# Patient Record
Sex: MALE | Race: WHITE | NOT HISPANIC OR LATINO | Employment: OTHER | URBAN - NONMETROPOLITAN AREA
[De-identification: names, ages, dates, MRNs, and addresses within clinical notes are randomized per-mention and may not be internally consistent; named-entity substitution may affect disease eponyms.]

---

## 2020-03-23 ENCOUNTER — HOSPITAL ENCOUNTER (EMERGENCY)
Facility: HOSPITAL | Age: 75
End: 2020-03-23
Attending: EMERGENCY MEDICINE | Admitting: EMERGENCY MEDICINE
Payer: COMMERCIAL

## 2020-03-23 ENCOUNTER — APPOINTMENT (EMERGENCY)
Dept: RADIOLOGY | Facility: HOSPITAL | Age: 75
End: 2020-03-23
Payer: COMMERCIAL

## 2020-03-23 ENCOUNTER — HOSPITAL ENCOUNTER (INPATIENT)
Facility: HOSPITAL | Age: 75
LOS: 7 days | Discharge: NURSING, REHAB OR LONG-TERM CARE | DRG: 813 | End: 2020-03-30
Attending: EMERGENCY MEDICINE | Admitting: ANESTHESIOLOGY
Payer: COMMERCIAL

## 2020-03-23 ENCOUNTER — APPOINTMENT (EMERGENCY)
Dept: CT IMAGING | Facility: HOSPITAL | Age: 75
End: 2020-03-23
Payer: COMMERCIAL

## 2020-03-23 VITALS
TEMPERATURE: 97.8 F | SYSTOLIC BLOOD PRESSURE: 94 MMHG | OXYGEN SATURATION: 98 % | DIASTOLIC BLOOD PRESSURE: 52 MMHG | WEIGHT: 134.48 LBS | HEART RATE: 71 BPM | RESPIRATION RATE: 17 BRPM

## 2020-03-23 DIAGNOSIS — R77.8 ELEVATED TROPONIN I LEVEL: ICD-10-CM

## 2020-03-23 DIAGNOSIS — N18.9 ACUTE KIDNEY INJURY SUPERIMPOSED ON CHRONIC KIDNEY DISEASE (HCC): ICD-10-CM

## 2020-03-23 DIAGNOSIS — D69.6 THROMBOCYTOPENIA (HCC): ICD-10-CM

## 2020-03-23 DIAGNOSIS — R19.5 OCCULT GI BLEEDING: ICD-10-CM

## 2020-03-23 DIAGNOSIS — N19 UREMIA: ICD-10-CM

## 2020-03-23 DIAGNOSIS — E87.2 HIGH ANION GAP METABOLIC ACIDOSIS: ICD-10-CM

## 2020-03-23 DIAGNOSIS — D64.9 SYMPTOMATIC ANEMIA: ICD-10-CM

## 2020-03-23 DIAGNOSIS — R77.8 ELEVATED TROPONIN: ICD-10-CM

## 2020-03-23 DIAGNOSIS — N17.9 ACUTE KIDNEY INJURY (HCC): ICD-10-CM

## 2020-03-23 DIAGNOSIS — E23.6 PITUITARY MASS (HCC): ICD-10-CM

## 2020-03-23 DIAGNOSIS — N17.9 ACUTE KIDNEY INJURY SUPERIMPOSED ON CHRONIC KIDNEY DISEASE (HCC): ICD-10-CM

## 2020-03-23 DIAGNOSIS — R57.9 SHOCK (HCC): Primary | ICD-10-CM

## 2020-03-23 DIAGNOSIS — E11.9 TYPE 2 DIABETES MELLITUS (HCC): ICD-10-CM

## 2020-03-23 DIAGNOSIS — K74.60 CIRRHOSIS (HCC): ICD-10-CM

## 2020-03-23 DIAGNOSIS — D64.9 ANEMIA: ICD-10-CM

## 2020-03-23 DIAGNOSIS — R79.89 ELEVATED LACTIC ACID LEVEL: ICD-10-CM

## 2020-03-23 DIAGNOSIS — G93.89 BRAIN MASS: ICD-10-CM

## 2020-03-23 PROBLEM — J44.9 COPD (CHRONIC OBSTRUCTIVE PULMONARY DISEASE) (HCC): Status: ACTIVE | Noted: 2020-03-23

## 2020-03-23 PROBLEM — I25.10 CAD (CORONARY ARTERY DISEASE): Status: ACTIVE | Noted: 2020-03-23

## 2020-03-23 LAB
ABO GROUP BLD: NORMAL
ABO GROUP BLD: NORMAL
ALBUMIN SERPL BCP-MCNC: 2.9 G/DL (ref 3.5–5)
ALBUMIN SERPL BCP-MCNC: 3.2 G/DL (ref 3.5–5)
ALP SERPL-CCNC: 36 U/L (ref 46–116)
ALP SERPL-CCNC: 40 U/L (ref 46–116)
ALT SERPL W P-5'-P-CCNC: 23 U/L (ref 12–78)
ALT SERPL W P-5'-P-CCNC: 23 U/L (ref 12–78)
AMMONIA PLAS-SCNC: 56 UMOL/L (ref 11–35)
ANION GAP SERPL CALCULATED.3IONS-SCNC: 10 MMOL/L (ref 4–13)
ANION GAP SERPL CALCULATED.3IONS-SCNC: 18 MMOL/L (ref 4–13)
ANION GAP SERPL CALCULATED.3IONS-SCNC: 20 MMOL/L (ref 4–13)
AST SERPL W P-5'-P-CCNC: 167 U/L (ref 5–45)
AST SERPL W P-5'-P-CCNC: 80 U/L (ref 5–45)
ATRIAL RATE: 79 BPM
BASE EX.OXY STD BLDV CALC-SCNC: 55.2 % (ref 60–80)
BASE EXCESS BLDV CALC-SCNC: -14.4 MMOL/L
BASOPHILS # BLD AUTO: 0.03 THOUSANDS/ΜL (ref 0–0.1)
BASOPHILS NFR BLD AUTO: 0 % (ref 0–1)
BILIRUB SERPL-MCNC: 0.4 MG/DL (ref 0.2–1)
BILIRUB SERPL-MCNC: 0.51 MG/DL (ref 0.2–1)
BILIRUB UR QL STRIP: NEGATIVE
BLD GP AB SCN SERPL QL: NEGATIVE
BLD GP AB SCN SERPL QL: NEGATIVE
BLD SMEAR INTERP: NORMAL
BUN SERPL-MCNC: 118 MG/DL (ref 5–25)
BUN SERPL-MCNC: 127 MG/DL (ref 5–25)
BUN SERPL-MCNC: 138 MG/DL (ref 5–25)
CALCIUM SERPL-MCNC: 7.4 MG/DL (ref 8.3–10.1)
CALCIUM SERPL-MCNC: 7.5 MG/DL (ref 8.3–10.1)
CALCIUM SERPL-MCNC: 8.4 MG/DL (ref 8.3–10.1)
CHLORIDE SERPL-SCNC: 109 MMOL/L (ref 100–108)
CHLORIDE SERPL-SCNC: 112 MMOL/L (ref 100–108)
CHLORIDE SERPL-SCNC: 123 MMOL/L (ref 100–108)
CLARITY UR: CLEAR
CO2 SERPL-SCNC: 13 MMOL/L (ref 21–32)
CO2 SERPL-SCNC: 13 MMOL/L (ref 21–32)
CO2 SERPL-SCNC: 14 MMOL/L (ref 21–32)
COLOR UR: YELLOW
CREAT SERPL-MCNC: 2.83 MG/DL (ref 0.6–1.3)
CREAT SERPL-MCNC: 3.17 MG/DL (ref 0.6–1.3)
CREAT SERPL-MCNC: 3.52 MG/DL (ref 0.6–1.3)
CRP SERPL QL: 8.6 MG/L
EOSINOPHIL # BLD AUTO: 0.14 THOUSAND/ΜL (ref 0–0.61)
EOSINOPHIL NFR BLD AUTO: 1 % (ref 0–6)
ERYTHROCYTE [DISTWIDTH] IN BLOOD BY AUTOMATED COUNT: 17.9 % (ref 11.6–15.1)
ERYTHROCYTE [DISTWIDTH] IN BLOOD BY AUTOMATED COUNT: 18.4 % (ref 11.6–15.1)
ERYTHROCYTE [SEDIMENTATION RATE] IN BLOOD: 19 MM/HOUR (ref 0–10)
FERRITIN SERPL-MCNC: 172 NG/ML (ref 8–388)
FIBRINOGEN PPP-MCNC: 208 MG/DL (ref 227–495)
FOLATE SERPL-MCNC: 13.7 NG/ML (ref 3.1–17.5)
GFR SERPL CREATININE-BSD FRML MDRD: 16 ML/MIN/1.73SQ M
GFR SERPL CREATININE-BSD FRML MDRD: 18 ML/MIN/1.73SQ M
GFR SERPL CREATININE-BSD FRML MDRD: 21 ML/MIN/1.73SQ M
GLUCOSE SERPL-MCNC: 126 MG/DL (ref 65–140)
GLUCOSE SERPL-MCNC: 133 MG/DL (ref 65–140)
GLUCOSE SERPL-MCNC: 136 MG/DL (ref 65–140)
GLUCOSE SERPL-MCNC: 146 MG/DL (ref 65–140)
GLUCOSE UR STRIP-MCNC: NEGATIVE MG/DL
HAV IGM SER QL: NORMAL
HBV CORE IGM SER QL: NORMAL
HBV SURFACE AG SER QL: NORMAL
HCO3 BLDV-SCNC: 11.5 MMOL/L (ref 24–30)
HCT VFR BLD AUTO: 18.8 % (ref 36.5–49.3)
HCT VFR BLD AUTO: 20.1 % (ref 36.5–49.3)
HCT VFR BLD AUTO: 20.7 % (ref 36.5–49.3)
HCV AB SER QL: NORMAL
HGB BLD-MCNC: 5.9 G/DL (ref 12–17)
HGB BLD-MCNC: 6.4 G/DL (ref 12–17)
HGB BLD-MCNC: 6.6 G/DL (ref 12–17)
HGB RETIC QN AUTO: 37.9 PG (ref 30–38.3)
HGB UR QL STRIP.AUTO: NEGATIVE
HOLD SPECIMEN: NORMAL
HOLD SPECIMEN: NORMAL
IMM GRANULOCYTES # BLD AUTO: 0.18 THOUSAND/UL (ref 0–0.2)
IMM GRANULOCYTES NFR BLD AUTO: 1 % (ref 0–2)
IMM RETICS NFR: 52.2 % (ref 0–14)
INR PPP: 1.36 (ref 0.84–1.19)
IRON SATN MFR SERPL: 83 %
IRON SERPL-MCNC: 280 UG/DL (ref 65–175)
KETONES UR STRIP-MCNC: NEGATIVE MG/DL
LACTATE SERPL-SCNC: 0.8 MMOL/L (ref 0.5–2)
LACTATE SERPL-SCNC: 2.4 MMOL/L (ref 0.5–2)
LACTATE SERPL-SCNC: 2.7 MMOL/L (ref 0.5–2)
LDH SERPL-CCNC: 370 U/L (ref 81–234)
LEUKOCYTE ESTERASE UR QL STRIP: NEGATIVE
LIPASE SERPL-CCNC: 52 U/L (ref 73–393)
LYMPHOCYTES # BLD AUTO: 5.95 THOUSANDS/ΜL (ref 0.6–4.47)
LYMPHOCYTES NFR BLD AUTO: 40 % (ref 14–44)
MAGNESIUM SERPL-MCNC: 1.6 MG/DL (ref 1.6–2.6)
MAGNESIUM SERPL-MCNC: 1.6 MG/DL (ref 1.6–2.6)
MCH RBC QN AUTO: 31.7 PG (ref 26.8–34.3)
MCH RBC QN AUTO: 33.1 PG (ref 26.8–34.3)
MCHC RBC AUTO-ENTMCNC: 31.4 G/DL (ref 31.4–37.4)
MCHC RBC AUTO-ENTMCNC: 31.8 G/DL (ref 31.4–37.4)
MCV RBC AUTO: 100 FL (ref 82–98)
MCV RBC AUTO: 106 FL (ref 82–98)
MONOCYTES # BLD AUTO: 1.51 THOUSAND/ΜL (ref 0.17–1.22)
MONOCYTES NFR BLD AUTO: 10 % (ref 4–12)
NEUTROPHILS # BLD AUTO: 7.16 THOUSANDS/ΜL (ref 1.85–7.62)
NEUTS SEG NFR BLD AUTO: 48 % (ref 43–75)
NITRITE UR QL STRIP: NEGATIVE
NRBC BLD AUTO-RTO: 1 /100 WBCS
O2 CT BLDV-SCNC: 5.6 ML/DL
P AXIS: 69 DEGREES
PCO2 BLDV: 27 MM HG (ref 42–50)
PH BLDV: 7.25 [PH] (ref 7.3–7.4)
PH UR STRIP.AUTO: 5 [PH]
PHOSPHATE SERPL-MCNC: 3.2 MG/DL (ref 2.3–4.1)
PLATELET # BLD AUTO: 13 THOUSANDS/UL (ref 149–390)
PLATELET # BLD AUTO: 2 THOUSANDS/UL (ref 149–390)
PMV BLD AUTO: 9.4 FL (ref 8.9–12.7)
PO2 BLDV: 33.3 MM HG (ref 35–45)
POTASSIUM SERPL-SCNC: 3.9 MMOL/L (ref 3.5–5.3)
POTASSIUM SERPL-SCNC: 4.4 MMOL/L (ref 3.5–5.3)
POTASSIUM SERPL-SCNC: 4.5 MMOL/L (ref 3.5–5.3)
PR INTERVAL: 158 MS
PROCALCITONIN SERPL-MCNC: 0.68 NG/ML
PROT SERPL-MCNC: 6 G/DL (ref 6.4–8.2)
PROT SERPL-MCNC: 6.6 G/DL (ref 6.4–8.2)
PROT UR STRIP-MCNC: NEGATIVE MG/DL
PROTHROMBIN TIME: 16.8 SECONDS (ref 11.6–14.5)
QRS AXIS: 62 DEGREES
QRSD INTERVAL: 88 MS
QT INTERVAL: 450 MS
QTC INTERVAL: 516 MS
RBC # BLD AUTO: 1.78 MILLION/UL (ref 3.88–5.62)
RBC # BLD AUTO: 2.02 MILLION/UL (ref 3.88–5.62)
RETICS # AUTO: ABNORMAL 10*3/UL (ref 14356–105094)
RETICS # CALC: 6.66 % (ref 0.37–1.87)
RH BLD: POSITIVE
RH BLD: POSITIVE
SODIUM SERPL-SCNC: 143 MMOL/L (ref 136–145)
SODIUM SERPL-SCNC: 143 MMOL/L (ref 136–145)
SODIUM SERPL-SCNC: 146 MMOL/L (ref 136–145)
SP GR UR STRIP.AUTO: 1.02 (ref 1–1.03)
SPECIMEN EXPIRATION DATE: NORMAL
SPECIMEN EXPIRATION DATE: NORMAL
T WAVE AXIS: 125 DEGREES
T4 FREE SERPL-MCNC: 0.79 NG/DL (ref 0.76–1.46)
TIBC SERPL-MCNC: 337 UG/DL (ref 250–450)
TROPONIN I SERPL-MCNC: 23.18 NG/ML
TROPONIN I SERPL-MCNC: 37.2 NG/ML
TROPONIN I SERPL-MCNC: 37.7 NG/ML
TROPONIN I SERPL-MCNC: 8.52 NG/ML
TSH SERPL DL<=0.05 MIU/L-ACNC: 1.45 UIU/ML (ref 0.36–3.74)
UROBILINOGEN UR QL STRIP.AUTO: 0.2 E.U./DL
VENTRICULAR RATE: 79 BPM
VIT B12 SERPL-MCNC: 437 PG/ML (ref 100–900)
WBC # BLD AUTO: 14.89 THOUSAND/UL (ref 4.31–10.16)
WBC # BLD AUTO: 15.21 THOUSAND/UL (ref 4.31–10.16)

## 2020-03-23 PROCEDURE — 85025 COMPLETE CBC W/AUTO DIFF WBC: CPT | Performed by: STUDENT IN AN ORGANIZED HEALTH CARE EDUCATION/TRAINING PROGRAM

## 2020-03-23 PROCEDURE — 83550 IRON BINDING TEST: CPT | Performed by: STUDENT IN AN ORGANIZED HEALTH CARE EDUCATION/TRAINING PROGRAM

## 2020-03-23 PROCEDURE — 96366 THER/PROPH/DIAG IV INF ADDON: CPT

## 2020-03-23 PROCEDURE — 86140 C-REACTIVE PROTEIN: CPT | Performed by: STUDENT IN AN ORGANIZED HEALTH CARE EDUCATION/TRAINING PROGRAM

## 2020-03-23 PROCEDURE — 84439 ASSAY OF FREE THYROXINE: CPT | Performed by: EMERGENCY MEDICINE

## 2020-03-23 PROCEDURE — 96361 HYDRATE IV INFUSION ADD-ON: CPT

## 2020-03-23 PROCEDURE — 85025 COMPLETE CBC W/AUTO DIFF WBC: CPT | Performed by: EMERGENCY MEDICINE

## 2020-03-23 PROCEDURE — 83605 ASSAY OF LACTIC ACID: CPT | Performed by: STUDENT IN AN ORGANIZED HEALTH CARE EDUCATION/TRAINING PROGRAM

## 2020-03-23 PROCEDURE — 86901 BLOOD TYPING SEROLOGIC RH(D): CPT | Performed by: EMERGENCY MEDICINE

## 2020-03-23 PROCEDURE — 85007 BL SMEAR W/DIFF WBC COUNT: CPT | Performed by: STUDENT IN AN ORGANIZED HEALTH CARE EDUCATION/TRAINING PROGRAM

## 2020-03-23 PROCEDURE — 83735 ASSAY OF MAGNESIUM: CPT | Performed by: EMERGENCY MEDICINE

## 2020-03-23 PROCEDURE — 80048 BASIC METABOLIC PNL TOTAL CA: CPT | Performed by: EMERGENCY MEDICINE

## 2020-03-23 PROCEDURE — 83540 ASSAY OF IRON: CPT | Performed by: STUDENT IN AN ORGANIZED HEALTH CARE EDUCATION/TRAINING PROGRAM

## 2020-03-23 PROCEDURE — 99291 CRITICAL CARE FIRST HOUR: CPT | Performed by: ANESTHESIOLOGY

## 2020-03-23 PROCEDURE — 36430 TRANSFUSION BLD/BLD COMPNT: CPT

## 2020-03-23 PROCEDURE — 80053 COMPREHEN METABOLIC PANEL: CPT | Performed by: EMERGENCY MEDICINE

## 2020-03-23 PROCEDURE — 85652 RBC SED RATE AUTOMATED: CPT | Performed by: STUDENT IN AN ORGANIZED HEALTH CARE EDUCATION/TRAINING PROGRAM

## 2020-03-23 PROCEDURE — 85384 FIBRINOGEN ACTIVITY: CPT | Performed by: STUDENT IN AN ORGANIZED HEALTH CARE EDUCATION/TRAINING PROGRAM

## 2020-03-23 PROCEDURE — 74176 CT ABD & PELVIS W/O CONTRAST: CPT

## 2020-03-23 PROCEDURE — 99284 EMERGENCY DEPT VISIT MOD MDM: CPT | Performed by: EMERGENCY MEDICINE

## 2020-03-23 PROCEDURE — 71045 X-RAY EXAM CHEST 1 VIEW: CPT

## 2020-03-23 PROCEDURE — 86920 COMPATIBILITY TEST SPIN: CPT

## 2020-03-23 PROCEDURE — 82948 REAGENT STRIP/BLOOD GLUCOSE: CPT

## 2020-03-23 PROCEDURE — 81003 URINALYSIS AUTO W/O SCOPE: CPT | Performed by: EMERGENCY MEDICINE

## 2020-03-23 PROCEDURE — 84484 ASSAY OF TROPONIN QUANT: CPT | Performed by: STUDENT IN AN ORGANIZED HEALTH CARE EDUCATION/TRAINING PROGRAM

## 2020-03-23 PROCEDURE — 85610 PROTHROMBIN TIME: CPT | Performed by: EMERGENCY MEDICINE

## 2020-03-23 PROCEDURE — 36415 COLL VENOUS BLD VENIPUNCTURE: CPT

## 2020-03-23 PROCEDURE — 86850 RBC ANTIBODY SCREEN: CPT | Performed by: STUDENT IN AN ORGANIZED HEALTH CARE EDUCATION/TRAINING PROGRAM

## 2020-03-23 PROCEDURE — P9016 RBC LEUKOCYTES REDUCED: HCPCS

## 2020-03-23 PROCEDURE — 83690 ASSAY OF LIPASE: CPT | Performed by: EMERGENCY MEDICINE

## 2020-03-23 PROCEDURE — 99285 EMERGENCY DEPT VISIT HI MDM: CPT

## 2020-03-23 PROCEDURE — 86901 BLOOD TYPING SEROLOGIC RH(D): CPT | Performed by: STUDENT IN AN ORGANIZED HEALTH CARE EDUCATION/TRAINING PROGRAM

## 2020-03-23 PROCEDURE — 84443 ASSAY THYROID STIM HORMONE: CPT | Performed by: EMERGENCY MEDICINE

## 2020-03-23 PROCEDURE — P9100 PATHOGEN TEST FOR PLATELETS: HCPCS

## 2020-03-23 PROCEDURE — 86900 BLOOD TYPING SEROLOGIC ABO: CPT | Performed by: EMERGENCY MEDICINE

## 2020-03-23 PROCEDURE — 96375 TX/PRO/DX INJ NEW DRUG ADDON: CPT

## 2020-03-23 PROCEDURE — 85014 HEMATOCRIT: CPT | Performed by: EMERGENCY MEDICINE

## 2020-03-23 PROCEDURE — 30233R1 TRANSFUSION OF NONAUTOLOGOUS PLATELETS INTO PERIPHERAL VEIN, PERCUTANEOUS APPROACH: ICD-10-PCS | Performed by: INTERNAL MEDICINE

## 2020-03-23 PROCEDURE — 84100 ASSAY OF PHOSPHORUS: CPT | Performed by: STUDENT IN AN ORGANIZED HEALTH CARE EDUCATION/TRAINING PROGRAM

## 2020-03-23 PROCEDURE — 85027 COMPLETE CBC AUTOMATED: CPT | Performed by: STUDENT IN AN ORGANIZED HEALTH CARE EDUCATION/TRAINING PROGRAM

## 2020-03-23 PROCEDURE — 84145 PROCALCITONIN (PCT): CPT | Performed by: EMERGENCY MEDICINE

## 2020-03-23 PROCEDURE — 87040 BLOOD CULTURE FOR BACTERIA: CPT | Performed by: EMERGENCY MEDICINE

## 2020-03-23 PROCEDURE — 96368 THER/DIAG CONCURRENT INF: CPT

## 2020-03-23 PROCEDURE — 96365 THER/PROPH/DIAG IV INF INIT: CPT

## 2020-03-23 PROCEDURE — 82140 ASSAY OF AMMONIA: CPT | Performed by: STUDENT IN AN ORGANIZED HEALTH CARE EDUCATION/TRAINING PROGRAM

## 2020-03-23 PROCEDURE — 84484 ASSAY OF TROPONIN QUANT: CPT | Performed by: EMERGENCY MEDICINE

## 2020-03-23 PROCEDURE — C9113 INJ PANTOPRAZOLE SODIUM, VIA: HCPCS | Performed by: EMERGENCY MEDICINE

## 2020-03-23 PROCEDURE — 93005 ELECTROCARDIOGRAM TRACING: CPT

## 2020-03-23 PROCEDURE — 82607 VITAMIN B-12: CPT | Performed by: STUDENT IN AN ORGANIZED HEALTH CARE EDUCATION/TRAINING PROGRAM

## 2020-03-23 PROCEDURE — 83010 ASSAY OF HAPTOGLOBIN QUANT: CPT | Performed by: STUDENT IN AN ORGANIZED HEALTH CARE EDUCATION/TRAINING PROGRAM

## 2020-03-23 PROCEDURE — 83615 LACTATE (LD) (LDH) ENZYME: CPT | Performed by: STUDENT IN AN ORGANIZED HEALTH CARE EDUCATION/TRAINING PROGRAM

## 2020-03-23 PROCEDURE — 82746 ASSAY OF FOLIC ACID SERUM: CPT | Performed by: STUDENT IN AN ORGANIZED HEALTH CARE EDUCATION/TRAINING PROGRAM

## 2020-03-23 PROCEDURE — 83735 ASSAY OF MAGNESIUM: CPT | Performed by: STUDENT IN AN ORGANIZED HEALTH CARE EDUCATION/TRAINING PROGRAM

## 2020-03-23 PROCEDURE — 85362 FIBRIN DEGRADATION PRODUCTS: CPT | Performed by: STUDENT IN AN ORGANIZED HEALTH CARE EDUCATION/TRAINING PROGRAM

## 2020-03-23 PROCEDURE — 82728 ASSAY OF FERRITIN: CPT | Performed by: STUDENT IN AN ORGANIZED HEALTH CARE EDUCATION/TRAINING PROGRAM

## 2020-03-23 PROCEDURE — 83605 ASSAY OF LACTIC ACID: CPT | Performed by: EMERGENCY MEDICINE

## 2020-03-23 PROCEDURE — C9113 INJ PANTOPRAZOLE SODIUM, VIA: HCPCS | Performed by: STUDENT IN AN ORGANIZED HEALTH CARE EDUCATION/TRAINING PROGRAM

## 2020-03-23 PROCEDURE — 85018 HEMOGLOBIN: CPT | Performed by: EMERGENCY MEDICINE

## 2020-03-23 PROCEDURE — P9037 PLATE PHERES LEUKOREDU IRRAD: HCPCS

## 2020-03-23 PROCEDURE — 93010 ELECTROCARDIOGRAM REPORT: CPT | Performed by: INTERNAL MEDICINE

## 2020-03-23 PROCEDURE — P9035 PLATELET PHERES LEUKOREDUCED: HCPCS

## 2020-03-23 PROCEDURE — 85046 RETICYTE/HGB CONCENTRATE: CPT | Performed by: STUDENT IN AN ORGANIZED HEALTH CARE EDUCATION/TRAINING PROGRAM

## 2020-03-23 PROCEDURE — 80074 ACUTE HEPATITIS PANEL: CPT | Performed by: EMERGENCY MEDICINE

## 2020-03-23 PROCEDURE — 80053 COMPREHEN METABOLIC PANEL: CPT | Performed by: STUDENT IN AN ORGANIZED HEALTH CARE EDUCATION/TRAINING PROGRAM

## 2020-03-23 PROCEDURE — 82805 BLOOD GASES W/O2 SATURATION: CPT | Performed by: EMERGENCY MEDICINE

## 2020-03-23 PROCEDURE — 70450 CT HEAD/BRAIN W/O DYE: CPT

## 2020-03-23 PROCEDURE — 86900 BLOOD TYPING SEROLOGIC ABO: CPT | Performed by: STUDENT IN AN ORGANIZED HEALTH CARE EDUCATION/TRAINING PROGRAM

## 2020-03-23 PROCEDURE — 96367 TX/PROPH/DG ADDL SEQ IV INF: CPT

## 2020-03-23 PROCEDURE — 30233N1 TRANSFUSION OF NONAUTOLOGOUS RED BLOOD CELLS INTO PERIPHERAL VEIN, PERCUTANEOUS APPROACH: ICD-10-PCS | Performed by: INTERNAL MEDICINE

## 2020-03-23 PROCEDURE — 86850 RBC ANTIBODY SCREEN: CPT | Performed by: EMERGENCY MEDICINE

## 2020-03-23 RX ORDER — CHLORHEXIDINE GLUCONATE 0.12 MG/ML
15 RINSE ORAL EVERY 12 HOURS SCHEDULED
Status: DISCONTINUED | OUTPATIENT
Start: 2020-03-23 | End: 2020-03-30 | Stop reason: HOSPADM

## 2020-03-23 RX ORDER — DOCUSATE SODIUM 100 MG/1
100 CAPSULE, LIQUID FILLED ORAL 2 TIMES DAILY
Status: DISCONTINUED | OUTPATIENT
Start: 2020-03-24 | End: 2020-03-26

## 2020-03-23 RX ORDER — FERROUS SULFATE 325(65) MG
325 TABLET ORAL 2 TIMES DAILY WITH MEALS
COMMUNITY
End: 2020-04-21 | Stop reason: ALTCHOICE

## 2020-03-23 RX ORDER — CEFEPIME HYDROCHLORIDE 2 G/50ML
2000 INJECTION, SOLUTION INTRAVENOUS ONCE
Status: COMPLETED | OUTPATIENT
Start: 2020-03-23 | End: 2020-03-23

## 2020-03-23 RX ORDER — ONDANSETRON 2 MG/ML
4 INJECTION INTRAMUSCULAR; INTRAVENOUS ONCE
Status: COMPLETED | OUTPATIENT
Start: 2020-03-23 | End: 2020-03-23

## 2020-03-23 RX ORDER — OMEPRAZOLE 20 MG/1
20 CAPSULE, DELAYED RELEASE ORAL DAILY
COMMUNITY
End: 2020-03-30 | Stop reason: HOSPADM

## 2020-03-23 RX ORDER — ACETAMINOPHEN 325 MG/1
650 TABLET ORAL EVERY 6 HOURS PRN
Status: DISCONTINUED | OUTPATIENT
Start: 2020-03-23 | End: 2020-03-30 | Stop reason: HOSPADM

## 2020-03-23 RX ORDER — ROSUVASTATIN CALCIUM 20 MG/1
20 TABLET, COATED ORAL DAILY
COMMUNITY

## 2020-03-23 RX ORDER — FERROUS SULFATE 325(65) MG
325 TABLET ORAL 2 TIMES DAILY WITH MEALS
Status: DISCONTINUED | OUTPATIENT
Start: 2020-03-24 | End: 2020-03-30 | Stop reason: HOSPADM

## 2020-03-23 RX ORDER — POLYVINYL ALCOHOL 14 MG/ML
2 SOLUTION/ DROPS OPHTHALMIC 2 TIMES DAILY
COMMUNITY
End: 2020-11-03 | Stop reason: ALTCHOICE

## 2020-03-23 RX ORDER — BISACODYL 10 MG
10 SUPPOSITORY, RECTAL RECTAL DAILY PRN
COMMUNITY

## 2020-03-23 RX ORDER — PANTOPRAZOLE SODIUM 40 MG/1
40 INJECTION, POWDER, FOR SOLUTION INTRAVENOUS ONCE
Status: COMPLETED | OUTPATIENT
Start: 2020-03-23 | End: 2020-03-23

## 2020-03-23 RX ORDER — ACETAMINOPHEN 325 MG/1
650 TABLET ORAL EVERY 6 HOURS PRN
COMMUNITY

## 2020-03-23 RX ORDER — ASPIRIN 81 MG/1
81 TABLET, CHEWABLE ORAL DAILY
COMMUNITY
End: 2020-03-30 | Stop reason: HOSPADM

## 2020-03-23 RX ORDER — PANTOPRAZOLE SODIUM 40 MG/1
40 INJECTION, POWDER, FOR SOLUTION INTRAVENOUS EVERY 12 HOURS SCHEDULED
Status: DISCONTINUED | OUTPATIENT
Start: 2020-03-23 | End: 2020-03-24

## 2020-03-23 RX ORDER — DOCUSATE SODIUM 100 MG/1
100 CAPSULE, LIQUID FILLED ORAL 2 TIMES DAILY
COMMUNITY
End: 2021-03-07

## 2020-03-23 RX ORDER — BISACODYL 10 MG
10 SUPPOSITORY, RECTAL RECTAL DAILY PRN
Status: DISCONTINUED | OUTPATIENT
Start: 2020-03-23 | End: 2020-03-30 | Stop reason: HOSPADM

## 2020-03-23 RX ORDER — ATORVASTATIN CALCIUM 40 MG/1
40 TABLET, FILM COATED ORAL
Status: DISCONTINUED | OUTPATIENT
Start: 2020-03-24 | End: 2020-03-30 | Stop reason: HOSPADM

## 2020-03-23 RX ORDER — FENOFIBRATE 145 MG/1
145 TABLET, COATED ORAL DAILY
COMMUNITY
End: 2021-04-07 | Stop reason: SINTOL

## 2020-03-23 RX ADMIN — CEFEPIME HYDROCHLORIDE 2000 MG: 2 INJECTION, SOLUTION INTRAVENOUS at 15:18

## 2020-03-23 RX ADMIN — NOREPINEPHRINE BITARTRATE 5 MCG/MIN: 1 INJECTION, SOLUTION, CONCENTRATE INTRAVENOUS at 16:41

## 2020-03-23 RX ADMIN — CHLORHEXIDINE GLUCONATE 0.12% ORAL RINSE 15 ML: 1.2 LIQUID ORAL at 21:46

## 2020-03-23 RX ADMIN — PANTOPRAZOLE SODIUM 40 MG: 40 INJECTION, POWDER, FOR SOLUTION INTRAVENOUS at 13:51

## 2020-03-23 RX ADMIN — SODIUM CHLORIDE 1000 ML: 0.9 INJECTION, SOLUTION INTRAVENOUS at 13:44

## 2020-03-23 RX ADMIN — SODIUM CHLORIDE, SODIUM LACTATE, POTASSIUM CHLORIDE, AND CALCIUM CHLORIDE 500 ML: .6; .31; .03; .02 INJECTION, SOLUTION INTRAVENOUS at 15:18

## 2020-03-23 RX ADMIN — DESMOPRESSIN ACETATE 18.4 MCG: 4 SOLUTION INTRAVENOUS at 14:32

## 2020-03-23 RX ADMIN — PANTOPRAZOLE SODIUM 40 MG: 40 INJECTION, POWDER, FOR SOLUTION INTRAVENOUS at 21:46

## 2020-03-23 RX ADMIN — ONDANSETRON 4 MG: 2 INJECTION INTRAMUSCULAR; INTRAVENOUS at 13:50

## 2020-03-23 RX ADMIN — SODIUM CHLORIDE 10 MCG/MIN: 0.9 INJECTION, SOLUTION INTRAVENOUS at 20:45

## 2020-03-23 NOTE — ED NOTES
Unit of blood completed at this time   VS: 96 3; HR: 76 BP: 82/47: resp: 17 Oxygen sat: Tamir Loyola RN  03/23/20 7208

## 2020-03-23 NOTE — H&P
History and Physical - Critical Care  Yas Banks 69 y o  male MRN: [id number]  Unit/Bed#: ICU 02 Encounter: [id number]     Reason for Admission / Chief Complaint: Brain mass, shock, anemia       History of Present Illness:  Yas Banks is a 69 y o  male who presents as a transfer from [address]  Patient has medical history significant for COPD, hyperlipidemia, coronary artery disease diabetes mellitus type 2, thrombocytopenia, CKD stage 3  Patient is a resident [facility]  Patient initially presented to the emergency department for abnormal lab results in [facility] home  Outpatient lab studies showed a hemoglobin of 7 with platelet count of less than 10 and elevated /creatinine 3  On questioning, patient referred small amount of blood in his stool as well as mild abdominal pain and nausea, exertional dyspnea does also present at rest, worse with standing  Patient appeared confused on questioning and was unable to provide full details  Despite lab derangements, patient hemodynamically stable with a heart rate of 79  Patient was mildly hypothermic at 35 8° C  Lab significant for hemoglobin 5 9-6 6, WBC 15, platelets 2, lactic acid 2 7-2 4, creatinine 3 52-3 17, -127, bicarb 13 with anion gap of 18 VBG 7 247/27 0/33 3/11 5/-14 4, troponin 8 52, INR 1 36, magnesium 1 6, AST 80, albumin 3 2  Chest x-ray without acute cardiopulmonary disease  CT abdomen pelvis with nodular liver concerning for underlying cirrhosis, multiple bilateral renal nodules some of which meet the criteria for simple cyst and others which do not, diverticulosis without diverticulitis  CT head with 2 5 x 2 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 with locally invasive features of the adjacent skull and extension into the sphenoid sinus on the left 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 versus pituitary carcinoma versus craniopharyngioma versus meningioma versus giant saccular aneurysm    Prominence of the subdural CSF spaces bilaterally which 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  EKG with normal sinus rhythm with heart rate 79, prolonged QTC of 516 milliseconds, T-wave inversion in lateral leads and ST depression in V3, isolated Q-wave in III  Patient received 1 5 L IVF, 1U pRBC, 1 U plt, DDAVP, protonix, cefepime and was started on levo for hypotension  Discussed case with nursing home  Stated patient felt dizzy a few days ago which prompted lab draws as an outpatient  They stated he had loose bowel movements however no blood or melena as bowel movements  FOBT was positive therapy  Per [facility] home, labs from 11/22/2019 had creatinine 2 2, BUN 35 and from 07/22/2019 hemoglobin 10 0  History obtained from chart review  Past Medical History:  No past medical history on file  Past Surgical History:  No past surgical history on file  Past Family History:  No family history on file       Social History:  E-Cigarette/Vaping     E-Cigarette/Vaping Substances     Social History     Tobacco Use   Smoking Status Current Every Day Smoker    Packs/day: 0 25   Smokeless Tobacco Never Used     Social History     Substance and Sexual Activity   Alcohol Use Never    Frequency: Never     Social History     Substance and Sexual Activity   Drug Use Never     Marital Status: /Civil Union     Medications:  Current Facility-Administered Medications   Medication Dose Route Frequency    [START ON 3/24/2020] cefepime (MAXIPIME) 1,000 mg in dextrose 5 % 50 mL IVPB  1,000 mg Intravenous Q12H    chlorhexidine (PERIDEX) 0 12 % oral rinse 15 mL  15 mL Swish & Spit Q12H [facility]    [START ON 3/24/2020] insulin lispro (HumaLOG) 100 units/mL subcutaneous injection 1-5 Units  1-5 Units Subcutaneous Q6H [facility]    norepinephrine (LEVOPHED) 4 mg (STANDARD CONCENTRATION) IV in sodium chloride 0 9% 250 mL  1-30 mcg/min Intravenous Titrated    pantoprazole (PROTONIX) injection 40 mg  40 mg Intravenous Q12H [facility] Home medications:  Prior to Admission medications    Not on File     Allergies: Allergies   Allergen Reactions    Erythromycin     Penicillins     Shellfish-Derived Products         ROS:   Review of Systems   Constitutional: Negative for appetite change, chills, diaphoresis, fatigue, fever and unexpected weight change  HENT: Negative for sore throat  Eyes: Negative for visual disturbance  Respiratory: Negative for cough, chest tightness, shortness of breath and wheezing  Cardiovascular: Negative for chest pain, palpitations and leg swelling  Gastrointestinal: Negative for abdominal distention, abdominal pain, blood in stool, constipation, diarrhea, nausea and vomiting  Genitourinary: Negative for difficulty urinating, flank pain and urgency  Musculoskeletal: Negative for arthralgias and myalgias  Skin: Negative for pallor and rash  Neurological: Negative for dizziness, weakness, light-headedness and headaches  Vitals:  Vitals:    20   BP: (!) 95/39   BP Location: Left arm   Pulse: 72   Resp: 18   Temp: (!) 97 4 °F (36 3 °C)   TempSrc: Oral   SpO2: 99%   Weight: 59 8 kg (131 lb 13 4 oz)   Height: 5' 6" (1 676 m)     Temperature:   Temp (24hrs), Av 7 °F (35 9 °C), Min:96 1 °F (35 6 °C), Max:97 8 °F (36 6 °C)    Current: Temperature: (!) 97 4 °F (36 3 °C)     Weights:   IBW: 63 8 kg  Body mass index is 21 28 kg/m²  Hemodynamic Monitoring:  N/A     Non-Invasive/Invasive Ventilation Settings:  Respiratory    Lab Data (Last 4 hours)    None         O2/Vent Data (Last 4 hours)    None              No results found for: PHART, OEK3SCN, PO2ART, GAH2NLF, F0ZUTOVQ, BEART, SOURCE  SpO2: SpO2: 99 %     Physical Exam:  Physical Exam   Constitutional: He is oriented to person, place, and time  He appears well-developed and well-nourished  No distress  HENT:   Head: Normocephalic and atraumatic  Mouth/Throat: Oropharynx is clear and moist  No oropharyngeal exudate     Eyes: Pupils are equal, round, and reactive to light  Conjunctivae and EOM are normal  No scleral icterus  Neck: Neck supple  JVD (No JVD however there is JVD on hepatic jugular reflex) present  No thyromegaly present  Cardiovascular: Normal rate and regular rhythm  No murmur heard  Pulmonary/Chest: Effort normal and breath sounds normal  No respiratory distress  He has no wheezes  He exhibits no tenderness  Ambient air   Abdominal: Soft  Bowel sounds are normal  He exhibits no distension  There is no tenderness  There is no guarding  Musculoskeletal: Normal range of motion  He exhibits no edema or deformity  Lymphadenopathy:     He has no cervical adenopathy  Neurological: He is alert and oriented to person, place, and time  He exhibits normal muscle tone  Awake, alert and oriented to person place and time however he is unaware of the month and does answer questions slowly at times, able to move all extremities and follows commands, EOMI, PERRLA   Skin: Skin is warm and dry  Capillary refill takes less than 2 seconds  No rash noted  He is not diaphoretic  No erythema  There is pallor  Psychiatric: He has a normal mood and affect  His behavior is normal    Nursing note and vitals reviewed         Labs:  Results from last 7 days   Lab Units 03/23/20  1754 03/23/20  1322   WBC Thousand/uL  --  14 89*   HEMOGLOBIN g/dL 6 6* 5 9*   HEMATOCRIT % 20 7* 18 8*   PLATELETS Thousands/uL  --  2*   NEUTROS PCT %  --  48   MONOS PCT %  --  10      Results from last 7 days   Lab Units 03/23/20  1610 03/23/20  1322   SODIUM mmol/L 143 143   POTASSIUM mmol/L 4 5 4 4   CHLORIDE mmol/L 112* 109*   CO2 mmol/L 13* 14*   BUN mg/dL 127* 138*   CREATININE mg/dL 3 17* 3 52*   CALCIUM mg/dL 7 5* 8 4   ALK PHOS U/L  --  40*   ALT U/L  --  23   AST U/L  --  80*     Results from last 7 days   Lab Units 03/23/20  1322   MAGNESIUM mg/dL 1 6          Results from last 7 days   Lab Units 03/23/20  1322   INR  1 36*     Results from last 7 days   Lab Units 03/23/20  1610   LACTIC ACID mmol/L 2 4*     0   Lab Value Date/Time    TROPONINI 23 18 () 03/23/2020 1754    TROPONINI 8 52 () 03/23/2020 1340        Imaging:  I have personally reviewed pertinent reports  EKG: This was personally reviewed by myself  Micro:  No results found for: [place]ice [place], SPUTUMCULTUR    Assessment:  78-year-old male with past medical history significant for CAD status post CABG, COPD, hyperlipidemia transferred to [facility] for brain mass, symptomatic anemia and shock currently requiring vasopressor support       Plan:                  Neuro:  Brain mass - 2 5 x 2 0 hyperdense mass in the pituitary fossa with locally invasive features of adjacent skull base extension into the sphenoid sinus on the left  -appreciate neurosurgery recommendations  -neuro checks q 4 hours  -[facility] ICU                 CV:   Shock - consider septic versus hypovolemic in the setting of anemia, bedside ultrasound without effusion or RWMA, extremities warm  Coronary artery disease status post CABG  Elevation in troponin - likely demand ischemia in the setting of non MI elevation in troponin versus NSTEMI type 1 - will avoid heparin drip at this time given thrombocytopenia and anemia  -goal MAP >65 mmHg  -trend troponin  -serial EKG  -check echocardiogram  -appreciate cardiology recommendations  -monitor clinically for chest pain or signs of heart failure                 Lung:   Charted history COPD - not currently in exacerbation, saturating well on room air  -goal SpO2 greater than 90% given ischemia  -incentive spirometry                 GI:   Cirrhosis  -Protonix IV b i d   -check ammonia level                 FEN:   -NPO  -goal potassium greater than 4, magnesium greater than 2  -hold off on IV fluids given volume resuscitation with blood products                 :   Multiple bilateral renal cysts  Acute kidney injury on chronic kidney disease - most recent creatinine 2 2/BUN 35 in November per nursing home - consider in the setting of symptomatic anemia  Uremia - likely in the setting of acute kidney injury however consider GI bleed as possibility  -I/O  -daily weights  -urinary retention protocol  -nephrology recommendations appreciated  -consider renal US                 ID:   Hypothermia  Lactic acidosis  -continue cefepime  -trend WBC, fever curve  -trend lactic acid                 Heme:   Symptomatic anemia - rectal exam negative, FOBT positive (per nursing home)  Thrombocytopenia  -trend hemoglobin  -anemia workup with hemolysis smear, LDH, haptoglobin, retic with the tip hemoglobin, B12, folate, iron panel  -DIC workup  -platelet clumping study  -appreciate gastroenterology recommendations                 Endo:   Type 2 diabetes mellitus  -blood glucose checks Q 6  -correctional scale insulin q 6 while NPO                 Msk/Skin:   Petechia  -PT/OT  -check ESR/CRP                 Disposition:  Critical care, await fax from facility for medication reconciliation  VTE Pharmacologic Prophylaxis:  Contraindicated in the setting of symptomatic anemia, thrombocytopenia  VTE Mechanical Prophylaxis: sequential compression device     Invasive lines and devices: Invasive Devices     Central Venous Catheter Line            CVC Central Lines 03/23/20 Triple Left Femoral less than 1 day          Peripheral Intravenous Line            Peripheral IV 03/23/20 Left Antecubital less than 1 day    Peripheral IV 03/23/20 Right Antecubital less than 1 day                 Code Status: Level 3 - DNAR and DNI  POA:    POLST:       Given critical illness, patient length of stay will require greater than two midnights  Portions of the record may have been created with voice recognition software  Occasional wrong word or "sound a like" substitutions may have occurred due to the inherent limitations of voice recognition software    Read the chart carefully and recognize, using context, where substitutions have occurred          Cristo Merchant MD

## 2020-03-23 NOTE — ED NOTES
Blood at hub at 1411  Temp:96 1; HR 72; BP: 75/52, RESP: 18 OXYGEN SAT: 96% ON ROOM AIR     Lesley Loyola RN  03/23/20 [address] Loyola RN  03/23/20

## 2020-03-23 NOTE — EMTALA/ACUTE CARE TRANSFER
[facility] EMERGENCY DEPARTMENT  [address] [facility] [address]  Dept: [phone]      EMTALA TRANSFER CONSENT    NAME Geri Hartmann 1945                              MRN [id number]    I have been informed of my rights regarding examination, treatment, and transfer   by Dr Jose Schmidt, DO    Benefits: Specialized equipment and/or services available at the receiving facility (Include comment)________________________(critical care)    Risks: Potential for delay in receiving treatment, Possible worsening of condition or death during transfer, Potential deterioration of medical condition, Loss of IV, Increased discomfort during transfer(motor vehicle crash)      Consent for Transfer:  I acknowledge that my medical condition has been evaluated and explained to me by the emergency department physician or other qualified medical person and/or my attending physician, who has recommended that I be transferred to the service of  Accepting Physician: Dr Sarahi Jiang at [address] Name, [address] : [facility]  The above potential benefits of such transfer, the potential risks associated with such transfer, and the probable risks of not being transferred have been explained to me, and I fully understand them  The doctor has explained that, in my case, the benefits of transfer outweigh the risks  I agree to be transferred  I authorize the performance of emergency medical procedures and treatments upon me in both transit and upon arrival at the receiving facility  Additionally, I authorize the release of any and all medical records to the receiving facility and request they be transported with me, if possible  I understand that the safest mode of transportation during a medical emergency is an ambulance and that the Hospital advocates the use of this mode of transport   Risks of traveling to the receiving facility by car, including absence of medical control, life sustaining equipment, such as oxygen, and medical personnel has been explained to me and I fully understand them  (ZOILA CORRECT BOX BELOW)  [  ]  I consent to the stated transfer and to be transported by ambulance/helicopter  [  ]  I consent to the stated transfer, but refuse transportation by ambulance and accept full responsibility for my transportation by car  I understand the risks of non-ambulance transfers and I exonerate the Hospital and its staff from any deterioration in my condition that results from this refusal     X___________________________________________    DATE  20  TIME________  Signature of patient or legally responsible individual signing on patient behalf           RELATIONSHIP TO PATIENT_________________________          Provider Certification    NAME Lamine Godoy                                        [facility] 1945                              MRN [id number]    A medical screening exam was performed on the above named patient  Based on the examination:    Condition Necessitating Transfer The primary encounter diagnosis was Shock ([address] )  Diagnoses of Acute kidney injury superimposed on chronic kidney disease ([address] ), Elevated troponin I level, Elevated lactic acid level, High anion gap metabolic acidosis, Occult GI bleeding, Anemia, and Thrombocytopenia ([address] ) were also pertinent to this visit      Patient Condition: The patient has been stabilized such that within reasonable medical probability, no material deterioration of the patient condition or the condition of the unborn child(minnie) is likely to result from the transfer    Reason for Transfer: Level of Care needed not available at this facility(critical care)    Transfer Requirements: [place] [address]   · Space available and qualified personnel available for treatment as acknowledged by    · Agreed to accept transfer and to provide appropriate medical treatment as acknowledged by       Dr Gaby Gay  · Appropriate medical records of the examination and treatment of the patient are provided at the time of transfer   [address] [facility], [address] _______  · Transfer will be performed by qualified personnel from    and appropriate transfer equipment as required, including the use of necessary and appropriate life support measures  Provider Certification: I have examined the patient and explained the following risks and benefits of being transferred/refusing transfer to the patient/family:  General risk, such as traffic hazards, adverse weather conditions, rough terrain or turbulence, possible failure of equipment (including vehicle or aircraft), or consequences of actions of persons outside the control of the transport personnel      Based on these reasonable risks and benefits to the patient and/or the unborn child(minnie), and based upon the information available at the time of the patients examination, I certify that the medical benefits reasonably to be expected from the provision of appropriate medical treatments at another medical facility outweigh the increasing risks, if any, to the individuals medical condition, and in the case of labor to the unborn child, from effecting the transfer      X____________________________________________ DATE 03/23/20        TIME_______      ORIGINAL - SEND TO MEDICAL RECORDS   COPY - SEND WITH PATIENT DURING TRANSFER

## 2020-03-23 NOTE — ED NOTES
[id number]; O POSITIVE;  Emergent transfusion of uncrossed blood per Dr Bruno Simple infusing over one hour, witnessed by KELECHI Loyola, KELECHI  03/23/20 6232

## 2020-03-23 NOTE — ED PROVIDER NOTES
History  Chief Complaint   Patient presents with    Abnormal Lab     patient present with low hemoglobin according to EMS  he denies any sob at this time  he states that he was sob and had blood in stool at [facility]  patient has a hx of a blood transfusion  56-year-old male with history emphysema, hyperlipidemia, coronary artery disease, type 2 diabetes, chronic thrombocytopenia, stage III chronic kidney disease presents from [place] [address] with report of abnormal hemoglobin found on lab testing ordered 22 March 2020  Review of lab testing performed from 22 March shows hemoglobin of 7 g/dl with marked thrombocytopenia of less than 10 x 10^3; there was also elevated blood urea nitrogen of 110 with creatinine of 3  Patient himself reported a small amount of blood in stool this morning although he was somewhat confused and unable to provide full details  He did report some degree of abdominal pain as well as nausea; again full details in this respect were not available from the patient  As well he reports some degree of dyspnea present at rest worse with exertion and upon standing at his nursing home this morning  He does not use any anticoagulant medications  He did not report any history of GI bleeding and review of medical records does not appear to demonstrate any prior GI bleeding: he has however received blood transfusions previously  Information taken from nursing home, EMR, and patient  No hx of hematologic malignancy  A/p:  Paperwork from nursing home reviewed demonstrating anemia/profound thrombocytopenia  With reported GI bleeding, concern for other hematologic derangements/metabolic derangements related to hypoperfusion  Broad workup for infectious/metabolic derangements  High likelihood of transfusion being required: consent obtained from patient after initial evaluation  Hospital admission        History provided by:  Patient, EMS personnel, medical records and nursing home (Nursing home transfer paperwork)      None       History reviewed  No pertinent past medical history  History reviewed  No pertinent surgical history  History reviewed  No pertinent family history  I have reviewed and agree with the history as documented  E-Cigarette/Vaping     E-Cigarette/Vaping Substances     Social History     Tobacco Use    Smoking status: Current Every Day Smoker     Packs/day: 0 25    Smokeless tobacco: Never Used   Substance Use Topics    Alcohol use: Never     Frequency: Never    Drug use: Never       Review of Systems   Constitutional: Negative for chills, fatigue and fever  Respiratory: Positive for shortness of breath  Negative for cough  Cardiovascular: Negative for chest pain and palpitations  Gastrointestinal: Positive for abdominal pain and nausea  Negative for diarrhea and vomiting  Skin: Negative for color change, pallor, rash and wound  Neurological: Positive for light-headedness  Negative for dizziness, weakness, numbness and headaches  Hematological: Negative for adenopathy  Does not bruise/bleed easily  All other systems reviewed and are negative  Physical Exam  Physical Exam   Constitutional: He appears well-developed and well-nourished  He is active and cooperative  No distress  HENT:   Head: Normocephalic and atraumatic  Right Ear: Hearing and external ear normal    Left Ear: Hearing and external ear normal    Nose: Nose normal    Neck: Trachea normal and phonation normal  No tracheal tenderness present  No tracheal deviation present  Cardiovascular: Normal rate, regular rhythm, S1 normal, S2 normal, normal heart sounds and intact distal pulses  Exam reveals no gallop and no friction rub  No murmur heard  Pulses:       Radial pulses are 1+ on the right side, and 1+ on the left side  Dorsalis pedis pulses are 1+ on the right side, and 1+ on the left side          Posterior tibial pulses are 1+ on the right side, and 1+ on the left side  Pulmonary/Chest: Effort normal and breath sounds normal  No stridor  No respiratory distress  He has no decreased breath sounds  He has no wheezes  He has no rhonchi  He has no rales  He exhibits no tenderness  Abdominal: Soft  He exhibits no distension and no mass  There is no tenderness  There is no rigidity, no rebound, no guarding and no CVA tenderness  Rectal exam:  Normal rectal tone  No palpable internal/external masses  Scant brown stool; guaiac negative  Exam assisted by [facility] RN   Musculoskeletal: He exhibits no edema, tenderness or deformity  Neurological: He is alert  He has normal strength  He is disoriented (oriented to person/place; not oriented to time: confused with respect to when he was residing at nursing home)  No cranial nerve deficit or sensory deficit  He exhibits normal muscle tone  GCS eye subscore is 4  GCS verbal subscore is 4  GCS motor subscore is 6  PERRLA; EOMI  Sensation intact to light touch over face in V1-V3 distribution bilaterally  Facial expressions symmetric  Tongue/uvula midline  Shoulder shrug equal bilaterally  Strength 5/5 in UE/LE bilaterally  Sensation intact to light touch in UE/LE bilaterally  Skin: Skin is warm, dry and intact  He is not diaphoretic  There is pallor  Psychiatric: He has a normal mood and affect  His speech is normal and behavior is normal    Confused as to recent past events: cannot recall when he was admitted to nursing facility and seems to think that he is no longer residing there   Nursing note and vitals reviewed        Vital Signs  ED Triage Vitals [03/23/20 1309]   Temperature Pulse Respirations Blood Pressure SpO2   (!) 96 4 °F (35 8 °C) 79 21 118/53 97 %      Temp Source Heart Rate Source Patient Position - Orthostatic VS BP Location FiO2 (%)   Temporal Monitor Lying Right arm --      Pain Score       --           Vitals:    03/23/20 1743 03/23/20 1745 03/23/20 1800 03/23/20 1815   BP: (!) 97/49 (!) 97/49 111/53 94/52   Pulse: 73 73 73 71   Patient Position - Orthostatic VS:  Lying Lying Lying       Visual Acuity    ED Medications  Medications   norepinephrine (LEVOPHED) 4 mg (STANDARD CONCENTRATION) IV in sodium chloride 0 9% 250 mL (7 mcg/min Intravenous Rate/Dose Change 3/23/20 1716)   sodium chloride 0 9 % bolus 1,000 mL (0 mL Intravenous Stopped 3/23/20 1443)   ondansetron (ZOFRAN) injection 4 mg (4 mg Intravenous Given 3/23/20 1350)   pantoprazole (PROTONIX) injection 40 mg (40 mg Intravenous Given 3/23/20 1351)   desmopressin (DDAVP) 18 4 mcg in sodium chloride 0 9 % 50 mL IVPB (0 mcg/kg × 61 kg Intravenous Stopped 3/23/20 1502)   cefepime (MAXIPIME) IVPB (premix) 2,000 mg (0 mg Intravenous Stopped 3/23/20 1550)   lactated ringers bolus 500 mL (0 mL Intravenous Stopped 3/23/20 1550)       Diagnostic Studies  Results Reviewed     Procedure Component Value Units Date/Time    Hemoglobin and hematocrit, blood [[id number]]  (Abnormal) Collected:  03/23/20 1754    Lab Status:  Final result Specimen:  Blood from Arm, Left Updated:  03/23/20 1805     Hemoglobin 6 6 g/dL      Hematocrit 20 7 %     Troponin I [[id number]] Collected:  03/23/20 1754    Lab Status: In process Specimen:  Blood from Arm, Left Updated:  03/23/20 1757    Blood culture #1 [[id number]] Collected:  03/23/20 1651    Lab Status: In process Specimen:  Blood from Central Venous Line Updated:  03/23/20 1720    Blood culture #2 [[id number]] Collected:  03/23/20 1658    Lab Status: In process Specimen:  Blood from Arm, Right Updated:  03/23/20 1720    Lactic acid, plasma [[id number]]  (Abnormal) Collected:  03/23/20 1610    Lab Status:  Final result Specimen:  Blood from Arm, Left Updated:  03/23/20 1641     LACTIC ACID 2 4 mmol/L     Narrative:       Result may be elevated if tourniquet was used during collection      Basic metabolic panel [[id number]]  (Abnormal) Collected:  03/23/20 1610    Lab Status:  Final result Specimen:  Blood from Arm, Left Updated: 03/23/20 1626     Sodium 143 mmol/L      Potassium 4 5 mmol/L      Chloride 112 mmol/L      CO2 13 mmol/L      ANION GAP 18 mmol/L       mg/dL      Creatinine 3 17 mg/dL      Glucose 133 mg/dL      Calcium 7 5 mg/dL      eGFR 18 ml/min/1 73sq m     Narrative:       [place] guidelines for Chronic Kidney Disease (CKD):     Stage 1 with normal or high GFR (GFR > 90 mL/min/1 73 square meters)    Stage 2 Mild CKD (GFR = 60-89 mL/min/1 73 square meters)    Stage 3A Moderate CKD (GFR = 45-59 mL/min/1 73 square meters)    Stage 3B Moderate CKD (GFR = 30-44 mL/min/1 73 square meters)    Stage 4 Severe CKD (GFR = 15-29 mL/min/1 73 square meters)    Stage 5 End Stage CKD (GFR <15 mL/min/1 73 square meters)  Note: GFR calculation is accurate only with a steady state creatinine    Hepatitis panel, acute [[id number]] Collected:  03/23/20 1610    Lab Status: In process Specimen:  Blood from Arm, Left Updated:  03/23/20 1614    TSH [[id number]]  (Normal) Collected:  03/23/20 1527    Lab Status:  Final result Specimen:  Blood Updated:  03/23/20 1557     TSH 3RD GENERATON 1 452 uIU/mL     Narrative:       Patients undergoing fluorescein dye angiography may retain small amounts of fluorescein in the body for 48-72 hours post procedure  Samples containing fluorescein can produce falsely depressed TSH values  If the patient had this procedure,a specimen should be resubmitted post fluorescein clearance        Blood gas, venous [[id number]]  (Abnormal) Collected:  03/23/20 1530    Lab Status:  Final result Specimen:  Blood from Arm, Left Updated:  03/23/20 1546     pH, Emigdio 7 247     pCO2, Emigdio 27 0 mm Hg      pO2, Emigdio 33 3 mm Hg      HCO3, Emigdio 11 5 mmol/L      Base Excess, Emigdio -14 4 mmol/L      O2 Content, Emigdio 5 6 ml/dL      O2 HGB, VENOUS 55 2 %     UA w Reflex to Microscopic w Reflex to Culture [[id number]] Collected:  03/23/20 1521    Lab Status:  Final result Specimen:  Urine, Clean Catch Updated: 03/23/20 1531     Color, UA Yellow     Clarity, UA Clear     Specific [place], UA 1 020     pH, UA 5 0     Leukocytes, UA Negative     Nitrite, UA Negative     Protein, UA Negative mg/dl      Glucose, UA Negative mg/dl      Ketones, UA Negative mg/dl      Urobilinogen, UA 0 2 E U /dl      Bilirubin, UA Negative     Blood, UA Negative    T4, free [[id number]] Collected:  03/23/20 1527    Lab Status: In process Specimen:  Blood Updated:  03/23/20 1527    Procalcitonin [[id number]] Collected:  03/23/20 1521    Lab Status: In process Specimen:  Blood Updated:  03/23/20 1522    Troponin I [[id number]]  (Abnormal) Collected:  03/23/20 1340    Lab Status:  Final result Specimen:  Blood Updated:  03/23/20 1414     Troponin I 8 52 ng/mL     Lactic acid, plasma [[id number]]  (Abnormal) Collected:  03/23/20 1335    Lab Status:  Final result Specimen:  Blood from Arm, Right Updated:  03/23/20 1408     LACTIC ACID 2 7 mmol/L     Narrative:       Result may be elevated if tourniquet was used during collection      Protime-INR [[id number]]  (Abnormal) Collected:  03/23/20 1322    Lab Status:  Final result Specimen:  Blood from Arm, Right Updated:  03/23/20 1401     Protime 16 8 seconds      INR 1 36    Magnesium [[id number]]  (Normal) Collected:  03/23/20 1322    Lab Status:  Final result Specimen:  Blood from Arm, Right Updated:  03/23/20 1353     Magnesium 1 6 mg/dL     Lipase [[id number]]  (Abnormal) Collected:  03/23/20 1322    Lab Status:  Final result Specimen:  Blood from Arm, Right Updated:  03/23/20 1353     Lipase 52 u/L     CBC and differential [[id number]]  (Abnormal) Collected:  03/23/20 1322    Lab Status:  Final result Specimen:  Blood from Arm, Right Updated:  03/23/20 1351     WBC 14 89 Thousand/uL      RBC 1 78 Million/uL      Hemoglobin 5 9 g/dL      Hematocrit 18 8 %       fL      MCH 33 1 pg      MCHC 31 4 g/dL      RDW 18 4 %      Platelets 2 Thousands/uL      nRBC 1 /100 WBCs      Neutrophils Relative 48 % Immat GRANS % 1 %      Lymphocytes Relative 40 %      Monocytes Relative 10 %      Eosinophils Relative 1 %      Basophils Relative 0 %      Neutrophils Absolute 7 16 Thousands/µL      Immature Grans Absolute 0 18 Thousand/uL      Lymphocytes Absolute 5 95 Thousands/µL      Monocytes Absolute 1 51 Thousand/µL      Eosinophils Absolute 0 14 Thousand/µL      Basophils Absolute 0 03 Thousands/µL     Comprehensive metabolic panel [[id number]]  (Abnormal) Collected:  03/23/20 1322    Lab Status:  Final result Specimen:  Blood from Arm, Right Updated:  03/23/20 1347     Sodium 143 mmol/L      Potassium 4 4 mmol/L      Chloride 109 mmol/L      CO2 14 mmol/L      ANION GAP 20 mmol/L       mg/dL      Creatinine 3 52 mg/dL      Glucose 136 mg/dL      Calcium 8 4 mg/dL      AST 80 U/L      ALT 23 U/L      Alkaline Phosphatase 40 U/L      Total Protein 6 6 g/dL      Albumin 3 2 g/dL      Total Bilirubin 0 40 mg/dL      eGFR 16 ml/min/1 73sq m     Narrative:       [employer] guidelines for Chronic Kidney Disease (CKD):     Stage 1 with normal or high GFR (GFR > 90 mL/min/1 73 square meters)    Stage 2 Mild CKD (GFR = 60-89 mL/min/1 73 square meters)    Stage 3A Moderate CKD (GFR = 45-59 mL/min/1 73 square meters)    Stage 3B Moderate CKD (GFR = 30-44 mL/min/1 73 square meters)    Stage 4 Severe CKD (GFR = 15-29 mL/min/1 73 square meters)    Stage 5 End Stage CKD (GFR <15 mL/min/1 73 square meters)  Note: GFR calculation is accurate only with a steady state creatinine                 CT abdomen pelvis wo contrast   Final Result by Austin Olmedo MD (03/23 1542)      Nodular liver concerning for underlying cirrhosis  Multiple bilateral renal nodules, some of which meet the criteria for a simple cyst and others which do not  Recommend an ultrasound for further evaluation with attention to the exophytic left upper pole nodule        Diverticulosis without evidence for acute diverticulitis  The study was marked in EPIC for significant notification  Workstation performed: [id number]         CT head without contrast   Final Result by Arie Hairston DO (03/23 1531)      1  No acute intracranial abnormality  2  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 and extension into the sphenoid sinus on the left  Differential considerations include pituitary macroadenoma, pituitary carcinoma,    craniopharyngioma, meningioma as well as less likely considerations such as giant saccular aneurysm  Nonemergent MRI brain with IV contrast recommended for further characterization  3  Prominence of the subdural CSF spaces bilaterally 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  The study was marked in Epic for follow-up  Workstation performed: [id number]         XR chest 1 view portable   Final Result by Milana Reeves MD (03/23 1342)      No acute cardiopulmonary disease              Workstation performed: [id number]                    Procedures  ECG 12 Lead Documentation Only  Date/Time: 3/23/2020 1:41 PM  Performed by: Esther Gay DO  Authorized by: Esther Gay DO     Indications / Diagnosis:  Gi bleeding  ECG reviewed by me, the ED Provider: yes    Patient location:  ED  Previous ECG:     Previous ECG:  Unavailable  Interpretation:     Interpretation: abnormal    Rate:     ECG rate:  79    ECG rate assessment: normal    Rhythm:     Rhythm: sinus rhythm    Ectopy:     Ectopy: none    QRS:     QRS axis:  Normal    QRS intervals:  Normal  Conduction:     Conduction: normal    ST segments:     ST segments:  Abnormal    Depression:  V3, V4 and V2  T waves:     T waves: inverted      Inverted:  V2, V3, V4, V5, V6, aVL, II and I  Other findings:     Other findings: prolonged qTc interval    Comments:      Pr 158 qrs 88 qtc 516  Central Line  Date/Time: 3/23/2020 4:20 PM  Performed by: Consuelo Land Lizeth Waite DO  Authorized by: Hermelindo García DO     Patient location:  ED  Other Assisting Provider: Yes (comment) Lauri Loyola RN)    Consent:     Consent obtained:  Emergent situation (Patient confused and unable to provide appropriate consent for procedure; no other family members or decision makers available to provide alternative consent)  Universal protocol:     Patient identity confirmed:  Verbally with patient and arm band  Pre-procedure details:     Hand hygiene: Hand hygiene performed prior to insertion      Sterile barrier technique: All elements of maximal sterile technique followed      Skin preparation:  2% chlorhexidine    Skin preparation agent: Skin preparation agent completely dried prior to procedure    Indications:     Central line indications: medications requiring central line      Central line indications comment:  Vasopressor infusion    Site selection rationale:  Patient unable to tolerate lying flat; marked thrombocytopenia  Anesthesia (see MAR for exact dosages): Anesthesia method:  Local infiltration    Local anesthetic:  Lidocaine 1% w/o epi (5 ml)  Procedure details:     Location:  Left femoral    Vessel type: vein      Laterality:  Left    Patient position:  Flat    Catheter type:  Triple lumen 20cm    Catheter size:  7 Fr    Landmarks identified: yes      Ultrasound guidance: yes      Sterile ultrasound techniques: Sterile gel and sterile probe covers were used      Manometry confirmation: yes (consistent with venous placement)      Number of attempts:  1    Successful placement: yes      Vessel of catheter tip end:  Flush to skin  Post-procedure details:     Post-procedure:  Dressing applied and line sutured    Assessment:  Blood return through all ports and free fluid flow    Patient tolerance of procedure:   Tolerated well, no immediate complications  CriticalCare Time  Performed by: Hermelindo García DO  Authorized by: Hermelindo García DO     Critical care provider statement: Critical care time (minutes):  120    Critical care was necessary to treat or prevent imminent or life-threatening deterioration of the following conditions:  Metabolic crisis, shock, renal failure and sepsis    Critical care was time spent personally by me on the following activities:  Ordering and review of laboratory studies, ordering and review of radiographic studies, re-evaluation of patient's condition, review of old charts, evaluation of patient's response to treatment, examination of patient, discussions with consultants, development of treatment plan with patient or surrogate and obtaining history from patient or surrogate        ED Course  ED Course as of Mar 23 1829   Mon Mar 23, 2020   1348 Since arrival, patient's blood pressure has decreased consistently from 118/53 to 90s/50s with increasing confusion/disorientation  Concern for CNS hypoperfusion plus/minus intracranial hemorrhage  He has both anemic and markedly thrombocytopenic  Will give emergent platelet/PRBC transfusion initially and additional product cross-matched as indicated  Check CT head given the marked thrombocytopenia with confusion and CT a/p       1409 WBC elevated  Hemoglobin/hematocrit anemic  Marked thrombocytopenia  Elevated lactic acid  Elevated anion gap with elevated BUN/creatinine  There is some elevation of AST as well  Prior values not available for comparison although patient is known to have stage 3 chronic kidney disease  INR elevated  There is no reported history of chronic liver disease  Lipase below reference range  1410 Upon further review of medical records, patient does take aspirin 81 mg daily as part of secondary prevention for coronary artery disease  As he is markedly thrombocytopenic and actively bleeding, I will administer DDAVP for antiplatelet reversal       1429 D/w Dr Hans Jacobs of cardiology: suspect type II MI from acidosis/severe anemia   Recommends stopping aspirin from cardiology standpoint  [address] D/w Dr Sanjay Koo of GI  Patient case reviewed: likely to need intervention later in hospital course but not immediately  Priority for resuscitation and H/O consultation regarding new hematologic derangements particularly as no over GIB is present  GI team happy to see patient in consultation  1508 CTs completed and awaiting interpretation      1537 UA resulted and wnl      1548 Multiple attempts were made to contact other relatives or individuals known to the patient who could provide additional information regarding patient's condition beyond that provided by NH staff, transfer paperwork, and EMS  Patient's wife resides in NH and has dementia; she was (per NH staff) unable to be of any assistance in this case  Patient has brother/sister listed on NH paperwork but no contact information available for them: NH was contacted and did not have information available regarding them  Limited recent information from [address] (last data consistently from 2017)      1551 VBG: acute metabolic acidosis with appropriate respiratory compensation  Hypoxemia    Tiger Text to Dr Jose Schmitt of [facility]      1600 TSH wnl; T4 pending        7574 Discussed with Dr Jose Schmitt of [facility]  Patient case was reviewed  He recommends transfer given CT head findings  This plan was discussed with the patient who was agreeable; he does appear to be somewhat more confused than at the time of his arrival  This coincided with a recurrent decrement of his blood pressure to the 70s/50s  He has clearly not responded consistently to IV fluid administration or blood product administration and hypotension appears to be affecting his mental status  Given the concern for hemorrhagic shock is now much lower, I will start norepinephrine infusion to improve patient's BP in place of additional blood product administration  Central venous access will be established  Patient awake/alert  Oriented to person/place    Not oriented consistently to time  Intact airway  Normal unlabored respirations  Hypotensive but with peripheral 1+ pulses throughout  GCS eyes four verbal four motor six  Moves all extremities x4 with equal tone  Intact sensation in all extremities  1635 Lactic acid decreased after resuscitation  Repeat BMP demonstrates improvement in AG/CO2 and creatinine      1655 Central venous catheter placed as per procedure note  Patient tolerated with no immediate complications      0520 BP improved with norepinephrine 7 mcg/min to 90s/50s MAP>65  HR 74 RR 18 O2 sat 96-98% RA  [facility] ETA 1815      1754 Repeat hemoglobin/hematocrit 6 6 g/dl  This is improved from prior  There has undoubtedly been a decrement due to additional blood draws after transfusion    Will hold additional transfusion as patient is now having improved hemodynamic instability with norepinephrine infusion and improved markers of perfusion after resuscitation  afterafter resuscitation      35 20 43 EMS in ED to transfer patient          MDM  Number of Diagnoses or Management Options     Amount and/or Complexity of Data Reviewed  Clinical lab tests: ordered  Tests in the radiology section of CPT®: ordered and reviewed  Decide to obtain previous medical records or to obtain history from someone other than the patient: yes  Obtain history from someone other than the patient: yes [phone]: D/w Dr Kali Conde of ICU [facility]   Patient case discussed  Kole Sánchez in transfer to [facility] to arrange transfer)  Review and summarize past medical records: yes  Discuss the patient with other providers: yes  Independent visualization of images, tracings, or specimens: yes    Risk of Complications, Morbidity, and/or Mortality  Presenting problems: high  Diagnostic procedures: high  Management options: high    Patient Progress  Patient progress: stable        Disposition  Final diagnoses:   Shock ([facility] [address] )   Acute kidney injury superimposed on chronic kidney disease ([facility] [address] )   Elevated troponin I level Elevated lactic acid level   High anion gap metabolic acidosis   Occult GI bleeding   Anemia   Thrombocytopenia (HCC)   Cirrhosis (HCC)   Pituitary mass ([facility] [address] )     Time reflects when diagnosis was documented in both MDM as applicable and the Disposition within this note     Time User Action Codes Description Comment    3/23/2020  5:07 PM Jacquelene Harden Add [R57 9] Shock ([facility] [address] )     3/23/2020  5:07 PM Jacquelene Harden Add [N17 0] Acute kidney injury ([facility]) with acute tubular necrosis (ATN) ([facility] [address] )     3/23/2020  5:07 PM Jacquelene Harden Remove [N17 0] Acute kidney injury (BEN) with acute tubular necrosis (ATN) ([facility] [address] )     3/23/2020  5:07 PM Jacquelene Harden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

## 2020-03-23 NOTE — ED NOTES
Patient being transported to [address] with nurse        Saige Loyola RN  03/23/20 4321

## 2020-03-23 NOTE — ED NOTES
Rectal exam completed by Dr Rowdy Middleton with myself at bedside  Patient tolerated exam well        Genetta Hashimoto Page, RN  03/23/20 4937

## 2020-03-24 ENCOUNTER — APPOINTMENT (INPATIENT)
Dept: NON INVASIVE DIAGNOSTICS | Facility: HOSPITAL | Age: 75
DRG: 813 | End: 2020-03-24
Payer: COMMERCIAL

## 2020-03-24 ENCOUNTER — TELEPHONE (OUTPATIENT)
Dept: NEUROSURGERY | Facility: CLINIC | Age: 75
End: 2020-03-24

## 2020-03-24 ENCOUNTER — APPOINTMENT (INPATIENT)
Dept: RADIOLOGY | Facility: HOSPITAL | Age: 75
DRG: 813 | End: 2020-03-24
Payer: COMMERCIAL

## 2020-03-24 PROBLEM — E87.2 ACIDOSIS: Status: ACTIVE | Noted: 2020-03-24

## 2020-03-24 PROBLEM — E87.0 HYPERNATREMIA: Status: ACTIVE | Noted: 2020-03-24

## 2020-03-24 PROBLEM — N17.9 AKI (ACUTE KIDNEY INJURY) (HCC): Status: ACTIVE | Noted: 2020-03-24

## 2020-03-24 PROBLEM — N18.30 CKD (CHRONIC KIDNEY DISEASE) STAGE 3, GFR 30-59 ML/MIN (HCC): Status: ACTIVE | Noted: 2020-03-24

## 2020-03-24 LAB
ABO GROUP BLD BPU: NORMAL
ALBUMIN SERPL BCP-MCNC: 3.1 G/DL (ref 3.5–5)
ALP SERPL-CCNC: 40 U/L (ref 46–116)
ALT SERPL W P-5'-P-CCNC: 30 U/L (ref 12–78)
ANION GAP SERPL CALCULATED.3IONS-SCNC: 6 MMOL/L (ref 4–13)
ANION GAP SERPL CALCULATED.3IONS-SCNC: 9 MMOL/L (ref 4–13)
ANISOCYTOSIS BLD QL SMEAR: PRESENT
AST SERPL W P-5'-P-CCNC: 215 U/L (ref 5–45)
ATRIAL RATE: 72 BPM
BASOPHILS # BLD AUTO: 0.09 THOUSANDS/ΜL (ref 0–0.1)
BASOPHILS # BLD MANUAL: 0.15 THOUSAND/UL (ref 0–0.1)
BASOPHILS NFR BLD AUTO: 1 % (ref 0–1)
BASOPHILS NFR MAR MANUAL: 1 % (ref 0–1)
BILIRUB SERPL-MCNC: 0.52 MG/DL (ref 0.2–1)
BPU ID: NORMAL
BUN SERPL-MCNC: 108 MG/DL (ref 5–25)
BUN SERPL-MCNC: 88 MG/DL (ref 5–25)
BURR CELLS BLD QL SMEAR: PRESENT
CALCIUM SERPL-MCNC: 7.5 MG/DL (ref 8.3–10.1)
CALCIUM SERPL-MCNC: 7.8 MG/DL (ref 8.3–10.1)
CHLORIDE SERPL-SCNC: 121 MMOL/L (ref 100–108)
CHLORIDE SERPL-SCNC: 123 MMOL/L (ref 100–108)
CO2 SERPL-SCNC: 14 MMOL/L (ref 21–32)
CO2 SERPL-SCNC: 17 MMOL/L (ref 21–32)
CREAT SERPL-MCNC: 2.54 MG/DL (ref 0.6–1.3)
CREAT SERPL-MCNC: 2.84 MG/DL (ref 0.6–1.3)
CROSSMATCH: NORMAL
EOSINOPHIL # BLD AUTO: 0.26 THOUSAND/ΜL (ref 0–0.61)
EOSINOPHIL # BLD MANUAL: 0.15 THOUSAND/UL (ref 0–0.4)
EOSINOPHIL NFR BLD AUTO: 2 % (ref 0–6)
EOSINOPHIL NFR BLD MANUAL: 1 % (ref 0–6)
ERYTHROCYTE [DISTWIDTH] IN BLOOD BY AUTOMATED COUNT: 17 % (ref 11.6–15.1)
ERYTHROCYTE [DISTWIDTH] IN BLOOD BY AUTOMATED COUNT: 17.2 % (ref 11.6–15.1)
ERYTHROCYTE [DISTWIDTH] IN BLOOD BY AUTOMATED COUNT: 17.6 % (ref 11.6–15.1)
ERYTHROCYTE [DISTWIDTH] IN BLOOD BY AUTOMATED COUNT: 18.2 % (ref 11.6–15.1)
FDP BLD QL AGGL: <10
GFR SERPL CREATININE-BSD FRML MDRD: 21 ML/MIN/1.73SQ M
GFR SERPL CREATININE-BSD FRML MDRD: 24 ML/MIN/1.73SQ M
GLUCOSE SERPL-MCNC: 109 MG/DL (ref 65–140)
GLUCOSE SERPL-MCNC: 124 MG/DL (ref 65–140)
GLUCOSE SERPL-MCNC: 131 MG/DL (ref 65–140)
GLUCOSE SERPL-MCNC: 136 MG/DL (ref 65–140)
GLUCOSE SERPL-MCNC: 179 MG/DL (ref 65–140)
GLUCOSE SERPL-MCNC: 96 MG/DL (ref 65–140)
GLUCOSE SERPL-MCNC: 97 MG/DL (ref 65–140)
HCT VFR BLD AUTO: 19.2 % (ref 36.5–49.3)
HCT VFR BLD AUTO: 23.1 % (ref 36.5–49.3)
HCT VFR BLD AUTO: 23.2 % (ref 36.5–49.3)
HCT VFR BLD AUTO: 24.1 % (ref 36.5–49.3)
HCT VFR BLD AUTO: 24.5 % (ref 36.5–49.3)
HEMOCCULT STL QL: POSITIVE
HGB BLD-MCNC: 6.2 G/DL (ref 12–17)
HGB BLD-MCNC: 7.5 G/DL (ref 12–17)
HGB BLD-MCNC: 7.6 G/DL (ref 12–17)
HGB BLD-MCNC: 8 G/DL (ref 12–17)
HGB BLD-MCNC: 8.2 G/DL (ref 12–17)
IMM GRANULOCYTES # BLD AUTO: 0.31 THOUSAND/UL (ref 0–0.2)
IMM GRANULOCYTES NFR BLD AUTO: 2 % (ref 0–2)
LACTATE SERPL-SCNC: 0.8 MMOL/L (ref 0.5–2)
LYMPHOCYTES # BLD AUTO: 35 % (ref 14–44)
LYMPHOCYTES # BLD AUTO: 5.13 THOUSANDS/ΜL (ref 0.6–4.47)
LYMPHOCYTES # BLD AUTO: 5.32 THOUSAND/UL (ref 0.6–4.47)
LYMPHOCYTES NFR BLD AUTO: 36 % (ref 14–44)
MACROCYTES BLD QL AUTO: PRESENT
MCH RBC QN AUTO: 31.3 PG (ref 26.8–34.3)
MCH RBC QN AUTO: 31.5 PG (ref 26.8–34.3)
MCHC RBC AUTO-ENTMCNC: 32.3 G/DL (ref 31.4–37.4)
MCHC RBC AUTO-ENTMCNC: 32.5 G/DL (ref 31.4–37.4)
MCHC RBC AUTO-ENTMCNC: 33.2 G/DL (ref 31.4–37.4)
MCHC RBC AUTO-ENTMCNC: 33.5 G/DL (ref 31.4–37.4)
MCV RBC AUTO: 94 FL (ref 82–98)
MCV RBC AUTO: 94 FL (ref 82–98)
MCV RBC AUTO: 96 FL (ref 82–98)
MCV RBC AUTO: 97 FL (ref 82–98)
MONOCYTES # BLD AUTO: 1.4 THOUSAND/ΜL (ref 0.17–1.22)
MONOCYTES # BLD AUTO: 1.52 THOUSAND/UL (ref 0–1.22)
MONOCYTES NFR BLD AUTO: 10 % (ref 4–12)
MONOCYTES NFR BLD: 10 % (ref 4–12)
NEUTROPHILS # BLD AUTO: 7.18 THOUSANDS/ΜL (ref 1.85–7.62)
NEUTROPHILS # BLD MANUAL: 8.06 THOUSAND/UL (ref 1.85–7.62)
NEUTS BAND NFR BLD MANUAL: 2 % (ref 0–8)
NEUTS SEG NFR BLD AUTO: 49 % (ref 43–75)
NEUTS SEG NFR BLD AUTO: 51 % (ref 43–75)
NRBC BLD AUTO-RTO: 2 /100 WBCS
P AXIS: 78 DEGREES
PATHOLOGIST INTERPRETATION: NORMAL
PATHOLOGY REVIEW: YES
PLATELET # BLD AUTO: 1 THOUSANDS/UL (ref 149–390)
PLATELET # BLD AUTO: 2 THOUSANDS/UL (ref 149–390)
PLATELET # BLD AUTO: 5 THOUSANDS/UL (ref 149–390)
PLATELET # BLD AUTO: 7 THOUSANDS/UL (ref 149–390)
PLATELET BLD QL SMEAR: ABNORMAL
PMV BLD AUTO: 12.8 FL (ref 8.9–12.7)
PMV BLD AUTO: 13.5 FL (ref 8.9–12.7)
POLYCHROMASIA BLD QL SMEAR: PRESENT
POTASSIUM SERPL-SCNC: 3.6 MMOL/L (ref 3.5–5.3)
POTASSIUM SERPL-SCNC: 3.8 MMOL/L (ref 3.5–5.3)
PR INTERVAL: 175 MS
PROCALCITONIN SERPL-MCNC: 0.51 NG/ML
PROT SERPL-MCNC: 6 G/DL (ref 6.4–8.2)
QRS AXIS: 61 DEGREES
QRSD INTERVAL: 92 MS
QT INTERVAL: 375 MS
QTC INTERVAL: 411 MS
RBC # BLD AUTO: 1.98 MILLION/UL (ref 3.88–5.62)
RBC # BLD AUTO: 2.4 MILLION/UL (ref 3.88–5.62)
RBC # BLD AUTO: 2.56 MILLION/UL (ref 3.88–5.62)
RBC # BLD AUTO: 2.6 MILLION/UL (ref 3.88–5.62)
RBC MORPH BLD: PRESENT
SODIUM SERPL-SCNC: 144 MMOL/L (ref 136–145)
SODIUM SERPL-SCNC: 146 MMOL/L (ref 136–145)
T WAVE AXIS: 184 DEGREES
TOTAL CELLS COUNTED SPEC: 100
TROPONIN I SERPL-MCNC: >40 NG/ML
UNIT DISPENSE STATUS: NORMAL
UNIT PRODUCT CODE: NORMAL
UNIT RH: NORMAL
VENTRICULAR RATE: 72 BPM
WBC # BLD AUTO: 14.37 THOUSAND/UL (ref 4.31–10.16)
WBC # BLD AUTO: 5.16 THOUSAND/UL (ref 4.31–10.16)
WBC # BLD AUTO: 5.22 THOUSAND/UL (ref 4.31–10.16)
WBC # BLD AUTO: 5.86 THOUSAND/UL (ref 4.31–10.16)

## 2020-03-24 PROCEDURE — 80053 COMPREHEN METABOLIC PANEL: CPT | Performed by: STUDENT IN AN ORGANIZED HEALTH CARE EDUCATION/TRAINING PROGRAM

## 2020-03-24 PROCEDURE — 99223 1ST HOSP IP/OBS HIGH 75: CPT | Performed by: INTERNAL MEDICINE

## 2020-03-24 PROCEDURE — 76700 US EXAM ABDOM COMPLETE: CPT

## 2020-03-24 PROCEDURE — 80048 BASIC METABOLIC PNL TOTAL CA: CPT | Performed by: PHYSICIAN ASSISTANT

## 2020-03-24 PROCEDURE — 85014 HEMATOCRIT: CPT | Performed by: STUDENT IN AN ORGANIZED HEALTH CARE EDUCATION/TRAINING PROGRAM

## 2020-03-24 PROCEDURE — 99222 1ST HOSP IP/OBS MODERATE 55: CPT | Performed by: INTERNAL MEDICINE

## 2020-03-24 PROCEDURE — P9037 PLATE PHERES LEUKOREDU IRRAD: HCPCS

## 2020-03-24 PROCEDURE — 99291 CRITICAL CARE FIRST HOUR: CPT | Performed by: EMERGENCY MEDICINE

## 2020-03-24 PROCEDURE — 99221 1ST HOSP IP/OBS SF/LOW 40: CPT | Performed by: NEUROLOGICAL SURGERY

## 2020-03-24 PROCEDURE — 82948 REAGENT STRIP/BLOOD GLUCOSE: CPT

## 2020-03-24 PROCEDURE — 97163 PT EVAL HIGH COMPLEX 45 MIN: CPT

## 2020-03-24 PROCEDURE — 85018 HEMOGLOBIN: CPT | Performed by: STUDENT IN AN ORGANIZED HEALTH CARE EDUCATION/TRAINING PROGRAM

## 2020-03-24 PROCEDURE — 97167 OT EVAL HIGH COMPLEX 60 MIN: CPT

## 2020-03-24 PROCEDURE — 93010 ELECTROCARDIOGRAM REPORT: CPT | Performed by: INTERNAL MEDICINE

## 2020-03-24 PROCEDURE — 85025 COMPLETE CBC W/AUTO DIFF WBC: CPT | Performed by: STUDENT IN AN ORGANIZED HEALTH CARE EDUCATION/TRAINING PROGRAM

## 2020-03-24 PROCEDURE — 93306 TTE W/DOPPLER COMPLETE: CPT

## 2020-03-24 PROCEDURE — 83605 ASSAY OF LACTIC ACID: CPT | Performed by: STUDENT IN AN ORGANIZED HEALTH CARE EDUCATION/TRAINING PROGRAM

## 2020-03-24 PROCEDURE — 93005 ELECTROCARDIOGRAM TRACING: CPT

## 2020-03-24 PROCEDURE — 82272 OCCULT BLD FECES 1-3 TESTS: CPT | Performed by: STUDENT IN AN ORGANIZED HEALTH CARE EDUCATION/TRAINING PROGRAM

## 2020-03-24 PROCEDURE — P9016 RBC LEUKOCYTES REDUCED: HCPCS

## 2020-03-24 PROCEDURE — C9113 INJ PANTOPRAZOLE SODIUM, VIA: HCPCS | Performed by: STUDENT IN AN ORGANIZED HEALTH CARE EDUCATION/TRAINING PROGRAM

## 2020-03-24 PROCEDURE — 85027 COMPLETE CBC AUTOMATED: CPT | Performed by: PHYSICIAN ASSISTANT

## 2020-03-24 PROCEDURE — 84484 ASSAY OF TROPONIN QUANT: CPT | Performed by: STUDENT IN AN ORGANIZED HEALTH CARE EDUCATION/TRAINING PROGRAM

## 2020-03-24 PROCEDURE — 84145 PROCALCITONIN (PCT): CPT | Performed by: STUDENT IN AN ORGANIZED HEALTH CARE EDUCATION/TRAINING PROGRAM

## 2020-03-24 PROCEDURE — 93306 TTE W/DOPPLER COMPLETE: CPT | Performed by: INTERNAL MEDICINE

## 2020-03-24 PROCEDURE — P9100 PATHOGEN TEST FOR PLATELETS: HCPCS

## 2020-03-24 RX ORDER — SODIUM CHLORIDE, SODIUM GLUCONATE, SODIUM ACETATE, POTASSIUM CHLORIDE, MAGNESIUM CHLORIDE, SODIUM PHOSPHATE, DIBASIC, AND POTASSIUM PHOSPHATE .53; .5; .37; .037; .03; .012; .00082 G/100ML; G/100ML; G/100ML; G/100ML; G/100ML; G/100ML; G/100ML
500 INJECTION, SOLUTION INTRAVENOUS ONCE
Status: COMPLETED | OUTPATIENT
Start: 2020-03-24 | End: 2020-03-24

## 2020-03-24 RX ORDER — LACTULOSE 20 G/30ML
20 SOLUTION ORAL 3 TIMES DAILY
Status: DISCONTINUED | OUTPATIENT
Start: 2020-03-24 | End: 2020-03-24

## 2020-03-24 RX ORDER — PANTOPRAZOLE SODIUM 40 MG/1
40 TABLET, DELAYED RELEASE ORAL
Status: DISCONTINUED | OUTPATIENT
Start: 2020-03-25 | End: 2020-03-24

## 2020-03-24 RX ORDER — PANTOPRAZOLE SODIUM 40 MG/1
40 TABLET, DELAYED RELEASE ORAL
Status: DISCONTINUED | OUTPATIENT
Start: 2020-03-24 | End: 2020-03-30 | Stop reason: HOSPADM

## 2020-03-24 RX ORDER — METHYLPREDNISOLONE SODIUM SUCCINATE 40 MG/ML
40 INJECTION, POWDER, LYOPHILIZED, FOR SOLUTION INTRAMUSCULAR; INTRAVENOUS 2 TIMES DAILY
Status: DISCONTINUED | OUTPATIENT
Start: 2020-03-24 | End: 2020-03-30 | Stop reason: HOSPADM

## 2020-03-24 RX ADMIN — SODIUM CHLORIDE 2.9 MCG/MIN: 0.9 INJECTION, SOLUTION INTRAVENOUS at 12:20

## 2020-03-24 RX ADMIN — METHYLPREDNISOLONE SODIUM SUCCINATE 40 MG: 40 INJECTION, POWDER, FOR SOLUTION INTRAMUSCULAR; INTRAVENOUS at 17:45

## 2020-03-24 RX ADMIN — PANTOPRAZOLE SODIUM 40 MG: 40 TABLET, DELAYED RELEASE ORAL at 16:21

## 2020-03-24 RX ADMIN — FERROUS SULFATE TAB 325 MG (65 MG ELEMENTAL FE) 325 MG: 325 (65 FE) TAB at 16:21

## 2020-03-24 RX ADMIN — CEFEPIME HYDROCHLORIDE 1000 MG: 1 INJECTION, POWDER, FOR SOLUTION INTRAMUSCULAR; INTRAVENOUS at 02:26

## 2020-03-24 RX ADMIN — DOCUSATE SODIUM 100 MG: 100 CAPSULE, LIQUID FILLED ORAL at 17:42

## 2020-03-24 RX ADMIN — SODIUM CHLORIDE, SODIUM GLUCONATE, SODIUM ACETATE, POTASSIUM CHLORIDE, MAGNESIUM CHLORIDE, SODIUM PHOSPHATE, DIBASIC, AND POTASSIUM PHOSPHATE 500 ML: .53; .5; .37; .037; .03; .012; .00082 INJECTION, SOLUTION INTRAVENOUS at 10:39

## 2020-03-24 RX ADMIN — LACTULOSE 20 G: 10 SOLUTION ORAL at 08:38

## 2020-03-24 RX ADMIN — PANTOPRAZOLE SODIUM 40 MG: 40 INJECTION, POWDER, FOR SOLUTION INTRAVENOUS at 08:38

## 2020-03-24 RX ADMIN — ATORVASTATIN CALCIUM 40 MG: 40 TABLET, FILM COATED ORAL at 16:22

## 2020-03-24 RX ADMIN — DEXTRAN 70 AND HYPROMELLOSE 2910 2 DROP: 1; 3 SOLUTION/ DROPS OPHTHALMIC at 17:42

## 2020-03-24 RX ADMIN — SODIUM CHLORIDE 10 MCG/MIN: 0.9 INJECTION, SOLUTION INTRAVENOUS at 04:02

## 2020-03-24 RX ADMIN — DEXTRAN 70 AND HYPROMELLOSE 2910 2 DROP: 1; 3 SOLUTION/ DROPS OPHTHALMIC at 08:42

## 2020-03-24 RX ADMIN — DOCUSATE SODIUM 100 MG: 100 CAPSULE, LIQUID FILLED ORAL at 09:40

## 2020-03-24 RX ADMIN — CHLORHEXIDINE GLUCONATE 0.12% ORAL RINSE 15 ML: 1.2 LIQUID ORAL at 08:38

## 2020-03-24 RX ADMIN — SODIUM BICARBONATE 100 ML/HR: 84 INJECTION, SOLUTION INTRAVENOUS at 14:25

## 2020-03-24 RX ADMIN — FERROUS SULFATE TAB 325 MG (65 MG ELEMENTAL FE) 325 MG: 325 (65 FE) TAB at 08:38

## 2020-03-24 NOTE — ASSESSMENT & PLAN NOTE
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  · Patient is a poor historian, states he has known about the mass for "years"    Imaging reviewed personally and with attending, results are as follows:  · CT head wo contrast 3/23/2020:  2 5 x 2 0cm hyperdensemass in the pituitary fossa with locally invasive features of the adjacent skull base and extension into the sphenoid sinus on the left  Differential considerations include pituitary macroadenoma, pituitary carcinoma, craniopharyngioma, meningioma as well as less likely considerations such as giant saccular aneurysm  Nonemergent MRI brain with IV contrast recommended for further characterization  Prominence of the subdural CSF spaces bilaterally 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  Plan:   Repeat CT head stat if GCS declines more than 2 points in 1 hour   Medical management and pain control per primary team  o Platelets 7  o Hemoglobin 7 5  o Elevated WBC on abx treatment   DVT ppx:  SCDs   Mobilize as tolerated with assistance, PT / OT evaluation    Neurosurgery will follow as needed during hospitalization  Patient is not a surgical candidate at this time and therefore no surgical intervention warranted on this admission  Continue medical management  Can follow up in the outpatient setting in about 2-4 weeks with MRI brain  Please call with questions or concerns, signed off

## 2020-03-24 NOTE — CONSULTS
Oncology Consult Note  Temo Solano 69 y o  male MRN: [id number]  Unit/Bed#: ICU 02 Encounter: [id number]          Assessment and Plan:   1  Anemia  Hemoglobin 5 9 upon presentation  7 6 post transfusion 2 Uprbc  Declined to 6 2  He also has received DDAVP  INR 1 36 3/23/20  Slightly elevated LDH at 370, FDP less than 10, fibrinogen decreased to 108  Reticulocyte count 6 6%, normal bilirubin of 0 4  No schistocytes or helmet cells seen on hemolysis smear, haptoglobin pending  Ferritin 172, 3/23 @1739, iron saturation 83% ( post transfusion packed red blood cells)  Mild elevation of CRP 8 6, sed rate 19     2  Thrombocytopenia,  platelet count  2  at time of presentation, platelet BVXVB<99 despite transfusion 2 bags platelets     3  Elevated Cr 3 52 on presentation; improved to 2 84 3/24/20    4   2 5 x 2 centimeter pituitary fossa mass identified on noncontrast CT of his head  MRI of the brain has been ordered  5   Multiple bilateral renal nodules, some of which meet the criteria for a simple cyst and others which do not  Recommend an ultrasound for further evaluation with attention to the exophytic left upper pole nodule  6   Nodular  liver concerning for underlying cirrhosis    7  Shock-  hypovolemic verses basal dilatory, NSTEMI MI on vasopressors, anticoagulation is on hold at this time, troponin greater than 40      Anemia and thrombocytopenia may be separate issues  Fact that his platelets are still decreased post transfusion may be related to immune phenomenon such as ITP  Iron studies not entirely accurate as they were drawn post transfusion  He may have DIC  Fibrinogen 108(for expected to be elevated more as an acute phase reactant )    Will administer Solu-Medrol 40mg bid for 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  Will recheck DIC panel to include aPTTT and [place] 13  Do Not have high suspicion for TTP  LFT elevation may be secondary to pressor effect               Reason for Consultation:  Anemia, thrombocytopenia      History of Presenting Illness:  Mague Bear was transferred from  [address] [place] [address] where he has been a resident since May 2018 to [address] due to abnormal outpatient blood work  3/22/2020 hemoglobin was found to be 7, platelet count 10, , creatinine of 3  Per H&P note,  Per nursing home, labs from 11/22/2019 had creatinine 2 2, BUN 35 and from 07/22/2019 hemoglobin 10  0   11/20/2016 hemoglobin 12 1, white blood cell count 5 5, platelet count [id number] creatinine 1 36 at [facility]     On records from the nursing home he has history of major depressive disorder, emphysema, atherosclerotic heart disease, coronary disease, diabetes mellitus, GERD, chronic kidney disease stage 3 and thrombocytopenia  Upon presentation March 23rd 1322 his hemoglobin was 5 9, MCV of 106, white blood cell count 14 89 with 48% neutrophils, 1% immature granulocytes, 40% lymphocytes, 10% monocytes, 1% eosinophils, platelet count of 2    3/23/2020 2138:  hemoglobin 6 4, white blood cell count 15 2, platelet count 13  0/25/76 505:  hemoglobin 7 5, white blood cell count 14 37, platelet count 7  3/98/26 1153:  hemoglobin 6 2, white blood cell count 5 16, platelet count 1    , fibrinogen slightly decreased to 108, FDP less than 10, ferritin 172, reticulocyte percentage 6 6, haptoglobin pending, no schistocytes or helmet is cells seen on peripheral smear, bilirubin normal at 0 4  AST has been elevating 3/20/1980 3rd to 15 March 24th  ALT is normal   INR 1 36, B12 437, sed rate 19, CRP 0 6, creatinine improving peers 3 52 on admission March 23rd  2 84 3/24/2020  Patient was noted to be somewhat confused  Nursing home staff reported the patient had been experiencing dizziness  Is also reported that he had a positive fecal occult blood test     CT scan of his head identified a 2 5 x 2 centimeter pituitary fossa mass        CT abdomen and pelvis 3/23/2020 identified cirrhotic changes as well as renal nodules which cannot be completely characterized as cysts  Patient is a poor story in  States he has diarrhea today  Denies any fevers, chills, sweats  States he has had itchy skin for the past 2 years  Denies any melena or hematochezia  States he was taking oral iron  Denies any chest pain or shortness of breath  Review of Systems - As stated in the HPI otherwise the fourteen point review of systems was negative        Past Medical History:   Diagnosis Date    Cirrhosis ([facility] )     COPD (chronic obstructive pulmonary disease) (HCC)     Coronary artery disease     Dementia (HCC)     Diabetes mellitus ([facility] )     Diverticulosis     Gastric reflux     Hyperlipemia     Panlobular emphysema (HCC)     Pituitary mass (HCC)     Renal disorder     CKD Stage 3 & Bilat multi renal nodules    Thrombocyte disorder (HCC)     thrombocytopenia    Unstable angina (HCC)        Past Surgical History:   Procedure Laterality Date    CARDIAC SURGERY      unspecified       Social History     Socioeconomic History    Marital status: /Civil Union     Spouse name: Not on file    Number of children: Not on file    Years of education: Not on file    Highest education level: Not on file   Occupational History    Not on file   Social Needs    Financial resource strain: Not on file    Food insecurity:     Worry: Not on file     Inability: Not on file    Transportation needs:     Medical: Not on file     Non-medical: Not on file   Tobacco Use    Smoking status: Current Every Day Smoker     Packs/day: 0 25    Smokeless tobacco: Never Used   Substance and Sexual Activity    Alcohol use: Never     Frequency: Never     Binge frequency: Never    Drug use: Never    Sexual activity: Not Currently     Partners: Female   Lifestyle    Physical activity:     Days per week: Not on file     Minutes per session: Not on file    Stress: Not on file   Relationships    Social connections:     Talks on phone: Not on file     Gets together: Not on file     Attends Christianity service: Not on file     Active member of club or organization: Not on file     Attends meetings of clubs or organizations: Not on file     Relationship status: Not on file    Intimate partner violence:     Fear of current or ex partner: Not on file     Emotionally abused: Not on file     Physically abused: Not on file     Forced sexual activity: Not on file   Other Topics Concern    Not on file   Social History Narrative    Not on file       No family history on file      Allergies   Allergen Reactions    Erythromycin     Penicillins     Shellfish-Derived Products          Current Facility-Administered Medications:     acetaminophen (TYLENOL) tablet 650 mg, 650 mg, Oral, Q6H PRN, Yris Michaels MD    atorvastatin (LIPITOR) tablet 40 mg, 40 mg, Oral, Daily With Diomedes Bernabe MD    bisacodyl (DULCOLAX) rectal suppository 10 mg, 10 mg, Rectal, Daily PRN, Yris Michaels MD    chlorhexidine (PERIDEX) 0 12 % oral rinse 15 mL, 15 mL, Swish & Spit, Q12H [facility], Yris Michaels MD, 15 mL at 03/24/20 0838    dextran 70-hypromellose (GENTEAL TEARS) 0 1-0 3 % ophthalmic solution 2 drop, 2 drop, Both Eyes, BID, Yris Michaels MD, 2 drop at 03/24/20 3901    docusate sodium (COLACE) capsule 100 mg, 100 mg, Oral, BID, Yris Michaels MD, 100 mg at 03/24/20 0940    ferrous sulfate tablet 325 mg, 325 mg, Oral, BID With Meals, Yris Michaels MD, 325 mg at 03/24/20 0838    insulin lispro (HumaLOG) 100 units/mL subcutaneous injection 1-5 Units, 1-5 Units, Subcutaneous, Q6H [facility] **AND** Fingerstick Glucose (POCT), , , Q6H, Yris Michaels MD    methylPREDNISolone sodium succinate (Solu-MEDROL) injection 40 mg, 40 mg, Intravenous, BID, Olivier Stark PA-C    norepinephrine (LEVOPHED) 4 mg (STANDARD CONCENTRATION) IV in sodium chloride 0 9% 250 mL, 1-30 mcg/min, Intravenous, Titrated, Yris Michaels MD, Last Rate: 10 9 mL/hr at 03/24/20 1220, 2 9 mcg/min at 03/24/20 1220    pantoprazole (PROTONIX) EC tablet 40 mg, 40 mg, Oral, BID AC, Tabitha Snow PA-C    sodium bicarbonate 75 mEq in sterile water 1,000 mL infusion, 100 mL/hr, Intravenous, Continuous, Tabitha Snow PA-C, Last Rate: 100 mL/hr at 03/24/20 1425, 100 mL/hr at 03/24/20 1425    Medications Prior to Admission   Medication    acetaminophen (TYLENOL) 325 mg tablet    aspirin 81 mg chewable tablet    docusate sodium (COLACE) 100 mg capsule    fenofibrate (TRICOR) 145 mg tablet    ferrous sulfate 325 (65 Fe) mg tablet    omeprazole (PriLOSEC) 20 mg delayed release capsule    polyvinyl alcohol (LIQUIFILM TEARS) 1 4 % ophthalmic solution    rosuvastatin (CRESTOR) 20 MG tablet    bisacodyl (DULCOLAX) 10 mg suppository         PHYSICAL EXAMINATION     /61   Pulse 68   Temp 97 9 °F (36 6 °C) (Oral)   Resp (!) 25   Ht 5' 6" (1 676 m)   Wt 59 8 kg (131 lb 13 4 oz)   SpO2 97%   BMI 21 28 kg/m²     Ht Readings from Last 3 Encounters:   03/23/20 5' 6" (1 676 m)        Wt Readings from Last 3 Encounters:   03/23/20 59 8 kg (131 lb 13 4 oz)   03/23/20 61 kg (134 lb 7 7 oz)       1 68 meters squared    Physical Exam:      Constitutional: Appears well-developed and well-nourished  NAD  Non-ill appearring  HEENT: Normocephalic and atraumatic  Conjunctivae, EOM and lids are normal  Pupils are equal, round  Oral mucosa moist   Hearing intact  Cardiovascular: Regular rate and rhythm without rubs, murmurs or gallops  Extremities:  No LE edema  2+ dorsalis pedis pulses  Pulmonary/Chest: CTA without wheezing, rales or rhonchi  Abdomen:  NABS, Soft, non-tender without rebound or guarding  Liver and spleen non-palpable  Musculoskeletal: Normal range of motion  + arthritic changes  Neurological: Grossly normal strength without sensory deficit  Skin: Skin is warm, dry and intact  Petechiae arms, legs, trunk        Psychiatric: Poor historian    Lymphatics:  No palpable peripheral adenopathy  RESULTS    Recent Results (from the past 48 hour(s))   CBC and differential    Collection Time: 03/23/20  1:22 PM   Result Value Ref Range    WBC 14 89 (H) 4 31 - 10 16 Thousand/uL    RBC 1 78 (L) 3 88 - 5 62 Million/uL    Hemoglobin 5 9 (LL) 12 0 - 17 0 g/dL    Hematocrit 18 8 (L) 36 5 - 49 3 %     (H) 82 - 98 fL    MCH 33 1 26 8 - 34 3 pg    MCHC 31 4 31 4 - 37 4 g/dL    RDW 18 4 (H) 11 6 - 15 1 %    Platelets 2 (LL) 702 - 390 Thousands/uL    nRBC 1 /100 WBCs    Neutrophils Relative 48 43 - 75 %    Immat GRANS % 1 0 - 2 %    Lymphocytes Relative 40 14 - 44 %    Monocytes Relative 10 4 - 12 %    Eosinophils Relative 1 0 - 6 %    Basophils Relative 0 0 - 1 %    Neutrophils Absolute 7 16 1 85 - 7 62 Thousands/µL    Immature Grans Absolute 0 18 0 00 - 0 20 Thousand/uL    Lymphocytes Absolute 5 95 (H) 0 60 - 4 47 Thousands/µL    Monocytes Absolute 1 51 (H) 0 17 - 1 22 Thousand/µL    Eosinophils Absolute 0 14 0 00 - 0 61 Thousand/µL    Basophils Absolute 0 03 0 00 - 0 10 Thousands/µL   Comprehensive metabolic panel    Collection Time: 03/23/20  1:22 PM   Result Value Ref Range    Sodium 143 136 - 145 mmol/L    Potassium 4 4 3 5 - 5 3 mmol/L    Chloride 109 (H) 100 - 108 mmol/L    CO2 14 (L) 21 - 32 mmol/L    ANION GAP 20 (H) 4 - 13 mmol/L     (H) 5 - 25 mg/dL    Creatinine 3 52 (H) 0 60 - 1 30 mg/dL    Glucose 136 65 - 140 mg/dL    Calcium 8 4 8 3 - 10 1 mg/dL    AST 80 (H) 5 - 45 U/L    ALT 23 12 - 78 U/L    Alkaline Phosphatase 40 (L) 46 - 116 U/L    Total Protein 6 6 6 4 - 8 2 g/dL    Albumin 3 2 (L) 3 5 - 5 0 g/dL    Total Bilirubin 0 40 0 20 - 1 00 mg/dL    eGFR 16 ml/min/1 73sq m   Green / Black tube on hold    Collection Time: 03/23/20  1:22 PM   Result Value Ref Range    Extra Tube Hold for add-ons  Green / Yellow tube on hold    Collection Time: 03/23/20  1:22 PM   Result Value Ref Range    Extra Tube Hold for add-ons  Magnesium    Collection Time: 03/23/20  1:22 PM   Result Value Ref Range    Magnesium 1 6 1 6 - 2 6 mg/dL   Protime-INR    Collection Time: 03/23/20  1:22 PM   Result Value Ref Range    Protime 16 8 (H) 11 6 - 14 5 seconds    INR 1 36 (H) 0 84 - 1 19   Lipase    Collection Time: 03/23/20  1:22 PM   Result Value Ref Range    Lipase 52 (L) 73 - 393 u/L   Type and screen    Collection Time: 03/23/20  1:23 PM   Result Value Ref Range    ABO Grouping O     Rh Factor Positive     Antibody Screen Negative     Specimen Expiration Date [id number]    Lactic acid, plasma    Collection Time: 03/23/20  1:35 PM   Result Value Ref Range    LACTIC ACID 2 7 (HH) 0 5 - 2 0 mmol/L   Troponin I    Collection Time: 03/23/20  1:40 PM   Result Value Ref Range    Troponin I 8 52 (HH) <=0 04 ng/mL   ECG 12 lead    Collection Time: 03/23/20  1:40 PM   Result Value Ref Range    Ventricular Rate 79 BPM    Atrial Rate 79 BPM    AK Interval 158 ms    QRSD Interval 88 ms    QT Interval 450 ms    QTC Interval 516 ms    P [place] 69 degrees    QRS Axis 62 degrees    T Wave Axis 125 degrees   UA w Reflex to Microscopic w Reflex to Culture    Collection Time: 03/23/20  3:21 PM   Result Value Ref Range    Color, UA Yellow     Clarity, UA Clear     Specific Gravity, UA 1 020 1 003 - 1 030    pH, UA 5 0 4 5, 5 0, 5 5, 6 0, 6 5, 7 0, 7 5, 8 0    Leukocytes, UA Negative Negative    Nitrite, UA Negative Negative    Protein, UA Negative Negative mg/dl    Glucose, UA Negative Negative mg/dl    Ketones, UA Negative Negative mg/dl    Urobilinogen, UA 0 2 0 2, 1 0 E U /dl E U /dl    Bilirubin, UA Negative Negative    Blood, UA Negative Negative   Procalcitonin    Collection Time: 03/23/20  3:21 PM   Result Value Ref Range    Procalcitonin 0 68 (H) <=0 25 ng/ml   TSH    Collection Time: 03/23/20  3:27 PM   Result Value Ref Range    TSH 3RD GENERATON 1 452 0 358 - 3 740 uIU/mL   T4, free    Collection Time: 03/23/20  3:27 PM   Result Value Ref Range    Free T4 0 79 0 76 - 1 46 ng/dL   Blood gas, venous    Collection Time: 03/23/20  3:30 PM   Result Value Ref Range    pH, Emigdio 7 247 (L) 7 300 - 7 400    pCO2, Emigdio 27 0 (L) 42 0 - 50 0 mm Hg    pO2, Emigdio 33 3 (L) 35 0 - 45 0 mm Hg    HCO3, Emigdio 11 5 (L) 24 - 30 mmol/L    Base Excess, Emigdio -14 4 mmol/L    O2 Content, Emigdio 5 6 ml/dL    O2 HGB, VENOUS 55 2 (L) 60 0 - 80 0 %   Hepatitis panel, acute    Collection Time: 03/23/20  4:10 PM   Result Value Ref Range    Hepatitis B Surface Ag Non-reactive Non-reactive, NonReactive - Confirmed    Hep A IgM Non-reactive Non-reactive, Equivocal-Suggest Recollect    Hepatitis C Ab Non-reactive Non-reactive    Hep B C IgM Non-reactive Non-reactive   Lactic acid, plasma    Collection Time: 03/23/20  4:10 PM   Result Value Ref Range    LACTIC ACID 2 4 (HH) 0 5 - 2 0 mmol/L   Basic metabolic panel    Collection Time: 03/23/20  4:10 PM   Result Value Ref Range    Sodium 143 136 - 145 mmol/L    Potassium 4 5 3 5 - 5 3 mmol/L    Chloride 112 (H) 100 - 108 mmol/L    CO2 13 (L) 21 - 32 mmol/L    ANION GAP 18 (H) 4 - 13 mmol/L     (H) 5 - 25 mg/dL    Creatinine 3 17 (H) 0 60 - 1 30 mg/dL    Glucose 133 65 - 140 mg/dL    Calcium 7 5 (L) 8 3 - 10 1 mg/dL    eGFR 18 ml/min/1 73sq m   Blood culture #1    Collection Time: 03/23/20  4:51 PM   Result Value Ref Range    Blood Culture Received in Microbiology Lab  Culture in Progress  Blood culture #2    Collection Time: 03/23/20  4:58 PM   Result Value Ref Range    Blood Culture Received in Microbiology Lab  Culture in Progress      Hemoglobin and hematocrit, blood    Collection Time: 03/23/20  5:54 PM   Result Value Ref Range    Hemoglobin 6 6 (LL) 12 0 - 17 0 g/dL    Hematocrit 20 7 (L) 36 5 - 49 3 %   Troponin I    Collection Time: 03/23/20  5:54 PM   Result Value Ref Range    Troponin I 23 18 (HH) <=0 04 ng/mL   CBC and differential    Collection Time: 03/23/20  9:38 PM   Result Value Ref Range    WBC 15 21 (H) 4 31 - 10 16 Thousand/uL    RBC 2 02 (L) 3 88 - 5 62 Million/uL    Hemoglobin 6 4 (LL) 12 0 - 17 0 g/dL    Hematocrit 20 1 (L) 36 5 - 49 3 %     (H) 82 - 98 fL    MCH 31 7 26 8 - 34 3 pg    MCHC 31 8 31 4 - 37 4 g/dL    RDW 17 9 (H) 11 6 - 15 1 %    MPV 9 4 8 9 - 12 7 fL    Platelets 13 (LL) 246 - 390 Thousands/uL   Lactic acid    Collection Time: 03/23/20  9:38 PM   Result Value Ref Range    LACTIC ACID 0 8 0 5 - 2 0 mmol/L   Troponin I    Collection Time: 03/23/20  9:38 PM   Result Value Ref Range    Troponin I 37 20 (H) <=0 04 ng/mL   Troponin I    Collection Time: 03/23/20  9:38 PM   Result Value Ref Range    Troponin I 37 70 (H) <=0 04 ng/mL   Ammonia    Collection Time: 03/23/20  9:38 PM   Result Value Ref Range    Ammonia 56 (H) 11 - 35 umol/L   Hemolysis Smear    Collection Time: 03/23/20  9:38 PM   Result Value Ref Range    Hemolysis Smear No Schistocytes or Helmet Cells noted    Retic Count with Reticulocyte HGB    Collection Time: 03/23/20  9:38 PM   Result Value Ref Range    Immature Retic Fract 52 2 (H) 0 0 - 14 0 %    Retic Ct Pct 6 66 (H) 0 37 - 1 87 %    Retic Ct Abs 132,500 (H) 14,356-105,094    RETIC HGB 37 9 30 0 - 38 3 pg   Manual Differential(PHLEBS Do Not Order)    Collection Time: 03/23/20  9:38 PM   Result Value Ref Range    Segmented % 51 43 - 75 %    Bands % 2 0 - 8 %    Lymphocytes % 35 14 - 44 %    Monocytes % 10 4 - 12 %    Eosinophils, % 1 0 - 6 %    Basophils % 1 0 - 1 %    Absolute Neutrophils 8 06 (H) 1 85 - 7 62 Thousand/uL    Lymphocytes Absolute 5 32 (H) 0 60 - 4 47 Thousand/uL    Monocytes Absolute 1 52 (H) 0 00 - 1 22 Thousand/uL    Eosinophils Absolute 0 15 0 00 - 0 40 Thousand/uL    Basophils Absolute 0 15 (H) 0 00 - 0 10 Thousand/uL    Total Counted 100     RBC Morphology Present     Anisocytosis Present     Cristo Cells Present     Macrocytes Present     Polychromasia Present     Platelet Estimate Decreased (A) Adequate    Pathology Review Yes (A) No   Path Slide Review    Collection Time: 03/23/20  9:38 PM Result Value Ref Range    Path Review       Normocytic anemia with polychromasia and several nucleated red blood cells  Numerous warren cells present  Suggest clinical correlation and follow-up as indicated      Comprehensive metabolic panel    Collection Time: 03/23/20  9:39 PM   Result Value Ref Range    Sodium 146 (H) 136 - 145 mmol/L    Potassium 3 9 3 5 - 5 3 mmol/L    Chloride 123 (H) 100 - 108 mmol/L    CO2 13 (L) 21 - 32 mmol/L    ANION GAP 10 4 - 13 mmol/L     (H) 5 - 25 mg/dL    Creatinine 2 83 (H) 0 60 - 1 30 mg/dL    Glucose 126 65 - 140 mg/dL    Calcium 7 4 (L) 8 3 - 10 1 mg/dL     (H) 5 - 45 U/L    ALT 23 12 - 78 U/L    Alkaline Phosphatase 36 (L) 46 - 116 U/L    Total Protein 6 0 (L) 6 4 - 8 2 g/dL    Albumin 2 9 (L) 3 5 - 5 0 g/dL    Total Bilirubin 0 51 0 20 - 1 00 mg/dL    eGFR 21 ml/min/1 73sq m   Magnesium    Collection Time: 03/23/20  9:39 PM   Result Value Ref Range    Magnesium 1 6 1 6 - 2 6 mg/dL   Phosphorus    Collection Time: 03/23/20  9:39 PM   Result Value Ref Range    Phosphorus 3 2 2 3 - 4 1 mg/dL   Fibrin split products    Collection Time: 03/23/20  9:39 PM   Result Value Ref Range    FDP <10 <10   Fibrinogen    Collection Time: 03/23/20  9:39 PM   Result Value Ref Range    Fibrinogen 208 (L) 227 - 495 mg/dL   Lactate dehydrogenase    Collection Time: 03/23/20  9:39 PM   Result Value Ref Range     (H) 81 - 234 U/L   Vitamin B12    Collection Time: 03/23/20  9:39 PM   Result Value Ref Range    Vitamin B-12 437 100 - 900 pg/mL   Folate    Collection Time: 03/23/20  9:39 PM   Result Value Ref Range    Folate 13 7 3 1 - 17 5 ng/mL   C-reactive protein    Collection Time: 03/23/20  9:39 PM   Result Value Ref Range    CRP 8 6 (H) <3 0 mg/L   Iron Saturation %    Collection Time: 03/23/20  9:39 PM   Result Value Ref Range    Iron Saturation 83 %    TIBC 337 250 - 450 ug/dL    Iron 280 (H) 65 - 175 ug/dL   Ferritin    Collection Time: 03/23/20  9:39 PM   Result Value Ref Range    Ferritin 172 8 - 388 ng/mL   Sedimentation rate, automated    Collection Time: 03/23/20  9:41 PM   Result Value Ref Range    Sed Rate 19 (H) 0 - 10 mm/hour   Type and screen    Collection Time: 03/23/20 10:38 PM   Result Value Ref Range    ABO Grouping O     Rh Factor Positive     Antibody Screen Negative     Specimen Expiration Date [id number]    Fingerstick Glucose (POCT)    Collection Time: 03/23/20 10:38 PM   Result Value Ref Range    POC Glucose 146 (H) 65 - 140 mg/dl   Hemoglobin and hematocrit, blood    Collection Time: 03/24/20  2:30 AM   Result Value Ref Range    Hemoglobin 7 6 (L) 12 0 - 17 0 g/dL    Hematocrit 23 2 (L) 36 5 - 49 3 %   Troponin I    Collection Time: 03/24/20  2:30 AM   Result Value Ref Range    Troponin I >40 00 (H) <=0 04 ng/mL   Lactic acid, plasma    Collection Time: 03/24/20  2:30 AM   Result Value Ref Range    LACTIC ACID 0 8 0 5 - 2 0 mmol/L   ECG 12 lead    Collection Time: 03/24/20  5:00 AM   Result Value Ref Range    Ventricular Rate 72 BPM    Atrial Rate 72 BPM    AK Interval 175 ms    QRSD Interval 92 ms    QT Interval 375 ms    QTC Interval 411 ms    P [place] 78 degrees    QRS Axis 61 degrees    T Wave Axis 184 degrees   Comprehensive metabolic panel    Collection Time: 03/24/20  5:05 AM   Result Value Ref Range    Sodium 146 (H) 136 - 145 mmol/L    Potassium 3 8 3 5 - 5 3 mmol/L    Chloride 123 (H) 100 - 108 mmol/L    CO2 14 (L) 21 - 32 mmol/L    ANION GAP 9 4 - 13 mmol/L     (H) 5 - 25 mg/dL    Creatinine 2 84 (H) 0 60 - 1 30 mg/dL    Glucose 124 65 - 140 mg/dL    Calcium 7 8 (L) 8 3 - 10 1 mg/dL     (H) 5 - 45 U/L    ALT 30 12 - 78 U/L    Alkaline Phosphatase 40 (L) 46 - 116 U/L    Total Protein 6 0 (L) 6 4 - 8 2 g/dL    Albumin 3 1 (L) 3 5 - 5 0 g/dL    Total Bilirubin 0 52 0 20 - 1 00 mg/dL    eGFR 21 ml/min/1 73sq m   CBC and differential    Collection Time: 03/24/20  5:05 AM   Result Value Ref Range    WBC 14 37 (H) 4 31 - 10 16 Thousand/uL    RBC 2 40 (L) 3 88 - 5 62 Million/uL    Hemoglobin 7 5 (L) 12 0 - 17 0 g/dL    Hematocrit 23 1 (L) 36 5 - 49 3 %    MCV 96 82 - 98 fL    MCH 31 3 26 8 - 34 3 pg    MCHC 32 5 31 4 - 37 4 g/dL    RDW 17 6 (H) 11 6 - 15 1 %    Platelets 7 (LL) 547 - 390 Thousands/uL    nRBC 2 /100 WBCs    Neutrophils Relative 49 43 - 75 %    Immat GRANS % 2 0 - 2 %    Lymphocytes Relative 36 14 - 44 %    Monocytes Relative 10 4 - 12 %    Eosinophils Relative 2 0 - 6 %    Basophils Relative 1 0 - 1 %    Neutrophils Absolute 7 18 1 85 - 7 62 Thousands/µL    Immature Grans Absolute 0 31 (H) 0 00 - 0 20 Thousand/uL    Lymphocytes Absolute 5 13 (H) 0 60 - 4 47 Thousands/µL    Monocytes Absolute 1 40 (H) 0 17 - 1 22 Thousand/µL    Eosinophils Absolute 0 26 0 00 - 0 61 Thousand/µL    Basophils Absolute 0 09 0 00 - 0 10 Thousands/µL   Procalcitonin    Collection Time: 03/24/20  5:05 AM   Result Value Ref Range    Procalcitonin 0 51 (H) <=0 25 ng/ml   Fingerstick Glucose (POCT)    Collection Time: 03/24/20  5:34 AM   Result Value Ref Range    POC Glucose 131 65 - 140 mg/dl   Fingerstick Glucose (POCT)    Collection Time: 03/24/20  5:46 AM   Result Value Ref Range    POC Glucose 136 65 - 140 mg/dl   Prepare Leukoreduced Platelet Pheresis (1 pheresis product = 6-8 pooled units): 1 Product    Collection Time: 03/24/20  5:55 AM   Result Value Ref Range    Unit Product Code P9203E48     Unit Number [id number]     Unit ABO O     Unit [facility] POS     Unit Dispense Status Presumed Trans    Prepare Leukoreduced RBC: 1 Units    Collection Time: 03/24/20  6:33 AM   Result Value Ref Range    Unit Product Code J2376D66     Unit Number [id number]     Unit ABO O     Unit RH POS     Crossmatch Compatible     Unit Dispense Status Presumed Trans    Prepare Leukoreduced Platelet Pheresis (1 pheresis product = 6-8 pooled units): 1 Product    Collection Time: 03/24/20  6:33 AM   Result Value Ref Range    Unit Product Code C4977P81     Unit Number [id number] Unit ABO O     Unit RH POS     Unit Dispense Status Presumed Trans    Fingerstick Glucose (POCT)    Collection Time: 03/24/20 11:52 AM   Result Value Ref Range    POC Glucose 109 65 - 140 mg/dl   CBC    Collection Time: 03/24/20 11:53 AM   Result Value Ref Range    WBC 5 16 4 31 - 10 16 Thousand/uL    RBC 1 98 (L) 3 88 - 5 62 Million/uL    Hemoglobin 6 2 (LL) 12 0 - 17 0 g/dL    Hematocrit 19 2 (L) 36 5 - 49 3 %    MCV 97 82 - 98 fL    MCH 31 3 26 8 - 34 3 pg    MCHC 32 3 31 4 - 37 4 g/dL    RDW 18 2 (H) 11 6 - 15 1 %    Platelets 1 (LL) 788 - 390 Thousands/uL   Prepare Leukoreduced RBC: 2 Units    Collection Time: 03/24/20 12:46 PM   Result Value Ref Range    Unit Product Code Y2959X78     Unit Number [id number]     Unit ABO O     Unit [facility] POS     Crossmatch Compatible     Unit Dispense Status Issued     Unit Product Code Q5940K70     Unit Number [id number]     Unit ABO O     Unit [facility] POS     Crossmatch Compatible     Unit Dispense Status Presumed Trans    Prepare Leukoreduced RBC: 1 Units    Collection Time: 03/24/20 12:46 PM   Result Value Ref Range    Unit Product Code I0204O98     Unit Number [id number]     Unit ABO O     Unit [facility] POS     Crossmatch Compatible     Unit Dispense Status Crossmatched    Prepare Leukoreduced Platelet Pheresis (1 pheresis product = 6-8 pooled units): 1 Product    Collection Time: 03/24/20  2:07 PM   Result Value Ref Range    Unit Product Code P6117Y91     Unit Number [id number]     Unit ABO O     Unit [facility] POS     Unit Dispense Status Issued        Procedure: Ct Abdomen Pelvis Wo Contrast    Result Date: 3/23/2020  Narrative: CT ABDOMEN AND PELVIS WITHOUT IV CONTRAST INDICATION:   Melena  COMPARISON:  May 14, 2013 TECHNIQUE:  CT examination of the abdomen and pelvis was performed without intravenous contrast   Axial, sagittal, and coronal 2D reformatted images were created from the source data and submitted for interpretation   Radiation dose length product (DLP) for this visit:  504 97 mGy-cm   This examination, like all CT scans performed in the [facility], was performed utilizing techniques to minimize radiation dose exposure, including the use of iterative  reconstruction and automated exposure control  Enteric contrast was not administered  FINDINGS: ABDOMEN LOWER CHEST:  No clinically significant abnormality identified in the visualized lower chest  LIVER/BILIARY TREE:  The liver surface is nodular which was not present on the previous study and can be seen with cirrhosis  GALLBLADDER:  Gallbladder is surgically absent  SPLEEN:  Unremarkable  PANCREAS:  Unremarkable  ADRENAL GLANDS:  Unremarkable  KIDNEYS/URETERS:  Multiple bilateral renal nodules  Some of the nodules represent cysts  A hyperdense 6 mm right lower pole cyst is noted  Additional nodules do not meet the criteria for a benign lesion and additional imaging is required  14 x 12 mm left upper pole exophytic nodule  STOMACH AND BOWEL:  There is colonic diverticulosis without evidence of acute diverticulitis  APPENDIX:  A normal appendix was visualized  ABDOMINOPELVIC CAVITY:  No ascites or free intraperitoneal air  No lymphadenopathy  VESSELS:  Atherosclerotic changes are present  No evidence of aneurysm  PELVIS REPRODUCTIVE ORGANS:  Unremarkable for patient's age  URINARY BLADDER:  Unremarkable  ABDOMINAL WALL/INGUINAL REGIONS:  Unremarkable  OSSEOUS STRUCTURES:  No acute fracture or destructive osseous lesion  Impression: Nodular liver concerning for underlying cirrhosis  Multiple bilateral renal nodules, some of which meet the criteria for a simple cyst and others which do not  Recommend an ultrasound for further evaluation with attention to the exophytic left upper pole nodule  Diverticulosis without evidence for acute diverticulitis  The study was marked in EPIC for significant notification   Workstation performed: [id number]     Procedure: Xr Chest 1 View Portable    Result Date: 3/23/2020  Narrative: CHEST INDICATION:   dyspnea; possible gi bleeding  COMPARISON:  None EXAM PERFORMED/VIEWS:  XR CHEST PORTABLE  AP semierect FINDINGS:  There are median sternotomy wires indicating prior cardiac surgery  Cardiomediastinal silhouette appears unremarkable  The lungs are clear  No pneumothorax or pleural effusion  Osseous structures appear within normal limits for patient age  Impression: No acute cardiopulmonary disease  Workstation performed: [id number]     Procedure: Ct Head Without Contrast    Result Date: 3/23/2020  Narrative: CT BRAIN - WITHOUT CONTRAST INDICATION:  Altered mental status increasing confusion/disorientation; marked thrombocytopenia  COMPARISON:  None TECHNIQUE:  CT examination of the brain was performed  In addition to axial images, coronal 2D reformatted images were created and submitted for interpretation  Radiation dose length product (DLP) for this visit:  826 52 mGy-cm   This examination, like all CT scans performed in the [facility], was performed utilizing techniques to minimize radiation dose exposure, including the use of iterative  reconstruction and automated exposure control  IMAGE QUALITY:  Diagnostic  FINDINGS: PARENCHYMA:  No intra-axial mass, mass effect or midline shift  No CT signs of acute infarction  No acute parenchymal hemorrhage  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  There appears to be disruption of the roof of the sphenoid sinus with soft tissue tissue extension into the sinus  Further bony destruction of the lateral wall and base of the sphenoid bone on the left  and left dorsum sellae  Subtle soft tissue involvement of the vidian canal on the left may be present as well as soft tissue extension into the medial aspect of the left middle cranial fossa  Age-related cortical atrophy   Periventricular white matter hypodensity which is nonspecific although most compatible with chronic small vessel ischemic disease  VENTRICLES AND EXTRA-AXIAL SPACES:  The ventricles are concordant with degree of atrophy, midline position  There is mild prominence of the subdural CSF spaces bilaterally, 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  VISUALIZED ORBITS AND PARANASAL SINUSES:  See comments above  Sinuses are otherwise unremarkable  No acute orbital pathology  CALVARIUM AND EXTRACRANIAL SOFT TISSUES:  Normal      Impression: 1  No acute intracranial abnormality  2  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 and extension into the sphenoid sinus on the left  Differential considerations include pituitary macroadenoma, pituitary carcinoma, craniopharyngioma, meningioma as well as less likely considerations such as giant saccular aneurysm  Nonemergent MRI brain with IV contrast recommended for further characterization  3  Prominence of the subdural CSF spaces bilaterally 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  The study was marked in Epic for follow-up   Workstation performed: [id number]

## 2020-03-24 NOTE — CONSULTS
Consultation - Nephrology   Micky Juana 69 y o  male MRN: [id number]  Unit/Bed#: ICU 02 Encounter: [id number]    ASSESSMENT and PLAN:  1  BEN on probable CKD, b/l sCr 1 36 as of Nov 2016   -BEN likely d/t prerenal cause from decreased oral intake with possible hypovolemic shock  -UA performed yesterday bland, supportive of likely hemodynamic cause  -f/u abdominal u/s to evaluate kidney sizes/echogenicity  -CT abdomen shows multiple bilateral renal nodules, with some cysts which do not appear benign   -was on PPI at home  -f/u serial BMP, renal indices improving s/p IV resuscitation  -continue with hypotonic bicarb gtt in setting of BEN/hypovolemia and acidosis  -urinating, monitor UOP    2  CKD stage 3 in setting of DM/cirrhosis - b/l sCr as above  -no outpatient nephrology follow up on record    3  Anemia ? Due to blood loss versus TMA- on oral iron, hemoglobin 5 9 on admission up to 7 5 status post 2 units of blood but back down to 6 2  Recommend Hematology consult which is pending  Iron level high at 280, ferritin normal 172, iron sat 83, TIBC 337 with normal folate  Hemolysis smear negative  4  Hypernatremia - sNa 146, likely d/t lack of free water intake in this demented patient  Will begin hypotonic fluid as above  5  Elevated anion gap metabolic acidosis-anion gap 20 on admission with a bicarb of 14, this could be due to significant renal impairment, bicarb stable at 14 with closed anion gap  Of note, glucose normal   Lactate also normal but was elevated on admission at 2 7  Will begin hypotonic bicarb drip as above  6  Azotemia with concern for possible uremia - BUN improving s/p IVF  Continue to monitor  No urgent RRT needs at this time  Suspect hypovolemia and possible GIB playing a role  7  Hypovolemic vs vasodilatory shock - on pressors per ICU team  Maintain MAP > 65      8  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 If malignant in nature    Other issues: NSTEMI type 1 vs 2 - cardio on board    HISTORY OF PRESENT ILLNESS:  Requesting Physician: Alfredo Rollins MD  Reason for Consult: BEN    Hira Pereira is a 69y o  year old male who was admitted to [facility] after presenting with anemia and dizziness  A renal consultation is requested today for assistance in the management of BEN  The patient presented with dizziness from the nursing home  He is found to have anemia as well as thrombocytopenia  He is a poor historian  He is currently on Levophed drip  He denies any fevers, chills, chest pain or shortness breath, nausea, vomiting, diarrhea or leg edema  He does states he has [address] over [address]  He denies any NSAID use  He does admit to decreased appetite over the past few days  He says that he cannot walk  Urinating okay  Denies history of kidney failure in the past     Per record review, the patient is in shock and was transferred from [address] [place] [address] to [facility]  He has been given 2 units of packed red blood cells as well as 1 unit of platelets and DDAVP as the patient has severe anemia as well as some thrombocytopenia with platelets of 2 on admission  Head imaging showed pituitary mass that had been present previously  No overt signs of bleeding while patient was at the nursing home  I note the patient is coughing periodically and he asks for a tissue      PAST MEDICAL HISTORY:  Past Medical History:   Diagnosis Date    [facility]     [facility]chronic obstructive [facility] disease[facility]     Coronary artery disease     Dementia [facility]     Diabetes mellitus ([facility]     Diverticulosis     Gastric reflux     Hyperlipemia     Panlobular emphysema (HCC)     Pituitary mass (HCC)     Renal disorder     CKD Stage 3 & Bilat multi renal nodules    Thrombocyte disorder (HCC)     thrombocytopenia    Unstable angina ([facility] )        PAST SURGICAL HISTORY:  Past Surgical History:   Procedure Laterality Date    CARDIAC SURGERY unspecified       ALLERGIES:  Allergies   Allergen Reactions    Erythromycin     Penicillins     Shellfish-Derived Products        SOCIAL HISTORY:  Social History     Substance and Sexual Activity   Alcohol Use Never    Frequency: Never    Binge frequency: Never     Social History     Substance and Sexual Activity   Drug Use Never     Social History     Tobacco Use   Smoking Status Current Every Day Smoker    Packs/day: 0 25   Smokeless Tobacco Never Used       FAMILY HISTORY:  No family history on file      MEDICATIONS:    Current Facility-Administered Medications:     acetaminophen (TYLENOL) tablet 650 mg, 650 mg, Oral, Q6H PRN, Marialuisa Mejia MD    atorvastatin (LIPITOR) tablet 40 mg, 40 mg, Oral, Daily With Lieutenant Zahida MD    bisacodyl (DULCOLAX) rectal suppository 10 mg, 10 mg, Rectal, Daily PRN, Marialuisa Mejia MD    chlorhexidine (PERIDEX) 0 12 % oral rinse 15 mL, 15 mL, Swish & Spit, Q12H [address], Marialuisa Mejia MD, 15 mL at 03/24/20 0838    dextran 70-hypromellose (GENTEAL TEARS) 0 1-0 3 % ophthalmic solution 2 drop, 2 drop, Both Eyes, BID, Marialuisa Mejia MD, 2 drop at 03/24/20 1130    docusate sodium (COLACE) capsule 100 mg, 100 mg, Oral, BID, Marialuisa Mejia MD, 100 mg at 03/24/20 0940    ferrous sulfate tablet 325 mg, 325 mg, Oral, BID With Meals, Marialuisa Mejia MD, 325 mg at 03/24/20 0838    insulin lispro (HumaLOG) 100 units/mL subcutaneous injection 1-5 Units, 1-5 Units, Subcutaneous, Q6H [address] **AND** Fingerstick Glucose (POCT), , , Q6H, Marialuisa Mejia MD    norepinephrine (LEVOPHED) 4 mg (STANDARD CONCENTRATION) IV in sodium chloride 0 9% 250 mL, 1-30 mcg/min, Intravenous, Titrated, Marialuisa Mejia MD, Last Rate: 10 9 mL/hr at 03/24/20 1220, 2 9 mcg/min at 03/24/20 1220    pantoprazole (PROTONIX) EC tablet 40 mg, 40 mg, Oral, BID AC, Norah Betts PA-C    sodium bicarbonate 75 mEq in sterile water 1,000 mL infusion, 100 mL/hr, Intravenous, Continuous, Wagner Rubio PA-C    REVIEW OF SYSTEMS:  More than 10 systems were reviewed  No other pertinent positive findings other than those mentioned in HPI  PHYSICAL EXAM:  Current Weight: Weight - Scale: 59 8 kg (131 lb 13 4 oz)  First Weight: Weight - Scale: 59 8 kg (131 lb 13 4 oz)  Vitals:    03/24/20 1000 03/24/20 1100 03/24/20 1200 03/24/20 1230   BP: 136/58 97/53 (!) 95/41 (!) 114/47   BP Location: Right arm Right arm Right arm    Pulse: 74 68 80 64   Resp: 17 13 21 (!) 24   Temp:   97 6 °F (36 4 °C) 97 6 °F (36 4 °C)   TempSrc:   Rectal Oral   SpO2: 99%  99% 99%   Weight:       Height:           Intake/Output Summary (Last 24 hours) at 3/24/2020 1243  Last data filed at 3/24/2020 1200  Gross per 24 hour   Intake 1755 69 ml   Output 1325 ml   Net 430 69 ml     Physical Exam   Constitutional: He appears well-developed and well-nourished  No distress  HENT:   Head: Normocephalic and atraumatic  Mouth/Throat: No oropharyngeal exudate  Eyes: Right eye exhibits no discharge  Left eye exhibits no discharge  No scleral icterus  Neck: Normal range of motion  Neck supple  No thyromegaly present  Cardiovascular: Normal rate, regular rhythm and normal heart sounds  Murmur:     Pulmonary/Chest: Effort normal  He has no wheezes  He has no rales  Coarse BS b/l   Abdominal: Soft  Bowel sounds are normal  He exhibits no distension  There is no tenderness  Musculoskeletal: Normal range of motion  He exhibits no edema  Neurological: He is alert  awake   Skin: Skin is warm and dry  Rash noted  He is not diaphoretic    +petechiae over torso, face, extremities   Psychiatric: He has a normal mood and affect  His behavior is normal    Vitals reviewed        Invasive Devices:      Lab Results:   Results from last 7 days   Lab Units 03/24/20  1153 03/24/20  0505 03/24/20  0230 03/23/20  2139 03/23/20  2138  03/23/20  1610 03/23/20  1322   WBC Thousand/uL 5 16 14 37*  --   --  15 21*  --   --  14 89*   HEMOGLOBIN g/dL 6 2* 7 5* 7 6*  --  6 4*   < > --  5 9*   HEMATOCRIT % 19 2* 23 1* 23 2*  --  20 1*   < >  --  18 8*   PLATELETS Thousands/uL 1* 7*  --   --  13*  --   --  2*   POTASSIUM mmol/L  --  3 8  --  3 9  --   --  4 5 4 4   CHLORIDE mmol/L  --  123*  --  123*  --   --  112* 109*   CO2 mmol/L  --  14*  --  13*  --   --  13* 14*   BUN mg/dL  --  108*  --  118*  --   --  127* 138*   CREATININE mg/dL  --  2 84*  --  2 83*  --   --  3 17* 3 52*   CALCIUM mg/dL  --  7 8*  --  7 4*  --   --  7 5* 8 4   MAGNESIUM mg/dL  --   --   --  1 6  --   --   --  1 6   PHOSPHORUS mg/dL  --   --   --  3 2  --   --   --   --    ALK PHOS U/L  --  40*  --  36*  --   --   --  40*   ALT U/L  --  30  --  23  --   --   --  23   AST U/L  --  215*  --  167*  --   --   --  80*    < > = values in this interval not displayed

## 2020-03-24 NOTE — ASSESSMENT & PLAN NOTE
· Platelets currently 7, continue to trend   · Per records has been chronic  · 2 units of platelets, 2 PRBC as well as DDAVP

## 2020-03-24 NOTE — CONSULTS
Consultation - [facility]  Palak Sharp 69 y o  male MRN: [id number]  Unit/Bed#: ICU 02 Encounter: [id number]        ASSESSMENT/PLAN:   Symptomatic Anemia Hx of cirrhosis, Chronic thrombocytopenia  [place]ed [place] with abnormal lab findings of hemoglobin 5 9, plt 2  His baseline Hb is 12  He was also found to have symptomatic anemia with  bright red stools of two days duration  He denies prior episodes of BRBPR,colonoscopy, endoscopy, use of NSAID's or malignancy  received 2u PBPC and 2 plt  Which bumped his Hb to 7 5 and platelet of 7  He had a CT abdomen which showed diverticulosis and nodular liver suggestive of cirrhosis  MELD 20 and Child class A  Pt  Might be having brisk bleeding due to history of cirrhosis vs diverticulosis vs malignancy of the colon  His platelet count at this time is 7k which is very low and  puts him at risk for bleeding  In the absence of severe GI bleed, risk of bleeding is greater vs benefit of evaluation  Will defer EGD/ Colonoscopy until platelet becomes stable at 50k  Would recommend hematology consult  Plan   · EGD/ Colonoscopy when platelet become stable  · Continue Protonix 40bid PO  · F/U ultrasound result    Cirrhosis etiology unknown  Pt  Is a poor historian  He Is AAOx3 no signs of decompensation, no stigmata of liver disease  Hepatitis panel normal  MELD Score 20 and child class A  Plan  · F/u abdominal ultrasound scan          Inpatient consult to gastroenterology     Performed by  Victor Manuel Catherine DO     Authorized by Renda Sandifer, MD              Reason for Consult / Principal Problem: Symptomatic anemia    HPI: Palak Sharp is a 69y o  year old male with a PMH of Cirrhosis unspecified etiology, HLD,CAD s/p CABG on aspirin,CKD,DM Chronic thrombocytopenia  [place]ed [place] with abnormal lab findings of hemoglobin 5 9, plt 2   He was also found to have symptomatic anemia with dizziness, dyspnea at rest worse on exertion, nausea, generalized abdominal pain  He denies vomiting but admits to  bright red stools of two days duration  He denies prior episodes of BRBPR,use of NSAID's or malignancy  He denies prior endoscopy or colonoscopy in the past,denies use of alcohol cigarette or recreational drugs  On admission,  pt  was started on IV protonix 40mg bid and received 2u PBPC and 2 plt  Which bumped his Hb to 7 5 and platelet of 7  He had a CT abdomen which showed diverticulosis and nodular liver suggestive of cirrhosis  Pt  Was seen and examined by bedside  He looks pale and admits to resolution of his dizziness, lightheadedness and dyspnea at rest  He denies having BM so cannot tell if he stopped passing BRBPR  Pt  Looks stable on room air not in any obvious distress  Review of Systems: as per HPI  Review of Systems   Constitutional: Negative for appetite change, chills, diaphoresis, fatigue and fever  HENT: Negative for congestion, rhinorrhea and sore throat  Eyes: Negative for photophobia and visual disturbance  Respiratory: Negative for cough, shortness of breath, wheezing and stridor  Cardiovascular: Negative for chest pain, palpitations and leg swelling  Gastrointestinal: Negative for abdominal distention, abdominal pain, blood in stool, constipation, diarrhea, nausea, rectal pain and vomiting  Endocrine: Negative for polyphagia and polyuria  Genitourinary: Negative for decreased urine volume, dysuria and hematuria  Musculoskeletal: Negative for neck pain and neck stiffness  Skin: Positive for pallor  Negative for wound  Neurological: Negative for dizziness, tremors, syncope, weakness, light-headedness and headaches  Psychiatric/Behavioral: Negative for agitation, behavioral problems and confusion         Historical Information   Past Medical History:   Diagnosis Date    Cirrhosis ([facility] )     COPD (chronic obstructive pulmonary disease) (HCC)     Coronary artery disease     Dementia ([facility] )     Diabetes mellitus [facility] )     Diverticulosis     Gastric reflux     Hyperlipemia     Panlobular emphysema (HCC)     Pituitary mass (HCC)     Renal disorder     CKD Stage 3 & Bilat multi renal nodules    Thrombocyte disorder (HCC)     thrombocytopenia    Unstable angina (HCC)      Past Surgical History:   Procedure Laterality Date    CARDIAC SURGERY      unspecified     Social History   Social History     Substance and Sexual Activity   Alcohol Use Never    Frequency: Never    Binge frequency: Never     Social History     Substance and Sexual Activity   Drug Use Never     Social History     Tobacco Use   Smoking Status Current Every Day Smoker    Packs/day: 0 25   Smokeless Tobacco Never Used     No family history on file      Meds/Allergies     Medications Prior to Admission   Medication    acetaminophen (TYLENOL) 325 mg tablet    aspirin 81 mg chewable tablet    docusate sodium (COLACE) 100 mg capsule    fenofibrate (TRICOR) 145 mg tablet    ferrous sulfate 325 (65 Fe) mg tablet    omeprazole (PriLOSEC) 20 mg delayed release capsule    polyvinyl alcohol (LIQUIFILM TEARS) 1 4 % ophthalmic solution    rosuvastatin (CRESTOR) 20 MG tablet    bisacodyl (DULCOLAX) 10 mg suppository     Current Facility-Administered Medications   Medication Dose Route Frequency    acetaminophen (TYLENOL) tablet 650 mg  650 mg Oral Q6H PRN    atorvastatin (LIPITOR) tablet 40 mg  40 mg Oral Daily With Dinner    bisacodyl (DULCOLAX) rectal suppository 10 mg  10 mg Rectal Daily PRN    cefepime (MAXIPIME) 1,000 mg in dextrose 5 % 50 mL IVPB  1,000 mg Intravenous Q12H    chlorhexidine (PERIDEX) 0 12 % oral rinse 15 mL  15 mL Swish & Spit Q12H BELLE    dextran 70-hypromellose (GENTEAL TEARS) 0 1-0 3 % ophthalmic solution 2 drop  2 drop Both Eyes BID    docusate sodium (COLACE) capsule 100 mg  100 mg Oral BID    ferrous sulfate tablet 325 mg  325 mg Oral BID With Meals    insulin lispro (HumaLOG) 100 units/mL subcutaneous injection 1-5 Units  1-5 Units Subcutaneous Q6H [address]    multi-electrolyte (ISOLYTE-S PH 7 4) bolus 500 mL  500 mL Intravenous Once    norepinephrine (LEVOPHED) 4 mg (STANDARD CONCENTRATION) IV in sodium chloride 0 9% 250 mL  1-30 mcg/min Intravenous Titrated    pantoprazole (PROTONIX) injection 40 mg  40 mg Intravenous Q12H [address]       Allergies   Allergen Reactions    Erythromycin     Penicillins     Shellfish-Derived Products        Objective     Blood pressure 136/58, pulse 74, temperature 99 °F (37 2 °C), temperature source Rectal, resp  rate 17, height 5' 6" (1 676 m), weight 59 8 kg (131 lb 13 4 oz), SpO2 99 %  Intake/Output Summary (Last 24 hours) at 3/24/2020 1052  Last data filed at 3/24/2020 0800  Gross per 24 hour   Intake 1195 64 ml   Output 1125 ml   Net 70 64 ml       PHYSICAL EXAM     Physical Exam   Constitutional: He is oriented to person, place, and time  No distress  HENT:   Head: Normocephalic and atraumatic  Eyes: EOM are normal  No scleral icterus  Neck: Normal range of motion  Neck supple  No JVD present  Cardiovascular: Normal rate, regular rhythm, normal heart sounds and intact distal pulses  No murmur heard  Pulmonary/Chest: Effort normal  No stridor  No respiratory distress  He has no wheezes  He has no rales  He exhibits no tenderness  Abdominal: Soft  Bowel sounds are normal  He exhibits no distension and no mass  There is no tenderness  There is no rebound and no guarding  Rectal exam: non bloody stool on exam, no mass, no hemorrhoid  Musculoskeletal: He exhibits no edema or tenderness  Neurological: He is alert and oriented to person, place, and time  Skin: Skin is warm and dry  He is not diaphoretic  There is pallor         Lab Results:   CBC:   Lab Results   Component Value Date    WBC 14 37 (H) 03/24/2020    HGB 7 5 (L) 03/24/2020    HCT 23 1 (L) 03/24/2020    MCV 96 03/24/2020    PLT 7 (LL) 03/24/2020    MCH 31 3 03/24/2020    MCHC 32 5 03/24/2020 RDW 17 6 (H) 03/24/2020    MPV 9 4 03/23/2020    NRBC 2 03/24/2020   ,   CMP:   Lab Results   Component Value Date    K 3 8 03/24/2020     (H) 03/24/2020    CO2 14 (L) 03/24/2020     (H) 03/24/2020    CREATININE 2 84 (H) 03/24/2020    CALCIUM 7 8 (L) 03/24/2020     (H) 03/24/2020    ALT 30 03/24/2020    ALKPHOS 40 (L) 03/24/2020    EGFR 21 03/24/2020   ,   Lipase:   Lab Results   Component Value Date    LIPASE 52 (L) 03/23/2020   ,  PT/INR:   Lab Results   Component Value Date    INR 1 36 (H) 03/23/2020   ,   Troponin:   Lab Results   Component Value Date    TROPONINI >40 00 (H) 03/24/2020   ,   Magnesium: No components found for: MAG,   Phosphorous:   Lab Results   Component Value Date    PHOS 3 2 03/23/2020     Imaging Studies: I have personally reviewed pertinent reports  Counseling / Coordination of Care  Total time spent today  30 minutes  Greater than 50% of total time was spent with the patient and / or family counseling and / or coordination of care

## 2020-03-24 NOTE — PLAN OF CARE
Problem: OCCUPATIONAL THERAPY ADULT  Goal: Performs self-care activities at highest level of function for planned discharge setting  See evaluation for individualized goals  Description  Treatment Interventions: ADL retraining, Functional transfer training, UE strengthening/ROM, Endurance training, Cognitive reorientation, Patient/family training, Equipment evaluation/education, Compensatory technique education, Continued evaluation, Energy conservation, Activityengagement          See flowsheet documentation for full assessment, interventions and recommendations  Note:   Limitation: Decreased ADL status, Decreased Safe judgement during ADL, Decreased endurance, Decreased cognition, Decreased self-care trans, Decreased high-level ADLs  Prognosis: Fair  Assessment: Pt is a 70 y/o male seen for OT eval s/p adm to [facility] as a transfer from [facility] where he initially presented to the ED w/ abnormal lab results  Pt had small amount of blood in stool, mild abdominla pain, nausea and exertional dyspnea  Pt is dx'd w/ 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  Per NSx, not a surgical candidate at this time  Pt  has a past medical history of Cirrhosis ([address] ), COPD (chronic obstructive pulmonary disease) ([address] ), Coronary artery disease, Dementia ([address] ), Diabetes mellitus ([address] ), Diverticulosis, Gastric reflux, Hyperlipemia, Panlobular emphysema ([address] ), Pituitary mass ([address] ), Renal disorder, Thrombocyte disorder ([address] ), and Unstable angina ([address] )  Pt with active OT orders and up with assistance  orders  Pt lives with facility staff @ [facility], all 1 floor, no CLARI, elevator access  Pt required assist w/ ADLS and IADLS, does not drive, & required use of DME PTA including rollator, s/c, and grab bars  Pt is currently demonstrating the following occupational deficits: Min A UB ADLS, Mod A LB ADLS, bed mobility, Mod A transfers and functional mobility w/ RW, +VC for safety   These deficits that are impacting pt's baseline areas of occupation are a result of the following impairments: pain, endurance, activity tolerance, functional mobility, forward functional reach, balance, trunk control, functional standing tolerance, decreased I w/ ADLS/IADLS, strength, visual deficits, cognitive impairments, decreased safety awareness and decreased insight into deficits  The following Occupational Performance Areas to address include: eating, grooming, bathing/shower, toilet hygiene, dressing, medication management, socialization, health maintenance, functional mobility and clothing management  Pt scored overall 35/100 on the Barthel Index  Based on the aforementioned OT evaluation, functional performance deficits, and assessments, pt has been identified as a high complexity evaluation  Recommend return to nursing home w/ increased assistance upon D/C, pending progress   Pt to continue to benefit from acute immediate OT services to address the following goals 3-5x/week to  w/in 10-14 days:      OT Discharge Recommendation: Other (Comment)(return to facility w/ increased assist)  OT - OK to Discharge: Yes(when medically stable)     Ralph Bond MS, OTR/L

## 2020-03-24 NOTE — UTILIZATION REVIEW
Initial Clinical Review    Admission: Date/Time/Statement: Admission Orders (From admission, onward)     Ordered        03/23/20 2058  Inpatient Admission  Once                   Orders Placed This Encounter   Procedures    Inpatient Admission     Standing Status:   Standing     Number of Occurrences:   1     Order Specific Question:   Admitting Physician     Answer:   Nunu Sanford     Order Specific Question:   Level of Care     Answer:   Critical Care [15]     Order Specific Question:   Estimated length of stay     Answer:   More than 2 Midnights     Order Specific Question:   Certification     Answer:   I certify that inpatient services are medically necessary for this patient for a duration of greater than two midnights  See H&P and MD Progress Notes for additional information about the patient's course of treatment  HPI:  Micky Arias is a 69 y o  male with history significant for COPD, hyperlipidemia, coronary artery disease diabetes mellitus type 2, chronic thrombocytopenia, CKD stage 3, and cirrhosis, who presents as a transfer from [address] [facility] [address] ED  Patient initially presented to the ED from a nursing home  for abnormal lab results  Outpatient lab studies showed a hemoglobin of 7 with platelet count of less than 10 and elevated /creatinine 3  On questioning, patient referred small amount of blood in his stool as well as mild abdominal pain and nausea, exertional dyspnea does also present at rest, worse with standing  Patient appeared confused on questioning and was unable to provide full details  Patient was mildly hypothermic at 35 8° C    Lab significant for hemoglobin 5 9-6 6, WBC 15, platelets 2, lactic acid 2 7-2 4, creatinine 3 52-3 17, -127, bicarb 13 with anion gap of 18 VBG 7 247/27 0/33 3/11 5/-14 4, troponin 8 52, INR 1 36, magnesium 1 6, AST 80, albumin 3 2      CT abdomen pelvis with nodular liver concerning for underlying cirrhosis, multiple bilateral renal nodules some of which meet the criteria for simple cyst and others which do not, diverticulosis without diverticulitis  CT head with 2 5 x 2 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 with locally invasive features of the adjacent skull and extension into the sphenoid sinus on the left 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 versus pituitary carcinoma versus craniopharyngioma versus meningioma versus giant saccular aneurysm  Prominence of the subdural CSF spaces bilaterally which 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  EKG with normal sinus rhythm with heart rate 79, prolonged QTC of 516 milliseconds, T-wave inversion in lateral leads and ST depression in V3, isolated Q-wave in III  Patient received 1 5 L IVF, 1U pRBC, 1 U plt, DDAVP, protonix, cefepime and was started on levo for hypotension  Physical exam: Awake, alert and oriented to person place and time however he is unaware of the month and does answer questions slowly at times, able to move all extremities and follows commands  Plan: Inpatient ICU admission for evaluation and treatment of NSTEMI Type 1 vs  Type 2, Acute on chronic anemia, Hypovolemic vs  Vasodilatory shock, BEN on CKD, metabolic acidosis, thrombocytopenia:    Consult Cardiology, no heparin drip due to thrombocytopenia and anemia, trend troponin, ECHO  Trend hemoglobin, consult Gastroenterology  Neuro checks, check ammonia, consult Neurosurgery  3/24 Cardiology consult: Patient with known underlying CAD, admitted with severe anemia, and possible melena/hypovolemic shock, noted to have elevated troponin in this setting, likely type 2 MI  Echo today showed normal LV size and function, EF 65%, no diagnostic RWMA, RV mildly dilated, mild to moderate MR, moderate TR, PASP 42 mm Hg  Given the patient's current anemia with thrombocytopenia unable to continue home aspirin or other antiplatelet/anticoagulants at this time  Was not on a beta blocker    From cardiac standpoint at this time can only treat supportively  3/24 Critical Care note: Currently on Levophed, 10 mcg/min  Pressor requirement increasing, may need to add second vasopressor, give 500 cc bolus, consider additional blood products  Troponin >40, holding AC  On room air  Ammonia 56, started on lactulose  Hemoccult negative  Creatinine improving with fluid resuscitation, continue to hydrate, Nephrology consulted  Cefepime Day 2, unclear source of infection, blood cultures pending, lactic 0 8, procalcitonin 0 51 from 0 68  Has received 2 united PRBC, 2 units platelets, and DDAVP, hemoglobin 7 5, platelets 7  [address] work up completed  Physical exam: oriented to person, place and time  Petechiae of B/L LE  Neurosurgery noted patient is not a surgical candidate  Recommended repeat MRI brain in 2-4 weeks  Gastroenterology deferred EGD/colonoscopy until platelets become stable at 50,000, continued Protonix, follow abdominal US  Social Work contacted nursing home, at baseline, patient is alert and oriented, independent with ADLs and uses a rolling walker for ambulation           Admission Vitals [03/23/20 2000]   Temperature Pulse Respirations Blood Pressure SpO2   (!) 97 4 °F (36 3 °C) 72 18 (!) 95/39 99 %      Temp Source Heart Rate Source Patient Position - Orthostatic VS BP Location FiO2 (%)   Oral Monitor Lying Left arm --      Pain Score       No Pain        Wt Readings from Last 1 Encounters:   03/23/20 59 8 kg (131 lb 13 4 oz)     Additional Vital Signs:     Date/Time  Temp  Pulse  Resp  BP  MAP (mmHg)  SpO2  O2 Device    03/24/20 1100    68  13  97/53  73        03/24/20 1000    74  17  136/58  87  99 %  None (Room air)    03/24/20 0900    72  19  140/59  93  98 %  None (Room air)    03/24/20 0800  99 °F  82  26Abnormal   93/42  59  97 %  None (Room air)    03/24/20 0700    62  13  114/45  62  95 %      03/24/20 0600    62  13  112/53  70  94 %      03/24/20 0500    72  16  97/50  74  97 %      03/24/20 0400   74  27Abnormal   101/47  69  96 %      03/24/20 0300    74  18  112/55  66  97 %      03/24/20 0200    64  13  129/49  69  96 %  None (Room air)    03/24/20 0115    62  22  107/50    97 %      03/24/20 0057  98 5 °F (36 9 °C)  62  15  103/44Abnormal     98 %      03/24/20 0027  97 9 °F (36 6 °C)  64  16  91/44    97 %  None (Room air)    03/24/20 0009  98 7 °F (37 1 °C)  70  16  92/44          03/24/20 0007  98 7 °F (37 1 °C)  66  13  92/44    97 %      03/24/20 0000    74  17  96/44    98 %      03/23/20 2340  98 9 °F (37 2 °C)  66  13  96/47    96 %      03/23/20 2330  98 8 °F (37 1 °C)  68  16  96/4  66  98 %      03/23/20 2315  98 9 °F (37 2 °C)  70  14  89/34    98 %      03/23/20 2300  98 5 °F (36 9 °C)  76  17  95/45  64  98 %      03/23/20 2245    76  16  90/38  57  98 %      03/23/20 2244  98 7 °F (37 1 °C)  70  14  90/38          03/23/20 2230  98 7 °F (37 1 °C)  66  15  111/45    99 %      03/23/20 2200    62  15  102/44  65  97 %  None (Room air)    03/23/20 2100    64  16  93/41  55  100 %          Date and Time Eye Opening Best Verbal Response Best Motor Response [place] Coma Scale Score   03/24/20 1100 4 5 6 15   03/24/20 1000 4 5 6 15   03/24/20 0900 4 5 6 15   03/24/20 0800 4 5 -- --   03/24/20 0554 4 5 6 15   03/24/20 0400 4 5 6 15   03/24/20 0200 4 5 6 15   03/24/20 0001 4 5 6 15   03/23/20 2200 4 5 6 15   03/23/20 2000 4 5 6 15       Pertinent Labs/Diagnostic Test Results:     3/23 CTAP:  Nodular liver concerning for underlying cirrhosis  Multiple bilateral renal nodules, some of which meet the criteria for a simple cyst and others which do not  Recommend an ultrasound for further evaluation with attention to the exophytic left upper pole nodule  Diverticulosis without evidence for acute diverticulitis  3/23 CT head: 1  No acute intracranial abnormality  2  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 and extension into the sphenoid sinus on the left  Differential considerations include pituitary macroadenoma, pituitary carcinoma,  craniopharyngioma, meningioma as well as less likely considerations such as giant saccular aneurysm  Nonemergent MRI brain with IV contrast recommended for further characterization  3  Prominence of the subdural CSF spaces bilaterally 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  3/23 Chest x-ray:  No acute cardiopulmonary disease    3/23 EKG: Normal sinus rhythm, ST & T wave abnormality, consider anterolateral ischemia  Prolonged QT, Abnormal ECG, When compared with ECG of 23-JUN-2000 17:08, ST elevation now present in Inferior leads, ST more depressed Anterior leads, Nonspecific T wave abnormality now evident in Inferior leads, T wave inversion now evident in Anterolateral leads, QT has lengthened          Results from last 7 days   Lab Units 03/24/20  0505 03/24/20  0230 03/23/20 2138 03/23/20  1754 03/23/20  1322   WBC Thousand/uL 14 37*  --  15 21*  --  14 89*   HEMOGLOBIN g/dL 7 5* 7 6* 6 4* 6 6* 5 9*   HEMATOCRIT % 23 1* 23 2* 20 1* 20 7* 18 8*   PLATELETS Thousands/uL 7*  --  13*  --  2*   NEUTROS ABS Thousands/µL 7 18  --   --   --  7 16     Results from last 7 days   Lab Units 03/23/20 2138   RETIC CT ABS  132,500*   RETIC CT PCT % 6 66*     Results from last 7 days   Lab Units 03/24/20  0505 03/23/20  2139 03/23/20  1610 03/23/20  1322   SODIUM mmol/L 146* 146* 143 143   POTASSIUM mmol/L 3 8 3 9 4 5 4 4   CHLORIDE mmol/L 123* 123* 112* 109*   CO2 mmol/L 14* 13* 13* 14*   ANION GAP mmol/L 9 10 18* 20*   BUN mg/dL 108* 118* 127* 138*   CREATININE mg/dL 2 84* 2 83* 3 17* 3 52*   EGFR ml/min/1 73sq m 21 21 18 16   CALCIUM mg/dL 7 8* 7 4* 7 5* 8 4   MAGNESIUM mg/dL  --  1 6  --  1 6   PHOSPHORUS mg/dL  --  3 2  --   --      Results from last 7 days   Lab Units 03/24/20  0505 03/23/20 2139 03/23/20 2138 03/23/20  1322   AST U/L 215* 167*  --  80*   ALT U/L 30 23 --  23   ALK PHOS U/L 40* 36*  --  40*   TOTAL PROTEIN g/dL 6 0* 6 0*  --  6 6   ALBUMIN g/dL 3 1* 2 9*  --  3 2*   TOTAL BILIRUBIN mg/dL 0 52 0 51  --  0 40   AMMONIA umol/L  --   --  56*  --      Results from last 7 days   Lab Units 03/24/20  0546 03/24/20  0534 03/23/20  2238   POC GLUCOSE mg/dl 136 131 146*     Results from last 7 days   Lab Units 03/24/20  0505 03/23/20  2139 03/23/20  1610 03/23/20  1322   GLUCOSE RANDOM mg/dL 124 126 133 136               Results from last 7 days   Lab Units 03/23/20  1530   PH WAQAR  7 247*   PCO2 WAQAR mm Hg 27 0*   PO2 WAQAR mm Hg 33 3*   HCO3 WAQAR mmol/L 11 5*   BASE EXC WAQAR mmol/L -14 4   O2 CONTENT WAQAR ml/dL 5 6   O2 HGB, VENOUS % 55 2*             Results from last 7 days   Lab Units 03/24/20  0230 03/23/20  2138 03/23/20  1754 03/23/20  1340   TROPONIN I ng/mL >40 00* 37 20*  37 70* 23 18* 8 52*         Results from last 7 days   Lab Units 03/23/20  1322   PROTIME seconds 16 8*   INR  1 36*     Results from last 7 days   Lab Units 03/23/20  1527   TSH 3RD GENERATON uIU/mL 1 452     Results from last 7 days   Lab Units 03/24/20  0505 03/23/20  1521   PROCALCITONIN ng/ml 0 51* 0 68*     Results from last 7 days   Lab Units 03/24/20  0230 03/23/20  2138 03/23/20  1610 03/23/20  1335   LACTIC ACID mmol/L 0 8 0 8 2 4* 2 7*            Results from last 7 days   Lab Units 03/23/20  2139   FERRITIN ng/mL 172     Results from last 7 days   Lab Units 03/23/20  1610   HEP B S AG  Non-reactive   HEP C AB  Non-reactive   HEP B C IGM  Non-reactive     Results from last 7 days   Lab Units 03/23/20  1322   LIPASE u/L 52*     Results from last 7 days   Lab Units 03/23/20  2141 03/23/20  2139   CRP mg/L  --  8 6*   SED RATE mm/hour 19*  --          Results from last 7 days   Lab Units 03/23/20  1521   CLARITY UA  Clear   COLOR UA  Yellow   SPEC GRAV UA  1 020   PH UA  5 0   GLUCOSE UA mg/dl Negative   KETONES UA mg/dl Negative   BLOOD UA  Negative   PROTEIN UA mg/dl Negative   NITRITE UA Negative   BILIRUBIN UA  Negative   UROBILINOGEN UA E U /dl 0 2   LEUKOCYTES UA  Negative         Results from last 7 days   Lab Units 03/23/20  1658 03/23/20  1651   BLOOD CULTURE  Received in Microbiology Lab  Culture in Progress  Received in Microbiology Lab  Culture in Progress  Past Medical History:   Diagnosis Date    Cirrhosis ([place] [id number] )     COPD (chronic obstructive pulmonary disease) (HCC)     Coronary artery disease     Dementia (HCC)     Diabetes mellitus ([place] [address] )     Diverticulosis     Gastric reflux     Hyperlipemia     Panlobular emphysema (HCC)     Pituitary mass (HCC)     Renal disorder     CKD Stage 3 & Bilat multi renal nodules    Thrombocyte disorder (HCC)     thrombocytopenia    Unstable angina (HCC)        Admitting Diagnosis: Shock ([place] [address] ) [R57 9]  Age/Sex: 69 y o  Male    Admission Orders: H&H Q6H, NPO, SCD, MRI brain,       Scheduled Medications:    Medications:  atorvastatin 40 mg Oral Daily With Dinner   cefepime 1,000 mg Intravenous Q12H   chlorhexidine 15 mL Swish & Spit Q12H [address]   dextran 70-hypromellose 2 drop Both Eyes BID   docusate sodium 100 mg Oral BID   ferrous sulfate 325 mg Oral BID With Meals   insulin lispro 1-5 Units Subcutaneous Q6H [address]   multi-electrolyte 500 mL Intravenous Once   pantoprazole 40 mg Intravenous Q12H [address]     Continuous IV Infusions:    norepinephrine 1-30 mcg/min Intravenous Titrated     PRN Meds:    acetaminophen 650 mg Oral Q6H PRN   bisacodyl 10 mg Rectal Daily PRN       IP CONSULT TO CASE MANAGEMENT  IP CONSULT TO CARDIOLOGY  IP CONSULT TO GASTROENTEROLOGY  IP CONSULT TO NEPHROLOGY  IP CONSULT TO NEUROSURGERY          Network Utilization Review Department  [email or web address]  ATTENTION: Please call with any questions or concerns to [phone] and carefully listen to the prompts so that you are directed to the right person   All voicemails are confidential   Skip Jacques all requests for admission clinical reviews, approved or denied determinations and any other requests to dedicated fax number below belonging to the campus where the patient is receiving treatment   List of dedicated fax numbers for the Facilities:  [address] DENIALS (Administrative/Medical Necessity) [phone]   [address]  (Maternity/NICU/Pediatrics) [phone]   John Aggarwalosbaldo [phone]   [facility] [phone]   [facility] [phone]   [facility] [phone]   [address] [place] [address] [phone]   [facility]  [phone]   [address] [facility] [address]  [address] [facility] [address] [facility] [phone]

## 2020-03-24 NOTE — PHYSICAL THERAPY NOTE
Physical Therapy Evaluation     Patient's Name: Keira Ham    Admitting Diagnosis  Shock [facility]) [R57 9]    Problem List  Patient Active Problem List   Diagnosis    Shock ([facility] )    Symptomatic anemia    Brain mass    CAD (coronary artery disease)    COPD (chronic obstructive pulmonary disease) (HCC)    Thrombocytopenia (HCC)    Type 2 diabetes mellitus (HCC)    Elevated troponin    Lactic acid acidosis    Uremia    High anion gap metabolic acidosis    BEN (acute kidney injury) ([place] [address] )    CKD (chronic kidney disease) stage 3, GFR 30-59 ml/min (HCC)    Acidosis    Hypernatremia       Past Medical History  Past Medical History:   Diagnosis Date    Cirrhosis ([place] [address] )     COPD (chronic obstructive pulmonary disease) (HCC)     Coronary artery disease     Dementia ([place] [address] )     Diabetes mellitus ([place] [address] )     Diverticulosis     Gastric reflux     Hyperlipemia     Panlobular emphysema (HCC)     Pituitary mass (HCC)     Renal disorder     CKD Stage 3 & Bilat multi renal nodules    Thrombocyte disorder (HCC)     thrombocytopenia    Unstable angina (HCC)        Past Surgical History  Past Surgical History:   Procedure Laterality Date    CARDIAC SURGERY      unspecified        03/24/20 1300   Note Type   Note type Eval/Treat   Pain Assessment   Pain Assessment Tool 0-10   Pain Score No Pain   Hospital Pain Intervention(s) Repositioned; Ambulation/increased activity   Home Living   Type of Home SNF   Home Layout One level   Home Equipment Walker  (rollator)   Prior Function   Level of [place] Needs assistance with ADLs and functional mobility   Lives With Facility staff   Receives Help From Family   ADL Assistance Needs assistance   IADLs Needs assistance   Falls in the last 6 months 0   Restrictions/Precautions   Other Precautions Pain; Fall Risk;Telemetry;Multiple lines; Bed Alarm; Chair Alarm;Cognitive   General   Family/Caregiver Present Yes   Cognition   Orientation Level Oriented to person;Oriented to place  (Simultaneous filing  User may not have seen previous data )   RLE Assessment   RLE Assessment WFL   LLE Assessment   LLE Assessment WFL   Coordination   Movements are Fluid and Coordinated 0   Coordination and Movement Description slow and gaurded   Bed Mobility   Supine to Sit 3  Moderate assistance   Additional items Assist x 1; Increased time required   Sit to Supine 3  Moderate assistance   Additional items Assist x 1; Increased time required   Transfers   Sit to Stand 3  Moderate assistance   Additional items Assist x 1   Stand to Sit 3  Moderate assistance   Additional items Assist x 1   Ambulation/Elevation   Gait pattern Excessively slow; Shuffling;Decreased foot clearance   Gait Assistance 3  Moderate assist   Additional items Assist x 1   Assistive Device Rolling walker   Distance 3'   Balance   Static Sitting Fair +   Dynamic Sitting Fair -   Ambulatory Poor +   Endurance Deficit   Endurance Deficit Yes   Endurance Deficit Description limited by fatigue   Activity Tolerance   Activity Tolerance Patient limited by fatigue   Medical Staff Made Aware Spoke to OT for D/C planning   Nurse Made Aware yes, nsg gave clearance to work with pt   Assessment   Prognosis Fair   Problem List Decreased strength;Decreased endurance; Impaired balance;Decreased mobility; Decreased safety awareness;Decreased cognition   Assessment Pt is 69 y o  male seen for PT evaluation s/p admit to [facility] on 3/23/2020 w/ Shock ([facility] )  PT consulted to assess pt's functional mobility and d/c needs  Order placed for PT eval and tx, w/ up w/ A order   Comorbidities affecting pt's physical performance at time of assessment include:  has a past medical history of Cirrhosis ([facility] ), COPD (chronic obstructive pulmonary disease) ([facility] ), Coronary artery disease, Dementia ([facility] ), Diabetes mellitus ([facility] ), Diverticulosis, Gastric reflux, Hyperlipemia, Panlobular emphysema ([facility] ), Pituitary mass ([facility] ), Renal disorder, Thrombocyte disorder [facility]), and Unstable angina ([facility] )  PTA, pt was long term resident of SNF and ambulating with rollator I around facility, required A for ADLs  Personal factors affecting pt at time of IE include: ambulating w/ assistive device, inability to ambulate household distances, inability to navigate level surfaces w/o external assistance, limited insight into impairments, inability to perform IADLs and inability to perform ADLs  Please find objective findings from PT assessment regarding body systems outlined above with impairments and limitations including weakness, impaired balance, decreased endurance, gait deviations, pain, decreased activity tolerance, decreased functional mobility tolerance, decreased safety awareness and fall risk  Pt required increased time and A for bed mobility with increased fatigue  Tolerated sitting with fair balance  Required A for transfers and ambulation with deficits in strength and balance  Ambulated with slow although overall steady gait  Pt reports at baseline if if ambulating around facility throughout the day  The following objective measures performed on IE also reveal limitations: Barthel Index: 40/100  Pt's clinical presentation is currently unstable/unpredictable seen in pt's presentation of critical care monitoring  Pt to benefit from continued PT tx to address deficits as defined above and maximize level of functional independent mobility and consistency  From PT/mobility standpoint, recommendation at time of d/c would be previous facility with increased A pending progress in order to facilitate return to PLOF  Goals   Patient Goals To walk more   STG Expiration Date 04/05/20   Short Term Goal #1 1  Complete bed mobility and transfers I to decrease need for caregiver in home  2  Ambulate 200' I to complete household and community mobility without A  3  Improve dynamic balance to good to decrease need for UE support during ambulation  Plan   Treatment/Interventions OT; Spoke to case management;Spoke to nursing;Gait training;Bed mobility; Patient/family training; Endurance training;LE strengthening/ROM; Functional transfer training   PT Frequency   (3-5x/wk)   Recommendation   Recommendation Other (Comment)  (previous facility with increased A)   PT - OK to Discharge Yes  ( to facility with A when medically stable )   Barthel Index   Feeding 5   Bathing 0   Grooming Score 5   Dressing Score 5   Bladder Score 5   Bowels Score 5   Toilet Use Score 5   Transfers (Bed/Chair) Score 10   Mobility (Level Surface) Score 0   Stairs Score 0   Barthel Index Score 40           Tricia Nielsen, PT

## 2020-03-24 NOTE — SOCIAL WORK
Chart reviewed  Pt admitted from [facility] [address] [facility]  TC to [facility]  CM spoke with Philip Heart to obtain pt prior level of functioning  At baseline pt is alert and oriented  Pt is independent with ADLs and using a RW for ambulation  Pt is able to feed self  CM to follow for dcp

## 2020-03-24 NOTE — CONSULTS
Consultation - General Cardiology Team 2  Hira Pereira 69 y o  male MRN: [id number]  Unit/Bed#: ICU 02 Encounter: [id number]            Inpatient consult to Cardiology     Performed by  Mirna Horton MD     Authorized by Aniceto Perez MD              Assessment/Plan     Assessment:  1  Type 2 non-STEMI  -patient with known coronary artery disease  -status post three-vessel CABG in 2017 or earlier, graft sites unclear  -home regimen:  aspirin 81 mg daily, rosuvastatin 20 mg daily, fenofibrate 145 mg daily, niacin 250 mg bedtime (all documented in [facility] chart/media)  -04/2017 vasodilator nuclear stress test:  Calculated EF 60%, normal regadenoson study  -currently patient on norepinephrine at 10 mcg/min  -30/23 transthoracic echo:  Limited views preceded at bedside which revealed normal to vigorous LV function, at least mild to moderate MR  Pending completion official read  -troponin peaking at >40, lactic acidosis result and 0 8 this morning    2  Acute blood loss on chronic anemia with hypovolemic shock  -hemoglobin of 7 5  -status post multiple transfusion at outside hospital  -pending 1 unit of platelet transfusion today  -on norepinephrine for circulatory support as mentioned above  -iron profile appreciated inconsistent with acute blood loss anemia (iron sat 83)  -current moving hemolytic workup  -gastroenterology consulted by primary team  -current regiment:  Pantoprazole IV b i d , free sulfate 325 mg b i d  & s/p desmopressin x1    3  Acute on chronic renal failure  -baseline CKD reported to be stage III  -presented with creatinine of 3 52  -morning creatinine 2 84 status post resuscitation    4  Pituitary mass  -mentioned in the outpatient notes found in care everywhere from 2017  -CT scan read appreciated  -neurosurgery consulted by primary team    5  TORRES induced liver cirrhosis  -currently with lactulose  -gastroenterology consulted by primary team      Plan:  1   Given patient's acute blood loss anemia and severe thrombocytopenia, would manage conservatively at this point  Agree with resumption of statin    2  Would initiate beta-blocker treatment once hemodynamically stable and off pressors    3  Once bleeding source is identified, could consider invasive evaluation if it is the patient and family's wish to do so  At the time, aspirin may be resumed if deemed safe bygGastroenterology/primary team    4  Follow-up on official echocardiogram read    5  Cardiology to continue following along       Case discussed and reviewed with Dr Casey Winslow who agrees with my assessment and plan  Thank you for involving us in the care of your patient  History of Present Illness   Physician Requesting Consult: Abbey Salmon MD  Reason for Consult / Principal Problem:  Troponin elevation    HPI: Dustin Osuna is a 69y o  year old male with a history of coronary artery disease status post three-vessel CABG 2017, essential hypertension, dyslipidemia, type 2 diabetes mellitus, chronic kidney disease, active tobacco use disorder, nonalcoholic steatohepatitis with cirrhosis, diverticulosis, GERD, pituitary mass, colonic AVMs,  benign prostatic hyperplasia and chronic anemia who was transferred to [facility] from [facility] due to severe acute on chronic anemia as well as uptrending troponins  Patient is a resident of [facility] skilled nursing facility where he was in his usual state of health until couple days ago  Patient reported bloody bowel movement at times arpit and other times dark in color  Around that time he started to experience lightheadedness, dizziness, shortness of breath and fatigue with ambulation  At baseline, patient utilizes of rolling walker with which he directs himself to any of the common halls or activity rooms at the nursing facility  He denies syncope, diaphoresis, anginal equivalent, orthopnea, PND, lower extremity edema, nausea, vomiting or any other symptoms      At the [facility] nursing [facility], lab work were obtained and revealed acute on chronic renal dysfunction Cr  3, low hemoglobin 7 and low platelets <21  Patient was taken to [address] [facility] for initial evaluation  On presentation there, patient was normotensive 118/53, normocardic 79, tachypneic 21, afebrile and saturating 97% on room air  Lab work revealed creatinine of 3 52, normal electrolytes, mild AST elevation 80, troponin elevation of 8 52, lactic acidosis of 2 7, severe macrocytic anemia (HGB 5 9, , RDW 18 4), severe thrombocytopenia 18 4, leukocytosis of 14 9 and INR 1 4  ECG at the time revealed sinus rhythm with T-wave inversions in the anterior and lateral leads  Resuscitation protocol was initiated with IV fluids and blood products  However, patient's blood pressure started to down trend and troponin continued to rise  It was decided to transfer the patient to [place] for higher level of care  Patient was started on vasopressor with norepinephrine  Troponin continued to trend upwards peaking at >40 and repeat ECG revealed sinus rhythm with horizontal ST depressions in the anterior leads  Cardiology was consulted for further evaluation  Review of Systems  ROS as noted above         Historical Information   Past Medical History:   Diagnosis Date    Cirrhosis ([facility] )     COPD (chronic obstructive pulmonary disease) (HCC)     Coronary artery disease     Dementia ([facility] )     Diabetes mellitus ([facility] )     Diverticulosis     Gastric reflux     Hyperlipemia     Panlobular emphysema (HCC)     Pituitary mass (HCC)     Renal disorder     CKD Stage 3 & Bilat multi renal nodules    Thrombocyte disorder (HCC)     thrombocytopenia    Unstable angina (HCC)      Past Surgical History:   Procedure Laterality Date    CARDIAC SURGERY      unspecified     Social History     Substance and Sexual Activity   Alcohol Use Never    Frequency: Never    Binge frequency: Never     Social History     Substance and Sexual Activity   Drug Use Never     Social History     Tobacco Use   Smoking Status Current Every Day Smoker    Packs/day: 0 25   Smokeless Tobacco Never Used     Family History: No family history on file      Meds/Allergies   Hospital Medications:   Current Facility-Administered Medications   Medication Dose Route Frequency    acetaminophen (TYLENOL) tablet 650 mg  650 mg Oral Q6H PRN    atorvastatin (LIPITOR) tablet 40 mg  40 mg Oral Daily With Dinner    bisacodyl (DULCOLAX) rectal suppository 10 mg  10 mg Rectal Daily PRN    cefepime (MAXIPIME) 1,000 mg in dextrose 5 % 50 mL IVPB  1,000 mg Intravenous Q12H    chlorhexidine (PERIDEX) 0 12 % oral rinse 15 mL  15 mL Swish & Spit Q12H BELLE    dextran 70-hypromellose (GENTEAL TEARS) 0 1-0 3 % ophthalmic solution 2 drop  2 drop Both Eyes BID    docusate sodium (COLACE) capsule 100 mg  100 mg Oral BID    ferrous sulfate tablet 325 mg  325 mg Oral BID With Meals    insulin lispro (HumaLOG) 100 units/mL subcutaneous injection 1-5 Units  1-5 Units Subcutaneous Q6H [facility]    lactulose 20 g/30 mL oral solution 20 g  20 g Oral TID    norepinephrine (LEVOPHED) 4 mg (STANDARD CONCENTRATION) IV in sodium chloride 0 9% 250 mL  1-30 mcg/min Intravenous Titrated    pantoprazole (PROTONIX) injection 40 mg  40 mg Intravenous Q12H [facility]     Home Medications:   Medications Prior to Admission   Medication    acetaminophen (TYLENOL) 325 mg tablet    aspirin 81 mg chewable tablet    docusate sodium (COLACE) 100 mg capsule    fenofibrate (TRICOR) 145 mg tablet    ferrous sulfate 325 (65 Fe) mg tablet    omeprazole (PriLOSEC) 20 mg delayed release capsule    polyvinyl alcohol (LIQUIFILM TEARS) 1 4 % ophthalmic solution    rosuvastatin (CRESTOR) 20 MG tablet    bisacodyl (DULCOLAX) 10 mg suppository       Allergies   Allergen Reactions    Erythromycin     Penicillins     Shellfish-Derived Products        Objective   Vitals: Blood pressure 112/53, pulse 62, temperature 98 6 °F (37 °C), temperature source Oral, resp  rate 13, height 5' 6" (1 676 m), weight 59 8 kg (131 lb 13 4 oz), SpO2 94 %  Orthostatic Blood Pressures      Most Recent Value   Blood Pressure  112/53 filed at 03/24/2020 0600   Patient Position - Orthostatic VS  Lying filed at 03/24/2020 0200            Invasive Devices     Central Venous Catheter Line            CVC Central Lines 03/23/20 Triple Left Femoral less than 1 day          Peripheral Intravenous Line            Peripheral IV 03/23/20 Left Antecubital less than 1 day    Peripheral IV 03/23/20 Right Antecubital less than 1 day                Physical Exam    GEN: Pura Morales appears well, alert/oriented to self, location and situation; pleasant and cooperative   HEENT:  Normocephalic, atraumatic, anicteric, moist mucous membranes  NECK:  Flat neck veins; no carotid bruits   HEART: Regular rhythm, normal S1 and S2, no murmurs, clicks, gallops or rubs   LUNGS: Clear to auscultation bilaterally; no wheezes, rales, or rhonchi; respiration nonlabored   ABDOMEN:  Normoactive bowel sounds, soft, no tenderness, no distention  EXTREMITIES: peripheral pulses palpable; no edema  NEURO: no gross focal findings; cranial nerves grossly intac on very limited exam   SKIN:  Dry, intact, warm to touch    Lab Results: I have personally reviewed pertinent lab results        Lab Results   Component Value Date    TROPONINI >40 00 (H) 03/24/2020    TROPONINI 37 20 (H) 03/23/2020    TROPONINI 37 70 (H) 03/23/2020       Lab Results   Component Value Date    CALCIUM 7 4 (L) 03/23/2020    K 3 9 03/23/2020    CO2 13 (L) 03/23/2020     (H) 03/23/2020     (H) 03/23/2020    CREATININE 2 83 (H) 03/23/2020       Lab Results   Component Value Date    WBC 14 37 (H) 03/24/2020    HGB 7 5 (L) 03/24/2020    HCT 23 1 (L) 03/24/2020    MCV 96 03/24/2020    PLT 7 (LL) 03/24/2020     Results from last 7 days   Lab Units 03/23/20  1322   INR  1 36*       No results found for: CHOL  No results found for: HDL  No results found for: LDLCALC  No results found for: TRIG    Lab Results   Component Value Date    ALT 23 2020     (H) 2020       Imaging: I have personally reviewed pertinent reports  and I have personally reviewed pertinent films in PACS  CT head:  No acute intracranial abnormality, pituitary fossa mass (nonemergent MRI brain recommended) and subdural CSF spaces prominence possibly secondary to chronic subdural hygromas    CT abdomen/pelvis:  Nodular liver cirrhosis, bilateral adrenal nodules (ultrasound recommended) and diverticulosis without evidence of diverticulitis      Holter:     ECHO: No results found for this or any previous visit  NUCLEAR STRESS TEST:   2017    FINDINGS  The study is of good quality with inferior attenuation artifact  seen   Ejection fraction is normal and calculated at 60% with no regional  wall motion abnormalities seen  LV cavity size is low normal   Perfusion images show a defect in the basal inferior segment  This  is seen in both rest and stress phase  No associated wall motion  abnormalities are seen  Likely represents an attenuation artifact  Possibility of infarct cannot be completely ruled out  No ischemia  is seen  CONCLUSIONS:  1  Lexiscan nuclear stress test   2  Normal LV function  3  Cardiac images as noted above  4  EKG portion reported separately    DICTATED BY: Paolo Aguila MD  [facility]        [id number]  Normal sinus rhythm, normal axis with horizontal ST depressions in anterior leads      VTE Prophylaxis: Sequential compression device (Venodyne)       Counseling / Coordination of Care  Total floor / unit time spent today 30 minutes minutes  Greater than 50% of total time was spent with the patient and / or family counseling and / or coordination of care  A description of the counseling / coordination of care:  Consultation

## 2020-03-24 NOTE — ASSESSMENT & PLAN NOTE
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  · Patient is a poor historian, states he has known about the mass for "years"    Imaging reviewed personally and with attending, results are as follows:  · CT head wo contrast 3/23/2020:  2 5 x 2 0cm hyperdensemass in the pituitary fossa with locally invasive features of the adjacent skull base and extension into the sphenoid sinus on the left  Differential considerations include pituitary macroadenoma, pituitary carcinoma, craniopharyngioma, meningioma as well as less likely considerations such as giant saccular aneurysm  Nonemergent MRI brain with IV contrast recommended for further characterization  Prominence of the subdural CSF spaces bilaterally 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  Plan:   Repeat CT head stat if GCS declines more than 2 points in 1 hour   Recommend ophthalmology and endocrine consult   Medical management and pain control per primary team  o Platelets 7  o Hemoglobin 7 5  o Elevated WBC on abx treatment   DVT ppx:  SCDs   Mobilize as tolerated with assistance, PT / OT evaluation    Neurosurgery will follow as needed during hospitalization  Patient is not a surgical candidate at this time and therefore no surgical intervention warranted on this admission  Continue medical management  Can follow up in the outpatient setting in about 2-4 weeks with MRI brain  Please call with questions or concerns, signed off

## 2020-03-24 NOTE — PLAN OF CARE
Problem: Prexisting or High Potential for Compromised Skin Integrity  Goal: Skin integrity is maintained or improved  Description  INTERVENTIONS:  - Identify patients at risk for skin breakdown  - Assess and monitor skin integrity  - Assess and monitor nutrition and hydration status  - Monitor labs   - Assess for incontinence   - Turn and reposition patient  - Assist with mobility/ambulation  - Relieve pressure over bony prominences  - Avoid friction and shearing  - Provide appropriate hygiene as needed including keeping skin clean and dry  - Evaluate need for skin moisturizer/barrier cream  - Collaborate with interdisciplinary team   - Patient/family teaching  - Consider wound care consult   Outcome: Progressing     Problem: Potential for Falls  Goal: Patient will remain free of falls  Description  INTERVENTIONS:  - Assess patient frequently for physical needs  -  Identify cognitive and physical deficits and behaviors that affect risk of falls    -  [place] fall precautions as indicated by assessment   - Educate patient/family on patient safety including physical limitations  - Instruct patient to call for assistance with activity based on assessment  - Modify environment to reduce risk of injury  - Consider OT/PT consult to assist with strengthening/mobility  Outcome: Progressing     Problem: PAIN - ADULT  Goal: Verbalizes/displays adequate comfort level or baseline comfort level  Description  Interventions:  - Encourage patient to monitor pain and request assistance  - Assess pain using appropriate pain scale  - Administer analgesics based on type and severity of pain and evaluate response  - Implement non-pharmacological measures as appropriate and evaluate response  - Consider cultural and social influences on pain and pain management  - Notify physician/advanced practitioner if interventions unsuccessful or patient reports new pain  Outcome: Progressing     Problem: INFECTION - ADULT  Goal: Absence or prevention of progression during hospitalization  Description  INTERVENTIONS:  - Assess and monitor for signs and symptoms of infection  - Monitor lab/diagnostic results  - Monitor all insertion sites, i e  indwelling lines, tubes, and drains  - Monitor endotracheal if appropriate and nasal secretions for changes in amount and color  - [place] appropriate cooling/warming therapies per order  - Administer medications as ordered  - Instruct and encourage patient and family to use good hand hygiene technique  - Identify and instruct in appropriate isolation precautions for identified infection/condition  Outcome: Progressing  Goal: Absence of fever/infection during neutropenic period  Description  INTERVENTIONS:  - Monitor WBC    Outcome: Progressing     Problem: SAFETY ADULT  Goal: Maintain or return to baseline ADL function  Description  INTERVENTIONS:  -  Assess patient's ability to carry out ADLs; assess patient's baseline for ADL function and identify physical deficits which impact ability to perform ADLs (bathing, care of mouth/teeth, toileting, grooming, dressing, etc )  - Assess/evaluate cause of self-care deficits   - Assess range of motion  - Assess patient's mobility; develop plan if impaired  - Assess patient's need for assistive devices and provide as appropriate  - Encourage maximum independence but intervene and supervise when necessary  - Involve family in performance of ADLs  - Assess for home care needs following discharge   - Consider OT consult to assist with ADL evaluation and planning for discharge  - Provide patient education as appropriate  Outcome: Progressing  Goal: Maintain or return mobility status to optimal level  Description  INTERVENTIONS:  - Assess patient's baseline mobility status (ambulation, transfers, stairs, etc )    - Identify cognitive and physical deficits and behaviors that affect mobility  - Identify mobility aids required to assist with transfers and/or ambulation (gait belt, sit-to-stand, lift, walker, cane, etc )  - [place] fall precautions as indicated by assessment  - Record patient progress and toleration of activity level on Mobility SBAR; progress patient to next Phase/Stage  - Instruct patient to call for assistance with activity based on assessment  - Consider rehabilitation consult to assist with strengthening/weightbearing, etc   Outcome: Progressing     Problem: DISCHARGE PLANNING  Goal: Discharge to home or other facility with appropriate resources  Description  INTERVENTIONS:  - Identify barriers to discharge w/patient and caregiver  - Arrange for needed discharge resources and transportation as appropriate  - Identify discharge learning needs (meds, wound care, etc )  - Arrange for interpretive services to assist at discharge as needed  - Refer to Case Management Department for coordinating discharge planning if the patient needs post-hospital services based on physician/advanced practitioner order or complex needs related to functional status, cognitive ability, or social support system  Outcome: Progressing     Problem: Knowledge Deficit  Goal: Patient/family/caregiver demonstrates understanding of disease process, treatment plan, medications, and discharge instructions  Description  Complete learning assessment and assess knowledge base  Interventions:  - Provide teaching at level of understanding  - Provide teaching via preferred learning methods  Outcome: Progressing     Problem: Nutrition/Hydration-ADULT  Goal: Nutrient/Hydration intake appropriate for improving, restoring or maintaining nutritional needs  Description  Monitor and assess patient's nutrition/hydration status for malnutrition  Collaborate with interdisciplinary team and initiate plan and interventions as ordered  Monitor patient's weight and dietary intake as ordered or per policy  Utilize nutrition screening tool and intervene as necessary   Determine patient's food preferences and provide high-protein, high-caloric foods as appropriate       INTERVENTIONS:  - Monitor oral intake, urinary output, labs, and treatment plans  - Assess nutrition and hydration status and recommend course of action  - Evaluate amount of meals eaten  - Assist patient with eating if necessary   - Allow adequate time for meals  - Recommend/ encourage appropriate diets, oral nutritional supplements, and vitamin/mineral supplements  - Order, calculate, and assess calorie counts as needed  - Recommend, monitor, and adjust tube feedings and TPN/PPN based on assessed needs  - Assess need for intravenous fluids  - Provide specific nutrition/hydration education as appropriate  - Include patient/family/caregiver in decisions related to nutrition  Outcome: Progressing

## 2020-03-24 NOTE — PROGRESS NOTES
I had to leave the unit immediately after hanging the PRBC transfusion  Just returned with several coworkers doing vital and checking onm the Pt in my absence

## 2020-03-24 NOTE — ASSESSMENT & PLAN NOTE
· Platelets currently 7, continue to trend   · Per records has been chronic  · 2 units of platelets, 2 PRBC as well as DDAVP for asa use

## 2020-03-24 NOTE — PLAN OF CARE
Problem: PHYSICAL THERAPY ADULT  Goal: Performs mobility at highest level of function for planned discharge setting  See evaluation for individualized goals  Description  Treatment/Interventions: OT, Spoke to case management, Spoke to nursing, Gait training, Bed mobility, Patient/family training, Endurance training, LE strengthening/ROM, Functional transfer training          See flowsheet documentation for full assessment, interventions and recommendations  Note:   Prognosis: Fair  Problem List: Decreased strength, Decreased endurance, Impaired balance, Decreased mobility, Decreased safety awareness, Decreased cognition  Assessment: Pt is 69 y o  male seen for PT evaluation s/p admit to [facility] on 3/23/2020 w/ Shock ([facility] )  PT consulted to assess pt's functional mobility and d/c needs  Order placed for PT eval and tx, w/ up w/ A order  Comorbidities affecting pt's physical performance at time of assessment include:  has a past medical history of [facility] ([facility] ), COPD (chronic obstructive pulmonary disease) ([facility] ), Coronary artery disease, Dementia ([facility] ), Diabetes mellitus ([facility] ), Diverticulosis, Gastric reflux, Hyperlipemia, Panlobular emphysema ([facility] ), Pituitary mass ([facility] ), Renal disorder, Thrombocyte disorder ([facility] ), and Unstable angina ([facility] )  PTA, pt was long term resident of SNF and ambulating with rollator I around facility, required A for ADLs  Personal factors affecting pt at time of IE include: ambulating w/ assistive device, inability to ambulate household distances, inability to navigate level surfaces w/o external assistance, limited insight into impairments, inability to perform IADLs and inability to perform ADLs   Please find objective findings from PT assessment regarding body systems outlined above with impairments and limitations including weakness, impaired balance, decreased endurance, gait deviations, pain, decreased activity tolerance, decreased functional mobility tolerance, decreased safety awareness and fall risk  Pt required increased time and A for bed mobility with increased fatigue  Tolerated sitting with fair balance  Required A for transfers and ambulation with deficits in strength and balance  Ambulated with slow although overall steady gait  Pt reports at baseline if if ambulating around facility throughout the day  The following objective measures performed on IE also reveal limitations: Barthel Index: 40/100  Pt's clinical presentation is currently unstable/unpredictable seen in pt's presentation of critical care monitoring  Pt to benefit from continued PT tx to address deficits as defined above and maximize level of functional independent mobility and consistency  From PT/mobility standpoint, recommendation at time of d/c would be previous facility with increased A pending progress in order to facilitate return to PLOF  Recommendation: Other (Comment)(previous facility with increased A)     PT - OK to Discharge: Yes( to facility with A when medically stable )    See flowsheet documentation for full assessment

## 2020-03-24 NOTE — PROGRESS NOTES
Daily Progress Note - Critical Care   Karly Cowan 69 y o  male MRN: [id number]  Unit/Bed#: ICU 02 Encounter: [id number]        ----------------------------------------------------------------------------------------  HPI/24hr events: Given 2U PRBC, 2U Plts, and DDAVP, hgb responded to 7 5  Levophed requirement increasing  Started on cefepime without clear source of infection    ---------------------------------------------------------------------------------------  SUBJECTIVE  Patient denies any complaints at this time  Understands he is in the hospital and is asking when he can go home  Review of Systems   Constitutional: Negative for chills, diaphoresis, fatigue and fever  Eyes: Negative for visual disturbance  Respiratory: Negative for cough, chest tightness and shortness of breath  Cardiovascular: Negative for chest pain and palpitations  Gastrointestinal: Positive for blood in stool  Negative for abdominal pain, constipation, diarrhea, nausea and vomiting  Genitourinary: Negative for difficulty urinating, dysuria and hematuria  Musculoskeletal: Negative for arthralgias, joint swelling, neck pain and neck stiffness  Skin: Positive for rash  Neurological: Negative for dizziness, seizures, syncope, speech difficulty, weakness, light-headedness, numbness and headaches       Review of systems was reviewed and negative unless stated above in HPI/24-hour events   ---------------------------------------------------------------------------------------  Assessment and Plan:    Plan:    Neuro:  Diagnosis: 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, toxic metabolic encephalopathy   Plan:  · Neurosurgery evaluation for pituitary mass  · q4H neuro checks   · Management of PAD  · Delirium monitoring/management, Regulate Sleep-wake cycle/CAM-ICU daily    Cardiac  Diagnosis: Shock - hypovolemic vs vasodilatory, NSTEMI MI type 1 vs 2, Hx CAGB 2017  Plan:   · Cardiac infusions: Levophed, 10 mcg/min  · Pressor requirement increasing, may need to add second vasopressor   · Will give 500 cc bolus and consider additional blood products   · Troponin >40, NSTEMI likely 2/2 demand and anemia   Holding on [facility] at this time in the setting of anemia and thrombocytopenia   · Cardiology consulted   · TTE pending     Pulmonary  Diagnosis: Hx COPD  Plan:   · On room air at this time, spO2 goal >92%    Gastrointestinal  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  Plan:  · GI consulted  · Ammonia 56, started on Lactulose 20 g TID   · Patient reported bloody stools, hemeoccult negative  · Protonix BID     FEN  Diagnosis: Metabolic acidosis, likely 2/2 uremia   Plan:   · Nutrition/diet plan: NPO   · Replete electrolytes with goals: K >4 0, Mag >2 0, and Phos >3 0    Genitourinary  Diagnosis: BEN on CKD, renal nodules   Plan:   · Cr improving with resuscitation, continue to hydrate   · Nephrology consulted   · Multiple BL renal nodules, some simple appearing as simple cysts others do not - US recommended   · Indwelling Beverly present: yes   · Trend UOP and BUN/creat, Strict I and O    Infectious Diease  Diagnosis: Hyperthermia, shock, hypovolemic vs dilatory in origin   Plan:  · Abx ordered: Cefepime, day 2 - unclear source of infection at this time  · Blood cultures pending   · Lactic 0 8   · Trend temps and WBC count  · Procalcitonin 0 51 from 0 68    Heme:   Diagnosis: Acute on chronic anemia, thrombocytopenia   Plan:   · Has received 2U PRBC, 2 U platelets, and DDAVP  · Hgb 7 5, Platelets 7   · Anemia/DIC work-up completed   · Hemolysis smear without schistocytes or helmet cells   · Ferritin 172, Fe 280, TIBC 337, Fe sat 83%  · B12 and folate WNL  · Immature retic fraction 52 2%, Absolute retic 132,500  · FDP <10, Fibrinogen 208,   · INR 1 36, Pt 16 8  · Trend hgb and plts  · Transfuse as needed for goal hgb >7    Endo:   Diagnosis: Type 2 DM  Plan:   · BG q6H with SSI   · Goal -180mg/dL    MSK/Skin:  Diagnosis: Petechiae  Plan:   · Early Mobility/Exercise  · PT consult: yes  · OT consult: yes  · Turn and position patient Q2 hours  · Off load pressure points  · Allevyn preventative per protocol    Family:  · Family updated within 24 hours: yes     Disposition: Continue Critical Care   Code Status: Level 3 - DNAR and DNI  ---------------------------------------------------------------------------------------  ICU CORE MEASURES    Prophylaxis   VTE Pharmacologic Prophylaxis: Pharmacologic VTE Prophylaxis contraindicated due to acute anemia and thrombocytopenia  VTE Mechanical Prophylaxis: sequential compression device  Stress Ulcer Prophylaxis: Prophylaxis Not Indicated     ABCDE Protocol (if indicated)  Plan to perform spontaneous awakening trial today? Not applicable  Plan to perform spontaneous breathing trial today? Not applicable  Obvious barriers to extubation? Not applicable  CAM-ICU: Negative    Invasive Devices Review  Invasive Devices     Central Venous Catheter Line            CVC Central Lines 20 Triple Left Femoral less than 1 day          Peripheral Intravenous Line            Peripheral IV 20 Left Antecubital less than 1 day    Peripheral IV 20 Right Antecubital less than 1 day              Can any invasive devices be discontinued today? Not applicable  ---------------------------------------------------------------------------------------  OBJECTIVE    Vitals   Vitals:    20 0700 20 0800 20 0815 20 0900   BP: (!) 114/45 (!) 93/42 150/56 140/59   BP Location: Left arm Left arm Right arm Right arm   Pulse: 62 82 58 72   Resp: 13 (!)    Temp:  99 °F (37 2 °C)     TempSrc:  Rectal     SpO2: 95% 97% 95% 98%   Weight:       Height:         Temp (24hrs), Av 6 °F (36 4 °C), Min:96 1 °F (35 6 °C), Max:99 °F (37 2 °C)  Current: Temperature: 99 °F (37 2 °C)  HR: 58  MAP 65 on Levo 10   RR: 13  SpO2: 97% RA     Physical Exam   Constitutional: He is oriented to person, place, and time   He appears cachectic  He is cooperative  He is easily aroused  No distress  HENT:   Head: Normocephalic and atraumatic  Mouth/Throat: Mucous membranes are normal    Eyes: Pupils are equal, round, and reactive to light  Conjunctivae and lids are normal  Left eye exhibits abnormal extraocular motion  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  R visual field cut    Neck: Neck supple  Cardiovascular: Regular rhythm, S1 normal and S2 normal  Bradycardia present  Exam reveals no gallop and no friction rub  No murmur heard  Pulmonary/Chest: Effort normal and breath sounds normal  No respiratory distress  He has no decreased breath sounds  He has no wheezes  He has no rhonchi  He has no rales  Abdominal: Soft  Bowel sounds are normal  He exhibits no distension  There is no tenderness  Musculoskeletal: Normal range of motion  Strength 5/5 and equal bilaterally    Neurological: He is alert, oriented to person, place, and time and easily aroused  He has normal strength  A cranial nerve deficit (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, R visual cut, all other CN appear intact ) is present  No sensory deficit  Skin: Skin is warm and dry  Petechiae (of BL LE ) noted  He is not diaphoretic  No pallor         Respiratory:     Invasive/non-invasive ventilation settings   Respiratory    Lab Data (Last 4 hours)    None         O2/Vent Data (Last 4 hours)    None                Laboratory and Diagnostics:  Results from last 7 days   Lab Units 03/24/20  0505 03/24/20  0230 03/23/20 2138 03/23/20  1754 03/23/20  1322   WBC Thousand/uL 14 37*  --  15 21*  --  14 89*   HEMOGLOBIN g/dL 7 5* 7 6* 6 4* 6 6* 5 9*   HEMATOCRIT % 23 1* 23 2* 20 1* 20 7* 18 8*   PLATELETS Thousands/uL 7*  --  13*  --  2*   NEUTROS PCT % 49  --   --   --  48   MONOS PCT % 10  --   --   --  10     Results from last 7 days   Lab Units 03/24/20  0505 03/23/20  2139 03/23/20  1610 03/23/20  1322   SODIUM mmol/L 146* 146* 143 143   POTASSIUM mmol/L 3 8 3 9 4 5 4 4   CHLORIDE mmol/L 123* 123* 112* 109*   CO2 mmol/L 14* 13* 13* 14*   ANION GAP mmol/L 9 10 18* 20*   BUN mg/dL 108* 118* 127* 138*   CREATININE mg/dL 2 84* 2 83* 3 17* 3 52*   CALCIUM mg/dL 7 8* 7 4* 7 5* 8 4   GLUCOSE RANDOM mg/dL 124 126 133 136   ALT U/L 30 23  --  23   AST U/L 215* 167*  --  80*   ALK PHOS U/L 40* 36*  --  40*   ALBUMIN g/dL 3 1* 2 9*  --  3 2*   TOTAL BILIRUBIN mg/dL 0 52 0 51  --  0 40     Results from last 7 days   Lab Units 03/23/20  2139 03/23/20  1322   MAGNESIUM mg/dL 1 6 1 6   PHOSPHORUS mg/dL 3 2  --       Results from last 7 days   Lab Units 03/23/20  1322   INR  1 36*      Results from last 7 days   Lab Units 03/24/20  0230 03/23/20  2138 03/23/20  1754 03/23/20  1340   TROPONIN I ng/mL >40 00* 37 20*  37 70* 23 18* 8 52*     Results from last 7 days   Lab Units 03/24/20  0230 03/23/20  2138 03/23/20  1610 03/23/20  1335   LACTIC ACID mmol/L 0 8 0 8 2 4* 2 7*     ABG:    VBG:  Results from last 7 days   Lab Units 03/23/20  1530   PH WAQAR  7 247*   PCO2 WAQAR mm Hg 27 0*   PO2 WAQAR mm Hg 33 3*   HCO3 WAQAR mmol/L 11 5*   BASE EXC WAQAR mmol/L -14 4     Results from last 7 days   Lab Units 03/24/20  0505 03/23/20  1521   PROCALCITONIN ng/ml 0 51* 0 68*       Micro  Results from last 7 days   Lab Units 03/23/20  1658 03/23/20  1651   BLOOD CULTURE  Received in Microbiology Lab  Culture in Progress  Received in Microbiology Lab  Culture in Progress  Imaging:  I have personally reviewed pertinent reports  and I have personally reviewed pertinent films in PACS   CT head - 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 and extension into the sphenoid sinus on the left  Differential considerations include pituitary macroadenoma, pituitary carcinoma,   craniopharyngioma, meningioma as well as less likely considerations such as giant saccular aneurysm  Nonemergent MRI brain with IV contrast recommended for further characterization   Prominence of the subdural CSF spaces bilaterally 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  CT C/A/P - Nodular liver concerning for underlying cirrhosis  Multiple bilateral renal nodules, some of which meet the criteria for a simple cyst and others which do not  Recommend an ultrasound for further evaluation with attention to the exophytic left upper pole nodule  Diverticulosis without evidence for acute diverticulitis  Intake and Output  I/O       03/22 0701 - 03/23 0700 03/23 0701 - 03/24 0700 03/24 0701 - 03/25 0700    I V  (mL/kg)  366 3 (6 1)     Blood  650     IV Piggyback  100     Total Intake(mL/kg)  1116 3 (18 7)     Urine (mL/kg/hr)  850     Total Output  850     Net  +266 3                  Height and Weights   Height: 5' 6" (167 6 cm)  IBW: 63 8 kg  Body mass index is 21 28 kg/m²  Weight (last 2 days)     Date/Time   Weight    03/23/20 2000   59 8 (131 84)                Nutrition       Diet Orders   (From admission, onward)             Start     Ordered    03/23/20 2058  Diet NPO; Sips with meds  Diet effective now     Question Answer Comment   Diet Type NPO    NPO Except: Sips with meds    RD to adjust diet per protocol?  Yes        03/23/20 2058              NPO      Active Medications  Scheduled Meds:    Current Facility-Administered Medications:  acetaminophen 650 mg Oral Q6H PRN Thee Bach MD    atorvastatin 40 mg Oral Daily With Mer Mcintyre MD    bisacodyl 10 mg Rectal Daily PRN Thee Bach MD    cefepime 1,000 mg Intravenous Q12H Thee Bach MD Last Rate: Stopped (03/24/20 0254)   chlorhexidine 15 mL Swish & Spit Q12H [address] Thee Bach MD    dextran 70-hypromellose 2 drop Both Eyes BID Thee Bach MD    docusate sodium 100 mg Oral BID Thee Bach MD    ferrous sulfate 325 mg Oral BID With Meals Thee Bach MD    insulin lispro 1-5 Units Subcutaneous Q6H [address] Thee Bach MD    lactulose 20 g Oral TID Thee Bach MD    multi-electrolyte 500 mL Intravenous Once Becky Mosqueda PA-C    norepinephrine 1-30 mcg/min Intravenous Titrated Beverly Valente MD Last Rate: 10 mcg/min (03/24/20 0402)   pantoprazole 40 mg Intravenous Q12H [address] Beverly Valente MD      Continuous Infusions:    norepinephrine 1-30 mcg/min Last Rate: 10 mcg/min (03/24/20 0402)     PRN Meds:     acetaminophen 650 mg Q6H PRN   bisacodyl 10 mg Daily PRN       Allergies   Allergies   Allergen Reactions    Erythromycin     Penicillins     Shellfish-Derived Products      ---------------------------------------------------------------------------------------  Advance Directive and Living Will:      Power of :    POLST:    ---------------------------------------------------------------------------------------  Care Time Delivered:   No Critical Care time spent     Stephanie Zepeda PA-C      Portions of the record may have been created with voice recognition software  Occasional wrong word or "sound a like" substitutions may have occurred due to the inherent limitations of voice recognition software    Read the chart carefully and recognize, using context, where substitutions have occurred

## 2020-03-24 NOTE — TELEPHONE ENCOUNTER
06/03/2020-MRI SCHEDULED 06/24/2020, F/U SCHEDULED 06/29/2020 06/01/2020-Pricila from [facility] call back and advised pt was discharged on 05/21/2020 and admitted to [facility] (phone#[phone])  Lacey Villatoro (phone#[phone]), SPOKE TO Homer Marin, WHO WILL LEAVE A MESSAGE FOR THE  TO CALL BACK TO RESCHEDULE APT AFTER MRI BRAIN IS COMPLETED  FAXED MRI SCRIPT TO FACILITY TO FAX#[phone]      06/01/2020-  [phone], LEFT MESSAGE ON MACHINE FOR CALL BACK TO SCHEDULE MRI AND RESCHEDULE TODAY'S APT        05/01/2020-PRICILA FROM [facility] CALLED BACK AND ADVISED THEY STILL HAVE CONFIRMED CASES OF COVID-19  WILL CALL AND FOLLOW UP WITH HER IN June 05/01/2020-CALLED [facility], LEFT MESSAGE ON MACHINE FOR PRICILA TO CALL OFFICE BACK TO SCHEDULE MRI BRAIN PITUITARY PRIOR TO 5/11/2020 APT  04/01/2020-CALLED [facility], SPOKE TO PRICILA, WHO WILL ASK A NURSE IF PT IS HAVING ANY VISION CHANGES  IF SO, PRICILA STATES SHE WILL CALL OUR OFFICE BACK, BUT IF NO CHANGES TO CALL THEM IN MAY TO SCHEDULE         03/31/2020-DISCUSSED W/NICKI:  -IF NO VISION CHANGES, CAN COMPLETE MAY  -NOT SURGICAL CANDIDATE W/LOW PLATELETS  -RECOMMEND FORMAL VISUAL FIELDS & PITUITARY LABS ALSO       03/31/2020-CALLED [facility] [phone] EXT [phone], SPOKE TO PRICILA, CONFIRMED 4/13/2020 APT, BUT SHE STATES PT IS NOT LEAVING FACILITY AND  CENTRAL SCHEDULING IS NOT SCHEDULING MRI'S UNTIL MAY      WILL DISCUSS AND REVIEW WITH INNA COHEN         03/30/2020-PT STILL IN HOSPITAL    03/27/2020-PT STILL IN HOSPITAL    03/25/2020-PT STILL IN HOSPITAL  04/13/2020 APT W/DR [place] WITH MRI BRAIN PITUITARY      03/24/2020-PT SILL IN HOSPITAL      ----- Message from Antolin Perez PA-C sent at 3/24/2020  2:05 PM EDT -----  Regarding: follow up appointment  Please make patient a 2-4 week appointment with repeat MRI brain  Can be with moulding or DKO  Thank you!

## 2020-03-24 NOTE — OCCUPATIONAL THERAPY NOTE
Occupational Therapy Evaluation     Patient Name: Karly Cowan  [id number] Date: 3/24/2020  Problem List  Principal Problem:    [facility]ock [facility])  Active Problems:    Symptomatic anemia    Brain mass    CAD (coronary artery disease)    COPD (chronic obstructive pulmonary disease) (HCC)    Thrombocytopenia (HCC)    Type 2 diabetes mellitus (HCC)    Elevated troponin    Lactic acid acidosis    Uremia    High anion gap metabolic acidosis    BEN (acute kidney injury) ([facility] )    CKD (chronic kidney disease) stage 3, GFR 30-59 ml/min (HCC)    Acidosis    Hypernatremia    Past Medical History  Past Medical History:   Diagnosis Date    Cirrhosis ([place] [address] )     COPD (chronic obstructive pulmonary disease) ([place] [address] )     Coronary artery disease     Dementia ([place] [address] )     Diabetes mellitus ([place] [address] )     Diverticulosis     Gastric reflux     Hyperlipemia     Panlobular emphysema (HCC)     Pituitary mass (HCC)     Renal disorder     CKD Stage 3 & Bilat multi renal nodules    Thrombocyte disorder ([facility])     thrombocytopenia    Unstable angina [facility])      Past Surgical History  Past Surgical History:   Procedure Laterality Date    CARDIAC SURGERY      unspecified             03/24/20 0959   Note Type   Note type Eval/Treat   Restrictions/Precautions   Weight Bearing Precautions Per Order No   Other Precautions Cognitive; Chair Alarm; Bed Alarm;Multiple lines;Telemetry; Fall Risk;Pain   Pain Assessment   Pain Assessment Tool Pain Assessment not indicated - pt denies pain   Pain Score No Pain   Home Living   Type of Home SNF   Home Layout One level;Performs ADLs on one level; Able to live on main level with bedroom/bathroom; Ramped entrance;Elevator   Bathroom Shower/Tub Walk-in shower   Bathroom Toilet Raised   Bathroom Equipment Grab bars in shower; Shower chair;Grab bars around toilet   [address] Other (Comment)  (rollator)   Additional Comments Pt reports living @ [facility] home, all 1 floor, no CLARI, elevator access   Prior Function   Level of [place] Needs assistance with IADLs; Needs assistance with ADLs and functional mobility   Lives With Facility staff   Receives Help From Other (Comment)  (facility staff)   ADL Assistance Needs assistance   IADLs Needs assistance   Falls in the last 6 months 0   Vocational Retired   Comments Pt reports needing assist w/ ADLS/IADLS, Mod I w/ transfers and functional mobility PTA   Lifestyle   Autonomy Assist ADLS/IADLS, Mod I w/ transfers and functional mobility PTA   Reciprocal Relationships Pt lives w/ facility staff; reports no family in the area   Service to Others Pt is retired   Intrinsic Gratification Enjoys going to the [employer]; and going outside to smoke w/ his friends   Psychosocial   Psychosocial (WDL) WDL   ADL   Eating Assistance 5  Supervision/Setup   Grooming Assistance 5  Supervision/Setup   [address] 4  Minimal [place] [address] 3  Moderate [address] 4  [address] 3  Moderate [address]  3  Moderate [address] 3  Moderate Assistance   Functional Deficit Steadying; Increased time to complete;Supervision/safety;Verbal cueing   Bed Mobility   Supine to Sit 3  Moderate assistance   Additional items Assist x 1;HOB elevated; Increased time required;Verbal cues;LE management   Sit to Supine Unable to assess   Additional Comments Pt went from supine to sit w/ Mod A x1 for UB support and LE management, HOB elevated for assist  Pt sat EOB w/ CTG for sitting balance/trunk control   Transfers   Sit to Stand 3  Moderate assistance   Additional items Assist x 1; Increased time required;Verbal cues   Stand to Sit 3  Moderate assistance   Additional items Assist x 1; Increased time required;Verbal cues   Additional Comments Pt performed sit-stand from EOB w/ Mod Ax1 for moderate force production into standing and +VC for safety/proper technique  Functional Mobility   Functional Mobility 3  Moderate assistance   Additional Comments Pt took few small steps from EOB to chair w/ Mod A x1, RW for support in standing, +VC for safety, SBA 2nd  Additional items Rolling walker   Balance   Static Sitting Fair -   Dynamic Sitting Poor +   Static Standing Poor   Dynamic Standing Poor   Ambulatory Poor   Activity Tolerance   Activity Tolerance Patient tolerated treatment well   Medical Staff Made Aware PT   Nurse Made Aware yes, Verito   RUE Assessment   RUE Assessment WFL   LUE Assessment   LUE Assessment WFL   Hand Function   Gross Motor Coordination Functional   Fine Motor Coordination Functional   Sensation   Light Touch No apparent deficits   Cognition   Overall Cognitive Status Impaired   Arousal/Participation Responsive; Cooperative   Attention Attends with cues to redirect   Orientation Level Oriented to person;Oriented to place; Disoriented to time;Disoriented to situation   Memory Decreased recall of precautions   Following Commands Follows one step commands without difficulty   Comments Pt is pleasant and cooperative; needs cues for safety and occasional re-direction to task   Assessment   Limitation Decreased ADL status; Decreased Safe judgement during ADL;Decreased endurance;Decreased cognition;Decreased self-care trans;Decreased high-level ADLs   Prognosis Fair   Assessment Pt is a 70 y/o male seen for OT eval s/p adm to B [facility] where he initially presented to the ED w/ abnormal lab results  Pt had small amount of blood in stool, mild abdominla pain, nausea and exertional dyspnea  Pt is dx'd w/ 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  Per NSx, not a surgical candidate at this time   Pt  has a past medical history of Cirrhosis ([facility] ), COPD (chronic obstructive pulmonary disease) ([facility] ), Coronary artery disease, Dementia ([facility] ), Diabetes mellitus ([facility] ), Diverticulosis, Gastric reflux, Hyperlipemia, Panlobular emphysema ([facility] ), Pituitary mass ([facility] ), Renal disorder, Thrombocyte disorder ([facility] ), and Unstable angina ([facility] )  Pt with active OT orders and up with assistance  orders  Pt lives with facility staff @ [facility] [place] home, all 1 floor, no CLARI, elevator access  Pt required assist w/ ADLS and IADLS, does not drive, & required use of DME PTA including rollator, s/c, and grab bars  Pt is currently demonstrating the following occupational deficits: Min A UB ADLS, Mod A LB ADLS, bed mobility, Mod A transfers and functional mobility w/ RW, +VC for safety  These deficits that are impacting pt's baseline areas of occupation are a result of the following impairments: pain, endurance, activity tolerance, functional mobility, forward functional reach, balance, trunk control, functional standing tolerance, decreased I w/ ADLS/IADLS, strength, visual deficits, cognitive impairments, decreased safety awareness and decreased insight into deficits  The following Occupational Performance Areas to address include: eating, grooming, bathing/shower, toilet hygiene, dressing, medication management, socialization, health maintenance, functional mobility and clothing management  Pt scored overall 35/100 on the Barthel Index  Based on the aforementioned OT evaluation, functional performance deficits, and assessments, pt has been identified as a high complexity evaluation  Recommend return to nursing home w/ increased assistance upon D/C, pending progress  Pt to continue to benefit from acute immediate OT services to address the following goals 3-5x/week to  w/in 10-14 days:    Goals   Patient Goals to move more and go back to ball games   LT Time Frame 10-14   Long Term Goal #1 see below listed goals   Plan   Treatment Interventions ADL retraining;Functional transfer training;UE strengthening/ROM; Endurance training;Cognitive reorientation;Patient/family training;Equipment evaluation/education; Compensatory technique education;Continued evaluation; Energy conservation; Activityengagement   Goal Expiration Date 04/07/20   OT Frequency 3-5x/wk   Recommendation   OT Discharge Recommendation Other (Comment)  (return to facility w/ increased assist)   OT - OK to Discharge Yes  (when medically stable)   Barthel Index   Feeding 5   Bathing 0   Grooming Score 5   Dressing Score 5   Bladder Score 5   Bowels Score 5   Toilet Use Score 5   Transfers (Bed/Chair) Score 5   Mobility (Level Surface) Score 0   Stairs Score 0   Barthel Index Score 35        GOALS    1) Pt will improve activity tolerance to G for min 30 min txment sessions for increase engagement in functional tasks  2) Pt will complete UB/LB dressing/self care w/ S using adaptive device and DME as needed  3) Pt will complete bathing w/ S w/ use of AE and DME as needed  4) Pt will complete toileting w/ S w/ G hygiene/thoroughness using DME as needed  5) Pt will improve functional transfers to S on/off all surfaces using DME as needed w/ G balance/safety   6) Pt will improve functional mobility during ADL/IADL/leisure tasks to S using DME as needed w/ G balance/safety   7) Pt will improve standing tolerance to 10 mins w/ S and use of DME as needed to increase endurance for sink level ADL tasks  8) Pt will be attentive 100% of the time during ongoing cognitive assessment w/ G participation to assist w/ safe d/c planning/recommendations  9) Pt will demonstrate G carryover of pt/caregiver education and training as appropriate w/o cues w/ good tolerance to increase safety during functional tasks  10) Pt will demonstrate 100% carryover of energy conservation techniques t/o functional I/ADL/leisure tasks w/o cues s/p skilled education to increase endurance during functional tasks     Yamilex Carrion MS, OTR/L

## 2020-03-24 NOTE — CONSULTS
Consult- Aydee Lies 1945, 69 y o  male MRN: [id number]    Unit/Bed#: ICU 02 Encounter: [id number]    Primary Care Provider: Lei Whatley MD   Date and time admitted to hospital: 3/23/2020  7:32 PM      Inpatient consult to Neurosurgery  Consult performed by: Laurita Leslie PA-C  Consult ordered by: Donna Gold MD      consult completed on 3/24/2020 at 0800    Brain mass  Assessment & Plan  Presented as a transfer from [facility] with abnormal lab results from the nursing home and was found to have a hyperdense mass in the pituitary fossa with locally invasive features  · Patient is a poor historian, states he has known about the mass for "years"    Imaging reviewed personally and with attending, results are as follows:  · CT head wo contrast 3/23/2020:  2 5 x 2 0cm hyperdensemass in the pituitary fossa with locally invasive features of the adjacent skull base and extension into the sphenoid sinus on the left  Differential considerations include pituitary macroadenoma, pituitary carcinoma, craniopharyngioma, meningioma as well as less likely considerations such as giant saccular aneurysm  Nonemergent MRI brain with IV contrast recommended for further characterization  Prominence of the subdural CSF spaces bilaterally 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  Plan:   Repeat CT head stat if GCS declines more than 2 points in 1 hour   Recommend ophthalmology and endocrine consult   Medical management and pain control per primary team  o Platelets 7  o Hemoglobin 7 5  o Elevated WBC on abx treatment   DVT ppx:  SCDs   Mobilize as tolerated with assistance, PT / OT evaluation    Neurosurgery will follow as needed during hospitalization  Patient is not a surgical candidate at this time and therefore no surgical intervention warranted on this admission  Continue medical management  Can follow up in the outpatient setting in about 2-4 weeks with MRI brain    Please call with questions or concerns, signed off  High anion gap metabolic acidosis  Assessment & Plan  Continue medical management    Thrombocytopenia (HCC)  Assessment & Plan  · Platelets currently 7, continue to trend   · Per records has been chronic  · 2 units of platelets, 2 PRBC as well as DDAVP for asa use    Symptomatic anemia  Assessment & Plan  · Trend hemoglobin, currently 7 5  · 2 units of platelets, 2 PRBC as well as DDAVP for asa use      History of Present Illness   HPI: Yas Banks is a 69y o  year old male with PMH including CAD, s/p CABG on asa, COPD who presented as a transfer from [facility] with abnormal labs and CT head finding of pituitary mass  Patient is a poor historian  Per patient, he reports he was unable to walk and had pain all over which prompted hospital visit  Per records, patient was complaining of dizziness a few days prior which prompted labs to be drawn  He was also having loose stools  Went to the ED for eval when labs showed platelets of 2, hemoglobin 2 8-3 6, WBC 15, metabolic acidosis  When asked about pituitary mass, patient states he has known about it for "years" and when asked why he never had surgery he states "if it aint broke don't fix it "  May have followed with a NSX in the past but patient cannot recall  Reports no issues with vision, headaches, numbness / tingling / weakness  Review of Systems   Constitutional: Positive for activity change  Negative for chills and fever  HENT: Negative for hearing loss and trouble swallowing  Eyes: Negative for visual disturbance  Respiratory: Negative for chest tightness and shortness of breath  Cardiovascular: Negative for chest pain  Gastrointestinal: Negative for abdominal pain, constipation, diarrhea, nausea and vomiting  Genitourinary: Negative for difficulty urinating  Musculoskeletal: Negative for back pain and neck pain  Skin: Negative for wound  Neurological: Positive for dizziness   Negative for facial asymmetry, speech difficulty, weakness, numbness and headaches  Hematological: Does not bruise/bleed easily  Psychiatric/Behavioral: Negative for confusion  Historical Information   Past Medical History:   Diagnosis Date    [facility]osis ([facility] )     COPD (chronic obstructive pulmonary disease) (HCC)     Coronary artery disease     Dementia ([facility] )     Diabetes mellitus ([place] [address] )     Diverticulosis     Gastric reflux     Hyperlipemia     Panlobular emphysema (HCC)     Pituitary mass (HCC)     Renal disorder     CKD Stage 3 & Bilat multi renal nodules    Thrombocyte disorder (HCC)     thrombocytopenia    Unstable angina (HCC)      Past Surgical History:   Procedure Laterality Date    CARDIAC SURGERY      unspecified     Social History     Substance and Sexual Activity   Alcohol Use Never    Frequency: Never    Binge frequency: Never     Social History     Substance and Sexual Activity   Drug Use Never     Social History     Tobacco Use   Smoking Status Current Every Day Smoker    Packs/day: 0 25   Smokeless Tobacco Never Used     No family history on file      Meds/Allergies   all current active meds have been reviewed, current meds:   Current Facility-Administered Medications   Medication Dose Route Frequency    acetaminophen (TYLENOL) tablet 650 mg  650 mg Oral Q6H PRN    atorvastatin (LIPITOR) tablet 40 mg  40 mg Oral Daily With Dinner    bisacodyl (DULCOLAX) rectal suppository 10 mg  10 mg Rectal Daily PRN    cefepime (MAXIPIME) 1,000 mg in dextrose 5 % 50 mL IVPB  1,000 mg Intravenous Q12H    chlorhexidine (PERIDEX) 0 12 % oral rinse 15 mL  15 mL Swish & Spit Q12H BELLE    dextran 70-hypromellose (GENTEAL TEARS) 0 1-0 3 % ophthalmic solution 2 drop  2 drop Both Eyes BID    docusate sodium (COLACE) capsule 100 mg  100 mg Oral BID    ferrous sulfate tablet 325 mg  325 mg Oral BID With Meals    insulin lispro (HumaLOG) 100 units/mL subcutaneous injection 1-5 Units  1-5 Units Subcutaneous Q6H [address]  lactulose 20 g/30 mL oral solution 20 g  20 g Oral TID    multi-electrolyte (ISOLYTE-S PH 7 4) bolus 500 mL  500 mL Intravenous Once    norepinephrine (LEVOPHED) 4 mg (STANDARD CONCENTRATION) IV in sodium chloride 0 9% 250 mL  1-30 mcg/min Intravenous Titrated    pantoprazole (PROTONIX) injection 40 mg  40 mg Intravenous Q12H [address]    and PTA meds:   Prior to Admission Medications   Prescriptions Last Dose Informant Patient Reported?  Taking?   acetaminophen (TYLENOL) 325 mg tablet 3/23/2020 at Unknown time  Yes Yes   Sig: Take 650 mg by mouth every 6 (six) hours as needed for mild pain   aspirin 81 mg chewable tablet 3/23/2020 at Unknown time  Yes Yes   Sig: Chew 81 mg daily   bisacodyl (DULCOLAX) 10 mg suppository Unknown at Unknown time  Yes No   Sig: Insert 10 mg into the rectum daily as needed for constipation   docusate sodium (COLACE) 100 mg capsule 3/23/2020 at Unknown time  Yes Yes   Sig: Take 100 mg by mouth 2 (two) times a day   fenofibrate (TRICOR) 145 mg tablet 3/23/2020 at Unknown time  Yes Yes   Sig: Take 145 mg by mouth daily   ferrous sulfate 325 (65 Fe) mg tablet 3/23/2020 at Unknown time  Yes Yes   Sig: Take 325 mg by mouth 2 (two) times a day with meals   omeprazole (PriLOSEC) 20 mg delayed release capsule 3/22/2020 at Unknown time  Yes Yes   Sig: Take 20 mg by mouth daily   polyvinyl alcohol (LIQUIFILM TEARS) 1 4 % ophthalmic solution 3/23/2020 at Unknown time  Yes Yes   Sig: Administer 2 drops to both eyes 2 (two) times a day   rosuvastatin (CRESTOR) 20 MG tablet 3/23/2020 at Unknown time  Yes Yes   Sig: Take 20 mg by mouth daily      Facility-Administered Medications: None     Allergies   Allergen Reactions    Erythromycin     Penicillins     Shellfish-Derived Products        Objective   I/O       03/22 0701 - 03/23 0700 03/23 0701 - 03/24 0700 03/24 0701 - 03/25 0700    I V  (mL/kg)  366 3 (6 1) 79 4 (1 3)    Blood  650     IV Piggyback  100     Total Intake(mL/kg)  1116 3 (18 7) 79 4 (1 3)    Urine (mL/kg/hr)  850 275 (2 9)    Total Output  850 275    Net  +266 3 -195 6                 Physical Exam   Constitutional: He is oriented to person, place, and time  He appears well-developed and well-nourished  He is cooperative  HENT:   Head: Normocephalic and atraumatic  Eyes: Pupils are equal, round, and reactive to light  Conjunctivae are normal  Right eye exhibits abnormal extraocular motion  Left eye exhibits abnormal extraocular motion  Cardiovascular: Normal rate  Pulmonary/Chest: Effort normal  No respiratory distress  Musculoskeletal: Normal range of motion  Neurological: He is alert and oriented to person, place, and time  He has normal strength  He has a normal Finger-Nose-Finger Test    Reflex Scores:       Bicep reflexes are 1+ on the right side and 1+ on the left side  Brachioradialis reflexes are 1+ on the right side and 1+ on the left side  Patellar reflexes are 1+ on the right side and 1+ on the left side  Achilles reflexes are 1+ on the right side and 1+ on the left side  Skin: Skin is warm, dry and intact  Psychiatric: He has a normal mood and affect  His speech is normal and behavior is normal  Judgment and thought content normal  Cognition and memory are impaired  Neurologic Exam     Mental Status   Oriented to person, place, and time  Follows 1 step commands  Attention: normal  Concentration: normal    Speech: speech is normal   Level of consciousness: alert  Knowledge: good  Normal comprehension  Cranial Nerves     CN III, IV, VI   Pupils are equal, round, and reactive to light      CN V   Right facial sensation deficit: none  Left facial sensation deficit: none    CN VII   Right facial weakness: none  Left facial weakness: none    CN VIII   Hearing: intact    CN IX, X   CN IX normal    CN X normal      CN XI   Right trapezius strength: normal  Left trapezius strength: normal    CN XII   CN XII normal    Visual field not intact, possibly exhibiting a right hemianopsia, exam very poor  Gaze not conjugate with left eye exotropia noted, possibly CN III / IV palsy     Motor Exam   Muscle bulk: normal  Overall muscle tone: normal  Right arm pronator drift: absent  Left arm pronator drift: absent    Strength   Strength 5/5 throughout  Sensory Exam   Light touch normal    Right leg proprioception: normal  Left leg proprioception: normal  DST intact     Gait, Coordination, and Reflexes     Coordination   Finger to nose coordination: normal    Tremor   Resting tremor: absent  Intention tremor: absent  Action tremor: absent    Reflexes   Right brachioradialis: 1+  Left brachioradialis: 1+  Right biceps: 1+  Left biceps: 1+  Right patellar: 1+  Left patellar: 1+  Right achilles: 1+  Left achilles: 1+  Right : 1+  Left : 1+  Right [place]: absent  Left [place]: absent  Right ankle clonus: absent  Left ankle clonus: absent      Vitals:Blood pressure 150/56, pulse 58, temperature 99 °F (37 2 °C), temperature source Rectal, resp  rate 13, height 5' 6" (1 676 m), weight 59 8 kg (131 lb 13 4 oz), SpO2 95 %  ,Body mass index is 21 28 kg/m²  Lab Results:   Results from last 7 days   Lab Units 03/24/20  0505 03/24/20  0230 03/23/20  2138  03/23/20  1322   WBC Thousand/uL 14 37*  --  15 21*  --  14 89*   HEMOGLOBIN g/dL 7 5* 7 6* 6 4*   < > 5 9*   HEMATOCRIT % 23 1* 23 2* 20 1*   < > 18 8*   PLATELETS Thousands/uL 7*  --  13*  --  2*   NEUTROS PCT % 49  --   --   --  48   MONOS PCT % 10  --   --   --  10    < > = values in this interval not displayed       Results from last 7 days   Lab Units 03/24/20  0505 03/23/20  2139 03/23/20  1610 03/23/20  1322   POTASSIUM mmol/L 3 8 3 9 4 5 4 4   CHLORIDE mmol/L 123* 123* 112* 109*   CO2 mmol/L 14* 13* 13* 14*   BUN mg/dL 108* 118* 127* 138*   CREATININE mg/dL 2 84* 2 83* 3 17* 3 52*   CALCIUM mg/dL 7 8* 7 4* 7 5* 8 4   ALK PHOS U/L 40* 36*  --  40*   ALT U/L 30 23  --  23   AST U/L 215* 167*  --  80* Results from last 7 days   Lab Units 03/23/20 2139 03/23/20  1322   MAGNESIUM mg/dL 1 6 1 6     Results from last 7 days   Lab Units 03/23/20  2139   PHOSPHORUS mg/dL 3 2     Results from last 7 days   Lab Units 03/23/20  1322   INR  1 36*       Imaging Studies: I have personally reviewed pertinent reports  and I have personally reviewed pertinent films in PACS    Ct Abdomen Pelvis Wo Contrast    Result Date: 3/23/2020  Impression: Nodular liver concerning for underlying cirrhosis  Multiple bilateral renal nodules, some of which meet the criteria for a simple cyst and others which do not  Recommend an ultrasound for further evaluation with attention to the exophytic left upper pole nodule  Diverticulosis without evidence for acute diverticulitis  The study was marked in EPIC for significant notification  Workstation performed: [id number]     Xr Chest 1 View Portable    Result Date: 3/23/2020  Impression: No acute cardiopulmonary disease  Workstation performed: [id number]     Ct Head Without Contrast    Result Date: 3/23/2020  Impression: 1  No acute intracranial abnormality  2  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 and extension into the sphenoid sinus on the left  Differential considerations include pituitary macroadenoma, pituitary carcinoma, craniopharyngioma, meningioma as well as less likely considerations such as giant saccular aneurysm  Nonemergent MRI brain with IV contrast recommended for further characterization  3  Prominence of the subdural CSF spaces bilaterally 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  The study was marked in Epic for follow-up  Workstation performed: [id number]     EKG, Pathology, and Other Studies: I have personally reviewed pertinent reports        VTE Prophylaxis: Sequential compression device Idalia Bethea)     Code Status: Level 3 - DNAR and DNI  Advance Directive and Living Will:      Power of :    POLST:      Counseling / Coordination of Care  I spent 20 minutes with the patient

## 2020-03-25 PROBLEM — E87.0 HYPERNATREMIA: Status: RESOLVED | Noted: 2020-03-24 | Resolved: 2020-03-25

## 2020-03-25 LAB
ABO GROUP BLD BPU: NORMAL
ALBUMIN SERPL BCP-MCNC: 2.7 G/DL (ref 3.5–5)
ALP SERPL-CCNC: 39 U/L (ref 46–116)
ALT SERPL W P-5'-P-CCNC: 29 U/L (ref 12–78)
ANION GAP SERPL CALCULATED.3IONS-SCNC: 7 MMOL/L (ref 4–13)
APTT PPP: 32 SECONDS (ref 23–37)
AST SERPL W P-5'-P-CCNC: 162 U/L (ref 5–45)
BILIRUB SERPL-MCNC: 0.57 MG/DL (ref 0.2–1)
BLD SMEAR INTERP: NORMAL
BPU ID: NORMAL
BUN SERPL-MCNC: 76 MG/DL (ref 5–25)
CALCIUM SERPL-MCNC: 7.5 MG/DL (ref 8.3–10.1)
CHLORIDE SERPL-SCNC: 117 MMOL/L (ref 100–108)
CO2 SERPL-SCNC: 19 MMOL/L (ref 21–32)
CREAT SERPL-MCNC: 2.31 MG/DL (ref 0.6–1.3)
CROSSMATCH: NORMAL
CROSSMATCH: NORMAL
D DIMER PPP FEU-MCNC: 0.81 UG/ML FEU
DEPRECATED AT III PPP: 71 % OF NORMAL (ref 92–136)
ERYTHROCYTE [DISTWIDTH] IN BLOOD BY AUTOMATED COUNT: 17.5 % (ref 11.6–15.1)
ERYTHROCYTE [DISTWIDTH] IN BLOOD BY AUTOMATED COUNT: 18.2 % (ref 11.6–15.1)
FDP BLD QL AGGL: <10
FIBRINOGEN PPP-MCNC: 240 MG/DL (ref 227–495)
GFR SERPL CREATININE-BSD FRML MDRD: 27 ML/MIN/1.73SQ M
GLUCOSE SERPL-MCNC: 142 MG/DL (ref 65–140)
GLUCOSE SERPL-MCNC: 149 MG/DL (ref 65–140)
GLUCOSE SERPL-MCNC: 150 MG/DL (ref 65–140)
GLUCOSE SERPL-MCNC: 169 MG/DL (ref 65–140)
GLUCOSE SERPL-MCNC: 179 MG/DL (ref 65–140)
HAPTOGLOB SERPL-MCNC: 28 MG/DL (ref 34–355)
HCT VFR BLD AUTO: 22.6 % (ref 36.5–49.3)
HCT VFR BLD AUTO: 23.5 % (ref 36.5–49.3)
HGB BLD-MCNC: 7.4 G/DL (ref 12–17)
HGB BLD-MCNC: 8 G/DL (ref 12–17)
INR PPP: 1.36 (ref 0.84–1.19)
MCH RBC QN AUTO: 31 PG (ref 26.8–34.3)
MCH RBC QN AUTO: 32 PG (ref 26.8–34.3)
MCHC RBC AUTO-ENTMCNC: 32.7 G/DL (ref 31.4–37.4)
MCHC RBC AUTO-ENTMCNC: 34 G/DL (ref 31.4–37.4)
MCV RBC AUTO: 94 FL (ref 82–98)
MCV RBC AUTO: 95 FL (ref 82–98)
PLATELET # BLD AUTO: 1 THOUSANDS/UL (ref 149–390)
PLATELET # BLD AUTO: 2 THOUSANDS/UL (ref 149–390)
POTASSIUM SERPL-SCNC: 3.5 MMOL/L (ref 3.5–5.3)
PROT SERPL-MCNC: 5.5 G/DL (ref 6.4–8.2)
PROTHROMBIN TIME: 16.3 SECONDS (ref 11.6–14.5)
RBC # BLD AUTO: 2.39 MILLION/UL (ref 3.88–5.62)
RBC # BLD AUTO: 2.5 MILLION/UL (ref 3.88–5.62)
SODIUM SERPL-SCNC: 143 MMOL/L (ref 136–145)
TPA PPP QL CHRO: 69 % OF NORMAL (ref 77–138)
UNIT DISPENSE STATUS: NORMAL
UNIT PRODUCT CODE: NORMAL
UNIT RH: NORMAL
WBC # BLD AUTO: 3.35 THOUSAND/UL (ref 4.31–10.16)
WBC # BLD AUTO: 4.23 THOUSAND/UL (ref 4.31–10.16)

## 2020-03-25 PROCEDURE — 85362 FIBRIN DEGRADATION PRODUCTS: CPT | Performed by: PHYSICIAN ASSISTANT

## 2020-03-25 PROCEDURE — 82948 REAGENT STRIP/BLOOD GLUCOSE: CPT

## 2020-03-25 PROCEDURE — 97535 SELF CARE MNGMENT TRAINING: CPT

## 2020-03-25 PROCEDURE — 97530 THERAPEUTIC ACTIVITIES: CPT

## 2020-03-25 PROCEDURE — 85420 FIBRINOLYTIC PLASMINOGEN: CPT | Performed by: PHYSICIAN ASSISTANT

## 2020-03-25 PROCEDURE — 85379 FIBRIN DEGRADATION QUANT: CPT | Performed by: PHYSICIAN ASSISTANT

## 2020-03-25 PROCEDURE — 99232 SBSQ HOSP IP/OBS MODERATE 35: CPT | Performed by: INTERNAL MEDICINE

## 2020-03-25 PROCEDURE — 80053 COMPREHEN METABOLIC PANEL: CPT | Performed by: PHYSICIAN ASSISTANT

## 2020-03-25 PROCEDURE — 99233 SBSQ HOSP IP/OBS HIGH 50: CPT | Performed by: EMERGENCY MEDICINE

## 2020-03-25 PROCEDURE — 85730 THROMBOPLASTIN TIME PARTIAL: CPT | Performed by: PHYSICIAN ASSISTANT

## 2020-03-25 PROCEDURE — 85300 ANTITHROMBIN III ACTIVITY: CPT | Performed by: PHYSICIAN ASSISTANT

## 2020-03-25 PROCEDURE — 85027 COMPLETE CBC AUTOMATED: CPT | Performed by: PHYSICIAN ASSISTANT

## 2020-03-25 PROCEDURE — 97116 GAIT TRAINING THERAPY: CPT

## 2020-03-25 PROCEDURE — 85027 COMPLETE CBC AUTOMATED: CPT | Performed by: EMERGENCY MEDICINE

## 2020-03-25 PROCEDURE — 85610 PROTHROMBIN TIME: CPT | Performed by: PHYSICIAN ASSISTANT

## 2020-03-25 PROCEDURE — NC001 PR NO CHARGE: Performed by: EMERGENCY MEDICINE

## 2020-03-25 PROCEDURE — 85384 FIBRINOGEN ACTIVITY: CPT | Performed by: PHYSICIAN ASSISTANT

## 2020-03-25 RX ORDER — POTASSIUM CHLORIDE 20 MEQ/1
40 TABLET, EXTENDED RELEASE ORAL ONCE
Status: COMPLETED | OUTPATIENT
Start: 2020-03-25 | End: 2020-03-25

## 2020-03-25 RX ADMIN — POTASSIUM CHLORIDE 40 MEQ: 1500 TABLET, EXTENDED RELEASE ORAL at 08:19

## 2020-03-25 RX ADMIN — DOCUSATE SODIUM 100 MG: 100 CAPSULE, LIQUID FILLED ORAL at 08:19

## 2020-03-25 RX ADMIN — FERROUS SULFATE TAB 325 MG (65 MG ELEMENTAL FE) 325 MG: 325 (65 FE) TAB at 15:48

## 2020-03-25 RX ADMIN — ATORVASTATIN CALCIUM 40 MG: 40 TABLET, FILM COATED ORAL at 15:48

## 2020-03-25 RX ADMIN — SODIUM BICARBONATE 100 ML/HR: 84 INJECTION, SOLUTION INTRAVENOUS at 11:25

## 2020-03-25 RX ADMIN — DEXTRAN 70 AND HYPROMELLOSE 2910 2 DROP: 1; 3 SOLUTION/ DROPS OPHTHALMIC at 08:22

## 2020-03-25 RX ADMIN — METHYLPREDNISOLONE SODIUM SUCCINATE 40 MG: 40 INJECTION, POWDER, FOR SOLUTION INTRAMUSCULAR; INTRAVENOUS at 08:19

## 2020-03-25 RX ADMIN — CHLORHEXIDINE GLUCONATE 0.12% ORAL RINSE 15 ML: 1.2 LIQUID ORAL at 08:19

## 2020-03-25 RX ADMIN — PANTOPRAZOLE SODIUM 40 MG: 40 TABLET, DELAYED RELEASE ORAL at 06:01

## 2020-03-25 RX ADMIN — METHYLPREDNISOLONE SODIUM SUCCINATE 40 MG: 40 INJECTION, POWDER, FOR SOLUTION INTRAMUSCULAR; INTRAVENOUS at 17:28

## 2020-03-25 RX ADMIN — INSULIN LISPRO 1 UNITS: 100 INJECTION, SOLUTION INTRAVENOUS; SUBCUTANEOUS at 00:29

## 2020-03-25 RX ADMIN — SODIUM BICARBONATE 100 ML/HR: 84 INJECTION, SOLUTION INTRAVENOUS at 00:31

## 2020-03-25 RX ADMIN — PANTOPRAZOLE SODIUM 40 MG: 40 TABLET, DELAYED RELEASE ORAL at 15:48

## 2020-03-25 RX ADMIN — CHLORHEXIDINE GLUCONATE 0.12% ORAL RINSE 15 ML: 1.2 LIQUID ORAL at 21:38

## 2020-03-25 RX ADMIN — FERROUS SULFATE TAB 325 MG (65 MG ELEMENTAL FE) 325 MG: 325 (65 FE) TAB at 08:19

## 2020-03-25 RX ADMIN — INSULIN LISPRO 1 UNITS: 100 INJECTION, SOLUTION INTRAVENOUS; SUBCUTANEOUS at 17:28

## 2020-03-25 NOTE — PROGRESS NOTES
Progress Note - ICU Transfer to Step down/med  surg  - Amina Shay 69 y o  male MRN: [id number]    Unit/Bed#: ICU 02 Encounter: [id number]      Code Status: Level 3 - DNAR and DNI    Date of ICU admission: 3/23/20    Reason for ICU adm: anemia, thrombocytopenia, hypotension     Active problems:   Patient Active Problem List   Diagnosis    Shock ([address] )    Symptomatic anemia    Brain mass    CAD (coronary artery disease)    COPD (chronic obstructive pulmonary disease) (HCC)    Thrombocytopenia (HCC)    Type 2 diabetes mellitus (HCC)    Elevated troponin    Lactic acid acidosis    Uremia    High anion gap metabolic acidosis    BEN (acute kidney injury) (HCC)    CKD (chronic kidney disease) stage 3, GFR 30-59 ml/min (HCC)    Acidosis    Hypernatremia       Summary of clinical course: 68 yo who initially was sent into [facility] ED for low hemoglobin along with and lightheadedness  Found to be hypotensive, anemic with Hgb 5 6, thrombocytopenic with PLT 2  Head CT revealed a pituitary mass  CT a/p cirrhosis and diverticulosis  He was given 2 units PRBCs, 2 units PLT, DDAVP given that he is on aspirin  Unclear how acute the anemia is given that he has not had labs since 2016  Hgb now stable and no obvious source of bleed, although he did have hemoccult positive stools  Heme is following and did thrombocytopenia workup  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 and treating with steroids  No schistocytes on smear  DIC panel showed normal fibrinogen today  GI planning to EGD/colo once platelets are stable  Also has Type II NSTEMI with trop 40  No chest pain  Cardiology consulting but not doing heparin drip given thrombocytopenia  He was on levophed for hypotension but has been off pressors since 0330 today  He has an BEN and metabolic acidosis that has been improving on bicarb drip   Neurosurgery saw him for pituitary mass and recommends non-urgent workup including outpatient MRI and follow up        Recent or scheduled procedures:   3/23 - 2U PRBC, 2U Plts, DDAVP  3/23 - CT head with 2 5 x 2 cm pituitary mass  3/23 - CT AP with nodular liver concerning for cirrhosis, multiple bilateral renal nodules, diverticulosis  3/23 - chest x-ray without acute cardiopulmonary disease  3/24 - 1U PRBC, 1U plts   3/24 - abd US - liver cirrhosis, no suspicious lesions   BL simple renal cysts    Outstanding/pending diagnostics:   - Q6 CBC  - [id number] activity     Hospital discharge planning:    - Consults: Hematology/oncology, nephrology, gastroenterology, cardiology, neurosurgery, PT/OT   - Continue to trend response to ITP treatment, BEN improvement  - Possible EGD/colo if PLT improve  - Plan for outpatient neurosurgery follow up for pituitary mass   - Cards follow up for NSTEMI    Lines:   Left femoral CVC triple lumen  Left antecubital peripheral IV

## 2020-03-25 NOTE — PROGRESS NOTES
Progress Note - Cardiology   Taisha Torres 69 y o  male MRN: [id number]  Encounter: [id number]  03/25/20  9:17 AM        Assessment/Plan:    1  Type 2 MI in setting of underlying CAD and current issues with severe anemia/hypotension  Echo showed preserved LV function  Due to anemia/thrombocytopenia, not currently on antiplatelets or antithrombotics  Was reported to be taking aspirin 81 mg daily PTA  On Atorvastatin 40  Not on beta blocker due to hypotension, which appears to be improving  2  BEN/CKD III  Peak creatinine on admission 3 5, currently trending down, in 2s  Still on bicarb drip  3  Thrombocytopenia  On solumedrol  4  Anemia  Hgb 7 4  Transfusing as needed  5  DM  On SSI  Subjective/Objective   Chief Complaint: No chief complaint on file  Subjective: 69 y o  man with CAD s/p 3 vessel CABG 2017, HTN, HL, DM, CKD III, tobacco use, TORRES with cirrhosis, pituitary mass, colonic AVMs, BPH, presented to  [address] from [facility] with dark/bloody BMs, lightheadedness, dizziness, SOB/fatigue and severe anemia with Hgb 7 on outpt labs, creatinine 3, platelets < 10  On presentation to  [facility] Hgb was 5 9, creatinine 3 5, troponin elevated to 8 52, lactic acid 2 7  He was persistently hypotensive despite IVF and blood transfusion, requiring pressor support  Echo 3/24/20 showed normal LV size and function, EF 65%, no diagnostic RWMA, RV mildly dilated with normal RV function  Mild to moderate MR  Moderate TR with PASP 42 mm Hg  He was evaluated by Hematology, being treated with steroids for 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  Currently appears to be off pressors, still on bicarb drip, but ambulating in halls with PT  No fevers  Denies CP or SOB  No dizziness or lightheadedness  Still has dark stools, but reports he takes iron, so they are always dark       Patient Active Problem List   Diagnosis    [facility] [address]    Symptomatic anemia    Brain mass    CAD (coronary artery disease)    COPD (chronic obstructive pulmonary disease) (HCC)    Thrombocytopenia (HCC)    Type 2 diabetes mellitus (HCC)    Elevated troponin    Lactic acid acidosis    Uremia    High anion gap metabolic acidosis    BEN (acute kidney injury) ([place] [address] )    CKD (chronic kidney disease) stage 3, GFR 30-59 ml/min (HCC)    Acidosis    Hypernatremia     Past Medical History:   Diagnosis Date    Cirrhosis ([place] [address] )     COPD (chronic obstructive pulmonary disease) (HCC)     Coronary artery disease     Dementia (HCC)     Diabetes mellitus ([place] [address] )     Diverticulosis     Gastric reflux     Hyperlipemia     Panlobular emphysema (HCC)     Pituitary mass (HCC)     Renal disorder     CKD Stage 3 & Bilat multi renal nodules    Thrombocyte disorder (HCC)     thrombocytopenia    Unstable angina (HCC)        Allergies   Allergen Reactions    Erythromycin     Penicillins     Shellfish-Derived Products        Current Facility-Administered Medications   Medication Dose Route Frequency Provider Last Rate Last Dose    acetaminophen (TYLENOL) tablet 650 mg  650 mg Oral Q6H PRN Rico Evans MD        atorvastatin (LIPITOR) tablet 40 mg  40 mg Oral Daily With Juana Church MD   40 mg at 03/24/20 1622    bisacodyl (DULCOLAX) rectal suppository 10 mg  10 mg Rectal Daily PRN Rico Evans MD        chlorhexidine (PERIDEX) 0 12 % oral rinse 15 mL  15 mL Swish & Spit Q12H [address] Rico Evans MD   15 mL at 03/25/20 0819    dextran 70-hypromellose (GENTEAL TEARS) 0 1-0 3 % ophthalmic solution 2 drop  2 drop Both Eyes BID Rico Evans MD   2 drop at 03/25/20 7787    docusate sodium (COLACE) capsule 100 mg  100 mg Oral BID Rico Evasn MD   100 mg at 03/25/20 0098    ferrous sulfate tablet 325 mg  325 mg Oral BID With Meals Rico Evans MD   325 mg at 03/25/20 0819    insulin lispro (HumaLOG) 100 units/mL subcutaneous injection 1-5 Units  1-5 Units Subcutaneous Q6H [address] Rico Evans MD   1 Units at 03/25/20 0029    methylPREDNISolone sodium succinate (Solu-MEDROL) injection 40 mg  40 mg Intravenous BID Alan Romero PA-C   40 mg at 03/25/20 3147    norepinephrine (LEVOPHED) 4 mg (STANDARD CONCENTRATION) IV in sodium chloride 0 9% 250 mL  1-30 mcg/min Intravenous Titrated Neno Tena MD   Stopped at 03/25/20 0332    pantoprazole (PROTONIX) EC tablet 40 mg  40 mg Oral BID AC Alan Romero PA-C   40 mg at 03/25/20 0601    sodium bicarbonate 75 mEq in sterile water 1,000 mL infusion  100 mL/hr Intravenous Continuous Alan Romero PA-C 100 mL/hr at 03/25/20 0031 100 mL/hr at 03/25/20 0031       Vitals: /52   Pulse 62   Temp 98 8 °F (37 1 °C) (Oral)   Resp 16   Ht 5' 6" (1 676 m)   Wt 59 8 kg (131 lb 13 4 oz)   SpO2 95%   BMI 21 28 kg/m²     Intake/Output Summary (Last 24 hours) at 3/25/2020 0917  Last data filed at 3/25/2020 0501  Gross per 24 hour   Intake 2751 34 ml   Output 1575 ml   Net 1176 34 ml     Wt Readings from Last 3 Encounters:   03/23/20 59 8 kg (131 lb 13 4 oz)   03/23/20 61 kg (134 lb 7 7 oz)       Body mass index is 21 28 kg/m²  ,     Vitals:    03/25/20 0500 03/25/20 0600 03/25/20 0700 03/25/20 0800   BP: 116/56 113/54 119/54 124/52   Pulse: 64 62 68 62   Patient Position - Orthostatic VS:  Lying         Physical Exam:     GEN: Awake, alert, in no acute distress  HEENT: Sclera anicteric, conjunctivae pink, mucous membranes moist   NECK: Supple, no carotid bruits, no significant JVD  HEART: Regular rhythm, normal S1 and S2, no murmurs, clicks, gallops or rubs  LUNGS: Clear to auscultation bilaterally; no wheezes, rales, or rhonchi   ABDOMEN: Soft, nontender, nondistended, normoactive bowel sounds  EXTREMITIES: Skin warm and well perfused, no clubbing, cyanosis, or edema  NEURO: No focal findings  SKIN: Normal without suspicious lesions on exposed skin        Lab Results:     BMP:  Results from last 7 days   Lab Units 03/25/20  0447 03/24/20  1737 03/24/20  0505 03/23/20  2139 03/23/20  1611 03/23/20  1322   POTASSIUM mmol/L 3 5 3 6 3 8 3 9 4 5 4 4   CHLORIDE mmol/L 117* 121* 123* 123* 112* 109*   CO2 mmol/L 19* 17* 14* 13* 13* 14*   BUN mg/dL 76* 88* 108* 118* 127* 138*   CREATININE mg/dL 2 31* 2 54* 2 84* 2 83* 3 17* 3 52*   CALCIUM mg/dL 7 5* 7 5* 7 8* 7 4* 7 5* 8 4       CBC:   Results from last 7 days   Lab Units 03/25/20  0446 03/24/20  2318 03/24/20  1737 03/24/20  1153 03/24/20  0505 03/24/20 0230 03/23/20 2138 03/23/20  1322   WBC Thousand/uL 3 35* 5 22 5 86 5 16 14 37*  --  15 21*  --  14 89*   HEMOGLOBIN g/dL 7 4* 8 0* 8 2* 6 2* 7 5* 7 6* 6 4*   < > 5 9*   HEMATOCRIT % 22 6* 24 1* 24 5* 19 2* 23 1* 23 2* 20 1*   < > 18 8*   MCV fL 95 94 94 97 96  --  100*  --  106*   PLATELETS Thousands/uL 1* 2* 5* 1* 7*  --  13*  --  2*   MCH pg 31 0 31 3 31 5 31 3 31 3  --  31 7  --  33 1   MCHC g/dL 32 7 33 2 33 5 32 3 32 5  --  31 8  --  31 4   RDW % 17 5* 17 2* 17 0* 18 2* 17 6*  --  17 9*  --  18 4*   MPV fL  --  13 5* 12 8*  --   --   --  9 4  --   --    NRBC AUTO /100 WBCs  --   --   --   --  2  --   --   --  1    < > = values in this interval not displayed  Results from last 7 days   Lab Units 03/24/20  0230 03/23/20 2138 03/23/20  1754   TROPONIN I ng/mL >40 00* 37 20*  37 70* 23 18*                 Results from last 7 days   Lab Units 03/23/20 2139 03/23/20  1322   MAGNESIUM mg/dL 1 6 1 6       INR:   Results from last 7 days   Lab Units 03/25/20  0447 03/23/20  1322   INR  1 36* 1 36*       Lipid Profile:   No results found for: CHOL  No results found for: HDL  No results found for: LDLCALC  No results found for: TRIG      Hgb A1c:         Telemetry: personally reviewed, SR, no events

## 2020-03-25 NOTE — PHYSICAL THERAPY NOTE
Physical Therapy Screen    Patient Name: Aydee PEREZEKB'B Date: 3/25/2020     Problem List  Principal Problem:    Shock [facility])  Active Problems:    Symptomatic anemia    Brain mass    CAD (coronary artery disease)    COPD (chronic obstructive pulmonary disease) (HCC)    Thrombocytopenia (HCC)    Type 2 diabetes mellitus (HCC)    Elevated troponin    Lactic acid acidosis    Uremia    High anion gap metabolic acidosis    BEN (acute kidney injury) ([facility] )    CKD (chronic kidney disease) stage 3, GFR 30-59 ml/min (HCC)    Acidosis    Hypernatremia       Past Medical History  Past Medical History:   Diagnosis Date    Cirrhosis ([facility] )     [facility]     Coronary artery disease     Dementia ([facility] )     Diabetes mellitus ([facility] )     Diverticulosis     Gastric reflux     Hyperlipemia     Panlobular emphysema (HCC)     Pituitary mass (HCC)     Renal disorder     CKD Stage 3 & Bilat multi renal nodules    Thrombocyte disorder (HCC)     thrombocytopenia    Unstable angina (HCC)         Past Surgical History  Past Surgical History:   Procedure Laterality Date    CARDIAC SURGERY      unspecified        03/25/20 1008   Pain Assessment   Pain Assessment Tool Pain Assessment not indicated - pt denies pain   Pain Score No Pain   Restrictions/Precautions   Weight Bearing Precautions Per Order No   Other Precautions Fall Risk;Multiple lines   General   Chart Reviewed Yes   Family/Caregiver Present No   Cognition   Overall Cognitive Status Impaired   Arousal/Participation Alert; Cooperative   Bed Mobility   Rolling R 3  Moderate assistance   Additional items Assist x 1   Rolling L 3  Moderate assistance   Additional items Assist x 1   Supine to Sit 3  Moderate assistance   Additional items Assist x 1   Transfers   Sit to Stand 4  Minimal assistance   Additional items Assist x 1   Stand to Sit 4  Minimal assistance   Additional items Assist x 1   Ambulation/Elevation   Gait pattern Excessively slow; Foward flexed; Shuffling   Gait Assistance 4  Minimal assist   Additional items Assist x 1   Assistive Device Rolling walker   Distance [id number]   Balance   Static Sitting Fair +   Dynamic Sitting Fair +   Static Standing Fair   Dynamic Standing Fair -   Ambulatory Poor +   Endurance Deficit   Endurance Deficit Yes   Activity Tolerance   Activity Tolerance Patient limited by fatigue   Medical Staff Made Aware OT   Nurse Made Aware nurse approved therapy session   Assessment   Prognosis Fair   Problem List Decreased strength; Impaired balance;Decreased endurance;Decreased mobility; Decreased cognition;Decreased safety awareness   Assessment Pt presents to therapy today with reduced mobility, confusion, limited endurance, high risk of falling, gait abnormalities  These impairments limit the patient by requiring assistance for mobility and places him at an increased risk of falling  Pt would benefit from continued skilled therapy while in the hospital to improve overall mobility and work towards a safe d/c  Recommend back to facility with increased assistance  At end of session patient was left seated with call bell within reach  Pt able to ambulate increased distances with assistance  Pt had increased difficulty multitasking while ambulating  Goals   STG Expiration Date 04/05/20   Short Term Goal #1 continue to progress towards goals   Plan   Treatment/Interventions Functional transfer training;LE strengthening/ROM; Therapeutic exercise; Endurance training;Gait training;Bed mobility; Equipment eval/education   Progress Progressing toward goals   PT Frequency Other (Comment)  (3-5xwk)   Recommendation   Recommendation Other (Comment)  (return to facility with increased assistance)   Equipment Recommended Walker   PT - OK to Discharge Yes   Additional Comments if back to facility with increased assistance   Gloria Doctor, Pt, DPT

## 2020-03-25 NOTE — OCCUPATIONAL THERAPY NOTE
Occupational Therapy Treatment Note      Saritha Going    3/25/2020    Principal Problem:    Shock ([place] [address] )  Active Problems:    Symptomatic anemia    Brain mass    CAD (coronary artery disease)    COPD (chronic obstructive pulmonary disease) (HCC)    Thrombocytopenia (HCC)    Type 2 diabetes mellitus (HCC)    Elevated troponin    Lactic acid acidosis    Uremia    High anion gap metabolic acidosis    BEN (acute kidney injury) (HCC)    CKD (chronic kidney disease) stage 3, GFR 30-59 ml/min (HCC)    Acidosis    Hypernatremia      Past Medical History:   Diagnosis Date    Cirrhosis ([place] [address] )     COPD (chronic obstructive pulmonary disease) ([place] [address] )     Coronary artery disease     Dementia ([place] [address] )     Diabetes mellitus ([place] [address] )     Diverticulosis     Gastric reflux     Hyperlipemia     Panlobular emphysema (HCC)     Pituitary mass (HCC)     Renal disorder     CKD Stage 3 & Bilat multi renal nodules    Thrombocyte disorder (HCC)     thrombocytopenia    Unstable angina ([place] [address] )        Past Surgical History:   Procedure Laterality Date    CARDIAC SURGERY      unspecified        03/25/20 1008   Restrictions/Precautions   Weight Bearing Precautions Per Order No   Other Precautions Cognitive; Chair Alarm; Bed Alarm;Multiple lines;Telemetry; Fall Risk;Pain   Lifestyle   Autonomy Assist ADLS/IADLS, Mod I w/ transfers and functional mobility PTA   Reciprocal Relationships Pt lives w/ facility staff; reports no family in the area   Service to Others Pt is retired   Intrinsic Gratification Enjoys going to the "[employer] and going outside to smoke w/ his friends   Pain Assessment   Pain Assessment Tool Pain Assessment not indicated - pt denies pain   Pain Score No Pain   ADL   LB Dressing Assistance 1  Total Assistance   LB Dressing Deficit Thread RLE into underwear; Thread LLE into underwear; Fasteners;Setup   LB Dressing Comments Pt required total A to don/doff diaper     Toileting Assistance  1  Total Assistance   Toileting Deficit Perineal hygiene   Toileting Comments pt incontinent of bowel, required total A to manage perineal hygiene   Bed Mobility   Rolling R 3  Moderate assistance   Additional items Assist x 1; Increased time required; Bedrails;Verbal cues;LE management   Rolling L 3  Moderate assistance   Additional items Assist x 1;Bedrails; Increased time required;Verbal cues;LE management   Supine to Sit 3  Moderate assistance   Additional items Assist x 1;HOB elevated; Increased time required;LE management;Verbal cues   Sit to Supine Unable to assess   Additional Comments pt went from supine to sit w/ mod A x1 for UB support and LE management, HOB elevated for assist  Pt sat EOB w/ S for safety  Prior to supine/sit transfer, pt rolled L and R w/ Mod  Ax1 for initiation into rolling and assist in side lying  Able to use B/L hand rails for support   Transfers   Sit to Stand 4  Minimal assistance   Additional items Assist x 1; Increased time required;Verbal cues   Stand to Sit 4  Minimal assistance   Additional items Assist x 1; Increased time required;Verbal cues   Additional Comments Pt performed sit-stand from EOB w/ Min A x1 for mild force production into standing  Functional Mobility   Functional Mobility 4  Minimal assistance   Additional Comments pt ambulated short household distance w/ Min A x1, RW for support in standing  Additional items Rolling walker   Cognition   Overall Cognitive Status Impaired   Arousal/Participation Responsive; Cooperative   Attention Attends with cues to redirect   Orientation Level Oriented to person;Oriented to place; Disoriented to time;Disoriented to situation   Memory Decreased recall of precautions;Decreased recall of recent events;Decreased short term memory   Following Commands Follows one step commands without difficulty   Comments pt is pleasant and cooperative; while engaging in functional mobility pt given mini cog tests   Pt demonstrated decreased STM (unable to recall 3 words at end of session that were given at the beginning)  Pt able to answer simple math problems (3/4 correctly)  Pt needed assist for money management questions (increased time to process); and had difficulty with reporting numbers in backwards order  Pt has some decreased understanding of deficits and safety awareness  Activity Tolerance   Activity Tolerance Patient tolerated treatment well   Medical Staff Made Aware PT, RN   Assessment   Assessment Patient participated in Skilled OT session 3/25/2020 with interventions consisting of ADL re training with the use of correct body mechnaics, Energy Conservation techniques, deep breathing technique, safety awareness and fall prevention techniques, therapeutic exercise to: increase functional use of BUEs, increase BUE muscle strength ,  therapeutic activities to: increase activity tolerance, increase dynamic sit/ stand balance during functional activity , increase postural control, increase trunk control and increase OOB/ sitting tolerance   Patient agreeable to OT treatment session, upon arrival patient was found supine in bed  In comparison to previous session, patient with improvements in bed mobility, transfers, functional mobility, arousal/alertness and activity tolerance  Patient requiring verbal cues for safety, verbal cues for correct technique and verbal cues for pacing thru activity steps  Patient continues to be functioning below baseline level, occupational performance remains limited secondary to factors listed above and increased risk for falls and injury  From OT standpoint, recommendation at time of d/c would be Short Term Rehab when medically stable  Patient to benefit from continued Occupational Therapy treatment while in the hospital to address deficits as defined above and maximize level of functional independence with ADLs and functional mobility  Plan   Treatment Interventions ADL retraining;Functional transfer training;UE strengthening/ROM; Endurance

## 2020-03-25 NOTE — PROGRESS NOTES
Progress Note - Nephrology   Tedi Brittle 69 y o  male MRN: [id number]  Unit/Bed#: ICU 02 Encounter: [id number]      Assessment / Plan:  1  BEN on probable CKD, b/l sCr 1 36 as of Nov 2016, peak sCr 3 52, down to 2 31 now  -BEN likely d/t prerenal cause from decreased oral intake with possible hypovolemic shock  -UA bland, supportive of likely hemodynamic cause  -f/u abdominal u/s to evaluate kidney sizes/echogenicity  -CT abdomen shows multiple bilateral renal nodules, with some cysts which do not appear benign   -was on PPI at home  -f/u serial BMP, renal indices improving s/p IV resuscitation  -continue with hypotonic bicarb gtt at 100ml/hr in setting of BEN/hypovolemia and acidosis  -urinating, monitor UOP     2  CKD stage 3 in setting of DM/cirrhosis - b/l sCr as above  -no outpatient nephrology follow up on record     3  Anemia ? 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 per hematology  On solumedrol 40mg IV BID with possible plan for IVIG per hematology       4  Hypernatremia - sNa 146-->143 with hypotonic bicarb gtt  Monitor sNa      5  Elevated anion gap metabolic acidosis-anion gap 20 on admission with a bicarb of 14, this could be due to significant renal impairment  -bicarb stable at 19, AG closed  - Lactate also normal but was elevated on admission at 2 7    -improving on bicarb gtt as above     6  Azotemia with concern for possible uremia - BUN improving on IVF  Continue to monitor  No urgent RRT needs at this time  Suspect hypovolemia and possible GIB playing a role       7  Hypovolemic vs vasodilatory shock - BP improved per ICU team  Maintain MAP > 65       8  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 If malignant in nature     Other issues: NSTEMI type 1 vs 2 - cardio on board        Subjective:   He denies any chest pain or shortness of breath  No complaints  Objective:     Vitals: Blood pressure 146/72, pulse 56, temperature 97 5 °F (36 4 °C), temperature source Oral, resp  rate 15, height 5' 6" (1 676 m), weight 59 8 kg (131 lb 13 4 oz), SpO2 97 %  ,Body mass index is 21 28 kg/m²  Temp (24hrs), Av 9 °F (36 6 °C), Min:97 5 °F (36 4 °C), Max:98 8 °F (37 1 °C)      Weight (last 2 days)     Date/Time   Weight    20   59 8 (131 84)                Intake/Output Summary (Last 24 hours) at 3/25/2020 1438  Last data filed at 3/25/2020 1300  Gross per 24 hour   Intake 3117 96 ml   Output 1475 ml   Net 1642 96 ml     I/O last 24 hours: In: 4107 4 [P O :540; I V :2917 4; Blood:650]  Out: 1950 [Urine:1950]        Physical Exam:   Physical Exam   Constitutional: He appears well-developed and well-nourished  No distress  Thin, blanket over head   HENT:   Head: Normocephalic and atraumatic  Mouth/Throat: No oropharyngeal exudate  Eyes: Right eye exhibits no discharge  Left eye exhibits no discharge  No scleral icterus  Neck: Normal range of motion  Neck supple  No thyromegaly present  Cardiovascular: Normal rate, regular rhythm and normal heart sounds  Pulmonary/Chest: Effort normal  He has no wheezes  He has no rales  Decreased BS at bases   Abdominal: Soft  Bowel sounds are normal  He exhibits no distension  There is no tenderness  Musculoskeletal: Normal range of motion  He exhibits no edema  Neurological: He is alert  awake   Skin: Skin is warm and dry  No rash noted  He is not diaphoretic  Psychiatric: He has a normal mood and affect  His behavior is normal    Vitals reviewed        Invasive Devices     Central Venous Catheter Line            CVC Central Lines 20 Triple Left Femoral 1 day          Peripheral Intravenous Line            Peripheral IV 20 Left Antecubital 2 days Medications:    Scheduled Meds:  Current Facility-Administered Medications:  acetaminophen 650 mg Oral Q6H PRN Moises Mccarthy MD    atorvastatin 40 mg Oral Daily With Mitchel Singh MD    bisacodyl 10 mg Rectal Daily PRN Moises Mccarthy MD    chlorhexidine 15 mL Swish & Spit Q12H [address] Moises Mccarthy MD    dextran 70-hypromellose 2 drop Both Eyes BID Moises Mccarthy MD    docusate sodium 100 mg Oral BID Moises Mccarthy MD    ferrous sulfate 325 mg Oral BID With Meals Moises Mccarthy MD    insulin lispro 1-5 Units Subcutaneous Q6H [address] Moises Mccarthy MD    methylPREDNISolone sodium succinate 40 mg Intravenous BID Gaby Newsome PA-C    norepinephrine 1-30 mcg/min Intravenous Titrated Moises Mccarthy MD Last Rate: Stopped (03/25/20 0332)   pantoprazole 40 mg Oral BID AC Gaby Newsome PA-C    sodium bicarbonate infusion 100 mL/hr Intravenous Continuous Gaby Newsome PA-C Last Rate: 100 mL/hr (03/25/20 1125)       PRN Meds:   acetaminophen    bisacodyl    Continuous Infusions:  norepinephrine 1-30 mcg/min Last Rate: Stopped (03/25/20 0332)   sodium bicarbonate infusion 100 mL/hr Last Rate: 100 mL/hr (03/25/20 1125)           LAB RESULTS:      Results from last 7 days   Lab Units 03/25/20  0447 03/25/20  0446 03/24/20  2318 03/24/20  1737 03/24/20  1153 03/24/20  0505 03/24/20  0230 03/23/20  2139 03/23/20  2138  03/23/20  1610 03/23/20  1322   WBC Thousand/uL  --  3 35* 5 22 5 86 5 16 14 37*  --   --  15 21*  --   --  14 89*   HEMOGLOBIN g/dL  --  7 4* 8 0* 8 2* 6 2* 7 5* 7 6*  --  6 4*   < >  --  5 9*   HEMATOCRIT %  --  22 6* 24 1* 24 5* 19 2* 23 1* 23 2*  --  20 1*   < >  --  18 8*   PLATELETS Thousands/uL  --  1* 2* 5* 1* 7*  --   --  13*  --   --  2*   NEUTROS PCT %  --   --   --   --   --  49  --   --   --   --   --  48   LYMPHS PCT %  --   --   --   --   --  36  --   --   --   --   --  40   LYMPHO PCT %  --   --   --   --   --   --   --   --  35  --   --   --    MONOS PCT %  --   --   --   --   --  10  --   -- --   --   --  10   MONO PCT %  --   --   --   --   --   --   --   --  10  --   --   --    EOS PCT %  --   --   --   --   --  2  --   --  1  --   --  1   POTASSIUM mmol/L 3 5  --   --  3 6  --  3 8  --  3 9  --   --  4 5 4 4   CHLORIDE mmol/L 117*  --   --  121*  --  123*  --  123*  --   --  112* 109*   CO2 mmol/L 19*  --   --  17*  --  14*  --  13*  --   --  13* 14*   BUN mg/dL 76*  --   --  88*  --  108*  --  118*  --   --  127* 138*   CREATININE mg/dL 2 31*  --   --  2 54*  --  2 84*  --  2 83*  --   --  3 17* 3 52*   CALCIUM mg/dL 7 5*  --   --  7 5*  --  7 8*  --  7 4*  --   --  7 5* 8 4   ALK PHOS U/L 39*  --   --   --   --  40*  --  36*  --   --   --  40*   ALT U/L 29  --   --   --   --  30  --  23  --   --   --  23   AST U/L 162*  --   --   --   --  215*  --  167*  --   --   --  80*   MAGNESIUM mg/dL  --   --   --   --   --   --   --  1 6  --   --   --  1 6   PHOSPHORUS mg/dL  --   --   --   --   --   --   --  3 2  --   --   --   --     < > = values in this interval not displayed  CUTURES:  Lab Results   Component Value Date    BLOODCX No Growth at 24 hrs  03/23/2020    BLOODCX No Growth at 24 hrs  03/23/2020                 Portions of the record may have been created with voice recognition software  Occasional wrong word or "sound a like" substitutions may have occurred due to the inherent limitations of voice recognition software  Read the chart carefully and recognize, using context, where substitutions have occurred  If you have any questions, please contact the dictating provider

## 2020-03-25 NOTE — PLAN OF CARE
Problem: Prexisting or High Potential for Compromised Skin Integrity  Goal: Skin integrity is maintained or improved  Description  INTERVENTIONS:  - Identify patients at risk for skin breakdown  - Assess and monitor skin integrity  - Assess and monitor nutrition and hydration status  - Monitor labs   - Assess for incontinence   - Turn and reposition patient  - Assist with mobility/ambulation  - Relieve pressure over bony prominences  - Avoid friction and shearing  - Provide appropriate hygiene as needed including keeping skin clean and dry  - Evaluate need for skin moisturizer/barrier cream  - Collaborate with interdisciplinary team   - Patient/family teaching  - Consider wound care consult   Outcome: Progressing     Problem: Potential for Falls  Goal: Patient will remain free of falls  Description  INTERVENTIONS:  - Assess patient frequently for physical needs  -  Identify cognitive and physical deficits and behaviors that affect risk of falls    -  [place] fall precautions as indicated by assessment   - Educate patient/family on patient safety including physical limitations  - Instruct patient to call for assistance with activity based on assessment  - Modify environment to reduce risk of injury  - Consider OT/PT consult to assist with strengthening/mobility  Outcome: Progressing     Problem: PAIN - ADULT  Goal: Verbalizes/displays adequate comfort level or baseline comfort level  Description  Interventions:  - Encourage patient to monitor pain and request assistance  - Assess pain using appropriate pain scale  - Administer analgesics based on type and severity of pain and evaluate response  - Implement non-pharmacological measures as appropriate and evaluate response  - Consider cultural and social influences on pain and pain management  - Notify physician/advanced practitioner if interventions unsuccessful or patient reports new pain  Outcome: Progressing     Problem: INFECTION - ADULT  Goal: Absence or prevention of progression during hospitalization  Description  INTERVENTIONS:  - Assess and monitor for signs and symptoms of infection  - Monitor lab/diagnostic results  - Monitor all insertion sites, i e  indwelling lines, tubes, and drains  - Monitor endotracheal if appropriate and nasal secretions for changes in amount and color  - [place] appropriate cooling/warming therapies per order  - Administer medications as ordered  - Instruct and encourage patient and family to use good hand hygiene technique  - Identify and instruct in appropriate isolation precautions for identified infection/condition  Outcome: Progressing  Goal: Absence of fever/infection during neutropenic period  Description  INTERVENTIONS:  - Monitor WBC    Outcome: Progressing     Problem: SAFETY ADULT  Goal: Maintain or return to baseline ADL function  Description  INTERVENTIONS:  -  Assess patient's ability to carry out ADLs; assess patient's baseline for ADL function and identify physical deficits which impact ability to perform ADLs (bathing, care of mouth/teeth, toileting, grooming, dressing, etc )  - Assess/evaluate cause of self-care deficits   - Assess range of motion  - Assess patient's mobility; develop plan if impaired  - Assess patient's need for assistive devices and provide as appropriate  - Encourage maximum independence but intervene and supervise when necessary  - Involve family in performance of ADLs  - Assess for home care needs following discharge   - Consider OT consult to assist with ADL evaluation and planning for discharge  - Provide patient education as appropriate  Outcome: Progressing  Goal: Maintain or return mobility status to optimal level  Description  INTERVENTIONS:  - Assess patient's baseline mobility status (ambulation, transfers, stairs, etc )    - Identify cognitive and physical deficits and behaviors that affect mobility  - Identify mobility aids required to assist with transfers and/or ambulation (gait belt, sit-to-stand, lift, walker, cane, etc )  - [place] fall precautions as indicated by assessment  - Record patient progress and toleration of activity level on Mobility SBAR; progress patient to next Phase/Stage  - Instruct patient to call for assistance with activity based on assessment  - Consider rehabilitation consult to assist with strengthening/weightbearing, etc   Outcome: Progressing     Problem: DISCHARGE PLANNING  Goal: Discharge to home or other facility with appropriate resources  Description  INTERVENTIONS:  - Identify barriers to discharge w/patient and caregiver  - Arrange for needed discharge resources and transportation as appropriate  - Identify discharge learning needs (meds, wound care, etc )  - Arrange for interpretive services to assist at discharge as needed  - Refer to Case Management Department for coordinating discharge planning if the patient needs post-hospital services based on physician/advanced practitioner order or complex needs related to functional status, cognitive ability, or social support system  Outcome: Progressing     Problem: Knowledge Deficit  Goal: Patient/family/caregiver demonstrates understanding of disease process, treatment plan, medications, and discharge instructions  Description  Complete learning assessment and assess knowledge base  Interventions:  - Provide teaching at level of understanding  - Provide teaching via preferred learning methods  Outcome: Progressing     Problem: Nutrition/Hydration-ADULT  Goal: Nutrient/Hydration intake appropriate for improving, restoring or maintaining nutritional needs  Description  Monitor and assess patient's nutrition/hydration status for malnutrition  Collaborate with interdisciplinary team and initiate plan and interventions as ordered  Monitor patient's weight and dietary intake as ordered or per policy  Utilize nutrition screening tool and intervene as necessary   Determine patient's food preferences and provide high-protein, high-caloric foods as appropriate       INTERVENTIONS:  - Monitor oral intake, urinary output, labs, and treatment plans  - Assess nutrition and hydration status and recommend course of action  - Evaluate amount of meals eaten  - Assist patient with eating if necessary   - Allow adequate time for meals  - Recommend/ encourage appropriate diets, oral nutritional supplements, and vitamin/mineral supplements  - Order, calculate, and assess calorie counts as needed  - Recommend, monitor, and adjust tube feedings and TPN/PPN based on assessed needs  - Assess need for intravenous fluids  - Provide specific nutrition/hydration education as appropriate  - Include patient/family/caregiver in decisions related to nutrition  Outcome: Progressing

## 2020-03-25 NOTE — PLAN OF CARE
Problem: PHYSICAL THERAPY ADULT  Goal: Performs mobility at highest level of function for planned discharge setting  See evaluation for individualized goals  Description  Treatment/Interventions: OT, Spoke to case management, Spoke to nursing, Gait training, Bed mobility, Patient/family training, Endurance training, LE strengthening/ROM, Functional transfer training          See flowsheet documentation for full assessment, interventions and recommendations  Outcome: Progressing  Note:   Prognosis: Fair  Problem List: Decreased strength, Impaired balance, Decreased endurance, Decreased mobility, Decreased cognition, Decreased safety awareness  Assessment: Pt presents to therapy today with reduced mobility, confusion, limited endurance, high risk of falling, gait abnormalities  These impairments limit the patient by requiring assistance for mobility and places him at an increased risk of falling  Pt would benefit from continued skilled therapy while in the hospital to improve overall mobility and work towards a safe d/c  Recommend back to facility with increased assistance  At end of session patient was left seated with call bell within reach  Pt able to ambulate increased distances with assistance  Pt had increased difficulty multitasking while ambulating  Recommendation: Other (Comment)(return to facility with increased assistance)     PT - OK to Discharge: Yes    See flowsheet documentation for full assessment

## 2020-03-25 NOTE — PLAN OF CARE
Problem: Prexisting or High Potential for Compromised Skin Integrity  Goal: Skin integrity is maintained or improved  Description  INTERVENTIONS:  - Identify patients at risk for skin breakdown  - Assess and monitor skin integrity  - Assess and monitor nutrition and hydration status  - Monitor labs   - Assess for incontinence   - Turn and reposition patient  - Assist with mobility/ambulation  - Relieve pressure over bony prominences  - Avoid friction and shearing  - Provide appropriate hygiene as needed including keeping skin clean and dry  - Evaluate need for skin moisturizer/barrier cream  - Collaborate with interdisciplinary team   - Patient/family teaching  - Consider wound care consult   Outcome: Progressing     Problem: Potential for Falls  Goal: Patient will remain free of falls  Description  INTERVENTIONS:  - Assess patient frequently for physical needs  -  Identify cognitive and physical deficits and behaviors that affect risk of falls    -  [place] fall precautions as indicated by assessment   - Educate patient/family on patient safety including physical limitations  - Instruct patient to call for assistance with activity based on assessment  - Modify environment to reduce risk of injury  - Consider OT/PT consult to assist with strengthening/mobility  Outcome: Progressing     Problem: PAIN - ADULT  Goal: Verbalizes/displays adequate comfort level or baseline comfort level  Description  Interventions:  - Encourage patient to monitor pain and request assistance  - Assess pain using appropriate pain scale  - Administer analgesics based on type and severity of pain and evaluate response  - Implement non-pharmacological measures as appropriate and evaluate response  - Consider cultural and social influences on pain and pain management  - Notify physician/advanced practitioner if interventions unsuccessful or patient reports new pain  Outcome: Progressing     Problem: INFECTION - ADULT  Goal: Absence or prevention of progression during hospitalization  Description  INTERVENTIONS:  - Assess and monitor for signs and symptoms of infection  - Monitor lab/diagnostic results  - Monitor all insertion sites, i e  indwelling lines, tubes, and drains  - Monitor endotracheal if appropriate and nasal secretions for changes in amount and color  - [place] appropriate cooling/warming therapies per order  - Administer medications as ordered  - Instruct and encourage patient and family to use good hand hygiene technique  - Identify and instruct in appropriate isolation precautions for identified infection/condition  Outcome: Progressing  Goal: Absence of fever/infection during neutropenic period  Description  INTERVENTIONS:  - Monitor WBC    Outcome: Progressing     Problem: SAFETY ADULT  Goal: Maintain or return to baseline ADL function  Description  INTERVENTIONS:  -  Assess patient's ability to carry out ADLs; assess patient's baseline for ADL function and identify physical deficits which impact ability to perform ADLs (bathing, care of mouth/teeth, toileting, grooming, dressing, etc )  - Assess/evaluate cause of self-care deficits   - Assess range of motion  - Assess patient's mobility; develop plan if impaired  - Assess patient's need for assistive devices and provide as appropriate  - Encourage maximum independence but intervene and supervise when necessary  - Involve family in performance of ADLs  - Assess for home care needs following discharge   - Consider OT consult to assist with ADL evaluation and planning for discharge  - Provide patient education as appropriate  Outcome: Progressing  Goal: Maintain or return mobility status to optimal level  Description  INTERVENTIONS:  - Assess patient's baseline mobility status (ambulation, transfers, stairs, etc )    - Identify cognitive and physical deficits and behaviors that affect mobility  - Identify mobility aids required to assist with transfers and/or ambulation (gait belt, sit-to-stand, lift, walker, cane, etc )  - [place] fall precautions as indicated by assessment  - Record patient progress and toleration of activity level on Mobility SBAR; progress patient to next Phase/Stage  - Instruct patient to call for assistance with activity based on assessment  - Consider rehabilitation consult to assist with strengthening/weightbearing, etc   Outcome: Progressing     Problem: DISCHARGE PLANNING  Goal: Discharge to home or other facility with appropriate resources  Description  INTERVENTIONS:  - Identify barriers to discharge w/patient and caregiver  - Arrange for needed discharge resources and transportation as appropriate  - Identify discharge learning needs (meds, wound care, etc )  - Arrange for interpretive services to assist at discharge as needed  - Refer to Case Management Department for coordinating discharge planning if the patient needs post-hospital services based on physician/advanced practitioner order or complex needs related to functional status, cognitive ability, or social support system  Outcome: Progressing     Problem: Knowledge Deficit  Goal: Patient/family/caregiver demonstrates understanding of disease process, treatment plan, medications, and discharge instructions  Description  Complete learning assessment and assess knowledge base  Interventions:  - Provide teaching at level of understanding  - Provide teaching via preferred learning methods  Outcome: Progressing     Problem: Nutrition/Hydration-ADULT  Goal: Nutrient/Hydration intake appropriate for improving, restoring or maintaining nutritional needs  Description  Monitor and assess patient's nutrition/hydration status for malnutrition  Collaborate with interdisciplinary team and initiate plan and interventions as ordered  Monitor patient's weight and dietary intake as ordered or per policy  Utilize nutrition screening tool and intervene as necessary   Determine patient's food preferences and provide high-protein, high-caloric foods as appropriate       INTERVENTIONS:  - Monitor oral intake, urinary output, labs, and treatment plans  - Assess nutrition and hydration status and recommend course of action  - Evaluate amount of meals eaten  - Assist patient with eating if necessary   - Allow adequate time for meals  - Recommend/ encourage appropriate diets, oral nutritional supplements, and vitamin/mineral supplements  - Order, calculate, and assess calorie counts as needed  - Recommend, monitor, and adjust tube feedings and TPN/PPN based on assessed needs  - Assess need for intravenous fluids  - Provide specific nutrition/hydration education as appropriate  - Include patient/family/caregiver in decisions related to nutrition  Outcome: Progressing

## 2020-03-25 NOTE — PROGRESS NOTES
Daily Progress Note - Critical Care   Keira Ham 69 y o  male MRN: [id number]  Unit/Bed#: ICU 02 Encounter: [id number]        ----------------------------------------------------------------------------------------  HPI/24hr events:  68 yo who initially was sent in for low hemoglobin  Patient had recent blood in stool and lightheadedness prior to coming in  In the ED he was also found to be thrombocytopenic with PLT 2 and a head CT revealed a pituitary mass  He was given 2 units PRBCs, 2 units PLT, DDAVP given that he is on aspirin  3/25: Nothing acute overnight   ---------------------------------------------------------------------------------------  SUBJECTIVE  Patient denies any complaints at this time    Review of Systems   Constitutional: Negative for chills, diaphoresis, fatigue and fever  Eyes: Negative for visual disturbance  Respiratory: Negative for cough, chest tightness and shortness of breath  Cardiovascular: Negative for chest pain and palpitations  Gastrointestinal: Negative for abdominal pain, constipation, diarrhea, nausea and vomiting  Genitourinary: Negative for difficulty urinating, dysuria and hematuria  Musculoskeletal: Negative for arthralgias, joint swelling, neck pain and neck stiffness  Skin: Positive for rash  Neurological: Negative for dizziness, seizures, syncope, speech difficulty, weakness, light-headedness, numbness and headaches       Review of systems was reviewed and negative unless stated above in HPI/24-hour events   ---------------------------------------------------------------------------------------  Assessment and Plan:    Plan:    Neuro:  Diagnosis: 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, toxic metabolic encephalopathy   Plan:  · Neurosurgery evaluation for pituitary mass  · q4H neuro checks   · Management of PAD  · Delirium monitoring/management, Regulate Sleep-wake cycle/CAM-ICU daily  · PRN Tylenol for pain     Cardiac  Diagnosis: Shock - hypovolemic vs vasodilatory, NSTEMI MI type 1 vs 2, Hx CAGB 2017  Plan:   · Cardiac infusions: Levophed off since 0330, maintaining MAPs > 65   · Troponin >40, NSTEMI likely 2/2 demand and anemia  Holding on [facility], [place] at this time in the setting of anemia and thrombocytopenia   · Cardiology consulted - agree with type II MI  TTE 3/25 showed EF 65%, mild-mod MR, mod TR, PASP 42, RV mildly dilated  · Metabolic acidosis  · VBG from 3/23: pH 7 247, pCO2 27, pO2 33, bicarb 1 5   · On sodium bicarb 75 mEq/L @ 100 cc/hr  · Atorvastatin for hx HLD     Pulmonary  Diagnosis: Hx COPD  Plan:   · On room air at this time, spO2 goal >92%    Gastrointestinal  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, melena  Plan:  · GI consulted - deferring endoscopy until PLT >50  · Ammonia 56, started on Lactulose 20 g TID   · Colace BID, PRN dulcolax  · LBM yesterday, denies blood in stool  · 3/25 Abd US: 1  Liver cirrhosis, no suspicious lesions  2  Bilateral simple renal cysts  3  Post cholecystectomy  · Patient reported bloody stools, hemeoccult negative  · Protonix BID     FEN  Diagnosis: Metabolic acidosis, likely 2/2 uremia   Plan:   · Nutrition/diet plan: Clear liquid   · Replete electrolytes with goals: K >4 0, Mag >2 0, and Phos >3 0    Genitourinary  Diagnosis: BEN on CKD, renal nodules   Plan:   · Acute on chronic kidney disease (prior CRT 1 36 in Nov 2016)  Likely prerenal from poor PO and hypovolemic shock   · Cr improving with resuscitation, continue to hydrate  · CRT 2 8 > 2 5 > 2 3   · Nephrology consulted - recommends continuing hypotonic bicarb for BEN/acidosis   · Multiple bilateral simple renal cysts identified on abd US - consider dedicated renal study   · Indwelling Beverly present: no    I/O       03/23 0701 - 03/24 0700 03/24 0701 - 03/25 0700 03/25 0701 - 03/26 0700    P  O   60     I V  (mL/kg) 366 3 (6 1) 2120 7 (35 5)     Blood 650 650     IV Piggyback 100      Total Intake(mL/kg) 1116 3 (18 7) 2830 7 (47 3)     Urine (mL/kg/hr) 850 1850 (1 3)     Stool  0     Total Output 850 1850     Net +266 3 +980 7            Unmeasured Urine Occurrence  1 x     Unmeasured Stool Occurrence  5 x           Infectious Diease  Diagnosis: Hyperthermia, shock, hypovolemic vs dilatory in origin   Plan:  · Cefepime discontinued yesterday - received x2 days to cover for possible septic shock   · Blood cultures 2/2 show no growth at 24 hrs   · Lactic cleared yesterday   · WBC 5 86, no fever     Heme:   Diagnosis: Acute on chronic anemia, thrombocytopenia   Plan:   · Has received 2U PRBC, 2 U platelets, and DDAVP on initial encounter   · Received another 1 unit PRBCs and 1 unit platelets yesterday   · Hgb 5 9 > 6 6 > 6 4 > 7 6 > 7 5 > 8 2 > 8 > 7 4   · PLT 2 > 7 > 1 > 5 > 2 > 1    · Anemia/DIC work-up completed   · Hemolysis smear without schistocytes or helmet cells   · Ferritin 172, Fe 280, TIBC 337, Fe sat 83%  · B12 and folate WNL  · Immature retic fraction 52 2%, Absolute retic 132,500  · FDP <10, Fibrinogen 240, mildly elevated   · INR 1 36, Pt 16 3  · Heme/onc consulting  · Feel anemia and thrombocytopenia may be separate issues  · 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 - started on solumedrol, consider possible IVIG  · Haptoglobin and ADAMS13 pending   · Q6 CBC & PLT  · Trend hgb and plts  · Transfuse as needed for goal hgb >7    Endo:   Diagnosis: Type 2 DM  Plan:   · BG q6H with SSI algorithm 1  · Goal -180mg/dL    MSK/Skin:  Diagnosis: Petechiae  Plan:   · Early Mobility/Exercise  · PT consult: yes  · OT consult: yes  · Turn and position patient Q2 hours  · Off load pressure points  · Allevyn preventative per protocol    Family:  · Family updated within 24 hours: yes     Disposition: Continue Critical Care   Code Status: Level 3 - DNAR and DNI  ---------------------------------------------------------------------------------------  ICU CORE MEASURES    Prophylaxis   VTE Pharmacologic Prophylaxis: Pharmacologic VTE Prophylaxis contraindicated due to acute anemia and thrombocytopenia  VTE Mechanical Prophylaxis: sequential compression device  Stress Ulcer Prophylaxis: Prophylaxis Not Indicated     ABCDE Protocol (if indicated)  Plan to perform spontaneous awakening trial today? Not applicable  Plan to perform spontaneous breathing trial today? Not applicable  Obvious barriers to extubation? Not applicable  CAM-ICU: Negative    Invasive Devices Review  Invasive Devices     Central Venous Catheter Line            CVC Central Lines 20 Triple Left Femoral 1 day          Peripheral Intravenous Line            Peripheral IV 20 Left Antecubital 1 day    Peripheral IV 20 Right Antecubital 1 day              Can any invasive devices be discontinued today? Not applicable  ---------------------------------------------------------------------------------------  OBJECTIVE    Vitals   Vitals:    20 0500 20 0600 20 0700 20 0800   BP: 116/56 113/54 119/54 124/52   BP Location:  Right arm     Pulse: 64 62 68 62   Resp: 15 18 19 16   Temp:    98 8 °F (37 1 °C)   TempSrc:    Oral   SpO2: 94% 95% 94% 95%   Weight:       Height:         Temp (24hrs), Av 9 °F (36 6 °C), Min:97 4 °F (36 3 °C), Max:98 8 °F (37 1 °C)  Current: Temperature: 98 8 °F (37 1 °C)  HR: 58  MAP 65 on Levo 10   RR: 13  SpO2: 97% RA     Physical Exam   Constitutional: He is oriented to person, place, and time  He appears well-developed and well-nourished  No distress  HENT:   Head: Normocephalic and atraumatic  Mouth/Throat: Oropharynx is clear and moist    Eyes: EOM are normal    Neck: Normal range of motion  Neck supple  Cardiovascular: Normal rate, regular rhythm, normal heart sounds and intact distal pulses  Exam reveals no gallop and no friction rub  No murmur heard  Pulmonary/Chest: Effort normal and breath sounds normal  No respiratory distress  He has no wheezes  He has no rales  Abdominal: Soft  There is no tenderness  There is no rebound and no guarding  Musculoskeletal: He exhibits no edema or tenderness  Neurological: He is alert and oriented to person, place, and time  Clear fluent speech   Skin: Skin is warm and dry  Capillary refill takes less than 2 seconds  Psychiatric: He has a normal mood and affect  Nursing note and vitals reviewed  Respiratory:     Invasive/non-invasive ventilation settings   Respiratory    Lab Data (Last 4 hours)    None         O2/Vent Data (Last 4 hours)    None                Laboratory and Diagnostics:  Results from last 7 days   Lab Units 03/25/20  0446 03/24/20  2318 03/24/20  1737 03/24/20  1153 03/24/20  0505 03/24/20  0230 03/23/20  2138  03/23/20  1322   WBC Thousand/uL 3 35* 5 22 5 86 5 16 14 37*  --  15 21*  --  14 89*   HEMOGLOBIN g/dL 7 4* 8 0* 8 2* 6 2* 7 5* 7 6* 6 4*   < > 5 9*   HEMATOCRIT % 22 6* 24 1* 24 5* 19 2* 23 1* 23 2* 20 1*   < > 18 8*   PLATELETS Thousands/uL 1* 2* 5* 1* 7*  --  13*  --  2*   NEUTROS PCT %  --   --   --   --  49  --   --   --  48   BANDS PCT %  --   --   --   --   --   --  2  --   --    MONOS PCT %  --   --   --   --  10  --   --   --  10   MONO PCT %  --   --   --   --   --   --  10  --   --     < > = values in this interval not displayed       Results from last 7 days   Lab Units 03/25/20  0447 03/24/20  1737 03/24/20  0505 03/23/20  2139 03/23/20  1610 03/23/20  1322   SODIUM mmol/L 143 144 146* 146* 143 143   POTASSIUM mmol/L 3 5 3 6 3 8 3 9 4 5 4 4   CHLORIDE mmol/L 117* 121* 123* 123* 112* 109*   CO2 mmol/L 19* 17* 14* 13* 13* 14*   ANION GAP mmol/L 7 6 9 10 18* 20*   BUN mg/dL 76* 88* 108* 118* 127* 138*   CREATININE mg/dL 2 31* 2 54* 2 84* 2 83* 3 17* 3 52*   CALCIUM mg/dL 7 5* 7 5* 7 8* 7 4* 7 5* 8 4   GLUCOSE RANDOM mg/dL 150* 96 124 126 133 136   ALT U/L 29  --  30 23  --  23   AST U/L 162*  --  215* 167*  --  80*   ALK PHOS U/L 39*  --  40* 36*  --  40*   ALBUMIN g/dL 2 7*  --  3 1* 2 9*  --  3 2*   TOTAL BILIRUBIN mg/dL 0 57  --  0 52 0 51  --  0 40     Results from last 7 days   Lab Units 03/23/20  2139 03/23/20  1322   MAGNESIUM mg/dL 1 6 1 6   PHOSPHORUS mg/dL 3 2  --       Results from last 7 days   Lab Units 03/25/20  0447 03/23/20  1322   INR  1 36* 1 36*   PTT seconds 32  --       Results from last 7 days   Lab Units 03/24/20  0230 03/23/20  2138 03/23/20  1754 03/23/20  1340   TROPONIN I ng/mL >40 00* 37 20*  37 70* 23 18* 8 52*     Results from last 7 days   Lab Units 03/24/20  0230 03/23/20  2138 03/23/20  1610 03/23/20  1335   LACTIC ACID mmol/L 0 8 0 8 2 4* 2 7*     ABG:    VBG:  Results from last 7 days   Lab Units 03/23/20  1530   PH WAQAR  7 247*   PCO2 WAQAR mm Hg 27 0*   PO2 WAQAR mm Hg 33 3*   HCO3 WAQAR mmol/L 11 5*   BASE EXC WAQAR mmol/L -14 4     Results from last 7 days   Lab Units 03/24/20  0505 03/23/20  1521   PROCALCITONIN ng/ml 0 51* 0 68*       Micro  Results from last 7 days   Lab Units 03/23/20  1658 03/23/20  1651   BLOOD CULTURE  No Growth at 24 hrs  No Growth at 24 hrs  Imaging:  I have personally reviewed pertinent reports  and I have personally reviewed pertinent films in PACS   CT head - 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 and extension into the sphenoid sinus on the left  Differential considerations include pituitary macroadenoma, pituitary carcinoma,   craniopharyngioma, meningioma as well as less likely considerations such as giant saccular aneurysm  Nonemergent MRI brain with IV contrast recommended for further characterization  Prominence of the subdural CSF spaces bilaterally 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  CT C/A/P - Nodular liver concerning for underlying cirrhosis  Multiple bilateral renal nodules, some of which meet the criteria for a simple cyst and others which do not  Recommend an ultrasound for further evaluation with attention to the exophytic left upper pole nodule  Diverticulosis without evidence for acute diverticulitis      Intake and Output  I/O       03/22 0701 - 03/23 0700 03/23 0701 - 03/24 0700 03/24 0701 - 03/25 0700    I V  (mL/kg)  366 3 (6 1)     Blood  650     IV Piggyback  100     Total Intake(mL/kg)  1116 3 (18 7)     Urine (mL/kg/hr)  850     Total Output  850     Net  +266 3                  Height and Weights   Height: 5' 6" (167 6 cm)  IBW: 63 8 kg  Body mass index is 21 28 kg/m²  Weight (last 2 days)     Date/Time   Weight    03/23/20 2000   59 8 (131 84)                Nutrition       Diet Orders   (From admission, onward)             Start     Ordered    03/24/20 1201  Diet Clear Liquid  Diet effective now     Question Answer Comment   Diet Type Clear Liquid    RD to adjust diet per protocol?  Yes        03/24/20 1200              NPO      Active Medications  Scheduled Meds:    Current Facility-Administered Medications:  acetaminophen 650 mg Oral Q6H PRN Beverly Valente MD    atorvastatin 40 mg Oral Daily With Samuel Thrasher MD    bisacodyl 10 mg Rectal Daily PRN Beverly Valente MD    chlorhexidine 15 mL Swish & Spit Q12H [address] Beverly Valente MD    dextran 70-hypromellose 2 drop Both Eyes BID Beverly Valente MD    docusate sodium 100 mg Oral BID Beverly Valente MD    ferrous sulfate 325 mg Oral BID With Meals Beverly Valente MD    insulin lispro 1-5 Units Subcutaneous Q6H [address] Beverly Valente MD    methylPREDNISolone sodium succinate 40 mg Intravenous BID Janae Carter PA-C    norepinephrine 1-30 mcg/min Intravenous Titrated Beverly Valente MD Last Rate: Stopped (03/25/20 0332)   pantoprazole 40 mg Oral BID AC Janae Carter PA-C    sodium bicarbonate infusion 100 mL/hr Intravenous Continuous Janae Carter PA-C Last Rate: 100 mL/hr (03/25/20 0031)     Continuous Infusions:    norepinephrine 1-30 mcg/min Last Rate: Stopped (03/25/20 0332)   sodium bicarbonate infusion 100 mL/hr Last Rate: 100 mL/hr (03/25/20 0031)     PRN Meds:     acetaminophen 650 mg Q6H PRN   bisacodyl 10 mg Daily PRN       Allergies   Allergies   Allergen Reactions    Erythromycin     Penicillins     Shellfish-Derived Products      ---------------------------------------------------------------------------------------  Advance Directive and Living Will:      Power of :    POLST:    ---------------------------------------------------------------------------------------  Care Time Delivered:   No Critical Care time spent     Margarete Barthel, MD      Portions of the record may have been created with voice recognition software  Occasional wrong word or "sound a like" substitutions may have occurred due to the inherent limitations of voice recognition software    Read the chart carefully and recognize, using context, where substitutions have occurred

## 2020-03-25 NOTE — NUTRITION
Monitor diet tolerance and % PO intake as advanced to solids  Monitor AMMONIA level, LFTs and renal parameters  Check Ionized Ca

## 2020-03-25 NOTE — PROGRESS NOTES
Oncology Progress Note  Pura Morales 69 y o  male MRN: [id number]  Unit/Bed#: ICU 02 Encounter: [id number]      /72   Pulse 56   Temp 97 5 °F (36 4 °C) (Oral)   Resp 15   Ht 5' 6" (1 676 m)   Wt 59 8 kg (131 lb 13 4 oz)   SpO2 97%   BMI 21 28 kg/m²     Subjective:  He is asking when to go home, my bottom sore    Objective:  He denies any fever or chills nausea or vomiting  General Appearance:    Alert, oriented, he is pale        Eyes:    PERRL, pale conjunctivae   Ears:    Normal external ear canals, both ears   Nose:   Nares normal, septum midline   Throat:   Mucosa moist  Pharynx without injection  Neck:   Supple       Lungs:     Clear to auscultation bilaterally   Chest Wall:    No tenderness or deformity    Heart:    Regular rate and rhythm       Abdomen:     Soft, non-tender, bowel sounds +, no organomegaly           Extremities:   Extremities no cyanosis or edema       Skin:   no rash or icterus scattered ecchymosis      Lymph nodes:   Cervical, supraclavicular, and axillary nodes normal   Neurologic:   CNII-XII intact, normal strength, sensation and reflexes     throughout        Recent Results (from the past 48 hour(s))   UA w Reflex to Microscopic w Reflex to Culture    Collection Time: 03/23/20  3:21 PM   Result Value Ref Range    Color, UA Yellow     Clarity, UA Clear     Specific Gravity, UA 1 020 1 003 - 1 030    pH, UA 5 0 4 5, 5 0, 5 5, 6 0, 6 5, 7 0, 7 5, 8 0    Leukocytes, UA Negative Negative    Nitrite, UA Negative Negative    Protein, UA Negative Negative mg/dl    Glucose, UA Negative Negative mg/dl    Ketones, UA Negative Negative mg/dl    Urobilinogen, UA 0 2 0 2, 1 0 E U /dl E U /dl    Bilirubin, UA Negative Negative    Blood, UA Negative Negative   Procalcitonin    Collection Time: 03/23/20  3:21 PM   Result Value Ref Range    Procalcitonin 0 68 (H) <=0 25 ng/ml   TSH    Collection Time: 03/23/20  3:27 PM   Result Value Ref Range    TSH 3RD GENERATON 1 452 0 358 - 3 740 uIU/mL   T4, free    Collection Time: 03/23/20  3:27 PM   Result Value Ref Range    Free T4 0 79 0 76 - 1 46 ng/dL   Blood gas, venous    Collection Time: 03/23/20  3:30 PM   Result Value Ref Range    pH, Emigdio 7 247 (L) 7 300 - 7 400    pCO2, Emigdio 27 0 (L) 42 0 - 50 0 mm Hg    pO2, Emigdio 33 3 (L) 35 0 - 45 0 mm Hg    HCO3, Emigdio 11 5 (L) 24 - 30 mmol/L    Base Excess, Emigdio -14 4 mmol/L    O2 Content, Emigdio 5 6 ml/dL    O2 HGB, VENOUS 55 2 (L) 60 0 - 80 0 %   Hepatitis panel, acute    Collection Time: 03/23/20  4:10 PM   Result Value Ref Range    Hepatitis B Surface Ag Non-reactive Non-reactive, NonReactive - Confirmed    Hep A IgM Non-reactive Non-reactive, Equivocal-Suggest Recollect    Hepatitis C Ab Non-reactive Non-reactive    Hep B C IgM Non-reactive Non-reactive   Lactic acid, plasma    Collection Time: 03/23/20  4:10 PM   Result Value Ref Range    LACTIC ACID 2 4 (HH) 0 5 - 2 0 mmol/L   Basic metabolic panel    Collection Time: 03/23/20  4:10 PM   Result Value Ref Range    Sodium 143 136 - 145 mmol/L    Potassium 4 5 3 5 - 5 3 mmol/L    Chloride 112 (H) 100 - 108 mmol/L    CO2 13 (L) 21 - 32 mmol/L    ANION GAP 18 (H) 4 - 13 mmol/L     (H) 5 - 25 mg/dL    Creatinine 3 17 (H) 0 60 - 1 30 mg/dL    Glucose 133 65 - 140 mg/dL    Calcium 7 5 (L) 8 3 - 10 1 mg/dL    eGFR 18 ml/min/1 73sq m   Blood culture #1    Collection Time: 03/23/20  4:51 PM   Result Value Ref Range    Blood Culture No Growth at 24 hrs  Blood culture #2    Collection Time: 03/23/20  4:58 PM   Result Value Ref Range    Blood Culture No Growth at 24 hrs      Hemoglobin and hematocrit, blood    Collection Time: 03/23/20  5:54 PM   Result Value Ref Range    Hemoglobin 6 6 (LL) 12 0 - 17 0 g/dL    Hematocrit 20 7 (L) 36 5 - 49 3 %   Troponin I    Collection Time: 03/23/20  5:54 PM   Result Value Ref Range    Troponin I 23 18 (HH) <=0 04 ng/mL   CBC and differential    Collection Time: 03/23/20  9:38 PM   Result Value Ref Range    WBC 15 21 (H) 4 31 - 10 16 Thousand/uL    RBC 2 02 (L) 3 88 - 5 62 Million/uL    Hemoglobin 6 4 (LL) 12 0 - 17 0 g/dL    Hematocrit 20 1 (L) 36 5 - 49 3 %     (H) 82 - 98 fL    MCH 31 7 26 8 - 34 3 pg    MCHC 31 8 31 4 - 37 4 g/dL    RDW 17 9 (H) 11 6 - 15 1 %    MPV 9 4 8 9 - 12 7 fL    Platelets 13 (LL) 524 - 390 Thousands/uL   Lactic acid    Collection Time: 03/23/20  9:38 PM   Result Value Ref Range    LACTIC ACID 0 8 0 5 - 2 0 mmol/L   Troponin I    Collection Time: 03/23/20  9:38 PM   Result Value Ref Range    Troponin I 37 20 (H) <=0 04 ng/mL   Troponin I    Collection Time: 03/23/20  9:38 PM   Result Value Ref Range    Troponin I 37 70 (H) <=0 04 ng/mL   Ammonia    Collection Time: 03/23/20  9:38 PM   Result Value Ref Range    Ammonia 56 (H) 11 - 35 umol/L   Hemolysis Smear    Collection Time: 03/23/20  9:38 PM   Result Value Ref Range    Hemolysis Smear No Schistocytes or Helmet Cells noted    Retic Count with Reticulocyte HGB    Collection Time: 03/23/20  9:38 PM   Result Value Ref Range    Immature Retic Fract 52 2 (H) 0 0 - 14 0 %    Retic Ct Pct 6 66 (H) 0 37 - 1 87 %    Retic Ct Abs 132,500 (H) 14,356-105,094    RETIC HGB 37 9 30 0 - 38 3 pg   Manual Differential(PHLEBS Do Not Order)    Collection Time: 03/23/20  9:38 PM   Result Value Ref Range    Segmented % 51 43 - 75 %    Bands % 2 0 - 8 %    Lymphocytes % 35 14 - 44 %    Monocytes % 10 4 - 12 %    Eosinophils, % 1 0 - 6 %    Basophils % 1 0 - 1 %    Absolute Neutrophils 8 06 (H) 1 85 - 7 62 Thousand/uL    Lymphocytes Absolute 5 32 (H) 0 60 - 4 47 Thousand/uL    Monocytes Absolute 1 52 (H) 0 00 - 1 22 Thousand/uL    Eosinophils Absolute 0 15 0 00 - 0 40 Thousand/uL    Basophils Absolute 0 15 (H) 0 00 - 0 10 Thousand/uL    Total Counted 100     RBC Morphology Present     Anisocytosis Present     [place] Cells Present     Macrocytes Present     Polychromasia Present     Platelet Estimate Decreased (A) Adequate    Pathology Review Yes (A) No   Path Slide Review Collection Time: 03/23/20  9:38 PM   Result Value Ref Range    Path Review       Normocytic anemia with polychromasia and several nucleated red blood cells  Numerous warren cells present  Suggest clinical correlation and follow-up as indicated      Comprehensive metabolic panel    Collection Time: 03/23/20  9:39 PM   Result Value Ref Range    Sodium 146 (H) 136 - 145 mmol/L    Potassium 3 9 3 5 - 5 3 mmol/L    Chloride 123 (H) 100 - 108 mmol/L    CO2 13 (L) 21 - 32 mmol/L    ANION GAP 10 4 - 13 mmol/L     (H) 5 - 25 mg/dL    Creatinine 2 83 (H) 0 60 - 1 30 mg/dL    Glucose 126 65 - 140 mg/dL    Calcium 7 4 (L) 8 3 - 10 1 mg/dL     (H) 5 - 45 U/L    ALT 23 12 - 78 U/L    Alkaline Phosphatase 36 (L) 46 - 116 U/L    Total Protein 6 0 (L) 6 4 - 8 2 g/dL    Albumin 2 9 (L) 3 5 - 5 0 g/dL    Total Bilirubin 0 51 0 20 - 1 00 mg/dL    eGFR 21 ml/min/1 73sq m   Magnesium    Collection Time: 03/23/20  9:39 PM   Result Value Ref Range    Magnesium 1 6 1 6 - 2 6 mg/dL   Phosphorus    Collection Time: 03/23/20  9:39 PM   Result Value Ref Range    Phosphorus 3 2 2 3 - 4 1 mg/dL   Fibrin split products    Collection Time: 03/23/20  9:39 PM   Result Value Ref Range    FDP <10 <10   Fibrinogen    Collection Time: 03/23/20  9:39 PM   Result Value Ref Range    Fibrinogen 208 (L) 227 - 495 mg/dL   Lactate dehydrogenase    Collection Time: 03/23/20  9:39 PM   Result Value Ref Range     (H) 81 - 234 U/L   Haptoglobin    Collection Time: 03/23/20  9:39 PM   Result Value Ref Range    Haptoglobin 28 (L) 34 - 355 mg/dL   Vitamin B12    Collection Time: 03/23/20  9:39 PM   Result Value Ref Range    Vitamin B-12 437 100 - 900 pg/mL   Folate    Collection Time: 03/23/20  9:39 PM   Result Value Ref Range    Folate 13 7 3 1 - 17 5 ng/mL   C-reactive protein    Collection Time: 03/23/20  9:39 PM   Result Value Ref Range    CRP 8 6 (H) <3 0 mg/L   Iron Saturation %    Collection Time: 03/23/20  9:39 PM   Result Value Ref Range Iron Saturation 83 %    TIBC 337 250 - 450 ug/dL    Iron 280 (H) 65 - 175 ug/dL   Ferritin    Collection Time: 03/23/20  9:39 PM   Result Value Ref Range    Ferritin 172 8 - 388 ng/mL   Sedimentation rate, automated    Collection Time: 03/23/20  9:41 PM   Result Value Ref Range    Sed Rate 19 (H) 0 - 10 mm/hour   Type and screen    Collection Time: 03/23/20 10:38 PM   Result Value Ref Range    ABO Grouping O     Rh Factor Positive     Antibody Screen Negative     Specimen Expiration Date [id number]    Fingerstick Glucose (POCT)    Collection Time: 03/23/20 10:38 PM   Result Value Ref Range    POC Glucose 146 (H) 65 - 140 mg/dl   Hemoglobin and hematocrit, blood    Collection Time: 03/24/20  2:30 AM   Result Value Ref Range    Hemoglobin 7 6 (L) 12 0 - 17 0 g/dL    Hematocrit 23 2 (L) 36 5 - 49 3 %   Troponin I    Collection Time: 03/24/20  2:30 AM   Result Value Ref Range    Troponin I >40 00 (H) <=0 04 ng/mL   Lactic acid, plasma    Collection Time: 03/24/20  2:30 AM   Result Value Ref Range    LACTIC ACID 0 8 0 5 - 2 0 mmol/L   ECG 12 lead    Collection Time: 03/24/20  5:00 AM   Result Value Ref Range    Ventricular Rate 72 BPM    Atrial Rate 72 BPM    LA Interval 175 ms    QRSD Interval 92 ms    QT Interval 375 ms    QTC Interval 411 ms    P [place] 78 degrees    QRS Axis 61 degrees    T Wave Axis 184 degrees   Comprehensive metabolic panel    Collection Time: 03/24/20  5:05 AM   Result Value Ref Range    Sodium 146 (H) 136 - 145 mmol/L    Potassium 3 8 3 5 - 5 3 mmol/L    Chloride 123 (H) 100 - 108 mmol/L    CO2 14 (L) 21 - 32 mmol/L    ANION GAP 9 4 - 13 mmol/L     (H) 5 - 25 mg/dL    Creatinine 2 84 (H) 0 60 - 1 30 mg/dL    Glucose 124 65 - 140 mg/dL    Calcium 7 8 (L) 8 3 - 10 1 mg/dL     (H) 5 - 45 U/L    ALT 30 12 - 78 U/L    Alkaline Phosphatase 40 (L) 46 - 116 U/L    Total Protein 6 0 (L) 6 4 - 8 2 g/dL    Albumin 3 1 (L) 3 5 - 5 0 g/dL    Total Bilirubin 0 52 0 20 - 1 00 mg/dL    eGFR 21 ml/min/1 73sq m   CBC and differential    Collection Time: 03/24/20  5:05 AM   Result Value Ref Range    WBC 14 37 (H) 4 31 - 10 16 Thousand/uL    RBC 2 40 (L) 3 88 - 5 62 Million/uL    Hemoglobin 7 5 (L) 12 0 - 17 0 g/dL    Hematocrit 23 1 (L) 36 5 - 49 3 %    MCV 96 82 - 98 fL    MCH 31 3 26 8 - 34 3 pg    MCHC 32 5 31 4 - 37 4 g/dL    RDW 17 6 (H) 11 6 - 15 1 %    Platelets 7 (LL) 719 - 390 Thousands/uL    nRBC 2 /100 WBCs    Neutrophils Relative 49 43 - 75 %    Immat GRANS % 2 0 - 2 %    Lymphocytes Relative 36 14 - 44 %    Monocytes Relative 10 4 - 12 %    Eosinophils Relative 2 0 - 6 %    Basophils Relative 1 0 - 1 %    Neutrophils Absolute 7 18 1 85 - 7 62 Thousands/µL    Immature Grans Absolute 0 31 (H) 0 00 - 0 20 Thousand/uL    Lymphocytes Absolute 5 13 (H) 0 60 - 4 47 Thousands/µL    Monocytes Absolute 1 40 (H) 0 17 - 1 22 Thousand/µL    Eosinophils Absolute 0 26 0 00 - 0 61 Thousand/µL    Basophils Absolute 0 09 0 00 - 0 10 Thousands/µL   Procalcitonin    Collection Time: 03/24/20  5:05 AM   Result Value Ref Range    Procalcitonin 0 51 (H) <=0 25 ng/ml   Fingerstick Glucose (POCT)    Collection Time: 03/24/20  5:34 AM   Result Value Ref Range    POC Glucose 131 65 - 140 mg/dl   Fingerstick Glucose (POCT)    Collection Time: 03/24/20  5:46 AM   Result Value Ref Range    POC Glucose 136 65 - 140 mg/dl   Prepare Leukoreduced Platelet Pheresis (1 pheresis product = 6-8 pooled units): 1 Product    Collection Time: 03/24/20  5:55 AM   Result Value Ref Range    Unit Product Code U8244R29     Unit Number [id number]     Unit ABO O     Unit [facility] POS     Unit Dispense Status Presumed Trans    Prepare Leukoreduced RBC: 1 Units    Collection Time: 03/24/20  6:33 AM   Result Value Ref Range    Unit Product Code O4664A13     Unit Number [id number]     Unit ABO O     Unit RH POS     Crossmatch Compatible     Unit Dispense Status Presumed Trans    Prepare Leukoreduced Platelet Pheresis (1 pheresis product = 6-8 pooled units): 1 Product    Collection Time: 03/24/20  6:33 AM   Result Value Ref Range    Unit Product Code A1119X62     Unit Number [id number]     Unit ABO O     Unit [facility] POS     Unit Dispense Status Presumed Trans    Fingerstick Glucose (POCT)    Collection Time: 03/24/20 11:52 AM   Result Value Ref Range    POC Glucose 109 65 - 140 mg/dl   Occult blood 1-3, stool    Collection Time: 03/24/20 11:53 AM   Result Value Ref Range    Fecal Occult Blood Diagnostic Positive (A) Negative    Fecal Occult Blood Diagnostic 2      Fecal Occult Blood Diagnostic 3     CBC    Collection Time: 03/24/20 11:53 AM   Result Value Ref Range    WBC 5 16 4 31 - 10 16 Thousand/uL    RBC 1 98 (L) 3 88 - 5 62 Million/uL    Hemoglobin 6 2 (LL) 12 0 - 17 0 g/dL    Hematocrit 19 2 (L) 36 5 - 49 3 %    MCV 97 82 - 98 fL    MCH 31 3 26 8 - 34 3 pg    MCHC 32 3 31 4 - 37 4 g/dL    RDW 18 2 (H) 11 6 - 15 1 %    Platelets 1 (LL) 010 - 390 Thousands/uL   Prepare Leukoreduced RBC: 1 Units    Collection Time: 03/24/20 12:46 PM   Result Value Ref Range    Unit Product Code L1716T46     Unit Number [id number]     Unit ABO O     Unit [facility] POS     Crossmatch Compatible     Unit Dispense Status Crossmatched    CBC and Platelet    Collection Time: 03/24/20  5:37 PM   Result Value Ref Range    WBC 5 86 4 31 - 10 16 Thousand/uL    RBC 2 60 (L) 3 88 - 5 62 Million/uL    Hemoglobin 8 2 (L) 12 0 - 17 0 g/dL    Hematocrit 24 5 (L) 36 5 - 49 3 %    MCV 94 82 - 98 fL    MCH 31 5 26 8 - 34 3 pg    MCHC 33 5 31 4 - 37 4 g/dL    RDW 17 0 (H) 11 6 - 15 1 %    Platelets 5 (LL) 563 - 390 Thousands/uL    MPV 12 8 (H) 8 9 - 12 7 fL   Basic metabolic panel    Collection Time: 03/24/20  5:37 PM   Result Value Ref Range    Sodium 144 136 - 145 mmol/L    Potassium 3 6 3 5 - 5 3 mmol/L    Chloride 121 (H) 100 - 108 mmol/L    CO2 17 (L) 21 - 32 mmol/L    ANION GAP 6 4 - 13 mmol/L    BUN 88 (H) 5 - 25 mg/dL    Creatinine 2 54 (H) 0 60 - 1 30 mg/dL    Glucose 96 65 - 140 mg/dL    Calcium 7 5 (L) 8 3 - 10 1 mg/dL    eGFR 24 ml/min/1 73sq m   Fingerstick Glucose (POCT)    Collection Time: 03/24/20  5:43 PM   Result Value Ref Range    POC Glucose 97 65 - 140 mg/dl   CBC and Platelet    Collection Time: 03/24/20 11:18 PM   Result Value Ref Range    WBC 5 22 4 31 - 10 16 Thousand/uL    RBC 2 56 (L) 3 88 - 5 62 Million/uL    Hemoglobin 8 0 (L) 12 0 - 17 0 g/dL    Hematocrit 24 1 (L) 36 5 - 49 3 %    MCV 94 82 - 98 fL    MCH 31 3 26 8 - 34 3 pg    MCHC 33 2 31 4 - 37 4 g/dL    RDW 17 2 (H) 11 6 - 15 1 %    Platelets 2 (LL) 732 - 390 Thousands/uL    MPV 13 5 (H) 8 9 - 12 7 fL   Fingerstick Glucose (POCT)    Collection Time: 03/24/20 11:21 PM   Result Value Ref Range    POC Glucose 179 (H) 65 - 140 mg/dl   CBC and Platelet    Collection Time: 03/25/20  4:46 AM   Result Value Ref Range    WBC 3 35 (L) 4 31 - 10 16 Thousand/uL    RBC 2 39 (L) 3 88 - 5 62 Million/uL    Hemoglobin 7 4 (L) 12 0 - 17 0 g/dL    Hematocrit 22 6 (L) 36 5 - 49 3 %    MCV 95 82 - 98 fL    MCH 31 0 26 8 - 34 3 pg    MCHC 32 7 31 4 - 37 4 g/dL    RDW 17 5 (H) 11 6 - 15 1 %    Platelets 1 (LL) 092 - 390 Thousands/uL   Hemolysis Smear    Collection Time: 03/25/20  4:46 AM   Result Value Ref Range    Hemolysis Smear No Schistocytes or Helmet Cells noted    Comprehensive metabolic panel    Collection Time: 03/25/20  4:47 AM   Result Value Ref Range    Sodium 143 136 - 145 mmol/L    Potassium 3 5 3 5 - 5 3 mmol/L    Chloride 117 (H) 100 - 108 mmol/L    CO2 19 (L) 21 - 32 mmol/L    ANION GAP 7 4 - 13 mmol/L    BUN 76 (H) 5 - 25 mg/dL    Creatinine 2 31 (H) 0 60 - 1 30 mg/dL    Glucose 150 (H) 65 - 140 mg/dL    Calcium 7 5 (L) 8 3 - 10 1 mg/dL     (H) 5 - 45 U/L    ALT 29 12 - 78 U/L    Alkaline Phosphatase 39 (L) 46 - 116 U/L    Total Protein 5 5 (L) 6 4 - 8 2 g/dL    Albumin 2 7 (L) 3 5 - 5 0 g/dL    Total Bilirubin 0 57 0 20 - 1 00 mg/dL    eGFR 27 ml/min/1 73sq m   Fibrin split products    Collection Time: 03/25/20 4:47 AM   Result Value Ref Range    FDP <10 <10   D-dimer, quantitative    Collection Time: 03/25/20  4:47 AM   Result Value Ref Range    D-Dimer, Quant 0 81 (H) <0 50 ug/ml FEU   Antithrombin III Activity    Collection Time: 03/25/20  4:47 AM   Result Value Ref Range    AntiThrombIN III Activity 71 (L) 92 - 136 % of Normal   Plasminogen activity    Collection Time: 03/25/20  4:47 AM   Result Value Ref Range    Plasminogen 69 (L) 77 - 138 % of Normal   Fibrinogen    Collection Time: 03/25/20  4:47 AM   Result Value Ref Range    Fibrinogen 240 227 - 495 mg/dL   APTT    Collection Time: 03/25/20  4:47 AM   Result Value Ref Range    PTT 32 23 - 37 seconds   Protime-INR    Collection Time: 03/25/20  4:47 AM   Result Value Ref Range    Protime 16 3 (H) 11 6 - 14 5 seconds    INR 1 36 (H) 0 84 - 1 19   Fingerstick Glucose (POCT)    Collection Time: 03/25/20  5:30 AM   Result Value Ref Range    POC Glucose 149 (H) 65 - 140 mg/dl   Prepare Leukoreduced RBC: 2 Units    Collection Time: 03/25/20  5:55 AM   Result Value Ref Range    Unit Product Code U3444K29     Unit Number [id number]     Unit ABO O     Unit [facility] POS     Crossmatch Compatible     Unit Dispense Status Presumed Trans     Unit Product Code F9374K46     Unit Number [id number]     Unit ABO O     Unit [facility] POS     Crossmatch Compatible     Unit Dispense Status Presumed Trans    Prepare Leukoreduced Platelet Pheresis (1 pheresis product = 6-8 pooled units): 1 Product    Collection Time: 03/25/20  5:55 AM   Result Value Ref Range    Unit Product Code A7147D54     Unit Number [id number]     Unit ABO O     Unit [facility] POS     Unit Dispense Status Presumed Trans    Fingerstick Glucose (POCT)    Collection Time: 03/25/20 11:24 AM   Result Value Ref Range    POC Glucose 142 (H) 65 - 140 mg/dl         Ct Abdomen Pelvis Wo Contrast    Result Date: 3/25/2020  Narrative: CT ABDOMEN AND PELVIS WITHOUT IV CONTRAST INDICATION:   Melena   COMPARISON:  May 14, 2013 TECHNIQUE:  CT examination of the abdomen and pelvis was performed without intravenous contrast   Axial, sagittal, and coronal 2D reformatted images were created from the source data and submitted for interpretation  Radiation dose length product (DLP) for this visit:  504 97 mGy-cm   This examination, like all CT scans performed in the [facility], was performed utilizing techniques to minimize radiation dose exposure, including the use of iterative  reconstruction and automated exposure control  Enteric contrast was not administered  FINDINGS: ABDOMEN LOWER CHEST:  No clinically significant abnormality identified in the visualized lower chest  LIVER/BILIARY TREE:  The liver surface is nodular which was not present on the previous study and can be seen with cirrhosis  GALLBLADDER:  Gallbladder is surgically absent  SPLEEN:  Unremarkable  PANCREAS:  Unremarkable  ADRENAL GLANDS:  Unremarkable  KIDNEYS/URETERS:  Multiple bilateral renal nodules  Some of the nodules represent cysts  A hyperdense 6 mm right lower pole cyst is noted  Additional nodules do not meet the criteria for a benign lesion and additional imaging is required  14 x 12 mm left upper pole exophytic nodule  STOMACH AND BOWEL:  There is colonic diverticulosis without evidence of acute diverticulitis  APPENDIX:  A normal appendix was visualized  ABDOMINOPELVIC CAVITY:  No ascites or free intraperitoneal air  No lymphadenopathy  VESSELS:  Atherosclerotic changes are present  No evidence of aneurysm  PELVIS REPRODUCTIVE ORGANS:  Unremarkable for patient's age  URINARY BLADDER:  Unremarkable  ABDOMINAL WALL/INGUINAL REGIONS:  Unremarkable  OSSEOUS STRUCTURES:  No acute fracture or destructive osseous lesion  Impression: Nodular liver concerning for underlying cirrhosis  Multiple bilateral renal nodules, some of which meet the criteria for a simple cyst and others which do not    Recommend an ultrasound for further evaluation with attention to the exophytic left upper pole nodule  Diverticulosis without evidence for acute diverticulitis  The study was marked in EPIC for significant notification  Workstation performed: [id number]     Xr Chest 1 View Portable    Result Date: 3/23/2020  Narrative: CHEST INDICATION:   dyspnea; possible gi bleeding  COMPARISON:  None EXAM PERFORMED/VIEWS:  XR CHEST PORTABLE  AP semierect FINDINGS:  There are median sternotomy wires indicating prior cardiac surgery  Cardiomediastinal silhouette appears unremarkable  The lungs are clear  No pneumothorax or pleural effusion  Osseous structures appear within normal limits for patient age  Impression: No acute cardiopulmonary disease  Workstation performed: [id number]     Ct Head Without Contrast    Result Date: 3/23/2020  Narrative: CT BRAIN - WITHOUT CONTRAST INDICATION:  Altered mental status increasing confusion/disorientation; marked thrombocytopenia  COMPARISON:  None TECHNIQUE:  CT examination of the brain was performed  In addition to axial images, coronal 2D reformatted images were created and submitted for interpretation  Radiation dose length product (DLP) for this visit:  826 52 mGy-cm   This examination, like all CT scans performed in the [facility], was performed utilizing techniques to minimize radiation dose exposure, including the use of iterative  reconstruction and automated exposure control  IMAGE QUALITY:  Diagnostic  FINDINGS: PARENCHYMA:  No intra-axial mass, mass effect or midline shift  No CT signs of acute infarction  No acute parenchymal hemorrhage  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  There appears to be disruption of the roof of the sphenoid sinus with soft tissue tissue extension into the sinus  Further bony destruction of the lateral wall and base of the sphenoid bone on the left  and left dorsum sellae   Subtle soft tissue involvement of the vidian canal on the left may be present as well as soft tissue extension into the medial aspect of the left middle cranial fossa  Age-related cortical atrophy  Periventricular white matter hypodensity which is nonspecific although most compatible with chronic small vessel ischemic disease  VENTRICLES AND EXTRA-AXIAL SPACES:  The ventricles are concordant with degree of atrophy, midline position  There is mild prominence of the subdural CSF spaces bilaterally, 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  VISUALIZED ORBITS AND PARANASAL SINUSES:  See comments above  Sinuses are otherwise unremarkable  No acute orbital pathology  CALVARIUM AND EXTRACRANIAL SOFT TISSUES:  Normal      Impression: 1  No acute intracranial abnormality  2  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 and extension into the sphenoid sinus on the left  Differential considerations include pituitary macroadenoma, pituitary carcinoma, craniopharyngioma, meningioma as well as less likely considerations such as giant saccular aneurysm  Nonemergent MRI brain with IV contrast recommended for further characterization  3  Prominence of the subdural CSF spaces bilaterally 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  The study was marked in Epic for follow-up  Workstation performed: [id number]     Us Abdomen Complete    Result Date: 3/24/2020  Narrative: ABDOMEN ULTRASOUND, COMPLETE INDICATION:   findings consistent with cirrhosis on abdominal CT scan  COMPARISON: CT abdomen pelvis 3/23/2020  TECHNIQUE:   Real-time ultrasound of the abdomen was performed with a curvilinear transducer with both volumetric sweeps and still imaging techniques  FINDINGS: PANCREAS:  Visualized portions of the pancreas are within normal limits  AORTA AND IVC:  Visualized portions are normal for patient age  LIVER: Size:  Within normal range  The liver measures 13 cm in the midclavicular line  Contour:  Surface contour is nodular  Parenchyma: There is diffuse coarsened heterogeneous echotexture suggesting underlying cirrhotic changes  No evidence of suspicious mass  Limited imaging of the main portal vein shows it to be patent and hepatopetal  BILIARY: Patient has undergone cholecystectomy  No intrahepatic biliary dilatation  CBD measures 4 mm  No choledocholithiasis  KIDNEY: Right kidney measures 10 x 5 cm  1 9 x 2 0 x 1 7 cm simple cyst is present in the upper pole  0 7 x 1 0 x 0 6 cm simple cyst is present in the midpole  0 6 x 0 6 x 0 7 cm simple cyst is present in the lower pole  Left kidney measures 10 x 6 cm  1 2 x 1 1 x 1 1 cm simple cyst is present in the upper pole  3 3 x 2 3 x 3 4 cm simple cyst is present in the mid to lower pole  1 5 x 1 6 x 1 8 cm simple cyst is present in the lower pole  SPLEEN: Measures 10 cm  Within normal limits  ASCITES:  None  Impression: 1  Liver cirrhosis  No suspicious lesions identified  2   Bilateral simple renal cysts  3   Status post cholecystectomy  Workstation performed: [id number]         Assessment and plan:  1  Grade 4 thrombocytopenia with platelet count of 0766, not responding to platelet transfusion, 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

## 2020-03-25 NOTE — PLAN OF CARE
Problem: OCCUPATIONAL THERAPY ADULT  Goal: Performs self-care activities at highest level of function for planned discharge setting  See evaluation for individualized goals  Description  Treatment Interventions: ADL retraining, Functional transfer training, UE strengthening/ROM, Endurance training, Cognitive reorientation, Patient/family training, Equipment evaluation/education, Compensatory technique education, Continued evaluation, Energy conservation, Activityengagement          See flowsheet documentation for full assessment, interventions and recommendations  Outcome: Progressing  Note:   Limitation: Decreased ADL status, Decreased Safe judgement during ADL, Decreased endurance, Decreased cognition, Decreased self-care trans, Decreased high-level ADLs  Prognosis: Fair  Assessment: Patient participated in Skilled OT session 3/25/2020 with interventions consisting of ADL re training with the use of correct body mechnaics, Energy Conservation techniques, deep breathing technique, safety awareness and fall prevention techniques, therapeutic exercise to: increase functional use of BUEs, increase BUE muscle strength ,  therapeutic activities to: increase activity tolerance, increase dynamic sit/ stand balance during functional activity , increase postural control, increase trunk control and increase OOB/ sitting tolerance   Patient agreeable to OT treatment session, upon arrival patient was found supine in bed  In comparison to previous session, patient with improvements in bed mobility, transfers, functional mobility, arousal/alertness and activity tolerance  Patient requiring verbal cues for safety, verbal cues for correct technique and verbal cues for pacing thru activity steps  Patient continues to be functioning below baseline level, occupational performance remains limited secondary to factors listed above and increased risk for falls and injury     From OT standpoint, recommendation at time of d/c would be Short Term Rehab when medically stable  Patient to benefit from continued Occupational Therapy treatment while in the hospital to address deficits as defined above and maximize level of functional independence with ADLs and functional mobility       OT Discharge Recommendation: Other (Comment)(return to facility to increased assist)  OT - OK to Discharge: Yes(when medically stable)     Luana Cunningham MS, OTR/L

## 2020-03-25 NOTE — PROGRESS NOTES
[facility]  Progress Note - Temo Solano 69 y o  male MRN: [id number]    Unit/Bed#: ICU 02 Encounter: [id number]    Assessment/Plan:  Symptomatic Anemia Hx of cirrhosis, Chronic thrombocytopenia  [facility] with abnormal lab findings of hemoglobin 5 9, plt 2  His baseline Hb is 12  He was also found to have symptomatic anemia with  bright red stools of two days duration  He denies prior episodes of BRBPR,colonoscopy, endoscopy, use of NSAID's or malignancy  received 2u PBPC and 2 plt  He had a CT abdomen which showed diverticulosis and nodular liver suggestive of cirrhosis  MELD 18 and Child class B  Pt  Might be having brisk bleeding due to history of cirrhosis vs diverticulosis vs malignancy of the colon  His platelet count at this time is 1k which is very low and  puts him at risk for bleeding  In the absence of severe GI bleed, risk of bleeding is greater vs benefit of evaluation  Will defer EGD/ Colonoscopy until platelet becomes stable at 50k  Pt  Is followed by hemonc  recs appreciated  Plan   · EGD/ Colonoscopy when platelet become stable  · Continue Protonix 40bid PO       Cirrhosis etiology unknown  Pt  Is a poor historian  He Is AAOx3 no signs of decompensation, no stigmata of liver disease  Hepatitis panel normal  MELD Score 18 and child class B  Abdominal USS showed cirrhosis, no masses, patent portal vein and no ascitis  Plan  · Continue Protonix 40mg bid  · Continue to monitor       Subjective:   Pt  Was seen and examined by bedside, he is resting comfortably on the bed  he denies lightheadedness, nausea, abdominal pain, chest pain, SOB  Nurse reports smear of tarry stool yesterday  Pt  Looks stable on room air, not in any obvious distress  Objective:     Vitals: Blood pressure (!) 119/49, pulse 72, temperature 98 8 °F (37 1 °C), temperature source Oral, resp  rate 20, height 5' 6" (1 676 m), weight 59 8 kg (131 lb 13 4 oz), SpO2 96 %  ,Body mass index is 21 28 kg/m²  Intake/Output Summary (Last 24 hours) at 3/25/2020 0939  Last data filed at 3/25/2020 0501  Gross per 24 hour   Intake 2751 34 ml   Output 1575 ml   Net 1176 34 ml       Review of Systems: as per HPI  Review of Systems   Constitutional: Negative for appetite change and fatigue  HENT: Negative for sore throat and voice change  Eyes: Negative for photophobia and visual disturbance  Respiratory: Negative for cough, choking, chest tightness, shortness of breath, wheezing and stridor  Cardiovascular: Negative for chest pain, palpitations and leg swelling  Gastrointestinal: Negative for abdominal distention, abdominal pain, blood in stool, constipation, diarrhea, nausea and vomiting  Genitourinary: Negative for dysuria and hematuria  Musculoskeletal: Negative for neck pain and neck stiffness  Skin: Negative for color change  Neurological: Negative for dizziness, tremors, seizures, syncope, speech difficulty, weakness, light-headedness, numbness and headaches  Psychiatric/Behavioral: Negative for agitation and confusion  Physical Exam:     Physical Exam   Constitutional: He is oriented to person, place, and time  He appears well-developed and well-nourished  No distress    + Nose bleed as evidence by specks of blood on facial tissue by bedside which was confirmed by the nurse  HENT:   Head: Normocephalic and atraumatic  Eyes: EOM are normal  No scleral icterus  Neck: Normal range of motion  Neck supple  No JVD present  Cardiovascular: Normal rate, regular rhythm, normal heart sounds and intact distal pulses  Exam reveals no gallop and no friction rub  No murmur heard  Pulmonary/Chest: Effort normal and breath sounds normal  No stridor  No respiratory distress  He has no wheezes  He has no rales  He exhibits no tenderness  Abdominal: Soft  Bowel sounds are normal  He exhibits no distension and no mass  There is no tenderness  Musculoskeletal: Normal range of motion   He exhibits no edema, tenderness or deformity  Neurological: He is alert and oriented to person, place, and time  Skin: Skin is warm and dry  He is not diaphoretic  No erythema  There is pallor  Psychiatric: He has a normal mood and affect  Invasive Devices     Central Venous Catheter Line            CVC Central Lines 03/23/20 Triple Left Femoral 1 day          Peripheral Intravenous Line            Peripheral IV 03/23/20 Left Antecubital 1 day                        CBC:   Lab Results   Component Value Date    WBC 3 35 (L) 03/25/2020    HGB 7 4 (L) 03/25/2020    HCT 22 6 (L) 03/25/2020    MCV 95 03/25/2020    PLT 1 (LL) 03/25/2020    MCH 31 0 03/25/2020    MCHC 32 7 03/25/2020    RDW 17 5 (H) 03/25/2020    MPV 13 5 (H) 03/24/2020   ,   CMP:   Lab Results   Component Value Date    K 3 5 03/25/2020     (H) 03/25/2020    CO2 19 (L) 03/25/2020    BUN 76 (H) 03/25/2020    CREATININE 2 31 (H) 03/25/2020    CALCIUM 7 5 (L) 03/25/2020     (H) 03/25/2020    ALT 29 03/25/2020    ALKPHOS 39 (L) 03/25/2020    EGFR 27 03/25/2020   ,   Lipase: No results found for: LIPASE,  PT/INR:   Lab Results   Component Value Date    INR 1 36 (H) 03/25/2020   ,   Troponin: No results found for: TROPONINI,   Magnesium: No components found for: MAG,   Phosphorous: No results found for: PHOS  Imaging Studies: I have personally reviewed pertinent reports  Counseling / Coordination of Care  Total time spent today  15 minutes  Greater than 50% of total time was spent with the patient and / or family counseling and / or coordination of care

## 2020-03-26 PROBLEM — E87.2 HIGH ANION GAP METABOLIC ACIDOSIS: Status: RESOLVED | Noted: 2020-03-23 | Resolved: 2020-03-26

## 2020-03-26 LAB
ALBUMIN SERPL BCP-MCNC: 2.6 G/DL (ref 3.5–5)
ALP SERPL-CCNC: 36 U/L (ref 46–116)
ALT SERPL W P-5'-P-CCNC: 26 U/L (ref 12–78)
ANION GAP SERPL CALCULATED.3IONS-SCNC: 5 MMOL/L (ref 4–13)
AST SERPL W P-5'-P-CCNC: 104 U/L (ref 5–45)
BILIRUB SERPL-MCNC: 0.52 MG/DL (ref 0.2–1)
BUN SERPL-MCNC: 60 MG/DL (ref 5–25)
CA-I BLD-SCNC: 1.05 MMOL/L (ref 1.12–1.32)
CALCIUM SERPL-MCNC: 7.7 MG/DL (ref 8.3–10.1)
CHLORIDE SERPL-SCNC: 110 MMOL/L (ref 100–108)
CO2 SERPL-SCNC: 24 MMOL/L (ref 21–32)
CREAT SERPL-MCNC: 1.96 MG/DL (ref 0.6–1.3)
ERYTHROCYTE [DISTWIDTH] IN BLOOD BY AUTOMATED COUNT: 17.9 % (ref 11.6–15.1)
ERYTHROCYTE [DISTWIDTH] IN BLOOD BY AUTOMATED COUNT: 18 % (ref 11.6–15.1)
ERYTHROCYTE [DISTWIDTH] IN BLOOD BY AUTOMATED COUNT: 18.1 % (ref 11.6–15.1)
GFR SERPL CREATININE-BSD FRML MDRD: 33 ML/MIN/1.73SQ M
GLUCOSE SERPL-MCNC: 101 MG/DL (ref 65–140)
GLUCOSE SERPL-MCNC: 106 MG/DL (ref 65–140)
GLUCOSE SERPL-MCNC: 112 MG/DL (ref 65–140)
GLUCOSE SERPL-MCNC: 163 MG/DL (ref 65–140)
GLUCOSE SERPL-MCNC: 177 MG/DL (ref 65–140)
GLUCOSE SERPL-MCNC: 197 MG/DL (ref 65–140)
GLUCOSE SERPL-MCNC: 92 MG/DL (ref 65–140)
HCT VFR BLD AUTO: 22 % (ref 36.5–49.3)
HCT VFR BLD AUTO: 22.3 % (ref 36.5–49.3)
HCT VFR BLD AUTO: 24.6 % (ref 36.5–49.3)
HGB BLD-MCNC: 7.5 G/DL (ref 12–17)
HGB BLD-MCNC: 7.5 G/DL (ref 12–17)
HGB BLD-MCNC: 8.1 G/DL (ref 12–17)
LDH SERPL-CCNC: 523 U/L (ref 81–234)
MAGNESIUM SERPL-MCNC: 1.8 MG/DL (ref 1.6–2.6)
MCH RBC QN AUTO: 31.5 PG (ref 26.8–34.3)
MCH RBC QN AUTO: 31.5 PG (ref 26.8–34.3)
MCH RBC QN AUTO: 32.3 PG (ref 26.8–34.3)
MCHC RBC AUTO-ENTMCNC: 32.9 G/DL (ref 31.4–37.4)
MCHC RBC AUTO-ENTMCNC: 33.6 G/DL (ref 31.4–37.4)
MCHC RBC AUTO-ENTMCNC: 34.1 G/DL (ref 31.4–37.4)
MCV RBC AUTO: 94 FL (ref 82–98)
MCV RBC AUTO: 95 FL (ref 82–98)
MCV RBC AUTO: 96 FL (ref 82–98)
PLATELET # BLD AUTO: 3 THOUSANDS/UL (ref 149–390)
PLATELET # BLD AUTO: 3 THOUSANDS/UL (ref 149–390)
PLATELET # BLD AUTO: 4 THOUSANDS/UL (ref 149–390)
PMV BLD AUTO: 14.5 FL (ref 8.9–12.7)
POTASSIUM SERPL-SCNC: 3.6 MMOL/L (ref 3.5–5.3)
PROT SERPL-MCNC: 5.4 G/DL (ref 6.4–8.2)
RBC # BLD AUTO: 2.32 MILLION/UL (ref 3.88–5.62)
RBC # BLD AUTO: 2.38 MILLION/UL (ref 3.88–5.62)
RBC # BLD AUTO: 2.57 MILLION/UL (ref 3.88–5.62)
RETICS # CALC: 5.94 % (ref 0.37–1.87)
SODIUM SERPL-SCNC: 139 MMOL/L (ref 136–145)
WBC # BLD AUTO: 3.24 THOUSAND/UL (ref 4.31–10.16)
WBC # BLD AUTO: 4.41 THOUSAND/UL (ref 4.31–10.16)
WBC # BLD AUTO: 5.06 THOUSAND/UL (ref 4.31–10.16)

## 2020-03-26 PROCEDURE — 85027 COMPLETE CBC AUTOMATED: CPT | Performed by: EMERGENCY MEDICINE

## 2020-03-26 PROCEDURE — 99232 SBSQ HOSP IP/OBS MODERATE 35: CPT | Performed by: INTERNAL MEDICINE

## 2020-03-26 PROCEDURE — 99231 SBSQ HOSP IP/OBS SF/LOW 25: CPT | Performed by: INTERNAL MEDICINE

## 2020-03-26 PROCEDURE — 97535 SELF CARE MNGMENT TRAINING: CPT

## 2020-03-26 PROCEDURE — 85045 AUTOMATED RETICULOCYTE COUNT: CPT | Performed by: INTERNAL MEDICINE

## 2020-03-26 PROCEDURE — 82330 ASSAY OF CALCIUM: CPT | Performed by: EMERGENCY MEDICINE

## 2020-03-26 PROCEDURE — 85397 CLOTTING FUNCT ACTIVITY: CPT | Performed by: EMERGENCY MEDICINE

## 2020-03-26 PROCEDURE — 82948 REAGENT STRIP/BLOOD GLUCOSE: CPT

## 2020-03-26 PROCEDURE — 83735 ASSAY OF MAGNESIUM: CPT | Performed by: EMERGENCY MEDICINE

## 2020-03-26 PROCEDURE — 97112 NEUROMUSCULAR REEDUCATION: CPT

## 2020-03-26 PROCEDURE — 97116 GAIT TRAINING THERAPY: CPT

## 2020-03-26 PROCEDURE — 80053 COMPREHEN METABOLIC PANEL: CPT | Performed by: EMERGENCY MEDICINE

## 2020-03-26 PROCEDURE — 83615 LACTATE (LD) (LDH) ENZYME: CPT | Performed by: INTERNAL MEDICINE

## 2020-03-26 PROCEDURE — 99232 SBSQ HOSP IP/OBS MODERATE 35: CPT | Performed by: PHYSICIAN ASSISTANT

## 2020-03-26 RX ORDER — SODIUM CHLORIDE, SODIUM GLUCONATE, SODIUM ACETATE, POTASSIUM CHLORIDE, MAGNESIUM CHLORIDE, SODIUM PHOSPHATE, DIBASIC, AND POTASSIUM PHOSPHATE .53; .5; .37; .037; .03; .012; .00082 G/100ML; G/100ML; G/100ML; G/100ML; G/100ML; G/100ML; G/100ML
60 INJECTION, SOLUTION INTRAVENOUS CONTINUOUS
Status: DISCONTINUED | OUTPATIENT
Start: 2020-03-26 | End: 2020-03-27

## 2020-03-26 RX ORDER — LOPERAMIDE HYDROCHLORIDE 2 MG/1
2 CAPSULE ORAL 3 TIMES DAILY PRN
Status: DISCONTINUED | OUTPATIENT
Start: 2020-03-26 | End: 2020-03-30 | Stop reason: HOSPADM

## 2020-03-26 RX ADMIN — METHYLPREDNISOLONE SODIUM SUCCINATE 40 MG: 40 INJECTION, POWDER, FOR SOLUTION INTRAMUSCULAR; INTRAVENOUS at 17:49

## 2020-03-26 RX ADMIN — INSULIN LISPRO 1 UNITS: 100 INJECTION, SOLUTION INTRAVENOUS; SUBCUTANEOUS at 17:49

## 2020-03-26 RX ADMIN — SODIUM CHLORIDE, SODIUM GLUCONATE, SODIUM ACETATE, POTASSIUM CHLORIDE, MAGNESIUM CHLORIDE, SODIUM PHOSPHATE, DIBASIC, AND POTASSIUM PHOSPHATE 60 ML/HR: .53; .5; .37; .037; .03; .012; .00082 INJECTION, SOLUTION INTRAVENOUS at 15:38

## 2020-03-26 RX ADMIN — PANTOPRAZOLE SODIUM 40 MG: 40 TABLET, DELAYED RELEASE ORAL at 06:07

## 2020-03-26 RX ADMIN — PANTOPRAZOLE SODIUM 40 MG: 40 TABLET, DELAYED RELEASE ORAL at 15:44

## 2020-03-26 RX ADMIN — FERROUS SULFATE TAB 325 MG (65 MG ELEMENTAL FE) 325 MG: 325 (65 FE) TAB at 09:37

## 2020-03-26 RX ADMIN — FERROUS SULFATE TAB 325 MG (65 MG ELEMENTAL FE) 325 MG: 325 (65 FE) TAB at 15:44

## 2020-03-26 RX ADMIN — DOCUSATE SODIUM 100 MG: 100 CAPSULE, LIQUID FILLED ORAL at 09:37

## 2020-03-26 RX ADMIN — METHYLPREDNISOLONE SODIUM SUCCINATE 40 MG: 40 INJECTION, POWDER, FOR SOLUTION INTRAMUSCULAR; INTRAVENOUS at 09:37

## 2020-03-26 RX ADMIN — SODIUM BICARBONATE 100 ML/HR: 84 INJECTION, SOLUTION INTRAVENOUS at 11:15

## 2020-03-26 RX ADMIN — ATORVASTATIN CALCIUM 40 MG: 40 TABLET, FILM COATED ORAL at 15:44

## 2020-03-26 RX ADMIN — CHLORHEXIDINE GLUCONATE 0.12% ORAL RINSE 15 ML: 1.2 LIQUID ORAL at 09:37

## 2020-03-26 RX ADMIN — INSULIN LISPRO 1 UNITS: 100 INJECTION, SOLUTION INTRAVENOUS; SUBCUTANEOUS at 03:07

## 2020-03-26 RX ADMIN — LOPERAMIDE HYDROCHLORIDE 2 MG: 2 CAPSULE ORAL at 15:44

## 2020-03-26 NOTE — PLAN OF CARE
Problem: OCCUPATIONAL THERAPY ADULT  Goal: Performs self-care activities at highest level of function for planned discharge setting  See evaluation for individualized goals  Description  Treatment Interventions: ADL retraining, Functional transfer training, UE strengthening/ROM, Endurance training, Cognitive reorientation, Patient/family training, Equipment evaluation/education, Compensatory technique education, Continued evaluation, Energy conservation, Activityengagement          See flowsheet documentation for full assessment, interventions and recommendations  Outcome: Progressing  Note:   Limitation: Decreased ADL status, Decreased Safe judgement during ADL, Decreased endurance, Decreased cognition, Decreased self-care trans, Decreased high-level ADLs  Prognosis: Fair  Assessment: Pt participated in occupational therapy with focus on activity tolerance, functional transfers/mob, LB and UB self-care  Pt cleared by KELECHI/Clarisa for pt participation in occupational therapy  Pt received sitting out of bed to bedside chair and agreeable to therapy following pt Identifiers confirmed  Pt reported his goal today to return to his facility for physical therapy  Pt required assist for functional transfers/mob, toilet transfers and toilet hygiene 2* pt decreased overall strength, coordination and balance  Pt will benefit from post acute care rehab services to continue to address pt noted deficits with decreased activity tolerance, balance, coordination and balance which currently impair pt ADL and functional mob  Pt chair alarm active post session all needs within reach         OT Discharge Recommendation: Other (Comment)(return to facility )  OT - OK to Discharge: Yes(when medically stable)

## 2020-03-26 NOTE — ASSESSMENT & PLAN NOTE
Patient initially presented to [facility] with lightheadedness  Patient found to have hemoglobin 5 6 in platelet 2  Patient is status post 3 units PRBCs this admission  Hemoglobin stable, 8 today  Unknown baseline  FOBT positive  Appreciate GI input - will need EGD/colonoscopy once platelets are stable  No obvious source of bleed at this time    Appreciate hematology input  Peripheral smear negative for schistocytes for helmet cell, , haptoglobin 27, reticulocyte count 6%  Imaging revealed cirrhosis of the liver, INR 1 36, PTT 32

## 2020-03-26 NOTE — PHYSICAL THERAPY NOTE
Physical Therapy Progress Note     03/26/20 1523   Pain Assessment   Pain Assessment Tool Pain Assessment not indicated - pt denies pain   Restrictions/Precautions   Other Precautions Chair Alarm;Cognitive;Multiple lines; Fall Risk   Subjective   Subjective Pt encountered seated in recliner, pleasant and agreeable to treatment  No new complaints given  Hopeful to discharge tomorrow  Transfers   Sit to Stand 4  Minimal assistance   Additional items Assist x 1; Armrests; Increased time required   Stand to Sit 4  Minimal assistance   Additional items Assist x 1; Armrests; Increased time required   Ambulation/Elevation   Gait pattern Excessively slow; Short stride; Foward flexed;Decreased foot clearance;Narrow MAGGY   Gait Assistance 5  Supervision   Additional items Assist x 1  (+ chair follow)   Assistive Device Rolling walker   Distance 230'   Balance   Static Sitting Fair +   Static Standing Fair   Dynamic Standing Poor +   Ambulatory Fair -   Endurance Deficit   Endurance Deficit Yes   Endurance Deficit Description fatigue   Activity Tolerance   Activity Tolerance Patient tolerated treatment well;Patient limited by fatigue   Nurse Made Aware yes   Exercises   Balance training  dynamic ambulatory tasks at hallway railing 10 feet x2 each trial including sidestepping L & R, tandem walking, marching with high knees, backwards walking, min A provided prn   Assessment   Prognosis Fair   Problem List Decreased strength; Impaired balance;Decreased endurance;Decreased mobility; Decreased cognition;Decreased safety awareness   Assessment Pt demonstrated improved mobility this session  Was able to perform sit to stand transfers with similar assist to previous session, and pt was able to do so without instructions for hand placment    Pt ambulated increased distances without need for seated rest   Immediately agreeable to perform standing balance exercises as noted above, requirng use of railing for marching, tandem walking, and backwards walking  Pace improved with UE support and pt did not demonstrate significant LOB  Pt demonstrated difficulty with increased hip flexion with marching, and taking long enough steps for true heel-toe tandem walking, requiring instructions for improvements throughout  Pt declined seated rest until conclusion of exercises  Pt returned to room with all needs in reach & chair alarm active post session  Continue with current POC at this time, working towards goals established in initial evaluation to maximize independence & reduce need for assist with mobility tasks at this time  Goals   Patient Goals to keep active, and be able to go home   STG Expiration Date 04/05/20   PT Treatment Day 1   Plan   Treatment/Interventions Functional transfer training;LE strengthening/ROM; Therapeutic exercise; Endurance training;Patient/family training;Equipment eval/education; Bed mobility;Gait training   Progress Progressing toward goals   PT Frequency   (3-5x/week)     Dedrick Avery, PTA

## 2020-03-26 NOTE — ASSESSMENT & PLAN NOTE
No urgent need for dialysis at this time  This may be secondary to hypovolemia and possible GI bleed

## 2020-03-26 NOTE — PROGRESS NOTES
Progress Note - Cardiology   Dilcia Ed 69 y o  male MRN: [id number]  Encounter: [id number]  03/26/20  10:35 AM        Assessment/Plan:    1  Type 2 MI in setting of underlying CAD and current issues with severe anemia/hypotension  Echo showed preserved LV function  Due to anemia/thrombocytopenia, not currently on antiplatelets or antithrombotics  Was reported to be taking aspirin 81 mg daily PTA  On Atorvastatin 40  Not on beta blocker due to hypotension, which appears to be improving  2  BEN/CKD III  Peak creatinine on admission 3 5, currently trending down  Still on bicarb drip  3  Thrombocytopenia  On solumedrol  Platelets remain low  May need IVIG as per last hematologic note  4  Anemia  Hgb 7 5  Transfusing as needed  5  DM  On SSI  Subjective/Objective   Chief Complaint: No chief complaint on file  Subjective: 69 y o  man with CAD s/p 3 vessel CABG 2017, HTN, HL, DM, CKD III, tobacco use, TORRES with cirrhosis, pituitary mass, colonic AVMs, BPH, presented to  [address] from [facility] with dark/bloody BMs, lightheadedness, dizziness, SOB/fatigue and severe anemia with Hgb 7 on outpt labs, creatinine 3, platelets < 10  On presentation to  [facility] Hgb was 5 9, creatinine 3 5, troponin elevated to 8 52, lactic acid 2 7  He was persistently hypotensive despite IVF and blood transfusion, requiring pressor support  Echo 3/24/20 showed normal LV size and function, EF 65%, no diagnostic RWMA, RV mildly dilated with normal RV function  Mild to moderate MR  Moderate TR with PASP 42 mm Hg  He was evaluated by Hematology, being treated with steroids for 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  Still on bicarb drip  Asking to go back to nursing home  No fevers  Denies CP or SOB  No dizziness or lightheadedness  No nausea/vomiting       Patient Active Problem List   Diagnosis    Shock ([facility] [address] )    Symptomatic anemia    Brain mass    CAD (coronary artery disease)    COPD (chronic obstructive pulmonary disease) ([place] [address] )    Thrombocytopenia (HCC)    Type 2 diabetes mellitus (HCC)    Elevated troponin    Lactic acid acidosis    Uremia    High anion gap metabolic acidosis    BEN (acute kidney injury) ([place] [address] )    CKD (chronic kidney disease) stage 3, GFR 30-59 ml/min (HCC)    Acidosis     Past Medical History:   Diagnosis Date    Cirrhosis ([place] [address] )     COPD (chronic obstructive pulmonary disease) (HCC)     Coronary artery disease     Dementia ([place] [address] )     Diabetes mellitus ([place] [address] )     Diverticulosis     Gastric reflux     Hyperlipemia     Panlobular emphysema (HCC)     Pituitary mass (HCC)     Renal disorder     CKD Stage 3 & Bilat multi renal nodules    Thrombocyte disorder (HCC)     thrombocytopenia    Unstable angina (HCC)        Allergies   Allergen Reactions    Erythromycin     Penicillins     Shellfish-Derived Products        Current Facility-Administered Medications   Medication Dose Route Frequency Provider Last Rate Last Dose    acetaminophen (TYLENOL) tablet 650 mg  650 mg Oral Q6H PRN Kaycee Greenberg MD        atorvastatin (LIPITOR) tablet 40 mg  40 mg Oral Daily With [employer] KARLA Greenberg MD   40 mg at 03/25/20 1548    bisacodyl (DULCOLAX) rectal suppository 10 mg  10 mg Rectal Daily PRN Kaycee Greenberg MD        chlorhexidine (PERIDEX) 0 12 % oral rinse 15 mL  15 mL Swish & Spit Q12H [facility] Kaycee Greenberg MD   15 mL at 03/26/20 0937    dextran 70-hypromellose (GENTEAL TEARS) 0 1-0 3 % ophthalmic solution 2 drop  2 drop Both Eyes BID Kaycee Greenberg MD   2 drop at 03/26/20 0938    docusate sodium (COLACE) capsule 100 mg  100 mg Oral BID Kaycee Greenberg MD   100 mg at 03/26/20 6981    ferrous sulfate tablet 325 mg  325 mg Oral BID With Meals Kaycee Greenberg MD   325 mg at 03/26/20 0937    insulin lispro (HumaLOG) 100 units/mL subcutaneous injection 1-5 Units  1-5 Units Subcutaneous Q6H [facility] Kaycee Greenberg MD   1 Units at 03/26/20 0307    methylPREDNISolone sodium succinate (Solu-MEDROL) injection 40 mg  40 mg Intravenous BID Kaycee Greenberg MD   40 mg at 03/26/20 0937    pantoprazole (PROTONIX) EC tablet 40 mg  40 mg Oral BID AC Kaycee Greenberg MD   40 mg at 03/26/20 0607    sodium bicarbonate 75 mEq in sterile water 1,000 mL infusion  100 mL/hr Intravenous Continuous Kaycee Greenberg  mL/hr at 03/25/20 1125 100 mL/hr at 03/25/20 1125       Vitals: /59   Pulse 59   Temp 98 7 °F (37 1 °C)   Resp 18   Ht 5' 6" (1 676 m)   Wt 59 6 kg (131 lb 6 3 oz)   SpO2 96%   BMI 21 21 kg/m²     Intake/Output Summary (Last 24 hours) at 3/26/2020 1035  Last data filed at 3/26/2020 0601  Gross per 24 hour   Intake 1256 67 ml   Output 1100 ml   Net 156 67 ml     Wt Readings from Last 3 Encounters:   03/26/20 59 6 kg (131 lb 6 3 oz)   03/23/20 61 kg (134 lb 7 7 oz)       Body mass index is 21 21 kg/m²  ,     Vitals:    03/25/20 1720 03/25/20 1853 03/25/20 2232 03/26/20 0632   BP: 114/57 109/52 107/53 117/59   Pulse:    59   Patient Position - Orthostatic VS:           Physical Exam:     GEN: Awake, alert, in no acute distress  HEENT: Sclera anicteric, conjunctivae pink, mucous membranes moist   NECK: Supple, no carotid bruits, no significant JVD  HEART: Regular rhythm, normal S1 and S2, no murmurs, clicks, gallops or rubs  LUNGS: Clear to auscultation bilaterally; no wheezes, rales, or rhonchi   ABDOMEN: Soft, nontender, nondistended, normoactive bowel sounds  EXTREMITIES: Skin warm and well perfused, no clubbing, cyanosis, or edema  NEURO: No focal findings  SKIN: Normal without suspicious lesions on exposed skin        Lab Results:     BMP:  Results from last 7 days   Lab Units 03/26/20  0547 03/25/20  0447 03/24/20  1737 03/24/20  0505 03/23/20  2139 03/23/20  1610 03/23/20  1322   POTASSIUM mmol/L 3 6 3 5 3 6 3 8 3 9 4 5 4 4   CHLORIDE mmol/L 110* 117* 121* 123* 123* 112* 109*   CO2 mmol/L 24 19* 17* 14* 13* 13* 14*   BUN mg/dL 60* 76* 88* 108* 118* 127* 138*   CREATININE mg/dL 1 96* 2 31* 2 54* 2 84* 2 83* 3 17* 3 52*   CALCIUM mg/dL 7 7* 7 5* 7 5* 7 8* 7 4* 7 5* 8 4       CBC:   Results from last 7 days   Lab Units 03/26/20  0547 03/26/20  0022 03/25/20 1758 03/25/20  0446 03/24/20  2318 03/24/20  1737 03/24/20  1153 03/24/20  0505  03/23/20 2138 03/23/20  1322   WBC Thousand/uL 4 41 3 24* 4 23* 3 35* 5 22 5 86 5 16 14 37*  --  15 21*  --  14 89*   HEMOGLOBIN g/dL 7 5* 7 5* 8 0* 7 4* 8 0* 8 2* 6 2* 7 5*   < > 6 4*   < > 5 9*   HEMATOCRIT % 22 0* 22 3* 23 5* 22 6* 24 1* 24 5* 19 2* 23 1*   < > 20 1*   < > 18 8*   MCV fL 95 94 94 95 94 94 97 96  --  100*  --  106*   PLATELETS Thousands/uL 4* 3* 2* 1* 2* 5* 1* 7*  --  13*  --  2*   MCH pg 32 3 31 5 32 0 31 0 31 3 31 5 31 3 31 3  --  31 7  --  33 1   MCHC g/dL 34 1 33 6 34 0 32 7 33 2 33 5 32 3 32 5  --  31 8  --  31 4   RDW % 18 1* 18 0* 18 2* 17 5* 17 2* 17 0* 18 2* 17 6*  --  17 9*  --  18 4*   MPV fL  --  14 5*  --   --  13 5* 12 8*  --   --   --  9 4  --   --    NRBC AUTO /100 WBCs  --   --   --   --   --   --   --  2  --   --   --  1    < > = values in this interval not displayed         Results from last 7 days   Lab Units 03/24/20  0230 03/23/20 2138 03/23/20  1754   TROPONIN I ng/mL >40 00* 37 20*  37 70* 23 18*                 Results from last 7 days   Lab Units 03/26/20  0547 03/23/20 2139 03/23/20  1322   MAGNESIUM mg/dL 1 8 1 6 1 6       INR:   Results from last 7 days   Lab Units 03/25/20  0447 03/23/20  1322   INR  1 36* 1 36*       Lipid Profile:   No results found for: CHOL  No results found for: HDL  No results found for: LDLCALC  No results found for: TRIG      Hgb A1c:         Telemetry: off tele

## 2020-03-26 NOTE — ASSESSMENT & PLAN NOTE
Elevated troponin, per Cardiology suspect type 2 MI in setting of underlying coronary artery disease with severe anemia and hypotension  Echo showed preserved left ventricular ejection fraction  Reportedly was taking aspirin prior to admission which is known hold  Little beta-blocker due to hypotension  Continue atorvastatin

## 2020-03-26 NOTE — SOCIAL WORK
A post acute care recommendation was made by your care team for STR  Discussed Freedom of Choice with patient  Choice is to return/continue services  Pt agreeable with return to [facility]  Referral made via [facility]

## 2020-03-26 NOTE — ASSESSMENT & PLAN NOTE
Anion gap 20 on admission with a bicarb of 14   May be secondary to renal impairment  Glucose normal, lactic acidosis resolved  Anion gap now closed, bicarb stable  Continue hypokalemic bicarb drip as per renal

## 2020-03-26 NOTE — ASSESSMENT & PLAN NOTE
Patient initially transferred to [facility] from [address] [facility] [address] due to hypotension and shock  Patient transferred out of ICU 03/25/2020  Status post IV fluid resuscitation  Femoral line discontinued today

## 2020-03-26 NOTE — PROGRESS NOTES
Progress Note - Nephrology   Mai Wayne 69 y o  male MRN: [id number]  Unit/Bed#: [facility] 926-01 Encounter: [id number]      Assessment / Plan:  1  BEN on probable CKD, b/l sCr 1 36 as of Nov 2016, peak sCr 3 52, down to 1 96 now  -BEN likely d/t prerenal cause from decreased oral intake with possible hypovolemic shock  -UA bland, supportive of likely hemodynamic cause  -f/u abdominal u/s to evaluate kidney sizes/echogenicity  -CT abdomen shows multiple bilateral renal nodules, with some cysts which do not appear benign   -was on PPI at home  -f/u am BMP  Will decrease rate of IVF and switch to isolyte maintenance rate 60ml/hr as patient's oral intake improved  -urinating, monitor UOP     2  CKD stage 3 in setting of DM/cirrhosis - b/l sCr as above  -no outpatient nephrology follow up on record     3  Anemia ?  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 If malignant in nature     Other issues: NSTEMI type 1 vs 2 - cardio on board, trop > 40        Subjective:   He denies any chest pain, shortness breath, nausea or vomiting  No complaints  Appetite improving        Objective:     Vitals: Blood pressure 117/59, pulse 59, temperature 98 7 °F (37 1 °C), resp  rate 18, height 5' 6" (1 676 m), weight 59 6 kg (131 lb 6 3 oz), SpO2 96 %  ,Body mass index is 21 21 kg/m²  Temp (24hrs), Av 6 °F (37 °C), Min:98 1 °F (36 7 °C), Max:99 1 °F (37 3 °C)      Weight (last 2 days)     Date/Time   Weight    20 0600   59 6 (131 39)    20 1630   59 8 (131 84)                Intake/Output Summary (Last 24 hours) at 3/26/2020 1411  Last data filed at 3/26/2020 0900  Gross per 24 hour   Intake 220 ml   Output 1000 ml   Net -780 ml     I/O last 24 hours: In: 1496 7 [P O :700; I V :796 7]  Out: 1100 [Urine:1100]        Physical Exam:   Physical Exam   Constitutional: He appears well-developed and well-nourished  No distress  thin   HENT:   Head: Normocephalic and atraumatic  Mouth/Throat: No oropharyngeal exudate  Eyes: Right eye exhibits no discharge  Left eye exhibits no discharge  No scleral icterus  Neck: Normal range of motion  Neck supple  No thyromegaly present  Cardiovascular: Normal rate, regular rhythm and normal heart sounds  Pulmonary/Chest: Effort normal and breath sounds normal  He has no wheezes  He has no rales  Abdominal: Soft  Bowel sounds are normal  He exhibits no distension  There is no tenderness  Musculoskeletal: Normal range of motion  He exhibits no edema  Neurological: He is alert  awake   Skin: Skin is warm and dry  No rash noted  He is not diaphoretic  There is pallor  Psychiatric: He has a normal mood and affect  His behavior is normal    Vitals reviewed        Invasive Devices     Peripheral Intravenous Line            Peripheral IV 20 Left Antecubital 3 days                Medications:    Scheduled Meds:  Current Facility-Administered Medications:  acetaminophen 650 mg Oral Q6H PRN Kaycee Greenberg MD    atorvastatin 40 mg Oral Daily With [employer] KARLA Greenberg MD    bisacodyl 10 mg Rectal Daily PRN Kaycee Greenberg MD    chlorhexidine 15 mL Swish & Spit Q12H [address] Kaycee K Minor, MD    dextran 70-hypromellose 2 drop Both Eyes BID Kaycee Greenberg MD    docusate sodium 100 mg Oral BID Kaycee Greenberg MD    ferrous sulfate 325 mg Oral BID With Meals Kaycee Greenberg MD    insulin lispro 1-5 Units Subcutaneous Q6H [facility] Kaycee Greenberg MD    methylPREDNISolone sodium succinate 40 mg Intravenous BID Kaycee Greenberg MD    pantoprazole 40 mg Oral BID AC Kaycee Greenberg MD    sodium bicarbonate infusion 100 mL/hr Intravenous Continuous Kaycee Greenberg MD Last Rate: 100 mL/hr (03/26/20 1115)       PRN Meds:   acetaminophen    bisacodyl    Continuous Infusions:  sodium bicarbonate infusion 100 mL/hr Last Rate: 100 mL/hr (03/26/20 1115)           LAB RESULTS:      Results from last 7 days   Lab Units 03/26/20  1122 03/26/20  0547 03/26/20  0022 03/25/20  1758 03/25/20  0447 03/25/20  0446 03/24/20  2318 03/24/20  1737  03/24/20  0505  03/23/20  2139 03/23/20  2138  03/23/20  1610 03/23/20  1322   WBC Thousand/uL 5 06 4 41 3 24* 4 23*  --  3 35* 5 22 5 86   < > 14 37*  --   --  15 21*  --   --  14 89*   HEMOGLOBIN g/dL 8 1* 7 5* 7 5* 8 0*  --  7 4* 8 0* 8 2*   < > 7 5*   < >  --  6 4*   < >  --  5 9*   HEMATOCRIT % 24 6* 22 0* 22 3* 23 5*  --  22 6* 24 1* 24 5*   < > 23 1*   < >  --  20 1*   < >  --  18 8*   PLATELETS Thousands/uL 3* 4* 3* 2*  --  1* 2* 5*   < > 7*  --   --  13*  --   --  2*   NEUTROS PCT %  --   --   --   --   --   --   --   --   --  49  --   --   --   --   --  48   LYMPHS PCT %  --   --   --   --   --   --   --   --   --  36  --   --   --   --   --  40   LYMPHO PCT %  --   --   --   --   --   --   --   --   --   --   --   --  35  --   --   --    MONOS PCT %  --   --   --   --   --   --   --   --   --  10  --   --   --   --   --  10   MONO PCT %  --   --   --   --   --   --   --   --   --   --   --   --  10  --   --   --    EOS PCT %  --   --   --   --   --   --   --   --   --  2  --   --  1  --   --  1   POTASSIUM mmol/L  --  3 6  --   --  3 5  --   --  3 6  --  3 8  --  3 9  --   -- 4 5 4 4   CHLORIDE mmol/L  --  110*  --   --  117*  --   --  121*  --  123*  --  123*  --   --  112* 109*   CO2 mmol/L  --  24  --   --  19*  --   --  17*  --  14*  --  13*  --   --  13* 14*   BUN mg/dL  --  60*  --   --  76*  --   --  88*  --  108*  --  118*  --   --  127* 138*   CREATININE mg/dL  --  1 96*  --   --  2 31*  --   --  2 54*  --  2 84*  --  2 83*  --   --  3 17* 3 52*   CALCIUM mg/dL  --  7 7*  --   --  7 5*  --   --  7 5*  --  7 8*  --  7 4*  --   --  7 5* 8 4   ALK PHOS U/L  --  36*  --   --  39*  --   --   --   --  40*  --  36*  --   --   --  40*   ALT U/L  --  26  --   --  29  --   --   --   --  30  --  23  --   --   --  23   AST U/L  --  104*  --   --  162*  --   --   --   --  215*  --  167*  --   --   --  80*   MAGNESIUM mg/dL  --  1 8  --   --   --   --   --   --   --   --   --  1 6  --   --   --  1 6   PHOSPHORUS mg/dL  --   --   --   --   --   --   --   --   --   --   --  3 2  --   --   --   --     < > = values in this interval not displayed  CUTURES:  Lab Results   Component Value Date    BLOODCX No Growth at 48 hrs  03/23/2020    BLOODCX No Growth at 48 hrs  03/23/2020                 Portions of the record may have been created with voice recognition software  Occasional wrong word or "sound a like" substitutions may have occurred due to the inherent limitations of voice recognition software  Read the chart carefully and recognize, using context, where substitutions have occurred  If you have any questions, please contact the dictating provider

## 2020-03-26 NOTE — ASSESSMENT & PLAN NOTE
Platelet 2 on admission, status post 3 units of platelets and DDAVP  Aspirin has been discontinued  Appreciate hematology input  Consider possibility of ITP bone marrow disorder, TTP/HUS given ongoing anemia  Follow-up [place] 13 enzyme  DIC panel- normal fibrinogen  Started on IV Solu-Medrol 40 mg twice daily, will continue  Discussed with Dr Lidia Encinas, continue steroids and recheck in AM  Consider IVIG 1gm/kg daily x 2 days

## 2020-03-26 NOTE — PROGRESS NOTES
Oncology Progress Note  Amina Day 69 y o  male MRN: [id number]  Unit/Bed#: [facility] 926-01 Encounter: [id number]      /59   Pulse 59   Temp 98 7 °F (37 1 °C)   Resp 18   Ht 5' 6" (1 676 m)   Wt 59 6 kg (131 lb 6 3 oz)   SpO2 96%   BMI 21 21 kg/m²     Subjective:  I feel good I have no bleeding    Objective:  Denies any epistaxis, gingival bleeding, fever, chills, abdominal pain, dysuria, hematuria, he had normal bowel movement without any hematochezia  General Appearance:    Alert, oriented, pale        Eyes:    PERRL   Ears:    Normal external ear canals, both ears   Nose:   Nares normal, septum midline   Throat:   Mucosa moist  Pharynx without injection  Neck:   Supple       Lungs:     Clear to auscultation bilaterally   Chest Wall:    No tenderness or deformity    Heart:    Regular rate and rhythm       Abdomen:     Soft, non-tender, bowel sounds +, no organomegaly           Extremities:   Extremities no cyanosis or edema       Skin:   no rash or icterus, petechiae on the lower extremities      Lymph nodes:   Cervical, supraclavicular, and axillary nodes normal   Neurologic:   CNII-XII intact, normal strength, sensation and reflexes     throughout        Recent Results (from the past 48 hour(s))   Fingerstick Glucose (POCT)    Collection Time: 03/24/20 11:52 AM   Result Value Ref Range    POC Glucose 109 65 - 140 mg/dl   Occult blood 1-3, stool    Collection Time: 03/24/20 11:53 AM   Result Value Ref Range    Fecal Occult Blood Diagnostic Positive (A) Negative    Fecal Occult Blood Diagnostic 2      Fecal Occult Blood Diagnostic 3     CBC    Collection Time: 03/24/20 11:53 AM   Result Value Ref Range    WBC 5 16 4 31 - 10 16 Thousand/uL    RBC 1 98 (L) 3 88 - 5 62 Million/uL    Hemoglobin 6 2 (LL) 12 0 - 17 0 g/dL    Hematocrit 19 2 (L) 36 5 - 49 3 %    MCV 97 82 - 98 fL    MCH 31 3 26 8 - 34 3 pg    MCHC 32 3 31 4 - 37 4 g/dL    RDW 18 2 (H) 11 6 - 15 1 %    Platelets 1 (LL) 028 - 390 Thousands/uL Prepare Leukoreduced RBC: 1 Units    Collection Time: 03/24/20 12:46 PM   Result Value Ref Range    Unit Product Code X4250J85     Unit Number [id number]     Unit ABO O     Unit [facility] POS     Crossmatch Compatible     Unit Dispense Status Crossmatched    CBC and Platelet    Collection Time: 03/24/20  5:37 PM   Result Value Ref Range    WBC 5 86 4 31 - 10 16 Thousand/uL    RBC 2 60 (L) 3 88 - 5 62 Million/uL    Hemoglobin 8 2 (L) 12 0 - 17 0 g/dL    Hematocrit 24 5 (L) 36 5 - 49 3 %    MCV 94 82 - 98 fL    MCH 31 5 26 8 - 34 3 pg    MCHC 33 5 31 4 - 37 4 g/dL    RDW 17 0 (H) 11 6 - 15 1 %    Platelets 5 (LL) 365 - 390 Thousands/uL    MPV 12 8 (H) 8 9 - 12 7 fL   Basic metabolic panel    Collection Time: 03/24/20  5:37 PM   Result Value Ref Range    Sodium 144 136 - 145 mmol/L    Potassium 3 6 3 5 - 5 3 mmol/L    Chloride 121 (H) 100 - 108 mmol/L    CO2 17 (L) 21 - 32 mmol/L    ANION GAP 6 4 - 13 mmol/L    BUN 88 (H) 5 - 25 mg/dL    Creatinine 2 54 (H) 0 60 - 1 30 mg/dL    Glucose 96 65 - 140 mg/dL    Calcium 7 5 (L) 8 3 - 10 1 mg/dL    eGFR 24 ml/min/1 73sq m   Fingerstick Glucose (POCT)    Collection Time: 03/24/20  5:43 PM   Result Value Ref Range    POC Glucose 97 65 - 140 mg/dl   CBC and Platelet    Collection Time: 03/24/20 11:18 PM   Result Value Ref Range    WBC 5 22 4 31 - 10 16 Thousand/uL    RBC 2 56 (L) 3 88 - 5 62 Million/uL    Hemoglobin 8 0 (L) 12 0 - 17 0 g/dL    Hematocrit 24 1 (L) 36 5 - 49 3 %    MCV 94 82 - 98 fL    MCH 31 3 26 8 - 34 3 pg    MCHC 33 2 31 4 - 37 4 g/dL    RDW 17 2 (H) 11 6 - 15 1 %    Platelets 2 (LL) 105 - 390 Thousands/uL    MPV 13 5 (H) 8 9 - 12 7 fL   Fingerstick Glucose (POCT)    Collection Time: 03/24/20 11:21 PM   Result Value Ref Range    POC Glucose 179 (H) 65 - 140 mg/dl   CBC and Platelet    Collection Time: 03/25/20  4:46 AM   Result Value Ref Range    WBC 3 35 (L) 4 31 - 10 16 Thousand/uL    RBC 2 39 (L) 3 88 - 5 62 Million/uL    Hemoglobin 7 4 (L) 12 0 - 17 0 g/dL Hematocrit 22 6 (L) 36 5 - 49 3 %    MCV 95 82 - 98 fL    MCH 31 0 26 8 - 34 3 pg    MCHC 32 7 31 4 - 37 4 g/dL    RDW 17 5 (H) 11 6 - 15 1 %    Platelets 1 (LL) 087 - 390 Thousands/uL   Hemolysis Smear    Collection Time: 03/25/20  4:46 AM   Result Value Ref Range    Hemolysis Smear No Schistocytes or Helmet Cells noted    Comprehensive metabolic panel    Collection Time: 03/25/20  4:47 AM   Result Value Ref Range    Sodium 143 136 - 145 mmol/L    Potassium 3 5 3 5 - 5 3 mmol/L    Chloride 117 (H) 100 - 108 mmol/L    CO2 19 (L) 21 - 32 mmol/L    ANION GAP 7 4 - 13 mmol/L    BUN 76 (H) 5 - 25 mg/dL    Creatinine 2 31 (H) 0 60 - 1 30 mg/dL    Glucose 150 (H) 65 - 140 mg/dL    Calcium 7 5 (L) 8 3 - 10 1 mg/dL     (H) 5 - 45 U/L    ALT 29 12 - 78 U/L    Alkaline Phosphatase 39 (L) 46 - 116 U/L    Total Protein 5 5 (L) 6 4 - 8 2 g/dL    Albumin 2 7 (L) 3 5 - 5 0 g/dL    Total Bilirubin 0 57 0 20 - 1 00 mg/dL    eGFR 27 ml/min/1 73sq m   Fibrin split products    Collection Time: 03/25/20  4:47 AM   Result Value Ref Range    FDP <10 <10   D-dimer, quantitative    Collection Time: 03/25/20  4:47 AM   Result Value Ref Range    D-Dimer, Quant 0 81 (H) <0 50 ug/ml FEU   Antithrombin III Activity    Collection Time: 03/25/20  4:47 AM   Result Value Ref Range    AntiThrombIN III Activity 71 (L) 92 - 136 % of Normal   Plasminogen activity    Collection Time: 03/25/20  4:47 AM   Result Value Ref Range    Plasminogen 69 (L) 77 - 138 % of Normal   Fibrinogen    Collection Time: 03/25/20  4:47 AM   Result Value Ref Range    Fibrinogen 240 227 - 495 mg/dL   APTT    Collection Time: 03/25/20  4:47 AM   Result Value Ref Range    PTT 32 23 - 37 seconds   Protime-INR    Collection Time: 03/25/20  4:47 AM   Result Value Ref Range    Protime 16 3 (H) 11 6 - 14 5 seconds    INR 1 36 (H) 0 84 - 1 19   Fingerstick Glucose (POCT)    Collection Time: 03/25/20  5:30 AM   Result Value Ref Range    POC Glucose 149 (H) 65 - 140 mg/dl Prepare Leukoreduced RBC: 2 Units    Collection Time: 03/25/20  5:55 AM   Result Value Ref Range    Unit Product Code L2304K02     Unit Number [id number]     Unit ABO O     Unit [facility] POS     Crossmatch Compatible     Unit Dispense Status Presumed Trans     Unit Product Code E2555P65     Unit Number [id number]     Unit ABO O     Unit [facility] POS     Crossmatch Compatible     Unit Dispense Status Presumed Trans    Prepare Leukoreduced Platelet Pheresis (1 pheresis product = 6-8 pooled units): 1 Product    Collection Time: 03/25/20  5:55 AM   Result Value Ref Range    Unit Product Code I8236J69     Unit Number [id number]     Unit ABO O     Unit [facility] POS     Unit Dispense Status Presumed Trans    Fingerstick Glucose (POCT)    Collection Time: 03/25/20 11:24 AM   Result Value Ref Range    POC Glucose 142 (H) 65 - 140 mg/dl   Fingerstick Glucose (POCT)    Collection Time: 03/25/20  5:27 PM   Result Value Ref Range    POC Glucose 169 (H) 65 - 140 mg/dl   CBC and Platelet    Collection Time: 03/25/20  5:58 PM   Result Value Ref Range    WBC 4 23 (L) 4 31 - 10 16 Thousand/uL    RBC 2 50 (L) 3 88 - 5 62 Million/uL    Hemoglobin 8 0 (L) 12 0 - 17 0 g/dL    Hematocrit 23 5 (L) 36 5 - 49 3 %    MCV 94 82 - 98 fL    MCH 32 0 26 8 - 34 3 pg    MCHC 34 0 31 4 - 37 4 g/dL    RDW 18 2 (H) 11 6 - 15 1 %    Platelets 2 (LL) 361 - 390 Thousands/uL   Fingerstick Glucose (POCT)    Collection Time: 03/25/20  8:55 PM   Result Value Ref Range    POC Glucose 179 (H) 65 - 140 mg/dl   CBC and Platelet    Collection Time: 03/26/20 12:22 AM   Result Value Ref Range    WBC 3 24 (L) 4 31 - 10 16 Thousand/uL    RBC 2 38 (L) 3 88 - 5 62 Million/uL    Hemoglobin 7 5 (L) 12 0 - 17 0 g/dL    Hematocrit 22 3 (L) 36 5 - 49 3 %    MCV 94 82 - 98 fL    MCH 31 5 26 8 - 34 3 pg    MCHC 33 6 31 4 - 37 4 g/dL    RDW 18 0 (H) 11 6 - 15 1 %    Platelets 3 (LL) 756 - 390 Thousands/uL    MPV 14 5 (H) 8 9 - 12 7 fL   Fingerstick Glucose (POCT)    Collection Time: 03/26/20 12:51 AM   Result Value Ref Range    POC Glucose 177 (H) 65 - 140 mg/dl   Fingerstick Glucose (POCT)    Collection Time: 03/26/20  5:45 AM   Result Value Ref Range    POC Glucose 101 65 - 140 mg/dl   CBC and Platelet    Collection Time: 03/26/20  5:47 AM   Result Value Ref Range    WBC 4 41 4 31 - 10 16 Thousand/uL    RBC 2 32 (L) 3 88 - 5 62 Million/uL    Hemoglobin 7 5 (L) 12 0 - 17 0 g/dL    Hematocrit 22 0 (L) 36 5 - 49 3 %    MCV 95 82 - 98 fL    MCH 32 3 26 8 - 34 3 pg    MCHC 34 1 31 4 - 37 4 g/dL    RDW 18 1 (H) 11 6 - 15 1 %    Platelets 4 (LL) 953 - 390 Thousands/uL   Calcium, ionized    Collection Time: 03/26/20  5:47 AM   Result Value Ref Range    Calcium, Ionized 1 05 (L) 1 12 - 1 32 mmol/L   Comprehensive metabolic panel    Collection Time: 03/26/20  5:47 AM   Result Value Ref Range    Sodium 139 136 - 145 mmol/L    Potassium 3 6 3 5 - 5 3 mmol/L    Chloride 110 (H) 100 - 108 mmol/L    CO2 24 21 - 32 mmol/L    ANION GAP 5 4 - 13 mmol/L    BUN 60 (H) 5 - 25 mg/dL    Creatinine 1 96 (H) 0 60 - 1 30 mg/dL    Glucose 106 65 - 140 mg/dL    Calcium 7 7 (L) 8 3 - 10 1 mg/dL     (H) 5 - 45 U/L    ALT 26 12 - 78 U/L    Alkaline Phosphatase 36 (L) 46 - 116 U/L    Total Protein 5 4 (L) 6 4 - 8 2 g/dL    Albumin 2 6 (L) 3 5 - 5 0 g/dL    Total Bilirubin 0 52 0 20 - 1 00 mg/dL    eGFR 33 ml/min/1 73sq m   Magnesium    Collection Time: 03/26/20  5:47 AM   Result Value Ref Range    Magnesium 1 8 1 6 - 2 6 mg/dL   Fingerstick Glucose (POCT)    Collection Time: 03/26/20  7:19 AM   Result Value Ref Range    POC Glucose 92 65 - 140 mg/dl         Ct Abdomen Pelvis Wo Contrast    Result Date: 3/25/2020  Narrative: CT ABDOMEN AND PELVIS WITHOUT IV CONTRAST INDICATION:   Melena   COMPARISON:  May 14, 2013 TECHNIQUE:  CT examination of the abdomen and pelvis was performed without intravenous contrast   Axial, sagittal, and coronal 2D reformatted images were created from the source data and submitted for interpretation  Radiation dose length product (DLP) for this visit:  504 97 mGy-cm   This examination, like all CT scans performed in the [facility], was performed utilizing techniques to minimize radiation dose exposure, including the use of iterative  reconstruction and automated exposure control  Enteric contrast was not administered  FINDINGS: ABDOMEN LOWER CHEST:  No clinically significant abnormality identified in the visualized lower chest  LIVER/BILIARY TREE:  The liver surface is nodular which was not present on the previous study and can be seen with cirrhosis  GALLBLADDER:  Gallbladder is surgically absent  SPLEEN:  Unremarkable  PANCREAS:  Unremarkable  ADRENAL GLANDS:  Unremarkable  KIDNEYS/URETERS:  Multiple bilateral renal nodules  Some of the nodules represent cysts  A hyperdense 6 mm right lower pole cyst is noted  Additional nodules do not meet the criteria for a benign lesion and additional imaging is required  14 x 12 mm left upper pole exophytic nodule  STOMACH AND BOWEL:  There is colonic diverticulosis without evidence of acute diverticulitis  APPENDIX:  A normal appendix was visualized  ABDOMINOPELVIC CAVITY:  No ascites or free intraperitoneal air  No lymphadenopathy  VESSELS:  Atherosclerotic changes are present  No evidence of aneurysm  PELVIS REPRODUCTIVE ORGANS:  Unremarkable for patient's age  URINARY BLADDER:  Unremarkable  ABDOMINAL WALL/INGUINAL REGIONS:  Unremarkable  OSSEOUS STRUCTURES:  No acute fracture or destructive osseous lesion  Impression: Nodular liver concerning for underlying cirrhosis  Multiple bilateral renal nodules, some of which meet the criteria for a simple cyst and others which do not  Recommend an ultrasound for further evaluation with attention to the exophytic left upper pole nodule  Diverticulosis without evidence for acute diverticulitis  The study was marked in EPIC for significant notification   Workstation performed: [id number] Xr Chest 1 View Portable    Result Date: 3/23/2020  Narrative: CHEST INDICATION:   dyspnea; possible gi bleeding  COMPARISON:  None EXAM PERFORMED/VIEWS:  XR CHEST PORTABLE  AP semierect FINDINGS:  There are median sternotomy wires indicating prior cardiac surgery  Cardiomediastinal silhouette appears unremarkable  The lungs are clear  No pneumothorax or pleural effusion  Osseous structures appear within normal limits for patient age  Impression: No acute cardiopulmonary disease  Workstation performed: [id number]     Ct Head Without Contrast    Result Date: 3/23/2020  Narrative: CT BRAIN - WITHOUT CONTRAST INDICATION:  Altered mental status increasing confusion/disorientation; marked thrombocytopenia  COMPARISON:  None TECHNIQUE:  CT examination of the brain was performed  In addition to axial images, coronal 2D reformatted images were created and submitted for interpretation  Radiation dose length product (DLP) for this visit:  826 52 mGy-cm   This examination, like all CT scans performed in the [facility], was performed utilizing techniques to minimize radiation dose exposure, including the use of iterative  reconstruction and automated exposure control  IMAGE QUALITY:  Diagnostic  FINDINGS: PARENCHYMA:  No intra-axial mass, mass effect or midline shift  No CT signs of acute infarction  No acute parenchymal hemorrhage  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  There appears to be disruption of the roof of the sphenoid sinus with soft tissue tissue extension into the sinus  Further bony destruction of the lateral wall and base of the sphenoid bone on the left  and left dorsum sellae  Subtle soft tissue involvement of the vidian canal on the left may be present as well as soft tissue extension into the medial aspect of the left middle cranial fossa  Age-related cortical atrophy   Periventricular white matter hypodensity which is nonspecific although most compatible with chronic small vessel ischemic disease  VENTRICLES AND EXTRA-AXIAL SPACES:  The ventricles are concordant with degree of atrophy, midline position  There is mild prominence of the subdural CSF spaces bilaterally, 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  VISUALIZED ORBITS AND PARANASAL SINUSES:  See comments above  Sinuses are otherwise unremarkable  No acute orbital pathology  CALVARIUM AND EXTRACRANIAL SOFT TISSUES:  Normal      Impression: 1  No acute intracranial abnormality  2  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 and extension into the sphenoid sinus on the left  Differential considerations include pituitary macroadenoma, pituitary carcinoma, craniopharyngioma, meningioma as well as less likely considerations such as giant saccular aneurysm  Nonemergent MRI brain with IV contrast recommended for further characterization  3  Prominence of the subdural CSF spaces bilaterally 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  The study was marked in Epic for follow-up  Workstation performed: [id number]     Us Abdomen Complete    Result Date: 3/24/2020  Narrative: ABDOMEN ULTRASOUND, COMPLETE INDICATION:   findings consistent with cirrhosis on abdominal CT scan  COMPARISON: CT abdomen pelvis 3/23/2020  TECHNIQUE:   Real-time ultrasound of the abdomen was performed with a curvilinear transducer with both volumetric sweeps and still imaging techniques  FINDINGS: PANCREAS:  Visualized portions of the pancreas are within normal limits  AORTA AND IVC:  Visualized portions are normal for patient age  LIVER: Size:  Within normal range  The liver measures 13 cm in the midclavicular line  Contour:  Surface contour is nodular  Parenchyma: There is diffuse coarsened heterogeneous echotexture suggesting underlying cirrhotic changes  No evidence of suspicious mass   Limited imaging of the main portal vein shows it to be patent and hepatopetal  BILIARY: Patient has undergone cholecystectomy  No intrahepatic biliary dilatation  CBD measures 4 mm  No choledocholithiasis  KIDNEY: Right kidney measures 10 x 5 cm  1 9 x 2 0 x 1 7 cm simple cyst is present in the upper pole  0 7 x 1 0 x 0 6 cm simple cyst is present in the midpole  0 6 x 0 6 x 0 7 cm simple cyst is present in the lower pole  Left kidney measures 10 x 6 cm  1 2 x 1 1 x 1 1 cm simple cyst is present in the upper pole  3 3 x 2 3 x 3 4 cm simple cyst is present in the mid to lower pole  1 5 x 1 6 x 1 8 cm simple cyst is present in the lower pole  SPLEEN: Measures 10 cm  Within normal limits  ASCITES:  None  Impression: 1  Liver cirrhosis  No suspicious lesions identified  2   Bilateral simple renal cysts  3   Status post cholecystectomy  Workstation performed: [id number]         Assessment and plan:  1  Thrombocytopenia grade 4 when he presented with platelet count of 1278, did not respond to platelet transfusion, normal PT, PTT, 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, he is on Solu-Medrol 40 mg IV twice daily, improvement slowly to 4000 today, no evidence of active bleeding, I will hold IVIG at this time because of no evidence of active bleeding and response to Solu-Medrol, if no improvement of the platelet by 69,269 tomorrow will introduce IVIG    2  Anemia, cirrhosis of the liver might be related to GI bleed with positive guaiac stool, Adamts 13 level is pending at the time of the dictation, no evidence of schistocytes or helmet cells, FDP is normal, fibrinogen is normal, PT, PTT is normal, hemoglobin 8 0, stable, received packed RBC yesterday 2 units, repeat LDH, reticulocyte count today  3   Elevated troponin, keep hemoglobin above 7 5 g

## 2020-03-26 NOTE — PLAN OF CARE
Problem: Prexisting or High Potential for Compromised Skin Integrity  Goal: Skin integrity is maintained or improved  Description  INTERVENTIONS:  - Identify patients at risk for skin breakdown  - Assess and monitor skin integrity  - Assess and monitor nutrition and hydration status  - Monitor labs   - Assess for incontinence   - Turn and reposition patient  - Assist with mobility/ambulation  - Relieve pressure over bony prominences  - Avoid friction and shearing  - Provide appropriate hygiene as needed including keeping skin clean and dry  - Evaluate need for skin moisturizer/barrier cream  - Collaborate with interdisciplinary team   - Patient/family teaching  - Consider wound care consult   Outcome: Progressing     Problem: Potential for Falls  Goal: Patient will remain free of falls  Description  INTERVENTIONS:  - Assess patient frequently for physical needs  -  Identify cognitive and physical deficits and behaviors that affect risk of falls    -  [place] fall precautions as indicated by assessment   - Educate patient/family on patient safety including physical limitations  - Instruct patient to call for assistance with activity based on assessment  - Modify environment to reduce risk of injury  - Consider OT/PT consult to assist with strengthening/mobility  Outcome: Progressing     Problem: PAIN - ADULT  Goal: Verbalizes/displays adequate comfort level or baseline comfort level  Description  Interventions:  - Encourage patient to monitor pain and request assistance  - Assess pain using appropriate pain scale  - Administer analgesics based on type and severity of pain and evaluate response  - Implement non-pharmacological measures as appropriate and evaluate response  - Consider cultural and social influences on pain and pain management  - Notify physician/advanced practitioner if interventions unsuccessful or patient reports new pain  Outcome: Progressing     Problem: INFECTION - ADULT  Goal: Absence or prevention of progression during hospitalization  Description  INTERVENTIONS:  - Assess and monitor for signs and symptoms of infection  - Monitor lab/diagnostic results  - Monitor all insertion sites, i e  indwelling lines, tubes, and drains  - Monitor endotracheal if appropriate and nasal secretions for changes in amount and color  - [place] appropriate cooling/warming therapies per order  - Administer medications as ordered  - Instruct and encourage patient and family to use good hand hygiene technique  - Identify and instruct in appropriate isolation precautions for identified infection/condition  Outcome: Progressing  Goal: Absence of fever/infection during neutropenic period  Description  INTERVENTIONS:  - Monitor WBC    Outcome: Progressing     Problem: SAFETY ADULT  Goal: Maintain or return to baseline ADL function  Description  INTERVENTIONS:  -  Assess patient's ability to carry out ADLs; assess patient's baseline for ADL function and identify physical deficits which impact ability to perform ADLs (bathing, care of mouth/teeth, toileting, grooming, dressing, etc )  - Assess/evaluate cause of self-care deficits   - Assess range of motion  - Assess patient's mobility; develop plan if impaired  - Assess patient's need for assistive devices and provide as appropriate  - Encourage maximum independence but intervene and supervise when necessary  - Involve family in performance of ADLs  - Assess for home care needs following discharge   - Consider OT consult to assist with ADL evaluation and planning for discharge  - Provide patient education as appropriate  Outcome: Progressing  Goal: Maintain or return mobility status to optimal level  Description  INTERVENTIONS:  - Assess patient's baseline mobility status (ambulation, transfers, stairs, etc )    - Identify cognitive and physical deficits and behaviors that affect mobility  - Identify mobility aids required to assist with transfers and/or ambulation (gait belt, sit-to-stand, lift, walker, cane, etc )  - [place] fall precautions as indicated by assessment  - Record patient progress and toleration of activity level on Mobility SBAR; progress patient to next Phase/Stage  - Instruct patient to call for assistance with activity based on assessment  - Consider rehabilitation consult to assist with strengthening/weightbearing, etc   Outcome: Progressing     Problem: DISCHARGE PLANNING  Goal: Discharge to home or other facility with appropriate resources  Description  INTERVENTIONS:  - Identify barriers to discharge w/patient and caregiver  - Arrange for needed discharge resources and transportation as appropriate  - Identify discharge learning needs (meds, wound care, etc )  - Arrange for interpretive services to assist at discharge as needed  - Refer to Case Management Department for coordinating discharge planning if the patient needs post-hospital services based on physician/advanced practitioner order or complex needs related to functional status, cognitive ability, or social support system  Outcome: Progressing     Problem: Knowledge Deficit  Goal: Patient/family/caregiver demonstrates understanding of disease process, treatment plan, medications, and discharge instructions  Description  Complete learning assessment and assess knowledge base  Interventions:  - Provide teaching at level of understanding  - Provide teaching via preferred learning methods  Outcome: Progressing     Problem: Nutrition/Hydration-ADULT  Goal: Nutrient/Hydration intake appropriate for improving, restoring or maintaining nutritional needs  Description  Monitor and assess patient's nutrition/hydration status for malnutrition  Collaborate with interdisciplinary team and initiate plan and interventions as ordered  Monitor patient's weight and dietary intake as ordered or per policy  Utilize nutrition screening tool and intervene as necessary   Determine patient's food preferences and provide high-protein, high-caloric foods as appropriate       INTERVENTIONS:  - Monitor oral intake, urinary output, labs, and treatment plans  - Assess nutrition and hydration status and recommend course of action  - Evaluate amount of meals eaten  - Assist patient with eating if necessary   - Allow adequate time for meals  - Recommend/ encourage appropriate diets, oral nutritional supplements, and vitamin/mineral supplements  - Order, calculate, and assess calorie counts as needed  - Recommend, monitor, and adjust tube feedings and TPN/PPN based on assessed needs  - Assess need for intravenous fluids  - Provide specific nutrition/hydration education as appropriate  - Include patient/family/caregiver in decisions related to nutrition  Outcome: Progressing

## 2020-03-26 NOTE — OCCUPATIONAL THERAPY NOTE
Occupational Therapy Treatment Note     03/26/20 1448   Restrictions/Precautions   Weight Bearing Precautions Per Order No   Other Precautions Cognitive; Chair Alarm   Lifestyle   Autonomy Assist ADLS/IADLS, Mod I w/ transfers and functional mobility PTA   Reciprocal Relationships Pt lives w/ facility staff; reports no family in the area   Service to Others Pt is retired   Intrinsic Gratification Enjoys going to the [employer]; and going outside to smoke w/ his friends   Pain Assessment   Pain Assessment Tool 0-10   Pain Score No Pain   ADL   Where Assessed Chair   Grooming Assistance 5  Supervision/Setup   Grooming Deficit Setup; Wash/dry hands; Increased time to complete;Supervision/safety   LB Dressing Assistance 2  Maximal Assistance   LB Dressing Deficit Thread RLE into pants; Thread LLE into pants;Pull up over hips   Toileting Assistance  2  Maximal Assistance   Toileting Deficit Perineal hygiene;Clothing management up;Clothing management down   Transfers   Sit to Stand 4  Minimal assistance   Additional items Assist x 1   Stand to Sit 4  Minimal assistance   Additional items Assist x 1   Functional Mobility   Functional Mobility 4  Minimal assistance   Additional items Rolling walker   Cognition   Overall Cognitive Status Impaired   Arousal/Participation Responsive; Cooperative   Attention Attends with cues to redirect   Orientation Level Oriented to person;Oriented to place;Oriented to situation;Oriented to time   Memory Decreased recall of precautions;Decreased recall of recent events;Decreased short term memory   Following Commands Follows one step commands without difficulty   Activity Tolerance   Activity Tolerance Patient tolerated treatment well   Assessment   Assessment Pt participated in occupational therapy with focus on activity tolerance, functional transfers/mob, LB and UB self-care  Pt cleared by KELECHI/Clarisa for pt participation in occupational therapy    Pt received sitting out of bed to bedside chair and agreeable to therapy following pt Identifiers confirmed  Pt reported his goal today to return to his facility for physical therapy  Pt required assist for functional transfers/mob, toilet transfers and toilet hygiene 2* pt decreased overall strength, coordination and balance  Pt will benefit from post acute care rehab services to continue to address pt noted deficits with decreased activity tolerance, balance, coordination and balance which currently impair pt ADL and functional mob  Pt chair alarm active post session all needs within reach       Plan   Treatment Interventions ADL retraining   Goal Expiration Date 04/07/20   OT Treatment Day 2   OT Frequency 3-5x/wk   Recommendation   OT Discharge Recommendation Other (Comment)  (return to facility )   Barthel Index   Feeding 5   Bathing 0   Grooming Score 0   Dressing Score 5   Bladder Score 5   Bowels Score 5   Toilet Use Score 5   Transfers (Bed/Chair) Score 10   Mobility (Level Surface) Score 0   Stairs Score 0   Barthel Index Score 35       Sun Ordaz  MAHMOOD/L

## 2020-03-26 NOTE — PROGRESS NOTES
Progress Note - Job Diver 1945, 69 y o  male MRN: [id number]  Unit/Bed#: [facility] 926-01 Encounter: [id number] DOS: 3/26/2020  Primary Care Provider: Tilman Opitz, MD   Date and time admitted to hospital: 3/23/2020  7:32 PM    Symptomatic anemia  Assessment & Plan  Patient initially presented to [address] with lightheadedness  Patient found to have hemoglobin 5 6 in platelet 2  Patient is status post 3 units PRBCs this admission  Hemoglobin stable, 8 today  Unknown baseline  FOBT positive  Appreciate GI input - will need EGD/colonoscopy once platelets are stable  No obvious source of bleed at this time    Appreciate hematology input  Peripheral smear negative for schistocytes for helmet cell, , haptoglobin 27, reticulocyte count 6%  Imaging revealed cirrhosis of the liver, INR 1 36, PTT 32    Thrombocytopenia (HCC)  Assessment & Plan  Platelet 2 on admission, status post 3 units of platelets and DDAVP  Aspirin has been discontinued  Appreciate hematology input  Consider possibility of ITP bone marrow disorder, TTP/HUS given ongoing anemia  Follow-up [place] 13 enzyme  DIC panel- normal fibrinogen  Started on IV Solu-Medrol 40 mg twice daily, will continue  Discussed with Dr Feroz Epstein, continue steroids and recheck in AM  Consider IVIG 1gm/kg daily x 2 days     * Shock [facility])  Assessment & Plan  Patient initially transferred to [facility] from [address] due to hypotension and shock  Patient transferred out of ICU 03/25/2020  Status post IV fluid resuscitation  Femoral line discontinued today    BEN (acute kidney injury) ([facility] [address] )  Assessment & Plan  BEN superimposed on probable chronic kidney disease in the setting diabetes cirrhosis  Baseline serum creatinine around 1 3  Suspect BEN due to prerenal causes from decreased oral intake and hypovolemic shock  UA bland  Multiple renal nodules with some CIS do not appear benign noted on CT abdomen  PPI discontinue  Continue hypotonic bicarb drip per Nephrology  Monitor urine output    High anion gap metabolic acidosis  Assessment & Plan  Anion gap 20 on admission with a bicarb of 14   May be secondary to renal impairment  Glucose normal, lactic acidosis resolved  Anion gap now closed, bicarb stable  Continue hypokalemic bicarb drip as per renal    Brain mass  Assessment & Plan  Appreciate input from Neurosurgery  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  Outpatient follow-up with Neurosurgery, may obtain an outpatient MRI in 2-4 weeks  Per patient, although he is a poor historian, states he has known about this for many years    Uremia  Assessment & Plan  No urgent need for dialysis at this time  This may be secondary to hypovolemia and possible GI bleed    Elevated troponin  Assessment & Plan  Elevated troponin, per Cardiology suspect type 2 MI in setting of underlying coronary artery disease with severe anemia and hypotension  Echo showed preserved left ventricular ejection fraction  Reportedly was taking aspirin prior to admission which is known hold  Little beta-blocker due to hypotension  Continue atorvastatin    Type 2 diabetes mellitus ([facility] [address] )  Assessment & Plan  No results found for: HGBA1C    Recent Labs     03/26/20  0051 03/26/20  0545 03/26/20  0719 03/26/20  1137   POCGLU 177* 101 92 112   Blood Sugar Average: Last 72 hrs:  (P) 711 [id number]   Will check hemoglobin A1c  On sliding scale insulin    VTE Pharmacologic Prophylaxis:   Pharmacologic: Pharmacologic VTE Prophylaxis contraindicated due to acute anemia and severe thrombocytopenia   Mechanical VTE Prophylaxis in Place: Yes    Patient Centered Rounds: I have performed bedside rounds with nursing staff today  Discussions with Specialists or Other Care Team Provider: nursing, Dr Luis Gar, [address]     Education and Discussions with Family / Patient: patient     Time Spent for Care: 30 minutes    More than 50% of total time spent on counseling and coordination of care as described above  Current Length of Stay: 3 day(s)    Current Patient Status: Inpatient   Certification Statement: The patient will continue to require additional inpatient hospital stay due to pending improvement in blood counts, close monitroing  of renal function given BEN     Discharge Plan: pending clinical improvement     Code Status: Level 3 - DNAR and DNI    Subjective:   Pt seen and examined at bedside  Eating lunch and feels well  Wants to go home  Normal uriantion without bleeding  Normal BM  Objective:     Vitals:   Temp (24hrs), Av 4 °F (36 9 °C), Min:97 5 °F (36 4 °C), Max:99 1 °F (37 3 °C)    Temp:  [97 5 °F (36 4 °C)-99 1 °F (37 3 °C)] 98 7 °F (37 1 °C)  HR:  [56-76] 59  Resp:  [13-30] 18  BP: (107-146)/(52-72) 117/59  SpO2:  [95 %-98 %] 96 %  Body mass index is 21 21 kg/m²  Input and Output Summary (last 24 hours): Intake/Output Summary (Last 24 hours) at 3/26/2020 1226  Last data filed at 3/26/2020 0900  Gross per 24 hour   Intake 460 ml   Output 1100 ml   Net -640 ml       Physical Exam:     Physical Exam   Constitutional: He is oriented to person, place, and time  He appears well-developed and well-nourished  No distress  HENT:   Head: Normocephalic and atraumatic  Eyes: EOM are normal  No scleral icterus  Neck: Normal range of motion  Neck supple  Cardiovascular: Normal rate, regular rhythm and normal heart sounds  No murmur heard  Pulmonary/Chest: Effort normal and breath sounds normal    Abdominal: Soft  Bowel sounds are normal    Musculoskeletal: Normal range of motion  He exhibits no edema  Neurological: He is alert and oriented to person, place, and time  Skin: Skin is warm and dry  There is pallor  Petechia b/l LE without active oozing    Psychiatric: He has a normal mood and affect       Additional Data:     Labs:    Results from last 7 days   Lab Units 20  1122  20  0505  20  2138   WBC Thousand/uL 5 06   < > 14 37*  --  15 21*   HEMOGLOBIN g/dL 8 1*   < > 7 5*   < > 6 4*   HEMATOCRIT % 24 6*   < > 23 1*   < > 20 1*   PLATELETS Thousands/uL 3*   < > 7*  --  13*   BANDS PCT %  --   --   --   --  2   NEUTROS PCT %  --   --  49  --   --    LYMPHS PCT %  --   --  36  --   --    LYMPHO PCT %  --   --   --   --  35   MONOS PCT %  --   --  10  --   --    MONO PCT %  --   --   --   --  10   EOS PCT %  --   --  2  --  1    < > = values in this interval not displayed  Results from last 7 days   Lab Units 03/26/20  0547   SODIUM mmol/L 139   POTASSIUM mmol/L 3 6   CHLORIDE mmol/L 110*   CO2 mmol/L 24   BUN mg/dL 60*   CREATININE mg/dL 1 96*   ANION GAP mmol/L 5   CALCIUM mg/dL 7 7*   ALBUMIN g/dL 2 6*   TOTAL BILIRUBIN mg/dL 0 52   ALK PHOS U/L 36*   ALT U/L 26   AST U/L 104*   GLUCOSE RANDOM mg/dL 106     Results from last 7 days   Lab Units 03/25/20  0447   INR  1 36*     Results from last 7 days   Lab Units 03/26/20  1137 03/26/20  0719 03/26/20  0545 03/26/20  0051 03/25/20  2055 03/25/20  1727 03/25/20  1124 03/25/20  0530 03/24/20  2321 03/24/20  1743 03/24/20  1152 03/24/20  0546   POC GLUCOSE mg/dl 112 92 101 177* 179* 169* 142* 149* 179* 97 109 136         Results from last 7 days   Lab Units 03/24/20  0505 03/24/20  0230 03/23/20  2138 03/23/20  1610 03/23/20  1521 03/23/20  1335   LACTIC ACID mmol/L  --  0 8 0 8 2 4*  --  2 7*   PROCALCITONIN ng/ml 0 51*  --   --   --  0 68*  --            * I Have Reviewed All Lab Data Listed Above  * Additional Pertinent Lab Tests Reviewed: [address] [facility] Admission Reviewed    Imaging:    Imaging Reports Reviewed Today Include: all  Imaging Personally Reviewed by Myself Includes:  none    Recent Cultures (last 7 days):     Results from last 7 days   Lab Units 03/23/20  1658 03/23/20  1651   BLOOD CULTURE  No Growth at 48 hrs  No Growth at 48 hrs         Last 24 Hours Medication List:     Current Facility-Administered Medications:  acetaminophen 650 mg Oral Q6H PRN Kaycee Greenberg MD    atorvastatin 40 mg Oral Daily With [employer] KARLA Greenberg MD    bisacodyl 10 mg Rectal Daily PRN Kaycee Greenberg MD    chlorhexidine 15 mL Swish & Spit Q12H [address] Kaycee Greenberg MD    dextran 70-hypromellose 2 drop Both Eyes BID Kaycee Greenberg MD    docusate sodium 100 mg Oral BID Kaycee Greenberg MD    ferrous sulfate 325 mg Oral BID With Meals Kaycee Greenberg MD    insulin lispro 1-5 Units Subcutaneous Q6H [address] Kaycee Greenberg MD    methylPREDNISolone sodium succinate 40 mg Intravenous BID Kaycee Greenberg MD    pantoprazole 40 mg Oral BID AC Kaycee Greenberg MD    sodium bicarbonate infusion 100 mL/hr Intravenous Continuous Kaycee Greenberg MD Last Rate: 100 mL/hr (03/26/20 1115)        Today, Patient Was Seen By: Enrique Grady PA-C    ** Please Note: Dictation voice to text software may have been used in the creation of this document   **

## 2020-03-26 NOTE — PLAN OF CARE
Problem: PHYSICAL THERAPY ADULT  Goal: Performs mobility at highest level of function for planned discharge setting  See evaluation for individualized goals  Description  Treatment/Interventions: OT, Spoke to case management, Spoke to nursing, Gait training, Bed mobility, Patient/family training, Endurance training, LE strengthening/ROM, Functional transfer training          See flowsheet documentation for full assessment, interventions and recommendations  Outcome: Progressing  Note:   Prognosis: Fair  Problem List: Decreased strength, Impaired balance, Decreased endurance, Decreased mobility, Decreased cognition, Decreased safety awareness  Assessment: Pt demonstrated improved mobility this session  Was able to perform sit to stand transfers with similar assist to previous session, and pt was able to do so without instructions for hand placment  Pt ambulated increased distances without need for seated rest   Immediately agreeable to perform standing balance exercises as noted above, requirng use of railing for marching, tandem walking, and backwards walking  Pace improved with UE support and pt did not demonstrate significant LOB  Pt demonstrated difficulty with increased hip flexion with marching, and taking long enough steps for true heel-toe tandem walking, requiring instructions for improvements throughout  Pt declined seated rest until conclusion of exercises  Pt returned to room with all needs in reach & chair alarm active post session  Continue with current POC at this time, working towards goals established in initial evaluation to maximize independence & reduce need for assist with mobility tasks at this time  Recommendation: Other (Comment)(return to facility with increased assistance)     PT - OK to Discharge: Yes    See flowsheet documentation for full assessment

## 2020-03-26 NOTE — ASSESSMENT & PLAN NOTE
No results found for: HGBA1C    Recent Labs     03/26/20  0051 03/26/20  0545 03/26/20  0719 03/26/20  1137   POCGLU 177* 101 92 112   Blood Sugar Average: Last 72 hrs:  (P) 791 [id number]   Will check hemoglobin A1c  On sliding scale insulin

## 2020-03-26 NOTE — ASSESSMENT & PLAN NOTE
BEN superimposed on probable chronic kidney disease in the setting diabetes cirrhosis  Baseline serum creatinine around 1 3  Suspect BEN due to prerenal causes from decreased oral intake and hypovolemic shock  UA bland  Multiple renal nodules with some CIS do not appear benign noted on CT abdomen  PPI discontinue  Continue hypotonic bicarb drip per Nephrology  Monitor urine output

## 2020-03-27 PROBLEM — D35.2 PITUITARY MACROADENOMA (HCC): Status: ACTIVE | Noted: 2020-03-23

## 2020-03-27 LAB
ABO GROUP BLD BPU: NORMAL
ANION GAP SERPL CALCULATED.3IONS-SCNC: 7 MMOL/L (ref 4–13)
BPU ID: NORMAL
BUN SERPL-MCNC: 54 MG/DL (ref 5–25)
CALCIUM SERPL-MCNC: 7.7 MG/DL (ref 8.3–10.1)
CHLORIDE SERPL-SCNC: 111 MMOL/L (ref 100–108)
CO2 SERPL-SCNC: 23 MMOL/L (ref 21–32)
CREAT SERPL-MCNC: 1.89 MG/DL (ref 0.6–1.3)
CROSSMATCH: NORMAL
ERYTHROCYTE [DISTWIDTH] IN BLOOD BY AUTOMATED COUNT: 17.6 % (ref 11.6–15.1)
GFR SERPL CREATININE-BSD FRML MDRD: 34 ML/MIN/1.73SQ M
GLUCOSE SERPL-MCNC: 111 MG/DL (ref 65–140)
GLUCOSE SERPL-MCNC: 160 MG/DL (ref 65–140)
GLUCOSE SERPL-MCNC: 224 MG/DL (ref 65–140)
GLUCOSE SERPL-MCNC: 264 MG/DL (ref 65–140)
GLUCOSE SERPL-MCNC: 85 MG/DL (ref 65–140)
HCT VFR BLD AUTO: 23.1 % (ref 36.5–49.3)
HGB BLD-MCNC: 7.5 G/DL (ref 12–17)
MCH RBC QN AUTO: 31.1 PG (ref 26.8–34.3)
MCHC RBC AUTO-ENTMCNC: 32.5 G/DL (ref 31.4–37.4)
MCV RBC AUTO: 96 FL (ref 82–98)
PLATELET # BLD AUTO: 6 THOUSANDS/UL (ref 149–390)
POTASSIUM SERPL-SCNC: 3.8 MMOL/L (ref 3.5–5.3)
RBC # BLD AUTO: 2.41 MILLION/UL (ref 3.88–5.62)
SODIUM SERPL-SCNC: 141 MMOL/L (ref 136–145)
UNIT DISPENSE STATUS: NORMAL
UNIT PRODUCT CODE: NORMAL
UNIT RH: NORMAL
WBC # BLD AUTO: 4.03 THOUSAND/UL (ref 4.31–10.16)

## 2020-03-27 PROCEDURE — 99232 SBSQ HOSP IP/OBS MODERATE 35: CPT | Performed by: INTERNAL MEDICINE

## 2020-03-27 PROCEDURE — 85027 COMPLETE CBC AUTOMATED: CPT | Performed by: PHYSICIAN ASSISTANT

## 2020-03-27 PROCEDURE — 80048 BASIC METABOLIC PNL TOTAL CA: CPT | Performed by: INTERNAL MEDICINE

## 2020-03-27 PROCEDURE — 82948 REAGENT STRIP/BLOOD GLUCOSE: CPT

## 2020-03-27 PROCEDURE — 99231 SBSQ HOSP IP/OBS SF/LOW 25: CPT | Performed by: INTERNAL MEDICINE

## 2020-03-27 RX ADMIN — PANTOPRAZOLE SODIUM 40 MG: 40 TABLET, DELAYED RELEASE ORAL at 06:05

## 2020-03-27 RX ADMIN — FERROUS SULFATE TAB 325 MG (65 MG ELEMENTAL FE) 325 MG: 325 (65 FE) TAB at 18:15

## 2020-03-27 RX ADMIN — METHYLPREDNISOLONE SODIUM SUCCINATE 40 MG: 40 INJECTION, POWDER, FOR SOLUTION INTRAMUSCULAR; INTRAVENOUS at 09:02

## 2020-03-27 RX ADMIN — ATORVASTATIN CALCIUM 40 MG: 40 TABLET, FILM COATED ORAL at 18:15

## 2020-03-27 RX ADMIN — FERROUS SULFATE TAB 325 MG (65 MG ELEMENTAL FE) 325 MG: 325 (65 FE) TAB at 09:02

## 2020-03-27 RX ADMIN — PANTOPRAZOLE SODIUM 40 MG: 40 TABLET, DELAYED RELEASE ORAL at 18:15

## 2020-03-27 RX ADMIN — METHYLPREDNISOLONE SODIUM SUCCINATE 40 MG: 40 INJECTION, POWDER, FOR SOLUTION INTRAMUSCULAR; INTRAVENOUS at 18:15

## 2020-03-27 NOTE — PROGRESS NOTES
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 o  male MRN: [id number]  Unit/Bed#: [facility] 926-01 Encounter: [id number]      Assessment / Plan:  1  BEN on probable CKD, b/l sCr 1 36 as of Nov 2016, peak sCr 3 52, down to 1 89 now  -BEN likely d/t prerenal cause from decreased oral intake with possible hypovolemic shock  -UA bland, supportive of likely hemodynamic cause  -abdominal ultrasound shows normal sized kidneys with bilateral renal cysts  -CT abdomen shows multiple bilateral renal nodules, with some cysts which do not appear benign   -was on PPI at home  -f/u am BMP  Monitor off IV fluids  -urinating, monitor UOP     2  CKD stage 3 in setting of DM/cirrhosis - b/l sCr as above  -no outpatient nephrology follow up on record     3  Anemia ?  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 If malignant in nature     Other issues: NSTEMI type 1 vs 2 - cardio on board, trop > 40        Subjective:   He denies any chest pain or shortness of breath  No complaints  Eating better  Objective:     Vitals: Blood pressure 126/58, pulse 56, temperature 98 6 °F (37 °C), resp   rate 18, height 5' 6" (1 676 m), weight 62 3 kg (137 lb 5 6 oz), SpO2 95 %  ,Body mass index is 22 17 kg/m²  Temp (24hrs), Av 7 °F (37 1 °C), Min:98 5 °F (36 9 °C), Max:98 9 °F (37 2 °C)      Weight (last 2 days)     Date/Time   Weight    20 0600   62 3 (137 35)    20 0600   59 6 (131 39)    20 1630   59 8 (131 84)                Intake/Output Summary (Last 24 hours) at 3/27/2020 1400  Last data filed at 3/27/2020 1300  Gross per 24 hour   Intake 1397 ml   Output 1400 ml   Net -3 ml     I/O last 24 hours: In: 80 [P O :430; I V :1057]  Out: 1400 [Urine:1400]        Physical Exam:   Physical Exam   Constitutional: He appears well-developed and well-nourished  No distress  thin   HENT:   Head: Normocephalic and atraumatic  Mouth/Throat: No oropharyngeal exudate  Eyes: Right eye exhibits no discharge  Left eye exhibits no discharge  No scleral icterus  Neck: Normal range of motion  Neck supple  No thyromegaly present  Cardiovascular: Normal rate, regular rhythm and normal heart sounds  Pulmonary/Chest: Effort normal and breath sounds normal  He has no wheezes  He has no rales  Abdominal: Soft  Bowel sounds are normal  He exhibits no distension  There is no tenderness  Musculoskeletal: Normal range of motion  He exhibits no edema  Neurological: He is alert  awake   Skin: Skin is warm and dry  Rash (+petechiae over arms and legs and upper chest) noted  He is not diaphoretic  Psychiatric: He has a normal mood and affect  His behavior is normal    Vitals reviewed        Invasive Devices     Peripheral Intravenous Line            Peripheral IV 20 Right Forearm less than 1 day                Medications:    Scheduled Meds:  Current Facility-Administered Medications:  acetaminophen 650 mg Oral Q6H PRN Kaycee Greenberg MD    atorvastatin 40 mg Oral Daily With [employer] KARLA Greenberg MD    bisacodyl 10 mg Rectal Daily PRN Kaycee Greenberg MD    chlorhexidine 15 mL Swish & Spit Q12H [address] Kaycee Greenberg MD    dextran 70-hypromellose 2 drop Both Eyes BID Kaycee Greenberg MD    ferrous sulfate 325 mg Oral BID With Meals Kaycee Greenberg MD    insulin lispro 1-5 Units Subcutaneous 4x Daily (AC & HS) RICKY Velez    loperamide 2 mg Oral TID PRN Tammi Prader Deleon, PA-C    methylPREDNISolone sodium succinate 40 mg Intravenous BID Velvet Greenberg MD    multi-electrolyte 60 mL/hr Intravenous Continuous Azra jB Palma DO Last Rate: 60 mL/hr (03/26/20 1538)   pantoprazole 40 mg Oral BID AC Kaycee Greenberg MD        PRN Meds:   acetaminophen    bisacodyl    loperamide    Continuous Infusions:  multi-electrolyte 60 mL/hr Last Rate: 60 mL/hr (03/26/20 1538)           LAB RESULTS:      Results from last 7 days   Lab Units 03/27/20  0540 03/26/20  1122 03/26/20  0547 03/26/20  0022 03/25/20  1758 03/25/20  0447 03/25/20  0446 03/24/20  2318 03/24/20  1737  03/24/20  0505  03/23/20  2139 03/23/20  2138  03/23/20  1610 03/23/20  1322   WBC Thousand/uL 4 03* 5 06 4 41 3 24* 4 23*  --  3 35* 5 22 5 86   < > 14 37*  --   --  15 21*  --   --  14 89*   HEMOGLOBIN g/dL 7 5* 8 1* 7 5* 7 5* 8 0*  --  7 4* 8 0* 8 2*   < > 7 5*   < >  --  6 4*   < >  --  5 9*   HEMATOCRIT % 23 1* 24 6* 22 0* 22 3* 23 5*  --  22 6* 24 1* 24 5*   < > 23 1*   < >  --  20 1*   < >  --  18 8*   PLATELETS Thousands/uL 6* 3* 4* 3* 2*  --  1* 2* 5*   < > 7*  --   --  13*  --   --  2*   NEUTROS PCT %  --   --   --   --   --   --   --   --   --   --  49  --   --   --   --   --  48   LYMPHS PCT %  --   --   --   --   --   --   --   --   --   --  36  --   --   --   --   --  40   LYMPHO PCT %  --   --   --   --   --   --   --   --   --   --   --   --   --  35  --   --   --    MONOS PCT %  --   --   --   --   --   --   --   --   --   --  10  --   --   --   --   --  10   MONO PCT %  --   --   --   --   --   --   --   --   --   --   --   --   --  10  --   --   --    EOS PCT %  --   --   --   --   --   --   --   --   --   --  2  --   --  1  --   --  1   POTASSIUM mmol/L 3 8  --  3 6  --   --  3 5  --   --  3 6 --  3 8  --  3 9  --   --  4 5 4 4   CHLORIDE mmol/L 111*  --  110*  --   --  117*  --   --  121*  --  123*  --  123*  --   --  112* 109*   CO2 mmol/L 23  --  24  --   --  19*  --   --  17*  --  14*  --  13*  --   --  13* 14*   BUN mg/dL 54*  --  60*  --   --  76*  --   --  88*  --  108*  --  118*  --   --  127* 138*   CREATININE mg/dL 1 89*  --  1 96*  --   --  2 31*  --   --  2 54*  --  2 84*  --  2 83*  --   --  3 17* 3 52*   CALCIUM mg/dL 7 7*  --  7 7*  --   --  7 5*  --   --  7 5*  --  7 8*  --  7 4*  --   --  7 5* 8 4   ALK PHOS U/L  --   --  36*  --   --  39*  --   --   --   --  40*  --  36*  --   --   --  40*   ALT U/L  --   --  26  --   --  29  --   --   --   --  30  --  23  --   --   --  23   AST U/L  --   --  104*  --   --  162*  --   --   --   --  215*  --  167*  --   --   --  80*   MAGNESIUM mg/dL  --   --  1 8  --   --   --   --   --   --   --   --   --  1 6  --   --   --  1 6   PHOSPHORUS mg/dL  --   --   --   --   --   --   --   --   --   --   --   --  3 2  --   --   --   --     < > = values in this interval not displayed  CUTURES:  Lab Results   Component Value Date    BLOODCX No Growth at 72 hrs  03/23/2020    BLOODCX No Growth at 72 hrs  03/23/2020                 Portions of the record may have been created with voice recognition software  Occasional wrong word or "sound a like" substitutions may have occurred due to the inherent limitations of voice recognition software  Read the chart carefully and recognize, using context, where substitutions have occurred  If you have any questions, please contact the dictating provider

## 2020-03-27 NOTE — ASSESSMENT & PLAN NOTE
No results found for: HGBA1C    Recent Labs     03/26/20  1137 03/26/20  1645 03/26/20  2116 03/27/20  0743   POCGLU 112 163* 197* 85   Blood Sugar Average: Last 72 hrs:  (P) 138 625   Acceptable blood sugar  On sliding scale insulin

## 2020-03-27 NOTE — RESTORATIVE TECHNICIAN NOTE
Restorative Specialist Mobility Note       Activity: Ambulate in jovel, [facility] privileges, Ambulate in room, Chair, Dangle, Stand at bedside(Educated/encouraged pt to ambulate with assistance 3-4 x's/day  Bed alarm on   Pt callbell, phone/tray within reach )     Assistive Device: Front wheel walker       Shemar SAMAYOA, Restorative Technician, [employer]

## 2020-03-27 NOTE — PROGRESS NOTES
Hematology - Oncology Progress Note    Neil Mireles 38[birth year], [id number]  [facility] 926/[facility] 926-01      Impression and plan:    67-year-old male admitted with severe thrombocytopenia  There was no significant response to platelet transfusion - consistent with immunologic phenomenon  PT PTT within normal limits  Patient is being treated for immune thrombocytopenia  Patient has been on Solu-Medrol, platelet count is slightly higher/better than before (but no clear and consistent upward trend)    Patient has bruising but no evidence of bleeding  Hemoglobin level is low but stable  Patient has no respiratory issues  Patient has a history of cirrhosis, history of guaiac positive stools (GI evaluation is obviously on hold with the present platelet count)  Hemoglobin level obviously needs to be monitored  No evidence of DIC, no evidence for TTP (although [id number] level pending)  For the time being, I would continue with the Solu-Medrol  IVIG has been discussed in the past - difficult at this time because of the limited supply  That being said, patient will be treated with IVIG if medically necessary  I am okay with watching the platelet count and watching for excessive bruising/bleeding for 1 or 2 more days  Hopefully within the next day or 2, there will be an upward/persistent trend from the steroids  Although not absolutely diagnostic of ITP, platelet antibody panel can be helpful as far as verifying the diagnosis - ordered  Above discussed with patient;   Ralston Hammans had no questions  Above discussed with hospitalist also   __________________________________________________________________________________________    Chief complaint: Thrombocytopenia    History of present illness:  67-year-old male with a number of medical issues (COPD, hyperlipidemia, CAD, diabetes, CKD) transferred from [facility] because of severe thrombocytopenia  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       Mr Ralston Hammans was found in bed in no apparent distress  Patient was pleasant and responsive, no complaints  Patient denied any bleeding, GI bleeding  Appetite okay, no nausea vomiting  Activities are extremely limited      Hospital medications list:    Current Facility-Administered Medications:  acetaminophen 650 mg Oral Q6H PRN Kaycee Greenberg MD    atorvastatin 40 mg Oral Daily With [employer] KARLA Greenberg MD    bisacodyl 10 mg Rectal Daily PRN Kaycee Greenberg MD    chlorhexidine 15 mL Swish & Spit Q12H [address] Kaycee Greenberg MD    dextran 70-hypromellose 2 drop Both Eyes BID Kaycee Greenberg MD    ferrous sulfate 325 mg Oral BID With Meals Kaycee Greenberg MD    insulin lispro 1-5 Units Subcutaneous 4x Daily (AC & HS) Ewa Dior PA-C    loperamide 2 mg Oral TID PRN Nat Barbosa PA-C    methylPREDNISolone sodium succinate 40 mg Intravenous BID Kaycee Greenberg MD    multi-electrolyte 60 mL/hr Intravenous Continuous Azra KARLA Muro DO Last Rate: 60 mL/hr (03/26/20 1538)   pantoprazole 40 mg Oral BID AC Kaycee Greenberg MD      Physical exam  /58   Pulse 56   Temp 98 6 °F (37 °C)   Resp 18   Ht 5' 6" (1 676 m)   Wt 62 3 kg (137 lb 5 6 oz)   SpO2 95%   BMI 22 17 kg/m²   Constitutional:  Frail appearing male, no respiratory distress, no evidence of pain  Eyes: PERRL, conjunctiva pale, anicteric    HENT: Atraumatic, external ears normal, nose normal,    Neck: Good range of motion, no adenopathy  Respiratory:  Fair entry bilaterally, scattered bilateral rhonchi  Cardiovascular: Normal rate, normal rhythm, no murmurs, no gallops, no rubs    GI: Soft, nondistended, normal bowel sounds, nontender, no organomegaly, no mass, no rebound, no guarding    : No costovertebral angle tenderness, normal reproductive organs, no discharge  Musculoskeletal: No pain or tenderness with palpation of joints, muscles or bones  Integument:  Pale, scattered purpura, few ecchymoses, no petechial rash, patient has a number of scabs on both lower extremities, no active bleeding  Lymphatic: No adenopathy in the neck, supra-clavicular region, axilla and groin bilaterally  Extremities:  0-1 +bilateral lower extremity edema, no cords, pulses are 1+  Neurologic:  Awake, responsive to simple questions, response time slightly delayed, motor and sensory grossly equal bilaterally    Laboratory test results    Results for Kenia Curry (MRN [id number]) as of 3/27/2020 09:12   Ref  Range 3/25/2020 17:58 3/26/2020 00:22 3/26/2020 05:47 3/26/2020 11:22 3/27/2020 05:40   WBC Latest Ref Range: 4 31 - 10 16 Thousand/uL 4 23 (L) 3 24 (L) 4 41 5 06 4 03 (L)   Red Blood Cell Count Latest Ref Range: 3 88 - 5 62 Million/uL 2 50 (L) 2 38 (L) 2 32 (L) 2 57 (L) 2 41 (L)   Hemoglobin Latest Ref Range: 12 0 - 17 0 g/dL 8 0 (L) 7 5 (L) 7 5 (L) 8 1 (L) 7 5 (L)   HCT Latest Ref Range: 36 5 - 49 3 % 23 5 (L) 22 3 (L) 22 0 (L) 24 6 (L) 23 1 (L)   MCV Latest Ref Range: 82 - 98 fL 94 94 95 96 96   MCH Latest Ref Range: 26 8 - 34 3 pg 32 0 31 5 32 3 31 5 31 1   MCHC Latest Ref Range: 31 4 - 37 4 g/dL 34 0 33 6 34 1 32 9 32 5   RDW Latest Ref Range: 11 6 - 15 1 % 18 2 (H) 18 0 (H) 18 1 (H) 17 9 (H) 17 6 (H)   Platelet Count Latest Ref Range: 149 - 390 Thousands/uL 2 (LL) 3 (LL) 4 (LL) 3 (LL) 6 (LL)     Results for Kenia Curry (MRN [id number]) as of 3/27/2020 09:12   Ref  Range 3/25/2020 04:47   ANTITHROMBIN III ACTIVITY Latest Ref Range: 92 - 136 % of Normal 71 (L)   FIBRINOGEN LEVEL Latest Ref Range: 227 - 495 mg/dL 240   PLASMINOGEN ACTIVITY Latest Ref Range: 77 - 138 % of Normal 69 (L)   FDP Latest Ref Range: <10  <10   Protime Latest Ref Range: 11 6 - 14 5 seconds 16 3 (H)   INR Latest Ref Range: 0 84 - 1 19  1 36 (H)   PTT Latest Ref Range: 23 - 37 seconds 32     Results for Kenia Curry (MRN [id number]) as of 3/27/2020 09:12   Ref   Range 3/27/2020 05:40   Sodium Latest Ref Range: 136 - 145 mmol/L 141   Potassium Latest Ref Range: 3 5 - 5 3 mmol/L 3 8   Chloride Latest Ref Range: 100 - 108 mmol/L 111 (H)   CO2 Latest Ref Range: 21 - 32 mmol/L 23   Anion Gap Latest Ref Range: 4 - 13 mmol/L 7   BUN Latest Ref Range: 5 - 25 mg/dL 54 (H)   Creatinine Latest Ref Range: 0 60 - 1 30 mg/dL 1 89 (H)   Glucose, Random Latest Ref Range: 65 - 140 mg/dL 111   Calcium Latest Ref Range: 8 3 - 10 1 mg/dL 7 7 (L)     03/25/2020 PT = 16 3 INR = 1 36 fibrinogen = 240 = WNL D-dimer = 0 81 = elevated    Imaging    03/23/2020 CT scan abdomen pelvis    Nodular liver concerning for underlying cirrhosis      Multiple bilateral renal nodules, some of which meet the criteria for a simple cyst and others which do not  Recommend an ultrasound for further evaluation with attention to the exophytic left upper pole nodule  Diverticulosis without evidence for acute diverticulitis  SPLEEN:  Unremarkable

## 2020-03-27 NOTE — ASSESSMENT & PLAN NOTE
Patient initially transferred to [facility] from [address] due to hypotension and shock  Patient transferred out of ICU 03/25/2020  Status post IV fluid resuscitation

## 2020-03-27 NOTE — ASSESSMENT & PLAN NOTE
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

## 2020-03-27 NOTE — PROGRESS NOTES
Progress Note - Emely Mcdowell 1945, 69 y o  male MRN: [id number]    Unit/Bed#: [facility] 926-01 Encounter: [id number]    Primary Care Provider: Ginette Perez MD   Date and time admitted to hospital: 3/23/2020  7:32 PM        Symptomatic anemia  Assessment & Plan  Patient initially presented to [address] with lightheadedness  Patient found to have hemoglobin 5 6 in platelet 2  Patient is status post 3 units PRBCs this admission  Unknown baseline hemoglobin  FOBT positive  Appreciate GI input - will need EGD/colonoscopy once platelets are stable  No obvious source of bleed at this time    Appreciate hematology input  Peripheral smear negative for hemolysis   Imaging revealed cirrhosis of the liver  Continue to monitor    Thrombocytopenia (HCC)  Assessment & Plan  Platelet 2 on admission, status post 3 units of platelets and DDAVP  Aspirin has been discontinued  Appreciate hematology input  Consider possibility of ITP bone marrow disorder, TTP/HUS given ongoing anemia  Follow-up [place] 13 enzyme  DIC panel- normal fibrinogen  Started on IV Solu-Medrol 40 mg twice daily,  Awaiting further hematology input for possibility of  IVIG 1gm/kg daily x 2 days     * Shock [facility])  Assessment & Plan  Patient initially transferred to [facility] from [address] [facility] due to hypotension and shock  Patient transferred out of ICU 03/25/2020  Status post IV fluid resuscitation      BEN (acute kidney injury) ([facility] [address] )  Assessment & Plan  BEN superimposed on probable chronic kidney disease in the setting diabetes, cirrhosis  Baseline serum creatinine around 1 3  Suspect BEN due to prerenal causes from decreased oral intake and hypovolemic shock  UA bland  Multiple renal nodules with some CIS do not appear benign noted on CT abdomen  Nephrology is following      Elevated troponin  Assessment & Plan  Elevated troponin, per Cardiology suspect type 2 MI in setting of underlying coronary artery disease with severe anemia and hypotension  Echo showed preserved left ventricular ejection fraction  Reportedly was taking aspirin prior to admission which is known hold  Continue atorvastatin  Cardiology signed off    Type 2 diabetes mellitus [facility])  Assessment & Plan  No results found for: HGBA1C    Recent Labs     20  1137 20  1645 20  2116 20  0743   POCGLU 112 163* 197* 85   Blood Sugar Average: Last 72 hrs:  (P) 138 625   Acceptable blood sugar  On sliding scale insulin    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[address] )  Assessment & Plan  Appreciate input from Neurosurgery  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  Outpatient follow-up with Neurosurgery, may obtain an outpatient MRI in 2-4 weeks           VTE Pharmacologic Prophylaxis:   Pharmacologic: Pharmacologic VTE Prophylaxis contraindicated due to Thrombocytopenia  Mechanical VTE Prophylaxis in Place: Yes    Patient Centered Rounds: I have performed bedside rounds with nursing staff today  Discussions with Specialists or Other Care Team Provider:  Hematology    Education and Discussions with Family / Patient:  Patient    Time Spent for Care: 30 minutes  More than 50% of total time spent on counseling and coordination of care as described above      Current Length of Stay: 4 day(s)    Current Patient Status: Inpatient   Certification Statement: The patient will continue to require additional inpatient hospital stay due to Above    Discharge Plan / Estimated Discharge Date: [facility] SNF when stable    Code Status: Level 3 - DNAR and DNI      Subjective:   Patient seen and examined  Comfortable sitting in chair  No event over night  Asking about going home    Objective:     Vitals:   Temp (24hrs), Av 7 °F (37 1 °C), Min:98 5 °F (36 9 °C), Max:98 9 °F (37 2 °C)    Temp:  [98 5 °F (36 9 °C)-98 9 °F (37 2 °C)] 98 6 °F (37 °C)  HR:  [56-59] 56  Resp:  [18-19] 18  BP: (122-144)/(58-73) 126/58  SpO2:  [95 %-98 %] 95 %  Body mass index is 22 17 kg/m²  Input and Output Summary (last 24 hours): Intake/Output Summary (Last 24 hours) at 3/27/2020 1216  Last data filed at 3/27/2020 0917  Gross per 24 hour   Intake 1287 ml   Output 1200 ml   Net 87 ml       Physical Exam:     Physical Exam  Patient is awake alert in no acute distress  Lungs decreased breath sounds bilateral at basis  Heart positive S1-S2 no murmur  Abdomen soft nontender  Lower extremity with petechia, no edema    Additional Data:     Labs:    Results from last 7 days   Lab Units 03/27/20  0540  03/24/20  0505   WBC Thousand/uL 4 03*   < > 14 37*   HEMOGLOBIN g/dL 7 5*   < > 7 5*   HEMATOCRIT % 23 1*   < > 23 1*   PLATELETS Thousands/uL 6*   < > 7*   NEUTROS PCT %  --   --  49   LYMPHS PCT %  --   --  36   MONOS PCT %  --   --  10   EOS PCT %  --   --  2    < > = values in this interval not displayed  Results from last 7 days   Lab Units 03/27/20  0540 03/26/20  0547   POTASSIUM mmol/L 3 8 3 6   CHLORIDE mmol/L 111* 110*   CO2 mmol/L 23 24   BUN mg/dL 54* 60*   CREATININE mg/dL 1 89* 1 96*   CALCIUM mg/dL 7 7* 7 7*   ALK PHOS U/L  --  36*   ALT U/L  --  26   AST U/L  --  104*     Results from last 7 days   Lab Units 03/25/20  0447   INR  1 36*       * I Have Reviewed All Lab Data Listed Above  * Additional Pertinent Lab Tests Reviewed: [address] Admission Reviewed    Imaging:    Imaging Reports Reviewed Today Include:   Imaging Personally Reviewed by Myself Includes:      Recent Cultures (last 7 days):     Results from last 7 days   Lab Units 03/23/20  1658 03/23/20  1651   BLOOD CULTURE  No Growth at 72 hrs  No Growth at 72 hrs         Last 24 Hours Medication List:     Current Facility-Administered Medications:  acetaminophen 650 mg Oral Q6H PRN Kaycee Greenberg MD    atorvastatin 40 mg Oral Daily With [employer] KARLA Greenberg MD    bisacodyl 10 mg Rectal Daily PRN Kaycee Greenberg MD    chlorhexidine 15 mL Swish & Spit Q12H [address] Rachelle Greenberg MD dextran 70-hypromellose 2 drop Both Eyes BID Kaycee Greenberg MD    ferrous sulfate 325 mg Oral BID With Meals Kaycee Greenberg MD    insulin lispro 1-5 Units Subcutaneous 4x Daily (AC & HS) Benjie Penn PA-C    loperamide 2 mg Oral TID PRN Dalia Barbosa PA-C    methylPREDNISolone sodium succinate 40 mg Intravenous BID Kaycee Greenberg MD    multi-electrolyte 60 mL/hr Intravenous Continuous Azra KARLA Muro DO Last Rate: 60 mL/hr (03/26/20 1538)   pantoprazole 40 mg Oral BID AC Kaycee Greenberg MD         Today, Patient Was Seen By: Ross Beltran DO    ** Please Note: This note has been constructed using a voice recognition system   **

## 2020-03-27 NOTE — SOCIAL WORK
CM informed by [facility] radiation dept--time slot available for pt for [facility] site for radiation at 5:30pm on Monday 3/30  Pt agreeable with time  Anticipate d/c to home tomorrow--no other needs identified

## 2020-03-27 NOTE — PROGRESS NOTES
Progress Note - Cardiology   Micky Arias 69 y o  male MRN: [id number]  Encounter: [id number]  03/27/20  11:12 AM        Assessment/Plan:    1  Type 2 MI in setting of underlying CAD and severe anemia/hypotension  Echo showed preserved LV function  Due to anemia/thrombocytopenia, not currently on antiplatelets or antithrombotics  Was reported to be taking aspirin 81 mg daily PTA  On Atorvastatin 40  Not on beta blocker due to hypotension on presentation, currently BP improved  Start beta blocker if able  2  BEN/CKD III  Peak creatinine on admission 3 5, currently trending down  Still on IVF, monitor for volume overload  3  Thrombocytopenia  On solumedrol  Platelets remain low  May need IVIG as per last hematology note  4  Anemia  Hgb 7 5  Transfusing as needed  5  DM  On SSI  No further inpatient cardiac recommendations, will sign off, call with questions  Subjective/Objective   Chief Complaint: No chief complaint on file  Subjective: 69 y o  man with CAD s/p 3 vessel CABG 2017, HTN, HL, DM, CKD III, tobacco use, TORRES with cirrhosis, pituitary mass, colonic AVMs, BPH, presented to [address] from [facility] with dark/bloody BMs, lightheadedness, dizziness, SOB/fatigue and severe anemia with Hgb 7 on outpt labs, creatinine 3, platelets < 10  On presentation to  [address] [facility] Hgb was 5 9, creatinine 3 5, troponin elevated to 8 52, lactic acid 2 7  He was persistently hypotensive despite IVF and blood transfusion, requiring pressor support  Echo 3/24/20 showed normal LV size and function, EF 65%, no diagnostic RWMA, RV mildly dilated with normal RV function  Mild to moderate MR  Moderate TR with PASP 42 mm Hg  He was evaluated by Hematology, being treated with steroids for 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  Still on IVF  No fevers  Denies CP or SOB  No dizziness or lightheadedness  No nausea/vomiting       Patient Active Problem List   Diagnosis    Shock ([facility] [address] )    Symptomatic anemia    Brain mass    CAD (coronary artery disease)    COPD (chronic obstructive pulmonary disease) (HCC)    Thrombocytopenia (HCC)    Type 2 diabetes mellitus (HCC)    Elevated troponin    Lactic acid acidosis    Uremia    BEN (acute kidney injury) (HCC)    CKD (chronic kidney disease) stage 3, GFR 30-59 ml/min (HCC)    Acidosis     Past Medical History:   Diagnosis Date    Cirrhosis ([facility] )     COPD (chronic obstructive pulmonary disease) (HCC)     Coronary artery disease     Dementia ([facility] )     Diabetes mellitus ([place] [address] )     Diverticulosis     Gastric reflux     Hyperlipemia     Panlobular emphysema (HCC)     Pituitary mass (HCC)     Renal disorder     CKD Stage 3 & Bilat multi renal nodules    Thrombocyte disorder (HCC)     thrombocytopenia    Unstable angina (HCC)        Allergies   Allergen Reactions    Erythromycin     Penicillins     Shellfish-Derived Products        Current Facility-Administered Medications   Medication Dose Route Frequency Provider Last Rate Last Dose    acetaminophen (TYLENOL) tablet 650 mg  650 mg Oral Q6H PRN Kaycee Greenberg MD        atorvastatin (LIPITOR) tablet 40 mg  40 mg Oral Daily With [employer] KARLA Greenberg MD   40 mg at 03/26/20 1544    bisacodyl (DULCOLAX) rectal suppository 10 mg  10 mg Rectal Daily PRN Kaycee Greenberg MD        chlorhexidine (PERIDEX) 0 12 % oral rinse 15 mL  15 mL Swish & Spit Q12H [facility] Kaycee Greenberg MD   15 mL at 03/26/20 0937    dextran 70-hypromellose (GENTEAL TEARS) 0 1-0 3 % ophthalmic solution 2 drop  2 drop Both Eyes BID Kaycee Greenberg MD   2 drop at 03/25/20 1724    ferrous sulfate tablet 325 mg  325 mg Oral BID With Meals Kaycee Greenberg MD   325 mg at 03/27/20 0902    insulin lispro (HumaLOG) 100 units/mL subcutaneous injection 1-5 Units  1-5 Units Subcutaneous 4x Daily (AC & HS) Donna Minor PA-C   1 Units at 03/26/20 2330    loperamide (IMODIUM) capsule 2 mg  2 mg Oral TID PRN Greyson Barbosa PA-C   2 mg at 03/26/20 1544    methylPREDNISolone sodium succinate (Solu-MEDROL) injection 40 mg  40 mg Intravenous BID Kaycee Greenberg MD   40 mg at 03/27/20 0902    multi-electrolyte (PLASMALYTE-A/ISOLYTE-S PH 7 4) IV solution  60 mL/hr Intravenous Continuous Azra KARLA Muro, DO 60 mL/hr at 03/26/20 1538 60 mL/hr at 03/26/20 1538    pantoprazole (PROTONIX) EC tablet 40 mg  40 mg Oral BID AC Kaycee Greenberg MD   40 mg at 03/27/20 0605       Vitals: /58   Pulse 56   Temp 98 6 °F (37 °C)   Resp 18   Ht 5' 6" (1 676 m)   Wt 62 3 kg (137 lb 5 6 oz)   SpO2 95%   BMI 22 17 kg/m²     Intake/Output Summary (Last 24 hours) at 3/27/2020 1112  Last data filed at 3/27/2020 0917  Gross per 24 hour   Intake 1287 ml   Output 1200 ml   Net 87 ml     Wt Readings from Last 3 Encounters:   03/27/20 62 3 kg (137 lb 5 6 oz)   03/23/20 61 kg (134 lb 7 7 oz)       Body mass index is 22 17 kg/m²  ,     Vitals:    03/26/20 0632 03/26/20 1452 03/26/20 2222 03/27/20 0738   BP: 117/59 122/60 144/73 126/58   Pulse: 59 58 59 56   Patient Position - Orthostatic VS:           Physical Exam:     GEN: Awake, alert, in no acute distress  HEENT: Sclera anicteric, conjunctivae pink, mucous membranes moist   NECK: Supple, no carotid bruits, no significant JVD  HEART: Regular rhythm, normal S1 and S2, no murmurs, clicks, gallops or rubs  LUNGS: Clear to auscultation bilaterally; no wheezes, rales, or rhonchi   ABDOMEN: Soft, nontender, nondistended, normoactive bowel sounds  EXTREMITIES: Skin warm and well perfused, no clubbing, cyanosis, or edema  NEURO: No focal findings  SKIN: Normal without suspicious lesions on exposed skin        Lab Results:     BMP:  Results from last 7 days   Lab Units 03/27/20  0540 03/26/20  0547 03/25/20  0447 03/24/20  1737 03/24/20  0505 03/23/20  2139 03/23/20  1610   POTASSIUM mmol/L 3 8 3 6 3 5 3 6 3 8 3 9 4 5   CHLORIDE mmol/L 111* 110* 117* 121* 123* 123* 112*   CO2 mmol/L 23 24 19* 17* 14* 13* 13*   BUN mg/dL 54* 60* 76* 88* 108* 118* 127*   CREATININE mg/dL 1 89* 1 96* 2 31* 2 54* 2 84* 2 83* 3 17*   CALCIUM mg/dL 7 7* 7 7* 7 5* 7 5* 7 8* 7 4* 7 5*       CBC:   Results from last 7 days   Lab Units 03/27/20  0540 03/26/20  1122 03/26/20  0547 03/26/20  0022 03/25/20  1758 03/25/20  0446 03/24/20  2318 03/24/20  1737  03/24/20  0505  03/23/20  2138  03/23/20  1322   WBC Thousand/uL 4 03* 5 06 4 41 3 24* 4 23* 3 35* 5 22 5 86   < > 14 37*  --  15 21*  --  14 89*   HEMOGLOBIN g/dL 7 5* 8 1* 7 5* 7 5* 8 0* 7 4* 8 0* 8 2*   < > 7 5*   < > 6 4*   < > 5 9*   HEMATOCRIT % 23 1* 24 6* 22 0* 22 3* 23 5* 22 6* 24 1* 24 5*   < > 23 1*   < > 20 1*   < > 18 8*   MCV fL 96 96 95 94 94 95 94 94   < > 96  --  100*  --  106*   PLATELETS Thousands/uL 6* 3* 4* 3* 2* 1* 2* 5*   < > 7*  --  13*  --  2*   MCH pg 31 1 31 5 32 3 31 5 32 0 31 0 31 3 31 5   < > 31 3  --  31 7  --  33 1   MCHC g/dL 32 5 32 9 34 1 33 6 34 0 32 7 33 2 33 5   < > 32 5  --  31 8  --  31 4   RDW % 17 6* 17 9* 18 1* 18 0* 18 2* 17 5* 17 2* 17 0*   < > 17 6*  --  17 9*  --  18 4*   MPV fL  --   --   --  14 5*  --   --  13 5* 12 8*  --   --   --  9 4  --   --    NRBC AUTO /100 WBCs  --   --   --   --   --   --   --   --   --  2  --   --   --  1    < > = values in this interval not displayed         Results from last 7 days   Lab Units 03/24/20  0230 03/23/20  2138 03/23/20  1754   TROPONIN I ng/mL >40 00* 37 20*  37 70* 23 18*                 Results from last 7 days   Lab Units 03/26/20  0547 03/23/20  2139 03/23/20  1322   MAGNESIUM mg/dL 1 8 1 6 1 6       INR:   Results from last 7 days   Lab Units 03/25/20  0447 03/23/20  1322   INR  1 36* 1 36*       Lipid Profile:   No results found for: CHOL  No results found for: HDL  No results found for: LDLCALC  No results found for: TRIG      Hgb A1c:         Telemetry: off tele

## 2020-03-27 NOTE — ASSESSMENT & PLAN NOTE
Patient initially presented to [facility] with lightheadedness  Patient found to have hemoglobin 5 6 in platelet 2  Patient is status post 3 units PRBCs this admission  Unknown baseline hemoglobin  FOBT positive  Appreciate GI input - will need EGD/colonoscopy once platelets are stable  No obvious source of bleed at this time    Appreciate hematology input  Peripheral smear negative for hemolysis   Imaging revealed cirrhosis of the liver  Continue to monitor

## 2020-03-27 NOTE — PHYSICAL THERAPY NOTE
Physical Therapy Cancellation Note    The pt  States that he just recently walked, and he just returned to bed  He requested to rest for now, and that he would be eager to walk later      Chemo Ruff, PTA

## 2020-03-27 NOTE — ASSESSMENT & PLAN NOTE
Platelet 2 on admission, status post 3 units of platelets and DDAVP  Aspirin has been discontinued  Appreciate hematology input  Consider possibility of ITP bone marrow disorder, TTP/HUS given ongoing anemia  Follow-up [place] 13 enzyme  DIC panel- normal fibrinogen  Started on IV Solu-Medrol 40 mg twice daily,  Awaiting further hematology input for possibility of  IVIG 1gm/kg daily x 2 days

## 2020-03-27 NOTE — ASSESSMENT & PLAN NOTE
Elevated troponin, per Cardiology suspect type 2 MI in setting of underlying coronary artery disease with severe anemia and hypotension  Echo showed preserved left ventricular ejection fraction  Reportedly was taking aspirin prior to admission which is known hold  Continue atorvastatin  Cardiology signed off

## 2020-03-27 NOTE — ASSESSMENT & PLAN NOTE
BEN superimposed on probable chronic kidney disease in the setting diabetes, cirrhosis  Baseline serum creatinine around 1 3  Suspect BEN due to prerenal causes from decreased oral intake and hypovolemic shock  UA bland  Multiple renal nodules with some CIS do not appear benign noted on CT abdomen  Nephrology is following

## 2020-03-28 PROBLEM — E87.2 ACIDOSIS: Status: RESOLVED | Noted: 2020-03-24 | Resolved: 2020-03-28

## 2020-03-28 PROBLEM — E87.2 LACTIC ACID ACIDOSIS: Status: RESOLVED | Noted: 2020-03-23 | Resolved: 2020-03-28

## 2020-03-28 LAB
ANION GAP SERPL CALCULATED.3IONS-SCNC: 5 MMOL/L (ref 4–13)
BACTERIA BLD CULT: NORMAL
BACTERIA BLD CULT: NORMAL
BUN SERPL-MCNC: 47 MG/DL (ref 5–25)
CALCIUM SERPL-MCNC: 7.8 MG/DL (ref 8.3–10.1)
CHLORIDE SERPL-SCNC: 111 MMOL/L (ref 100–108)
CO2 SERPL-SCNC: 24 MMOL/L (ref 21–32)
CREAT SERPL-MCNC: 1.89 MG/DL (ref 0.6–1.3)
ERYTHROCYTE [DISTWIDTH] IN BLOOD BY AUTOMATED COUNT: 17.6 % (ref 11.6–15.1)
GFR SERPL CREATININE-BSD FRML MDRD: 34 ML/MIN/1.73SQ M
GLUCOSE SERPL-MCNC: 149 MG/DL (ref 65–140)
GLUCOSE SERPL-MCNC: 181 MG/DL (ref 65–140)
GLUCOSE SERPL-MCNC: 189 MG/DL (ref 65–140)
GLUCOSE SERPL-MCNC: 226 MG/DL (ref 65–140)
GLUCOSE SERPL-MCNC: 269 MG/DL (ref 65–140)
HCT VFR BLD AUTO: 27.2 % (ref 36.5–49.3)
HGB BLD-MCNC: 8.7 G/DL (ref 12–17)
MCH RBC QN AUTO: 31.1 PG (ref 26.8–34.3)
MCHC RBC AUTO-ENTMCNC: 32 G/DL (ref 31.4–37.4)
MCV RBC AUTO: 97 FL (ref 82–98)
PLATELET # BLD AUTO: 5 THOUSANDS/UL (ref 149–390)
POTASSIUM SERPL-SCNC: 3.7 MMOL/L (ref 3.5–5.3)
RBC # BLD AUTO: 2.8 MILLION/UL (ref 3.88–5.62)
SODIUM SERPL-SCNC: 140 MMOL/L (ref 136–145)
VWF CP ACT/NOR PPP CHRO: 62.1 %
VWF CP ACT/NOR PPP CHRO: NORMAL %
WBC # BLD AUTO: 4.09 THOUSAND/UL (ref 4.31–10.16)

## 2020-03-28 PROCEDURE — 99232 SBSQ HOSP IP/OBS MODERATE 35: CPT | Performed by: INTERNAL MEDICINE

## 2020-03-28 PROCEDURE — 80048 BASIC METABOLIC PNL TOTAL CA: CPT | Performed by: INTERNAL MEDICINE

## 2020-03-28 PROCEDURE — 86022 PLATELET ANTIBODIES: CPT | Performed by: INTERNAL MEDICINE

## 2020-03-28 PROCEDURE — 85027 COMPLETE CBC AUTOMATED: CPT | Performed by: PHYSICIAN ASSISTANT

## 2020-03-28 PROCEDURE — 82948 REAGENT STRIP/BLOOD GLUCOSE: CPT

## 2020-03-28 RX ORDER — INSULIN GLARGINE 100 [IU]/ML
5 INJECTION, SOLUTION SUBCUTANEOUS
Status: DISCONTINUED | OUTPATIENT
Start: 2020-03-28 | End: 2020-03-30 | Stop reason: HOSPADM

## 2020-03-28 RX ORDER — ACETAMINOPHEN 325 MG/1
650 TABLET ORAL ONCE
Status: COMPLETED | OUTPATIENT
Start: 2020-03-28 | End: 2020-03-28

## 2020-03-28 RX ADMIN — FERROUS SULFATE TAB 325 MG (65 MG ELEMENTAL FE) 325 MG: 325 (65 FE) TAB at 17:01

## 2020-03-28 RX ADMIN — ACETAMINOPHEN 650 MG: 325 TABLET ORAL at 13:32

## 2020-03-28 RX ADMIN — PANTOPRAZOLE SODIUM 40 MG: 40 TABLET, DELAYED RELEASE ORAL at 17:01

## 2020-03-28 RX ADMIN — FERROUS SULFATE TAB 325 MG (65 MG ELEMENTAL FE) 325 MG: 325 (65 FE) TAB at 08:05

## 2020-03-28 RX ADMIN — DEXTRAN 70 AND HYPROMELLOSE 2910 2 DROP: 1; 3 SOLUTION/ DROPS OPHTHALMIC at 17:01

## 2020-03-28 RX ADMIN — DEXTRAN 70 AND HYPROMELLOSE 2910 2 DROP: 1; 3 SOLUTION/ DROPS OPHTHALMIC at 08:05

## 2020-03-28 RX ADMIN — INSULIN GLARGINE 5 UNITS: 100 INJECTION, SOLUTION SUBCUTANEOUS at 21:07

## 2020-03-28 RX ADMIN — Medication 62.5 G: at 13:36

## 2020-03-28 RX ADMIN — PANTOPRAZOLE SODIUM 40 MG: 40 TABLET, DELAYED RELEASE ORAL at 05:42

## 2020-03-28 RX ADMIN — METHYLPREDNISOLONE SODIUM SUCCINATE 40 MG: 40 INJECTION, POWDER, FOR SOLUTION INTRAMUSCULAR; INTRAVENOUS at 17:01

## 2020-03-28 RX ADMIN — CHLORHEXIDINE GLUCONATE 0.12% ORAL RINSE 15 ML: 1.2 LIQUID ORAL at 08:05

## 2020-03-28 RX ADMIN — ATORVASTATIN CALCIUM 40 MG: 40 TABLET, FILM COATED ORAL at 17:01

## 2020-03-28 RX ADMIN — METHYLPREDNISOLONE SODIUM SUCCINATE 40 MG: 40 INJECTION, POWDER, FOR SOLUTION INTRAMUSCULAR; INTRAVENOUS at 08:05

## 2020-03-28 RX ADMIN — DIPHENHYDRAMINE HYDROCHLORIDE 25 MG: 25 LIQUID ORAL at 13:32

## 2020-03-28 NOTE — ASSESSMENT & PLAN NOTE
Patient initially transferred to [facility] from [address] due to hypotension and shock  Patient transferred out of ICU 03/25/2020  Status post IV fluid resuscitation

## 2020-03-28 NOTE — ASSESSMENT & PLAN NOTE
No results found for: HGBA1C    Recent Labs     03/27/20  1706 03/27/20  2125 03/28/20  0709 03/28/20  1142   POCGLU 224* 264* 226* 149*   Blood Sugar Average: Last 72 hrs:  (P) 161 8125   Add Lantus q h s    Continue sliding scale insulin

## 2020-03-28 NOTE — PROGRESS NOTES
Progress Note - Nephrology   Darline Isaacs 69 y o  male MRN: [id number]  Unit/Bed#: [facility] 926-01 Encounter: [id number]      Assessment / Plan:  1  BEN on probable CKD, b/l sCr 1 36 as of Nov 2016, peak sCr 3 52, down to 1 89 now and stable off IVF  -BEN likely d/t prerenal cause from decreased oral intake with possible hypovolemic shock  -UA bland, supportive of likely hemodynamic cause  -abdominal ultrasound shows normal sized kidneys with bilateral renal cysts  -CT abdomen shows multiple bilateral renal nodules, with some cysts which do not appear benign   -was on PPI at home  -f/u am BMP  Suspect sCr in high 1s could be new baseline for patient  -urinating, monitor UOP     2  CKD stage 3 in setting of DM/cirrhosis - b/l sCr as above  -no outpatient nephrology follow up on record-will need f/u with renal upon discharge     3  Anemia ?  Due to blood loss versus TMA- on oral iron, hemoglobin 5 9 on admission up to 8 7 status post 2 units of blood     Iron level high at 280, ferritin normal 172, iron sat 83, TIBC 337 with normal folate  Hemolysis smear negative  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 vs early DIC per hematology  On solumedrol 40mg IV BID, receiving IVIG today per hematology  platelets 1-->5     4  Hypernatremia - resolved     5  Elevated anion gap metabolic acidosis-resolved on bicarb gtt     6  Azotemia with concern for possible uremia - BUN improved  Continue to monitor off IVF  No urgent RRT needs at this time       7  Hypovolemic vs vasodilatory shock - BP improved, goal SBP > 130 in light of BEN on CKD and age     8  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 If malignant in nature     Other issues: NSTEMI type 1 vs 2 - cardio on board, trop > 40        Subjective:   He denies chest pain, shortness of breath, nausea and vomiting  Objective:     Vitals: Blood pressure 118/59, pulse 69, temperature 98 7 °F (37 1 °C), resp   rate 16, height 5' 6" (1 676 m), weight 62 4 kg (137 lb 9 1 oz), SpO2 96 % ,Body mass index is 22 2 kg/m²  Temp (24hrs), Av 4 °F (36 9 °C), Min:97 5 °F (36 4 °C), Max:99 °F (37 2 °C)      Weight (last 2 days)     Date/Time   Weight    20 0600   62 4 (137 57)    20 0600   62 3 (137 35)    20 0600   59 6 (131 39)                Intake/Output Summary (Last 24 hours) at 3/28/2020 1724  Last data filed at 3/28/2020 1236  Gross per 24 hour   Intake 545 ml   Output 900 ml   Net -355 ml     I/O last 24 hours: In: 2213 [P O :1035; I V :1178]  Out: 1350 [Urine:1350]        Physical Exam:   Physical Exam   Constitutional: He appears well-developed and well-nourished  No distress  HENT:   Head: Normocephalic and atraumatic  Mouth/Throat: No oropharyngeal exudate  Eyes: Right eye exhibits no discharge  Left eye exhibits no discharge  No scleral icterus  Neck: Normal range of motion  Neck supple  No thyromegaly present  Cardiovascular: Normal rate, regular rhythm and normal heart sounds  Pulmonary/Chest: Effort normal and breath sounds normal  He has no wheezes  He has no rales  Abdominal: Soft  Bowel sounds are normal  He exhibits no distension  There is no tenderness  Musculoskeletal: Normal range of motion  He exhibits no edema  Neurological: He is alert  awake   Skin: Skin is warm and dry  Rash (+petechiae over arms and legs) noted  He is not diaphoretic  Psychiatric: He has a normal mood and affect  His behavior is normal    Vitals reviewed        Invasive Devices     Peripheral Intravenous Line            Peripheral IV 20 Right Forearm 1 day                Medications:    Scheduled Meds:  Current Facility-Administered Medications:  acetaminophen 650 mg Oral Q6H PRN Kaycee Greenberg MD    atorvastatin 40 mg Oral Daily With [employer] KARLA Greenberg MD    bisacodyl 10 mg Rectal Daily PRN Kaycee Greenberg MD    chlorhexidine 15 mL Swish & Spit Q12H [facility] Kaycee Greenberg MD    dextran 70-hypromellose 2 drop Both Eyes BID Kaycee Greenberg MD    ferrous sulfate 325 mg Oral BID With Meals Kaycee Greenberg MD    immune globulin, human 62 5 g Intravenous Q24H Georgia Li MD Last Rate: 150 mL/hr at 03/28/20 1659   insulin glargine 5 Units Subcutaneous HS Pablo Wallace, DO    insulin lispro 1-5 Units Subcutaneous 4x Daily (AC & HS) Rozena Cogan, PA-C    loperamide 2 mg Oral TID PRN Jay Jay Barbosa PA-C    methylPREDNISolone sodium succinate 40 mg Intravenous BID Kaycee Greenberg MD    pantoprazole 40 mg Oral BID AC Kaycee Greenberg MD        PRN Meds:   acetaminophen    bisacodyl    loperamide    Continuous Infusions:         LAB RESULTS:      Results from last 7 days   Lab Units 03/28/20  0623 03/27/20  0540 03/26/20  1122 03/26/20  0547 03/26/20  0022 03/25/20  1758 03/25/20  0447 03/25/20  0446  03/24/20  1737  03/24/20  0505  03/23/20  2139 03/23/20  2138  03/23/20  1322   WBC Thousand/uL 4 09* 4 03* 5 06 4 41 3 24* 4 23*  --  3 35*   < > 5 86   < > 14 37*  --   --  15 21*  --  14 89*   HEMOGLOBIN g/dL 8 7* 7 5* 8 1* 7 5* 7 5* 8 0*  --  7 4*   < > 8 2*   < > 7 5*   < >  --  6 4*   < > 5 9*   HEMATOCRIT % 27 2* 23 1* 24 6* 22 0* 22 3* 23 5*  --  22 6*   < > 24 5*   < > 23 1*   < >  --  20 1*   < > 18 8*   PLATELETS Thousands/uL 5* 6* 3* 4* 3* 2*  --  1*   < > 5*   < > 7*  --   --  13*  --  2*   NEUTROS PCT %  --   --   --   --   --   --   --   --   --   --   --  49  --   --   --   --  48   LYMPHS PCT %  --   --   --   --   --   --   --   --   --   --   --  36  --   --   --   --  40   LYMPHO PCT %  --   --   --   --   --   --   --   --   --   --   --   --   --   --  35  --   --    MONOS PCT %  --   --   --   --   --   --   --   --   --   --   --  10  --   --   --   --  10   MONO PCT %  --   --   --   --   --   --   --   --   --   --   --   --   --   --  10  --   --    EOS PCT %  --   --   --   --   --   --   --   --   --   --   --  2  --   --  1  --  1   POTASSIUM mmol/L 3 7 3 8  --  3 6  --   --  3 5  --   --  3 6  --  3 8  --  3 9  --    < > 4 4   CHLORIDE mmol/L 111* 111* --  110*  --   --  117*  --   --  121*  --  123*  --  123*  --    < > 109*   CO2 mmol/L 24 23  --  24  --   --  19*  --   --  17*  --  14*  --  13*  --    < > 14*   BUN mg/dL 47* 54*  --  60*  --   --  76*  --   --  88*  --  108*  --  118*  --    < > 138*   CREATININE mg/dL 1 89* 1 89*  --  1 96*  --   --  2 31*  --   --  2 54*  --  2 84*  --  2 83*  --    < > 3 52*   CALCIUM mg/dL 7 8* 7 7*  --  7 7*  --   --  7 5*  --   --  7 5*  --  7 8*  --  7 4*  --    < > 8 4   ALK PHOS U/L  --   --   --  36*  --   --  39*  --   --   --   --  40*  --  36*  --   --  40*   ALT U/L  --   --   --  26  --   --  29  --   --   --   --  30  --  23  --   --  23   AST U/L  --   --   --  104*  --   --  162*  --   --   --   --  215*  --  167*  --   --  80*   MAGNESIUM mg/dL  --   --   --  1 8  --   --   --   --   --   --   --   --   --  1 6  --   --  1 6   PHOSPHORUS mg/dL  --   --   --   --   --   --   --   --   --   --   --   --   --  3 2  --   --   --     < > = values in this interval not displayed  CUTURES:  Lab Results   Component Value Date    BLOODCX No Growth After 4 Days  03/23/2020    BLOODCX No Growth After 4 Days  03/23/2020                 Portions of the record may have been created with voice recognition software  Occasional wrong word or "sound a like" substitutions may have occurred due to the inherent limitations of voice recognition software  Read the chart carefully and recognize, using context, where substitutions have occurred  If you have any questions, please contact the dictating provider

## 2020-03-28 NOTE — NURSING NOTE
Pt refusing to be repositioned overnight, pt was educated on importance of offloading pressure and weight to protect skin, pt states he will turn himself

## 2020-03-28 NOTE — ASSESSMENT & PLAN NOTE
Platelet 2 on admission, status post 3 units of platelets and DDAVP  Aspirin has been discontinued  Appreciate hematology input  Consider possibility of ITP bone marrow disorder, TTP/HUS given ongoing anemia  DIC panel- normal fibrinogen  Follow on a platelet antibody   Started on IV Solu-Medrol 40 mg twice daily  Plan for IVIG x 2 days

## 2020-03-28 NOTE — PROGRESS NOTES
Progress Note - Verónica Pereira 1945, 69 y o  male MRN: [id number]    [facility] Encounter: [id number]    Primary Care Provider: Kanchan Olivas MD   Date and time admitted to hospital: 3/23/2020  7:32 PM        Symptomatic anemia  Assessment & Plan  Patient initially presented to [facility] with lightheadedness  Patient found to have hemoglobin 5 6 in platelet 2, status post 3 units PRBCs this admission  Unknown baseline hemoglobin  FOBT positive  Appreciate GI input - will need EGD/colonoscopy once platelets are stable  No obvious source of bleed at this time    Appreciate hematology input  Peripheral smear negative for hemolysis   Imaging revealed cirrhosis of the liver  Continue to monitor    Thrombocytopenia [facility])  Assessment & Plan  Platelet 2 on admission, status post 3 units of platelets and DDAVP  Aspirin has been discontinued  Appreciate hematology input  Consider possibility of ITP bone marrow disorder, TTP/HUS given ongoing anemia  DIC panel- normal fibrinogen  Follow on a platelet antibody   Started on IV Solu-Medrol 40 mg twice daily  Plan for IVIG x 2 days    * Shock ([facility] [address] )  Assessment & Plan  Patient initially transferred to [facility] from [address] [place] [address] due to hypotension and shock  Patient transferred out of ICU 03/25/2020  Status post IV fluid resuscitation      BEN (acute kidney injury) ([facility] [address] )  Assessment & Plan  BEN superimposed on probable chronic kidney disease in the setting diabetes, cirrhosis  Baseline serum creatinine around 1 3  Suspect BEN due to prerenal causes from decreased oral intake and hypovolemic shock  UA bland  Multiple renal nodules with some CIS do not appear benign noted on CT abdomen  Nephrology is following      Elevated troponin  Assessment & Plan  Elevated troponin, per Cardiology suspect type 2 MI in setting of underlying coronary artery disease with severe anemia and hypotension  Echo showed preserved left ventricular ejection fraction  Reportedly was taking aspirin prior to admission which is known hold  Continue atorvastatin  Cardiology signed off    Type 2 diabetes mellitus [facility])  Assessment & Plan  No results found for: HGBA1C    Recent Labs     20  1706 20  2125 20  0709 20  1142   POCGLU 224* 264* 226* 149*   Blood Sugar Average: Last 72 hrs:  (P) 161 8125   Add Lantus q h s  Continue sliding scale insulin    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[address] )  Assessment & Plan  Appreciate input from Neurosurgery  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  Outpatient follow-up with Neurosurgery, may obtain an outpatient MRI in 2-4 weeks           VTE Pharmacologic Prophylaxis:   Pharmacologic: Pharmacologic VTE Prophylaxis contraindicated due to Thrombocytopenia  Mechanical VTE Prophylaxis in Place: Yes    Patient Centered Rounds: I have performed bedside rounds with nursing staff today  Discussions with Specialists or Other Care Team Provider:  Hematology    Education and Discussions with Family / Patient:  Patient    Time Spent for Care: 30 minutes  More than 50% of total time spent on counseling and coordination of care as described above  Current Length of Stay: 5 day(s)    Current Patient Status: Inpatient   Certification Statement: The patient will continue to require additional inpatient hospital stay due to Above    Discharge Plan / Estimated Discharge Date: TBD    Code Status: Level 3 - DNAR and DNI      Subjective:   Patient seen and examined  Comfortable in bed  No event overnight  No bleeding  Wants to go home    Objective:     Vitals:   Temp (24hrs), Av 4 °F (36 9 °C), Min:97 5 °F (36 4 °C), Max:99 °F (37 2 °C)    Temp:  [97 5 °F (36 4 °C)-99 °F (37 2 °C)] 97 5 °F (36 4 °C)  HR:  [63-68] 68  Resp:  [16-18] 18  BP: ()/(48-56) 94/50  SpO2:  [96 %-98 %] 96 %  Body mass index is 22 2 kg/m²  Input and Output Summary (last 24 hours):        Intake/Output Summary (Last 24 hours) at 3/28/2020 1238  Last data filed at 3/28/2020 1236  Gross per 24 hour   Intake 2073 ml   Output 1100 ml   Net 973 ml       Physical Exam:     Physical Exam  Patient is awake no acute distress  Lung with decreased breath sounds bilateral  Heart positive S1-S2 no murmur  Abdomen soft nontender  Lower extremities no edema  Skin petechiae on lower extremities    Additional Data:     Labs:    Results from last 7 days   Lab Units 03/28/20  0623  03/24/20  0505   WBC Thousand/uL 4 09*   < > 14 37*   HEMOGLOBIN g/dL 8 7*   < > 7 5*   HEMATOCRIT % 27 2*   < > 23 1*   PLATELETS Thousands/uL 5*   < > 7*   NEUTROS PCT %  --   --  49   LYMPHS PCT %  --   --  36   MONOS PCT %  --   --  10   EOS PCT %  --   --  2    < > = values in this interval not displayed  Results from last 7 days   Lab Units 03/28/20  0623  03/26/20  0547   POTASSIUM mmol/L 3 7   < > 3 6   CHLORIDE mmol/L 111*   < > 110*   CO2 mmol/L 24   < > 24   BUN mg/dL 47*   < > 60*   CREATININE mg/dL 1 89*   < > 1 96*   CALCIUM mg/dL 7 8*   < > 7 7*   ALK PHOS U/L  --   --  36*   ALT U/L  --   --  26   AST U/L  --   --  104*    < > = values in this interval not displayed  Results from last 7 days   Lab Units 03/25/20  0447   INR  1 36*       * I Have Reviewed All Lab Data Listed Above  * Additional Pertinent Lab Tests Reviewed: [address] Admission Reviewed    Imaging:    Imaging Reports Reviewed Today Include:   Imaging Personally Reviewed by Myself Includes:      Recent Cultures (last 7 days):     Results from last 7 days   Lab Units 03/23/20  1658 03/23/20  1651   BLOOD CULTURE  No Growth After 4 Days  No Growth After 4 Days         Last 24 Hours Medication List:     Current Facility-Administered Medications:  acetaminophen 650 mg Oral Q6H PRN Kaycee Greenberg MD   acetaminophen 650 mg Oral Once Butler Scheuermann, MD   atorvastatin 40 mg Oral Daily With [employer] KARLA Greenberg MD   bisacodyl 10 mg Rectal Daily PRN Raphael Moreno MD chlorhexidine 15 mL Swish & Spit Q12H [address] Kaycee Greenberg MD   dextran 70-hypromellose 2 drop Both Eyes BID Kaycee Greenberg MD   diphenhydrAMINE 25 mg Oral Once Sofía Barnes MD   ferrous sulfate 325 mg Oral BID With Meals Kaycee Greenberg MD   immune globulin, human 62 5 g Intravenous Q24H Sofía Barnes MD   insulin glargine 5 Units Subcutaneous HS Guillaume Grimes DO   insulin lispro 1-5 Units Subcutaneous 4x Daily (AC & HS) Adan Webbre PA-C   loperamide 2 mg Oral TID PRN Kateryna Barbosa PA-C   methylPREDNISolone sodium succinate 40 mg Intravenous BID Kaycee Greenberg MD   pantoprazole 40 mg Oral BID AC Kaycee Greenberg MD        Today, Patient Was Seen By: Guillaume Grimes DO    ** Please Note: This note has been constructed using a voice recognition system   **

## 2020-03-28 NOTE — PROGRESS NOTES
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  Previously PT PTT were within normal limits  Patient has been on Solu-Medrol - platelet counts are trended below  The platelet count has not changed significantly over the past 3 days  Happily there is no signs of excessive bruising or bleeding, patient does have some bruises  As discussed recently, the plan was to try to improve the platelets with Solu-Medrol only  This does not seem to be happening  I have placed an order in for IVIG (1 g/kg IV daily x 2 days)  I have spoken with pharmacy - they are looking into the specifics  Apparently the IVIG needs to be okayed by pharmacy management - this is due to the covid pandemic causing blood perez to recently limit the amount of donors - pharmacy needs to see if they have the IVIG available  In the meantime, I would continue with the Solu-Medrol  There are other options (more potential complications, potential more side effects) that can be tried but I would rather see if the IVIG is available  WBC is low normal, hemoglobin is decreased but okay/acceptable and actually higher today as compared to yesterday   __________________________________________________________________________________________    Chief complaint: Thrombocytopenia    History of present illness:  13-year-old male with a number of medical issues including COPD, hyperlipidemia, diabetes and chronic kidney transferred from [facility] because of severe thrombocytopenia  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  Mr Chikis Montana has been on steroids  Patient states feeling okay, about the same as before  Mr George Katz actually states that he would like to get out so that he can not smoke cigarettes    No problems with excessive bruising or bleeding  Appetite is okay, no nausea vomiting  No other  or GI issues  Activities are limited      Hospital medications list:    Current Facility-Administered Medications:  acetaminophen 650 mg Oral Q6H PRN Kaycee Greenberg MD   atorvastatin 40 mg Oral Daily With [employer] KARLA Greenberg MD   bisacodyl 10 mg Rectal Daily PRN Kaycee Greenberg MD   chlorhexidine 15 mL Swish & Spit Q12H [address] Kaycee Greenberg MD   dextran 70-hypromellose 2 drop Both Eyes BID Kaycee Greenberg MD   ferrous sulfate 325 mg Oral BID With Meals Kaycee Greenberg MD   insulin lispro 1-5 Units Subcutaneous 4x Daily (AC & HS) Latricia RICKY Prince   loperamide 2 mg Oral TID PRN Mina Barbosa PA-C   methylPREDNISolone sodium succinate 40 mg Intravenous BID Kaycee Greenberg MD   pantoprazole 40 mg Oral BID AC Kaycee Greenberg MD     Physical exam  BP 94/50   Pulse 68   Temp 97 5 °F (36 4 °C) (Oral)   Resp 18   Ht 5' 6" (1 676 m)   Wt 62 4 kg (137 lb 9 1 oz)   SpO2 96%   BMI 22 20 kg/m²   Constitutional:   Eyes: PERRL, conjunctiva pale, anicteric    HENT: Atraumatic, external ears normal, nose normal,    oropharynx moist, no pharyngeal exudates, no thrush, pink  Neck: Good range of motion, no adenopathy  Respiratory:  Fair to good air entry bilaterally, scattered rhonchi  Cardiovascular: Normal rate, normal rhythm, no murmurs, no gallops, no rubs    GI: Soft, nondistended, normal bowel sounds, nontender, no organomegaly, no mass, no rebound, no guarding    : No costovertebral angle tenderness, normal reproductive organs, no discharge  Musculoskeletal: No pain or tenderness with palpation of joints, muscles or bones  Integument:  Pale, warm, moist, scattered few purpura, no petechiae, no hematomas or ecchymoses, multiple scabs on lower extremities no different than before, no active bleeding  Lymphatic: No adenopathy in the neck, supra-clavicular region, axilla and groin bilaterally  Extremities:  No lower extremity edema, no cords, pulses are 1+  Neurologic: Alert & oriented x 3, CN 2-12 normal, normal motor function, normal sensory function, no focal deficits noted  Psychiatric:  Pleasant, responsive, slightly anxious but otherwise appropriate  Rectal: Deferred    Laboratory test results    Results for Nestor Jha (MRN [id number]) as of 3/28/2020 11:01   Ref   Range 3/26/2020 00:22 3/26/2020 05:47 3/26/2020 11:22 3/27/2020 05:40 3/28/2020 06:23   WBC Latest Ref Range: 4 31 - 10 16 Thousand/uL 3 24 (L) 4 41 5 06 4 03 (L) 4 09 (L)   Red Blood Cell Count Latest Ref Range: 3 88 - 5 62 Million/uL 2 38 (L) 2 32 (L) 2 57 (L) 2 41 (L) 2 80 (L)   Hemoglobin Latest Ref Range: 12 0 - 17 0 g/dL 7 5 (L) 7 5 (L) 8 1 (L) 7 5 (L) 8 7 (L)   HCT Latest Ref Range: 36 5 - 49 3 % 22 3 (L) 22 0 (L) 24 6 (L) 23 1 (L) 27 2 (L)   MCV Latest Ref Range: 82 - 98 fL 94 95 96 96 97   MCH Latest Ref Range: 26 8 - 34 3 pg 31 5 32 3 31 5 31 1 31 1   MCHC Latest Ref Range: 31 4 - 37 4 g/dL 33 6 34 1 32 9 32 5 32 0   RDW Latest Ref Range: 11 6 - 15 1 % 18 0 (H) 18 1 (H) 17 9 (H) 17 6 (H) 17 6 (H)   Platelet Count Latest Ref Range: 149 - 390 Thousands/uL 3 (LL) 4 (LL) 3 (LL) 6 (LL) 5 (LL)

## 2020-03-28 NOTE — ASSESSMENT & PLAN NOTE
Patient initially presented to [address] with lightheadedness  Patient found to have hemoglobin 5 6 in platelet 2, status post 3 units PRBCs this admission  Unknown baseline hemoglobin  FOBT positive  Appreciate GI input - will need EGD/colonoscopy once platelets are stable  No obvious source of bleed at this time    Appreciate hematology input  Peripheral smear negative for hemolysis   Imaging revealed cirrhosis of the liver  Continue to monitor

## 2020-03-29 PROBLEM — E87.6 HYPOKALEMIA: Status: ACTIVE | Noted: 2020-03-29

## 2020-03-29 LAB
ANION GAP SERPL CALCULATED.3IONS-SCNC: 4 MMOL/L (ref 4–13)
BUN SERPL-MCNC: 42 MG/DL (ref 5–25)
CALCIUM SERPL-MCNC: 7.3 MG/DL (ref 8.3–10.1)
CHLORIDE SERPL-SCNC: 112 MMOL/L (ref 100–108)
CO2 SERPL-SCNC: 23 MMOL/L (ref 21–32)
CREAT SERPL-MCNC: 1.83 MG/DL (ref 0.6–1.3)
ERYTHROCYTE [DISTWIDTH] IN BLOOD BY AUTOMATED COUNT: 17.5 % (ref 11.6–15.1)
GFR SERPL CREATININE-BSD FRML MDRD: 36 ML/MIN/1.73SQ M
GLUCOSE SERPL-MCNC: 164 MG/DL (ref 65–140)
GLUCOSE SERPL-MCNC: 197 MG/DL (ref 65–140)
GLUCOSE SERPL-MCNC: 259 MG/DL (ref 65–140)
GLUCOSE SERPL-MCNC: 272 MG/DL (ref 65–140)
GLUCOSE SERPL-MCNC: 92 MG/DL (ref 65–140)
HCT VFR BLD AUTO: 25.8 % (ref 36.5–49.3)
HGB BLD-MCNC: 8.2 G/DL (ref 12–17)
MCH RBC QN AUTO: 31.5 PG (ref 26.8–34.3)
MCHC RBC AUTO-ENTMCNC: 31.8 G/DL (ref 31.4–37.4)
MCV RBC AUTO: 99 FL (ref 82–98)
PLATELET # BLD AUTO: 16 THOUSANDS/UL (ref 149–390)
PMV BLD AUTO: 14.2 FL (ref 8.9–12.7)
POTASSIUM SERPL-SCNC: 3.4 MMOL/L (ref 3.5–5.3)
RBC # BLD AUTO: 2.6 MILLION/UL (ref 3.88–5.62)
SODIUM SERPL-SCNC: 139 MMOL/L (ref 136–145)
WBC # BLD AUTO: 3.19 THOUSAND/UL (ref 4.31–10.16)

## 2020-03-29 PROCEDURE — 99232 SBSQ HOSP IP/OBS MODERATE 35: CPT | Performed by: INTERNAL MEDICINE

## 2020-03-29 PROCEDURE — 82948 REAGENT STRIP/BLOOD GLUCOSE: CPT

## 2020-03-29 PROCEDURE — 85027 COMPLETE CBC AUTOMATED: CPT | Performed by: PHYSICIAN ASSISTANT

## 2020-03-29 PROCEDURE — 80048 BASIC METABOLIC PNL TOTAL CA: CPT | Performed by: INTERNAL MEDICINE

## 2020-03-29 RX ORDER — DIPHENHYDRAMINE HCL 25 MG
25 TABLET ORAL ONCE
Status: COMPLETED | OUTPATIENT
Start: 2020-03-29 | End: 2020-03-29

## 2020-03-29 RX ORDER — ACETAMINOPHEN 325 MG/1
650 TABLET ORAL ONCE
Status: COMPLETED | OUTPATIENT
Start: 2020-03-29 | End: 2020-03-29

## 2020-03-29 RX ORDER — POTASSIUM CHLORIDE 20 MEQ/1
20 TABLET, EXTENDED RELEASE ORAL ONCE
Status: COMPLETED | OUTPATIENT
Start: 2020-03-29 | End: 2020-03-29

## 2020-03-29 RX ADMIN — Medication 62.5 G: at 14:51

## 2020-03-29 RX ADMIN — ATORVASTATIN CALCIUM 40 MG: 40 TABLET, FILM COATED ORAL at 17:04

## 2020-03-29 RX ADMIN — DIPHENHYDRAMINE HCL 25 MG: 25 TABLET, COATED ORAL at 15:13

## 2020-03-29 RX ADMIN — METHYLPREDNISOLONE SODIUM SUCCINATE 40 MG: 40 INJECTION, POWDER, FOR SOLUTION INTRAMUSCULAR; INTRAVENOUS at 17:04

## 2020-03-29 RX ADMIN — ACETAMINOPHEN 650 MG: 325 TABLET ORAL at 15:13

## 2020-03-29 RX ADMIN — CHLORHEXIDINE GLUCONATE 0.12% ORAL RINSE 15 ML: 1.2 LIQUID ORAL at 08:42

## 2020-03-29 RX ADMIN — PANTOPRAZOLE SODIUM 40 MG: 40 TABLET, DELAYED RELEASE ORAL at 06:26

## 2020-03-29 RX ADMIN — PANTOPRAZOLE SODIUM 40 MG: 40 TABLET, DELAYED RELEASE ORAL at 15:13

## 2020-03-29 RX ADMIN — POTASSIUM CHLORIDE 20 MEQ: 1500 TABLET, EXTENDED RELEASE ORAL at 14:48

## 2020-03-29 RX ADMIN — FERROUS SULFATE TAB 325 MG (65 MG ELEMENTAL FE) 325 MG: 325 (65 FE) TAB at 17:04

## 2020-03-29 RX ADMIN — METHYLPREDNISOLONE SODIUM SUCCINATE 40 MG: 40 INJECTION, POWDER, FOR SOLUTION INTRAMUSCULAR; INTRAVENOUS at 08:42

## 2020-03-29 RX ADMIN — INSULIN GLARGINE 5 UNITS: 100 INJECTION, SOLUTION SUBCUTANEOUS at 21:59

## 2020-03-29 RX ADMIN — FERROUS SULFATE TAB 325 MG (65 MG ELEMENTAL FE) 325 MG: 325 (65 FE) TAB at 08:42

## 2020-03-29 RX ADMIN — DEXTRAN 70 AND HYPROMELLOSE 2910 2 DROP: 1; 3 SOLUTION/ DROPS OPHTHALMIC at 08:43

## 2020-03-29 NOTE — UTILIZATION REVIEW
Continued Stay Review    Date: 3/28/2020                    Current Patient Class: IP  Current Level of Care: Med/Surg    HPI:74 y o  male initially admitted on 3/23 with a NSTEMI, cute on chronic anemia and hypovolemic vs vasodilatory shock, thrombocytopenia    Assessment/Plan: Plt 2 on admission, s/p 3 units of PLTs and DDAVP  Tahira Bolaños was d/c'd, hematology consulted  Consider possibility of ITP bone marrow d/o TTP/HUS given ongoing anemia  DIC panel -- normal fibrinogen  F/u on Plt antibody  Started on IV solumedrol 40 mg BID  Plan for IVIG x 2 days  Hgb 5 6 on admission with no obvious source of bleeding  Currently stable after transfusion this admission  FOBT (+)  GI plans for EGD/Colonoscopy once platelets stable  Continue to monitor H&H  Nephrology continues to follow for BEN  Cardiology signed off    Fingerstick glucose checks ac & hs, nursing dysphagia assessment --> surgical soft diet, I/O's, SCD's, up with assist, incentive spirometry q1     Pertinent Labs/Diagnostic Results:   Results from last 7 days   Lab Units 03/29/20  0539 03/27/20  0540 03/26/20  1122 03/26/20  0547 03/24/20  0505 03/23/20  2138 03/23/20  1322   WBC Thousand/uL 3 19* 4 03* 5 06 4 41 14 37* 15 21* 14 89*   HEMOGLOBIN g/dL 8 2* 7 5* 8 1* 7 5* 7 5* 6 4* 5 9*   HEMATOCRIT % 25 8* 23 1* 24 6* 22 0* 23 1* 20 1* 18 8*   PLATELETS Thousands/uL 16* 6* 3* 4* 7* 13* 2*   NEUTROS ABS Thousands/µL  --   --   --   --  7 18  --  7 16   BANDS PCT %  --   --   --   --   --  2  --      Results from last 7 days   Lab Units 03/26/20  1122 03/23/20  2138   RETIC CT ABS   --  132,500*   RETIC CT PCT % 5 94* 6 66*     Results from last 7 days   Lab Units 03/28/20  0623 03/27/20  0540 03/26/20  0547 03/25/20  0447 03/23/20  2139 03/23/20  1322   SODIUM mmol/L 140 141 139 143 146* 143   POTASSIUM mmol/L 3 7 3 8 3 6 3 5 3 9 4 4   CHLORIDE mmol/L 111* 111* 110* 117* 123* 109*   CO2 mmol/L 24 23 24 19* 13* 14*   ANION GAP mmol/L 5 7 5 7 10 20*   BUN mg/dL 47* 54* 60* 76* 118* 138*   CREATININE mg/dL 1 89* 1 89* 1 96* 2 31* 2 83* 3 52*   EGFR ml/min/1 73sq m 34 34 33 27 21 16   CALCIUM mg/dL 7 8* 7 7* 7 7* 7 5* 7 4* 8 4   CALCIUM, IONIZED mmol/L  --   --  1 05*  --   --   --    MAGNESIUM mg/dL  --   --  1 8  --  1 6 1 6   PHOSPHORUS mg/dL  --   --   --   --  3 2  --      Results from last 7 days   Lab Units 03/26/20  0547 03/25/20  0447 03/24/20  0505 03/23/20  2139 03/23/20  2138 03/23/20  1322   AST U/L 104* 162* 215* 167*  --  80*   ALT U/L 26 29 30 23  --  23   ALK PHOS U/L 36* 39* 40* 36*  --  40*   TOTAL PROTEIN g/dL 5 4* 5 5* 6 0* 6 0*  --  6 6   ALBUMIN g/dL 2 6* 2 7* 3 1* 2 9*  --  3 2*   TOTAL BILIRUBIN mg/dL 0 52 0 57 0 52 0 51  --  0 40   AMMONIA umol/L  --   --   --   --  56*  --      Results from last 7 days   Lab Units 03/28/20  2105 03/28/20  1632 03/28/20  1142 03/28/20  0709 03/27/20  2125 03/27/20  1706 03/27/20  1214 03/27/20  0743 03/26/20  2116 03/26/20  1645 03/26/20  1137   POC GLUCOSE mg/dl 181* 269* 149* 226* 264* 224* 160* 85 197* 163* 112     Results from last 7 days   Lab Units 03/28/20  0623 03/27/20  0540 03/26/20  0547 03/25/20  0447 03/24/20  1737 03/24/20  0505 03/23/20  2139 03/23/20  1610 03/23/20  1322   GLUCOSE RANDOM mg/dL 189* 111 106 150* 96 124 126 133 136     Results from last 7 days   Lab Units 03/23/20  1530   PH WAQAR  7 247*   PCO2 WAQAR mm Hg 27 0*   PO2 WAQAR mm Hg 33 3*   HCO3 WAQAR mmol/L 11 5*   BASE EXC WAQAR mmol/L -14 4   O2 CONTENT WAQAR ml/dL 5 6   O2 HGB, VENOUS % 55 2*     Results from last 7 days   Lab Units 03/24/20  0230 03/23/20  2138 03/23/20  1754 03/23/20  1340   TROPONIN I ng/mL >40 00* 37 20*  37 70* 23 18* 8 52*     Results from last 7 days   Lab Units 03/25/20  0447   D-DIMER QUANTITATIVE ug/ml FEU 0 81*     Results from last 7 days   Lab Units 03/25/20  0447 03/23/20  1322   PROTIME seconds 16 3* 16 8*   INR  1 36* 1 36*   PTT seconds 32  --      Results from last 7 days   Lab Units 03/23/20  1527   TSH 3RD GENERATON uIU/mL 1 452     Results from last 7 days   Lab Units 03/24/20  0505 03/23/20  1521   PROCALCITONIN ng/ml 0 51* 0 68*     Results from last 7 days   Lab Units 03/24/20  0230 03/23/20  2138 03/23/20  1610 03/23/20  1335   LACTIC ACID mmol/L 0 8 0 8 2 4* 2 7*     Results from last 7 days   Lab Units 03/23/20  2139   FERRITIN ng/mL 172     Results from last 7 days   Lab Units 03/23/20  1610   HEP B S AG  Non-reactive   HEP C AB  Non-reactive   HEP B C IGM  Non-reactive     Results from last 7 days   Lab Units 03/23/20  1322   LIPASE u/L 52*     Results from last 7 days   Lab Units 03/23/20  2141 03/23/20  2139   CRP mg/L  --  8 6*   SED RATE mm/hour 19*  --      Results from last 7 days   Lab Units 03/23/20  1521   CLARITY UA  Clear   COLOR UA  Yellow   SPEC GRAV UA  1 020   PH UA  5 0   GLUCOSE UA mg/dl Negative   KETONES UA mg/dl Negative   BLOOD UA  Negative   PROTEIN UA mg/dl Negative   NITRITE UA  Negative   BILIRUBIN UA  Negative   UROBILINOGEN UA E U /dl 0 2   LEUKOCYTES UA  Negative     Results from last 7 days   Lab Units 03/23/20  1658 03/23/20  1651   BLOOD CULTURE  No Growth After 5 Days  No Growth After 5 Days       Results from last 7 days   Lab Units 03/23/20  2138   TOTAL COUNTED  100     Vital Signs:  Date/Time  Temp  Pulse  Resp  BP  MAP (mmHg)  SpO2  O2 Device   03/28/20 21:58:53  98 8 °F (37 1 °C)  62  16  134/66  89  95 %     03/28/20 2111              None (Room air)   03/28/20 1742    69    135/70  92  99 %     03/28/20 16:25:47    69    118/59  79  96 %     03/28/20 14:58:49  98 7 °F (37 1 °C)  76  16  118/60  79  97 %     03/28/20 1415  98 9 °F (37 2 °C)  77    128/64  85  96 %     03/28/20 1400    78    135/65  88  96 %     03/28/20 1345  97 7 °F (36 5 °C)  73    135/69  91  97 %     03/28/20 0710        94/50         03/28/20 07:04:44  97 5 °F (36 4 °C)  68  18  92/48Abnormal   63  96 %     03/27/20 22:39:11  99 °F (37 2 °C)  63  16  119/56  77  96 %   03/27/20 2015              None (Room air)   03/27/20 1503  98 6 °F (37 °C)  64  16  123/56  78  98 %     03/27/20 07:38:19  98 6 °F (37 °C)  56  18  126/58  81  95 %           Medications:   Scheduled Medications:  Medications:  atorvastatin 40 mg Oral Daily With Dinner   chlorhexidine 15 mL Swish & Spit Q12H [facility]   dextran 70-hypromellose 2 drop Both Eyes BID   ferrous sulfate 325 mg Oral BID With Meals   immune globulin, human 62 5 g Intravenous Q24H   insulin glargine 5 Units Subcutaneous HS   insulin lispro 1-5 Units Subcutaneous 4x Daily (AC & HS)   methylPREDNISolone sodium succinate 40 mg Intravenous BID   pantoprazole 40 mg Oral BID AC     Continuous IV Infusions: none     PRN Meds:  acetaminophen 650 mg Oral Q6H PRN   bisacodyl 10 mg Rectal Daily PRN   loperamide 2 mg Oral TID PRN       Discharge Plan: TBD    Network Utilization Review Department  [email or web address]  ATTENTION: Please call with any questions or concerns to [phone] and carefully listen to the prompts so that you are directed to the right person  All voicemails are confidential   Karly Olivera all requests for admission clinical reviews, approved or denied determinations and any other requests to dedicated fax number below belonging to the campus where the patient is receiving treatment   List of dedicated fax numbers for the Facilities:  FACILITY NAME UR FAX NUMBER   ADMISSION DENIALS (Administrative/Medical Necessity) [phone]   [address] (Maternity/NICU/Pediatrics) [phone]   Derian Smith [phone]   [address] [facility] [phone]   [facility] [phone]   [address] [facility] [address] [facility] [phone]   [facility] [address] [facility] [address]  [place] [address] [phone]

## 2020-03-29 NOTE — PLAN OF CARE
Problem: Prexisting or High Potential for Compromised Skin Integrity  Goal: Skin integrity is maintained or improved  Description  INTERVENTIONS:  - Identify patients at risk for skin breakdown  - Assess and monitor skin integrity  - Assess and monitor nutrition and hydration status  - Monitor labs   - Assess for incontinence   - Turn and reposition patient  - Assist with mobility/ambulation  - Relieve pressure over bony prominences  - Avoid friction and shearing  - Provide appropriate hygiene as needed including keeping skin clean and dry  - Evaluate need for skin moisturizer/barrier cream  - Collaborate with interdisciplinary team   - Patient/family teaching  - Consider wound care consult   Outcome: Progressing     Problem: Potential for Falls  Goal: Patient will remain free of falls  Description  INTERVENTIONS:  - Assess patient frequently for physical needs  -  Identify cognitive and physical deficits and behaviors that affect risk of falls    -  [place] fall precautions as indicated by assessment   - Educate patient/family on patient safety including physical limitations  - Instruct patient to call for assistance with activity based on assessment  - Modify environment to reduce risk of injury  - Consider OT/PT consult to assist with strengthening/mobility  Outcome: Progressing     Problem: PAIN - ADULT  Goal: Verbalizes/displays adequate comfort level or baseline comfort level  Description  Interventions:  - Encourage patient to monitor pain and request assistance  - Assess pain using appropriate pain scale  - Administer analgesics based on type and severity of pain and evaluate response  - Implement non-pharmacological measures as appropriate and evaluate response  - Consider cultural and social influences on pain and pain management  - Notify physician/advanced practitioner if interventions unsuccessful or patient reports new pain  Outcome: Progressing     Problem: INFECTION - ADULT  Goal: Absence or prevention of progression during hospitalization  Description  INTERVENTIONS:  - Assess and monitor for signs and symptoms of infection  - Monitor lab/diagnostic results  - Monitor all insertion sites, i e  indwelling lines, tubes, and drains  - Monitor endotracheal if appropriate and nasal secretions for changes in amount and color  - [place] appropriate cooling/warming therapies per order  - Administer medications as ordered  - Instruct and encourage patient and family to use good hand hygiene technique  - Identify and instruct in appropriate isolation precautions for identified infection/condition  Outcome: Progressing  Goal: Absence of fever/infection during neutropenic period  Description  INTERVENTIONS:  - Monitor WBC    Outcome: Progressing     Problem: SAFETY ADULT  Goal: Maintain or return to baseline ADL function  Description  INTERVENTIONS:  -  Assess patient's ability to carry out ADLs; assess patient's baseline for ADL function and identify physical deficits which impact ability to perform ADLs (bathing, care of mouth/teeth, toileting, grooming, dressing, etc )  - Assess/evaluate cause of self-care deficits   - Assess range of motion  - Assess patient's mobility; develop plan if impaired  - Assess patient's need for assistive devices and provide as appropriate  - Encourage maximum independence but intervene and supervise when necessary  - Involve family in performance of ADLs  - Assess for home care needs following discharge   - Consider OT consult to assist with ADL evaluation and planning for discharge  - Provide patient education as appropriate  Outcome: Progressing  Goal: Maintain or return mobility status to optimal level  Description  INTERVENTIONS:  - Assess patient's baseline mobility status (ambulation, transfers, stairs, etc )    - Identify cognitive and physical deficits and behaviors that affect mobility  - Identify mobility aids required to assist with transfers and/or ambulation (gait belt, sit-to-stand, lift, walker, cane, etc )  - [place] fall precautions as indicated by assessment  - Record patient progress and toleration of activity level on Mobility SBAR; progress patient to next Phase/Stage  - Instruct patient to call for assistance with activity based on assessment  - Consider rehabilitation consult to assist with strengthening/weightbearing, etc   Outcome: Progressing     Problem: DISCHARGE PLANNING  Goal: Discharge to home or other facility with appropriate resources  Description  INTERVENTIONS:  - Identify barriers to discharge w/patient and caregiver  - Arrange for needed discharge resources and transportation as appropriate  - Identify discharge learning needs (meds, wound care, etc )  - Arrange for interpretive services to assist at discharge as needed  - Refer to Case Management Department for coordinating discharge planning if the patient needs post-hospital services based on physician/advanced practitioner order or complex needs related to functional status, cognitive ability, or social support system  Outcome: Progressing     Problem: Knowledge Deficit  Goal: Patient/family/caregiver demonstrates understanding of disease process, treatment plan, medications, and discharge instructions  Description  Complete learning assessment and assess knowledge base  Interventions:  - Provide teaching at level of understanding  - Provide teaching via preferred learning methods  Outcome: Progressing     Problem: Nutrition/Hydration-ADULT  Goal: Nutrient/Hydration intake appropriate for improving, restoring or maintaining nutritional needs  Description  Monitor and assess patient's nutrition/hydration status for malnutrition  Collaborate with interdisciplinary team and initiate plan and interventions as ordered  Monitor patient's weight and dietary intake as ordered or per policy  Utilize nutrition screening tool and intervene as necessary   Determine patient's food preferences and provide high-protein, high-caloric foods as appropriate       INTERVENTIONS:  - Monitor oral intake, urinary output, labs, and treatment plans  - Assess nutrition and hydration status and recommend course of action  - Evaluate amount of meals eaten  - Assist patient with eating if necessary   - Allow adequate time for meals  - Recommend/ encourage appropriate diets, oral nutritional supplements, and vitamin/mineral supplements  - Order, calculate, and assess calorie counts as needed  - Recommend, monitor, and adjust tube feedings and TPN/PPN based on assessed needs  - Assess need for intravenous fluids  - Provide specific nutrition/hydration education as appropriate  - Include patient/family/caregiver in decisions related to nutrition  Outcome: Progressing

## 2020-03-29 NOTE — ASSESSMENT & PLAN NOTE
BEN superimposed on chronic kidney disease in the setting diabetes, cirrhosis  Baseline serum creatinine around 1 3  Suspect BEN due to prerenal causes from decreased oral intake and hypovolemic shock  UA bland  Multiple renal nodules with some CIS do not appear benign noted on CT abdomen  Nephrology is following

## 2020-03-29 NOTE — PROGRESS NOTES
Progress Note - Marceil Kocher 1945, 69 y o  male MRN: [id number]    Unit/Bed#: [facility] 926-01 Encounter: [id number]    Primary Care Provider: Brittany Villatoro MD   Date and time admitted to hospital: 3/23/2020  7:32 PM        Symptomatic anemia  Assessment & Plan  Patient initially presented to [address] with lightheadedness, found to have hemoglobin 5 6 in platelet 2, status post 3 units PRBCs this admission  Unknown baseline hemoglobin  FOBT positive  Appreciate GI input - will need EGD/colonoscopy once platelets are stable  No obvious source of bleed at this time    Appreciate hematology input  Peripheral smear negative for hemolysis   Imaging revealed cirrhosis of the liver  Continue to monitor    Thrombocytopenia [facility])  Assessment & Plan  Platelet 2 on admission, status post 3 units of platelets and DDAVP  Aspirin has been discontinued  Appreciate hematology input, Consider possibility of ITP bone marrow disorder, TTP/HUS given ongoing anemia  DIC panel- normal fibrinogen  Follow on a platelet antibody   Started on IV Solu-Medrol 40 mg twice daily  IV IG x2 doses  Improving    * Shock ([address] )  Assessment & Plan  Patient initially transferred to [facility] from [address] [facility] due to hypotension and shock  Patient transferred out of ICU 03/25/2020  Status post IV fluid resuscitation      BEN (acute kidney injury) ([facility] [address] )  Assessment & Plan  BEN superimposed on chronic kidney disease in the setting diabetes, cirrhosis  Baseline serum creatinine around 1 3  Suspect BEN due to prerenal causes from decreased oral intake and hypovolemic shock  UA bland  Multiple renal nodules with some CIS do not appear benign noted on CT abdomen  Nephrology is following      Elevated troponin  Assessment & Plan  Elevated troponin, per Cardiology suspect type 2 MI in setting of underlying coronary artery disease with severe anemia and hypotension  Echo showed preserved left ventricular ejection fraction  Reportedly was taking aspirin prior to admission which is known hold  Continue atorvastatin  Cardiology signed off    Type 2 diabetes mellitus [facility])  Assessment & Plan  No results found for: HGBA1C    Recent Labs     20  1632 20  2105 20  0724 20  1125   POCGLU 269* 181* 92 197*   Blood Sugar Average: Last 72 hrs:  (P) 582 4380   Continue Lantus q h s  Continue sliding scale insulin    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[address] )  Assessment & Plan  Appreciate input from Neurosurgery  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  Outpatient follow-up with Neurosurgery, may obtain an outpatient MRI in 2-4 weeks        VTE Pharmacologic Prophylaxis:   Pharmacologic: Pharmacologic VTE Prophylaxis contraindicated due to Thrombocytopenia  Mechanical VTE Prophylaxis in Place: Yes    Patient Centered Rounds: I have performed bedside rounds with nursing staff today  Discussions with Specialists or Other Care Team Provider:     Education and Discussions with Family / Patient:  Discussed with patient's rehab center    Time Spent for Care: 30 minutes  More than 50% of total time spent on counseling and coordination of care as described above      Current Length of Stay: 6 day(s)    Current Patient Status: Inpatient   Certification Statement: The patient will continue to require additional inpatient hospital stay due to Above    Discharge Plan / Estimated Discharge Date: TBD    Code Status: Level 3 - DNAR and DNI      Subjective:   Patient seen and examined  Comfortable in chair  No event overnight  Wants to leave the hospital and see his wife  Discussed with rehab center, patient's wife with dementia  No nausea vomiting or diarrhea    Objective:     Vitals:   Temp (24hrs), Av 5 °F (36 9 °C), Min:97 7 °F (36 5 °C), Max:98 9 °F (37 2 °C)    Temp:  [97 7 °F (36 5 °C)-98 9 °F (37 2 °C)] 98 4 °F (36 9 °C)  HR:  [56-78] 56  Resp:  [16-18] 18  BP: (118-135)/(59-70) 134/66  SpO2:  [95 %-99 %] 95 %  Body mass index is 22 63 kg/m²  Input and Output Summary (last 24 hours): Intake/Output Summary (Last 24 hours) at 3/29/2020 1226  Last data filed at 3/29/2020 5617  Gross per 24 hour   Intake 825 ml   Output 1375 ml   Net -550 ml       Physical Exam:     Physical Exam  Patient is awake alert in no acute distress  Lung with decreased breath sounds bilateral  Heart with distant heart sounds  Abdomen soft nontender  Lower extremities petechia no edema  Additional Data:     Labs:    Results from last 7 days   Lab Units 03/29/20  0539  03/24/20  0505   WBC Thousand/uL 3 19*   < > 14 37*   HEMOGLOBIN g/dL 8 2*   < > 7 5*   HEMATOCRIT % 25 8*   < > 23 1*   PLATELETS Thousands/uL 16*   < > 7*   NEUTROS PCT %  --   --  49   LYMPHS PCT %  --   --  36   MONOS PCT %  --   --  10   EOS PCT %  --   --  2    < > = values in this interval not displayed  Results from last 7 days   Lab Units 03/29/20  0539  03/26/20  0547   POTASSIUM mmol/L 3 4*   < > 3 6   CHLORIDE mmol/L 112*   < > 110*   CO2 mmol/L 23   < > 24   BUN mg/dL 42*   < > 60*   CREATININE mg/dL 1 83*   < > 1 96*   CALCIUM mg/dL 7 3*   < > 7 7*   ALK PHOS U/L  --   --  36*   ALT U/L  --   --  26   AST U/L  --   --  104*    < > = values in this interval not displayed  Results from last 7 days   Lab Units 03/25/20  0447   INR  1 36*       * I Have Reviewed All Lab Data Listed Above  * Additional Pertinent Lab Tests Reviewed: [address] Admission Reviewed    Imaging:    Imaging Reports Reviewed Today Include:   Imaging Personally Reviewed by Myself Includes:     Recent Cultures (last 7 days):     Results from last 7 days   Lab Units 03/23/20  1658 03/23/20  1651   BLOOD CULTURE  No Growth After 5 Days  No Growth After 5 Days         Last 24 Hours Medication List:     Current Facility-Administered Medications:  acetaminophen 650 mg Oral Q6H PRN Kaycee Greenberg MD    atorvastatin 40 mg Oral Daily With Leroy Stanley Michelet Greenberg MD    bisacodyl 10 mg Rectal Daily PRN Kaycee Greenberg MD    chlorhexidine 15 mL Swish & Spit Q12H [address] Kaycee Greenberg MD    dextran 70-hypromellose 2 drop Both Eyes BID Kaycee Greenberg MD    ferrous sulfate 325 mg Oral BID With Meals Kaycee Greenberg MD    immune globulin, human 62 5 g Intravenous Q24H Kaur Ramirez MD Last Rate: Stopped (03/28/20 1854)   insulin glargine 5 Units Subcutaneous HS Reynaldo Yuan,     insulin lispro 1-5 Units Subcutaneous 4x Daily (AC & HS) Irwin Cloud PA-C    loperamide 2 mg Oral TID PRN Maki Barbosa PA-C    methylPREDNISolone sodium succinate 40 mg Intravenous BID Kaycee Greenberg MD    pantoprazole 40 mg Oral BID AC Kaycee Greenberg MD    potassium chloride 20 mEq Oral Once Reynaldo Yuan DO         Today, Patient Was Seen By: Reynaldo Yuan DO    ** Please Note: This note has been constructed using a voice recognition system   **

## 2020-03-29 NOTE — ASSESSMENT & PLAN NOTE
Patient initially transferred to [facility] from [address] due to hypotension and shock  Patient transferred out of ICU 03/25/2020  Status post IV fluid resuscitation

## 2020-03-29 NOTE — ASSESSMENT & PLAN NOTE
No results found for: HGBA1C    Recent Labs     03/28/20  1632 03/28/20  2105 03/29/20  0724 03/29/20  1125   POCGLU 269* 181* 92 197*   Blood Sugar Average: Last 72 hrs:  (P) 983 0929   Continue Lantus q h s    Continue sliding scale insulin

## 2020-03-29 NOTE — ASSESSMENT & PLAN NOTE
Platelet 2 on admission, status post 3 units of platelets and DDAVP  Aspirin has been discontinued  Appreciate hematology input, Consider possibility of ITP bone marrow disorder, TTP/HUS given ongoing anemia  DIC panel- normal fibrinogen  Follow on a platelet antibody   Started on IV Solu-Medrol 40 mg twice daily  IV IG x2 doses  Improving

## 2020-03-29 NOTE — PROGRESS NOTES
Progress Note - Nephrology   Iris Dewey 69 y o  male MRN: [id number]  Unit/Bed#: [facility] 926-01 Encounter: [id number]      Assessment / Plan:  1  BEN on probable CKD, b/l sCr 1 36 as of Nov 2016, peak sCr 3 52, down to 1 83 now and stable off IVF  -BEN likely d/t prerenal cause from decreased oral intake with possible hypovolemic shock  -UA bland, supportive of likely hemodynamic cause  -abdominal ultrasound shows normal sized kidneys with bilateral renal cysts  -CT abdomen shows multiple bilateral renal nodules, with some cysts which do not appear benign   -was on PPI at home  -f/u am BMP  Suspect sCr in high 1s could be new baseline for patient  -urinating, monitor UOP     2  CKD stage 3 in setting of DM/cirrhosis - b/l sCr as above  -no outpatient nephrology follow up on record-will need f/u with renal upon discharge     3  Anemia ?  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 If malignant in nature     Other issues: NSTEMI type 1 vs 2 - cardio on board, trop > 40        Subjective:   Denies any chest pain or shortness of breath  No complaints  Happy that his kidney function is better  Urinating a lot        Objective:     Vitals: Blood pressure 119/64, pulse 70, temperature 97 8 °F (36 6 °C), resp  rate 16, height 5' 6" (1 676 m), weight 63 6 kg (140 lb 3 4 oz), SpO2 97 %  ,Body mass index is 22 63 kg/m²  Temp (24hrs), Av 3 °F (36 8 °C), Min:97 8 °F (36 6 °C), Max:98 8 °F (37 1 °C)      Weight (last 2 days)     Date/Time   Weight    20 0600   63 6 (140 21)    20 0600   62 4 (137 57)    20 0600   62 3 (137 35)                Intake/Output Summary (Last 24 hours) at 3/29/2020 1510  Last data filed at 3/29/2020 1238  Gross per 24 hour   Intake 720 ml   Output 1675 ml   Net -955 ml     I/O last 24 hours: In: 1065 [P O :1065]  Out:  [Urine:]        Physical Exam:   Physical Exam   Constitutional: He appears well-developed and well-nourished  No distress  thin   HENT:   Head: Normocephalic and atraumatic  Mouth/Throat: No oropharyngeal exudate  Eyes: Right eye exhibits no discharge  Left eye exhibits no discharge  No scleral icterus  Neck: Normal range of motion  Neck supple  No thyromegaly present  Cardiovascular: Normal rate, regular rhythm and normal heart sounds  Pulmonary/Chest: Effort normal and breath sounds normal  He has no wheezes  He has no rales  Abdominal: Soft  Bowel sounds are normal  He exhibits no distension  There is no tenderness  Musculoskeletal: Normal range of motion  He exhibits no edema  Neurological: He is alert  awake   Skin: Skin is warm and dry  Rash (petechiae over both arms and legs) noted  He is not diaphoretic  There is pallor  Psychiatric: He has a normal mood and affect  His behavior is normal    Vitals reviewed        Invasive Devices     Peripheral Intravenous Line            Peripheral IV 20 Right Forearm 2 days                Medications:    Scheduled Meds:  Current Facility-Administered Medications:  acetaminophen 650 mg Oral Q6H PRN Kaycee Greenberg MD   acetaminophen 650 mg Oral Once Marvelyn Bamberger, DO   atorvastatin 40 mg Oral Daily With [employer] KARLA Greenberg MD   bisacodyl 10 mg Rectal Daily PRN Kaycee Greenberg MD   chlorhexidine 15 mL Swish & Spit Q12H [facility] & half-way Kaycee Greenberg MD   dextran 70-hypromellose 2 drop Both Eyes BID Kaycee Greenberg MD   diphenhydrAMINE 25 mg Oral Once Rhetta , DO   ferrous sulfate 325 mg Oral BID With Meals Kaycee Greenberg MD   insulin glargine 5 Units Subcutaneous HS Rhetta , DO   insulin lispro 1-5 Units Subcutaneous 4x Daily (AC & HS) Benjie Penn PA-C   loperamide 2 mg Oral TID PRN Hawk Barbosa PA-C   methylPREDNISolone sodium succinate 40 mg Intravenous BID Kaycee Greenberg MD   pantoprazole 40 mg Oral BID AC Kaycee Greenberg MD       PRN Meds:   acetaminophen    bisacodyl    loperamide    Continuous Infusions:         LAB RESULTS:      Results from last 7 days   Lab Units 03/29/20  0539 03/28/20  0623 03/27/20  0540 03/26/20  1122 03/26/20  0547 03/26/20  0022 03/25/20  1758 03/25/20  0447  03/24/20  1737  03/24/20  0505  03/23/20  2139 03/23/20  2138  03/23/20  1322   WBC Thousand/uL 3 19* 4 09* 4 03* 5 06 4 41 3 24* 4 23*  --    < > 5 86   < > 14 37*  --   --  15 21*  --  14 89*   HEMOGLOBIN g/dL 8 2* 8 7* 7 5* 8 1* 7 5* 7 5* 8 0*  --    < > 8 2*   < > 7 5*   < >  --  6 4*   < > 5 9*   HEMATOCRIT % 25 8* 27 2* 23 1* 24 6* 22 0* 22 3* 23 5*  --    < > 24 5*   < > 23 1*   < >  --  20 1*   < > 18 8*   PLATELETS Thousands/uL 16* 5* 6* 3* 4* 3* 2*  --    < > 5*   < > 7*  --   --  13*  --  2*   NEUTROS PCT %  --   --   --   --   --   --   --   --   --   --   --  49  --   --   --   --  48   LYMPHS PCT %  --   --   --   --   --   --   --   --   --   --   --  36  --   --   --   --  40   LYMPHO PCT %  --   --   --   --   --   --   --   --   --   --   --   --   --   --  35  --   --    MONOS PCT %  --   --   --   --   --   --   --   --   --   --   --  10  --   --   --   --  10   MONO PCT %  --   --   --   --   --   --   --   --   --   --   --   --   --   --  10  --   --    EOS PCT %  --   --   --   --   --   --   --   --   --   --   --  2  --   --  1  --  1 POTASSIUM mmol/L 3 4* 3 7 3 8  --  3 6  --   --  3 5  --  3 6  --  3 8  --  3 9  --    < > 4 4   CHLORIDE mmol/L 112* 111* 111*  --  110*  --   --  117*  --  121*  --  123*  --  123*  --    < > 109*   CO2 mmol/L 23 24 23  --  24  --   --  19*  --  17*  --  14*  --  13*  --    < > 14*   BUN mg/dL 42* 47* 54*  --  60*  --   --  76*  --  88*  --  108*  --  118*  --    < > 138*   CREATININE mg/dL 1 83* 1 89* 1 89*  --  1 96*  --   --  2 31*  --  2 54*  --  2 84*  --  2 83*  --    < > 3 52*   CALCIUM mg/dL 7 3* 7 8* 7 7*  --  7 7*  --   --  7 5*  --  7 5*  --  7 8*  --  7 4*  --    < > 8 4   ALK PHOS U/L  --   --   --   --  36*  --   --  39*  --   --   --  40*  --  36*  --   --  40*   ALT U/L  --   --   --   --  26  --   --  29  --   --   --  30  --  23  --   --  23   AST U/L  --   --   --   --  104*  --   --  162*  --   --   --  215*  --  167*  --   --  80*   MAGNESIUM mg/dL  --   --   --   --  1 8  --   --   --   --   --   --   --   --  1 6  --   --  1 6   PHOSPHORUS mg/dL  --   --   --   --   --   --   --   --   --   --   --   --   --  3 2  --   --   --     < > = values in this interval not displayed  CUTURES:  Lab Results   Component Value Date    BLOODCX No Growth After 5 Days  03/23/2020    BLOODCX No Growth After 5 Days  03/23/2020                 Portions of the record may have been created with voice recognition software  Occasional wrong word or "sound a like" substitutions may have occurred due to the inherent limitations of voice recognition software  Read the chart carefully and recognize, using context, where substitutions have occurred  If you have any questions, please contact the dictating provider

## 2020-03-29 NOTE — ASSESSMENT & PLAN NOTE
Patient initially presented to [address] with lightheadedness, found to have hemoglobin 5 6 in platelet 2, status post 3 units PRBCs this admission  Unknown baseline hemoglobin  FOBT positive  Appreciate GI input - will need EGD/colonoscopy once platelets are stable  No obvious source of bleed at this time    Appreciate hematology input  Peripheral smear negative for hemolysis   Imaging revealed cirrhosis of the liver  Continue to monitor

## 2020-03-30 VITALS
DIASTOLIC BLOOD PRESSURE: 71 MMHG | HEIGHT: 66 IN | HEART RATE: 69 BPM | OXYGEN SATURATION: 100 % | WEIGHT: 140.43 LBS | BODY MASS INDEX: 22.57 KG/M2 | TEMPERATURE: 98.1 F | SYSTOLIC BLOOD PRESSURE: 146 MMHG | RESPIRATION RATE: 18 BRPM

## 2020-03-30 PROBLEM — R57.9 SHOCK (HCC): Status: RESOLVED | Noted: 2020-03-23 | Resolved: 2020-03-30

## 2020-03-30 PROBLEM — N17.9 AKI (ACUTE KIDNEY INJURY) (HCC): Status: RESOLVED | Noted: 2020-03-24 | Resolved: 2020-03-30

## 2020-03-30 LAB
ANION GAP SERPL CALCULATED.3IONS-SCNC: 4 MMOL/L (ref 4–13)
ANISOCYTOSIS BLD QL SMEAR: PRESENT
BASOPHILS # BLD MANUAL: 0 THOUSAND/UL (ref 0–0.1)
BASOPHILS NFR MAR MANUAL: 0 % (ref 0–1)
BUN SERPL-MCNC: 50 MG/DL (ref 5–25)
CALCIUM SERPL-MCNC: 7.6 MG/DL (ref 8.3–10.1)
CHLORIDE SERPL-SCNC: 113 MMOL/L (ref 100–108)
CO2 SERPL-SCNC: 21 MMOL/L (ref 21–32)
CREAT SERPL-MCNC: 1.71 MG/DL (ref 0.6–1.3)
EOSINOPHIL # BLD MANUAL: 0 THOUSAND/UL (ref 0–0.4)
EOSINOPHIL NFR BLD MANUAL: 0 % (ref 0–6)
ERYTHROCYTE [DISTWIDTH] IN BLOOD BY AUTOMATED COUNT: 17.2 % (ref 11.6–15.1)
GFR SERPL CREATININE-BSD FRML MDRD: 39 ML/MIN/1.73SQ M
GLUCOSE SERPL-MCNC: 109 MG/DL (ref 65–140)
GLUCOSE SERPL-MCNC: 128 MG/DL (ref 65–140)
GLUCOSE SERPL-MCNC: 78 MG/DL (ref 65–140)
GLYCOPROTEIN IV AB: NEGATIVE
HCT VFR BLD AUTO: 23.4 % (ref 36.5–49.3)
HGB BLD-MCNC: 7.4 G/DL (ref 12–17)
HLA AB SER QL IA: NEGATIVE
HYPERCHROMIA BLD QL SMEAR: PRESENT
LYMPHOCYTES # BLD AUTO: 1.02 THOUSAND/UL (ref 0.6–4.47)
LYMPHOCYTES # BLD AUTO: 37 % (ref 14–44)
MAGNESIUM SERPL-MCNC: 1.9 MG/DL (ref 1.6–2.6)
MCH RBC QN AUTO: 30.8 PG (ref 26.8–34.3)
MCHC RBC AUTO-ENTMCNC: 31.6 G/DL (ref 31.4–37.4)
MCV RBC AUTO: 98 FL (ref 82–98)
METAMYELOCYTES NFR BLD MANUAL: 1 % (ref 0–1)
MONOCYTES # BLD AUTO: 0.17 THOUSAND/UL (ref 0–1.22)
MONOCYTES NFR BLD: 6 % (ref 4–12)
NEUTROPHILS # BLD MANUAL: 1.55 THOUSAND/UL (ref 1.85–7.62)
NEUTS SEG NFR BLD AUTO: 56 % (ref 43–75)
NRBC BLD AUTO-RTO: 1 /100 WBCS
PLAT GP IA/IIA AB SER QL IA: NEGATIVE
PLAT GP IB/IX AB SER QL IA: NEGATIVE
PLAT GP IIB/IIIA AB SER QL IA: NEGATIVE
PLATELET # BLD AUTO: 38 THOUSANDS/UL (ref 149–390)
PLATELET BLD QL SMEAR: ABNORMAL
PMV BLD AUTO: 13.2 FL (ref 8.9–12.7)
POIKILOCYTOSIS BLD QL SMEAR: PRESENT
POLYCHROMASIA BLD QL SMEAR: PRESENT
POTASSIUM SERPL-SCNC: 4 MMOL/L (ref 3.5–5.3)
RBC # BLD AUTO: 2.4 MILLION/UL (ref 3.88–5.62)
SODIUM SERPL-SCNC: 138 MMOL/L (ref 136–145)
TOTAL CELLS COUNTED SPEC: 100
WBC # BLD AUTO: 2.76 THOUSAND/UL (ref 4.31–10.16)

## 2020-03-30 PROCEDURE — 99232 SBSQ HOSP IP/OBS MODERATE 35: CPT | Performed by: INTERNAL MEDICINE

## 2020-03-30 PROCEDURE — 80048 BASIC METABOLIC PNL TOTAL CA: CPT | Performed by: INTERNAL MEDICINE

## 2020-03-30 PROCEDURE — 85007 BL SMEAR W/DIFF WBC COUNT: CPT | Performed by: INTERNAL MEDICINE

## 2020-03-30 PROCEDURE — 97116 GAIT TRAINING THERAPY: CPT

## 2020-03-30 PROCEDURE — 83735 ASSAY OF MAGNESIUM: CPT | Performed by: INTERNAL MEDICINE

## 2020-03-30 PROCEDURE — 82948 REAGENT STRIP/BLOOD GLUCOSE: CPT

## 2020-03-30 PROCEDURE — 97535 SELF CARE MNGMENT TRAINING: CPT

## 2020-03-30 PROCEDURE — 85027 COMPLETE CBC AUTOMATED: CPT | Performed by: INTERNAL MEDICINE

## 2020-03-30 PROCEDURE — 99239 HOSP IP/OBS DSCHRG MGMT >30: CPT | Performed by: INTERNAL MEDICINE

## 2020-03-30 RX ORDER — INSULIN GLARGINE 100 [IU]/ML
5 INJECTION, SOLUTION SUBCUTANEOUS
Refills: 0
Start: 2020-03-30

## 2020-03-30 RX ORDER — PREDNISONE 10 MG/1
40 TABLET ORAL DAILY
Refills: 0
Start: 2020-03-30 | End: 2020-06-30 | Stop reason: ALTCHOICE

## 2020-03-30 RX ORDER — PANTOPRAZOLE SODIUM 40 MG/1
40 TABLET, DELAYED RELEASE ORAL
Refills: 0
Start: 2020-03-30 | End: 2021-06-18 | Stop reason: HOSPADM

## 2020-03-30 RX ADMIN — PANTOPRAZOLE SODIUM 40 MG: 40 TABLET, DELAYED RELEASE ORAL at 06:32

## 2020-03-30 RX ADMIN — CHLORHEXIDINE GLUCONATE 0.12% ORAL RINSE 15 ML: 1.2 LIQUID ORAL at 08:52

## 2020-03-30 RX ADMIN — METHYLPREDNISOLONE SODIUM SUCCINATE 40 MG: 40 INJECTION, POWDER, FOR SOLUTION INTRAMUSCULAR; INTRAVENOUS at 08:51

## 2020-03-30 RX ADMIN — DEXTRAN 70 AND HYPROMELLOSE 2910 2 DROP: 1; 3 SOLUTION/ DROPS OPHTHALMIC at 08:54

## 2020-03-30 RX ADMIN — FERROUS SULFATE TAB 325 MG (65 MG ELEMENTAL FE) 325 MG: 325 (65 FE) TAB at 08:52

## 2020-03-30 NOTE — SOCIAL WORK
DC Plan back to [facility]:       Dr Gurwinder Baum advised that the patient is medically cleared for dc  DC plan for patient to return to [facility]  CM called and spoke with Mukund Turner at [facility], advised pt is cleared for dc  Mukund Turner advised that admissions will start the auth and given CM call back  CM called and spoke with Jake Henderson at [facility]; she advised that they are giving first 3 day auth and then do updated review for full criteria  [place] [facility] stated if [facility] able to accept the patient can dc today  CM called and spoke with Mukund Turner, relayed [address] report of giving auth for initial three days  Mukund Turner stated CM may set up transport for this afternoon  Cm called and spoke with Kristin Penaloza at [place]; [address]  set for 5pm  Dr Gurwinder Baum and KELECHI Hardy notified and [facility] advised via [address]  DC Plan - 5pm [facility] Van  via [facility] back to [facility] rehab/SNF  Pending confirmation of auth from [facility]  Update / 1330 hrs:   Per [facility], via [address], temporary auth received [id number], fax [phone]  Dr Gurwinder Baum and KELECHI Hardy advised

## 2020-03-30 NOTE — ASSESSMENT & PLAN NOTE
Patient initially transferred to [facility]note 3[facility] from [facility] due to hypotension and shock  Patient transferred out of ICU 03/25/2020  Status post IV fluid resuscitation

## 2020-03-30 NOTE — RESTORATIVE TECHNICIAN NOTE
Restorative Specialist Mobility Note       Activity: Ambulate in jovel, Ambulate in room, Bathroom privileges, Chair, Dangle, Stand at bedside(Educated/encouraged pt to ambulate with assistance 3-4 x's/day  Chair alarm on   Pt callbell, phone/tray within reach )     Assistive Device: Four wheel walker          Burke SAMAYOA, Restorative Technician, [employer] [facility]

## 2020-03-30 NOTE — DISCHARGE SUMMARY
Discharge- Lamine Godoy 1945, 69 y o  male MRN: [id number]    Unit/Bed#: [facility] 926-01 Encounter: [id number]    Primary Care Provider: Lissette Stephens MD   Date and time admitted to hospital: 3/23/2020  7:32 PM        Symptomatic anemia  Assessment & Plan  Patient initially presented to [facility] with lightheadedness, found to have hemoglobin 5 6 in platelet 2, status post 3 units PRBCs this admission  Unknown baseline hemoglobin  FOBT positive, imaging revealed liver cirrhosis likely secondary to TORRES  Patient was evaluated by GI, no overt GI bleeding, recommendation for outpatient follow-up and into endoscopy evaluation      Thrombocytopenia ([facility] [address] )  Assessment & Plan  Platelet 2 on admission, status post 3 units of platelets and DDAVP  Aspirin has been discontinued  Appreciate hematology input, Consider possibility of ITP bone marrow disorder, TTP/HUS given ongoing anemia  DIC panel- normal fibrinogen  Started on IV Solu-Medrol 40 mg twice daily  Status post IV IG x2 doses  Improving  Plan to transition to oral prednisone 40 mg daily  Outpatient Hematology follow-up  Repeat CBC in 2 days    * Shock [facility])  Assessment & Plan  Patient initially transferred to [facility] from [address] [place] [address] due to hypotension and shock  Patient transferred out of ICU 03/25/2020  Status post IV fluid resuscitation      Elevated troponin  Assessment & Plan  Elevated troponin, per Cardiology suspect type 2 MI in setting of underlying coronary artery disease with severe anemia and hypotension  Echo showed preserved left ventricular ejection fraction  Reportedly was taking aspirin prior to admission which is known hold  Continue atorvastatin  Cardiology signed off    Type 2 diabetes mellitus [facility])  Assessment & Plan  No results found for: HGBA1C    Recent Labs     03/29/20  0724 03/29/20  1125 03/29/20  1635 03/29/20  2105   POCGLU 92 197* 259* 272*   Blood Sugar Average: Last 72 hrs:  (P) 250 [id number] Continue Lantus q h s  Continue sliding scale insulin    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    Patient Information: Hira Pereira 69 y o  male MRN: [id number]  [facility] Encounter: [id number]    Discharging Physician / Practitioner: Juliane Parekh DO  PCP: Keenan Saavedra MD  Admission Date: 3/23/2020  Discharge Date: 03/30/20    Disposition:     Other: [facility] rehab/SNF  Reason for Admission:   Symptomatic anemia and severe thrombocytopenia    Discharge Diagnoses:     Principal Problem:    Shock ([address] )  Active Problems:    Symptomatic anemia    Thrombocytopenia (HCC)    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HCC)    CAD (coronary artery disease)    COPD (chronic obstructive pulmonary disease) (HCC)    Type 2 diabetes mellitus (HCC)    Elevated troponin    Uremia    CKD (chronic kidney disease) stage 3, GFR 30-59 ml/min (HCC)    Hypokalemia  Resolved Problems:    High anion gap metabolic acidosis    BEN (acute kidney injury) ([address] )    Hypernatremia      Consultations During Hospital Stay:  · Oncology  · Nephrology  · GI  · Neurosurgery  · Cardiology    Procedures Performed:     3/23 - CT head with 2 5 x 2 cm pituitary mass  3/23 - CT AP with nodular liver concerning for cirrhosis, multiple bilateral renal nodules, diverticulosis  3/23 - chest x-ray without acute cardiopulmonary disease  3/24 - abd US - liver cirrhosis, no suspicious lesions   BL simple renal cysts    Cardiac echo with EF 65% moderate TR with mild pulmonary hypertension    Significant Findings / Test Results:     · As above    Incidental Findings:   · As above    Test Results Pending at Discharge (will require follow up):   · Platelet antibody profile     Outpatient Tests Requested:  · CBC in 2 days    Complications:  no    Hospital Course:     Neil Mireles is a 69 y o  male patient who originally presented to the hospital on 3/23/2020 due to symptomatic anemia and severe thrombocytopenia  Patient initially was sent into 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, treated with IV steroid and IVIG x2 days with improvement in platelet count, plan to discharge patient back to rehab on oral prednisone repeat CBC in 2 days and outpatient hematology follow-up    Patient was evaluated by GI, no active GI bleeding    Recommendation for outpatient follow-up and endoscopy evaluation  Patient was found to have elevated troponin, evaluated by Cardiology likely MRI type 2 in the setting of underlying CAD severe anemia and hypotension    Patient was found to have shock, BEN and metabolic acidosis on admission he was resuscitated with IV fluid and blood products    Evaluated by Neurosurgery regarding incidental finding of a pituitary mass, suspect macroadenoma, recommendation for outpatient MRI and neurosurgery follow-up    Currently patient is in stable condition, he will be discharged back to nursing home to follow with his family doctor in 1 week  Condition at Discharge: stable     Discharge Day Visit / Exam:     Subjective:    Patient seen and examined  Comfortable in bed  No event overnight  Anxious to go home  Vitals: Blood Pressure: 146/71 (03/30/20 1441)  Pulse: 69 (03/30/20 1441)  Temperature: 98 1 °F (36 7 °C) (03/30/20 0706)  Temp Source: Oral (03/28/20 1415)  Respirations: 18 (03/30/20 1441)  Height: 5' 6" (167 6 cm) (03/25/20 1631)  Weight - Scale: 63 7 kg (140 lb 6 9 oz) (03/30/20 0600)  SpO2: 100 % (03/30/20 1441)  Exam:   Physical Exam  Patient is awake alert in no acute distress  Lung with decreased breath sounds bilateral  Heart with distant heart sounds  Abdomen soft nontender  Lower extremities petechia no edema  Discussion with Family: no   Discharge instructions/Information to patient and family:   See after visit summary for information provided to patient and family  Provisions for Follow-Up Care:  See after visit summary for information related to follow-up care and any pertinent home health orders  Planned Readmission: no     Discharge Statement:  I spent 40  minutes discharging the patient  This time was spent on the day of discharge  I had direct contact with the patient on the day of discharge  Greater than 50% of the total time was spent examining patient, answering all patient questions, arranging and discussing plan of care with patient as well as directly providing post-discharge instructions  Additional time then spent on discharge activities  Discharge Medications:  See after visit summary for reconciled discharge medications provided to patient and family        ** Please Note: This note has been constructed using a voice recognition system **

## 2020-03-30 NOTE — ASSESSMENT & PLAN NOTE
Platelet 2 on admission, status post 3 units of platelets and DDAVP  Aspirin has been discontinued  Appreciate hematology input, Consider possibility of ITP bone marrow disorder, TTP/HUS given ongoing anemia  DIC panel- normal fibrinogen  Started on IV Solu-Medrol 40 mg twice daily  Status post IV IG x2 doses  Improving  Plan to transition to oral prednisone 40 mg daily  Outpatient Hematology follow-up  Repeat CBC in 2 days

## 2020-03-30 NOTE — PROGRESS NOTES
Nephrology PROGRESS NOTE     Name: Palak Sharp   Age & Sex: 69 y o  male   MRN: [id number]  Unit/Bed#: [facility] 926-01   Encounter: [id number]    PATIENT INFORMATION     Name: Palak Sharp   Age & Sex: 69 y o  male   MRN: [id number]  Hospital Stay Days: 7    ASSESSMENT/PLAN     Principal Problem:    Shock [facility])  Active Problems:    Symptomatic anemia    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HCC)    CAD (coronary artery disease)    COPD (chronic obstructive pulmonary disease) (HCC)    Thrombocytopenia (HCC)    Type 2 diabetes mellitus (HCC)    Elevated troponin    Uremia    BEN (acute kidney injury) ([facility] [address] )    CKD (chronic kidney disease) stage 3, GFR 30-59 ml/min (HCC)    Hypokalemia      1  BEN on CKD- Improivng  - Patient initially presented with lightheadedness and found to have hypovolemic shock with Hg 5 6  S/p pRBC and platelets transfusions    - BEN likely from prerenal cause from decreased oral intake and hypovolemic shock  - history of CKD in setting of diabetes and cirrhosis  - baseline creatinine 1 36 as of November 2016, peaked at 3 5 on presentation, now down trending to 1 7   - CT abdomen/ pelvis showed bilateral renal nodules with some less likely to be benign  Follow-up renal ultrasound on March 24 showed of bilateral simple renal cyst   - continue to monitor with daily BMP  Patient urinating fine  Daily I/O     2  CKD 3:  Setting of history of diabetes and cirrhosis  Patient was not following nephrology outpatient  Will need outpatient follow-up with nephrology discharge  3  Anemia, Cirrhosis:  Likely blood loss in setting of GI bleed and underlying CKD  Currently on oral iron  Gi following  GI is Holding on to further endoscopy due to low platelets count and pt wanting to go home  4  Thrombocytopenia:  Hematology following  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  Possible TTP/situation given anemia  S/p platelet transfusion  Platelet count improving  Status post IVIG x2 doses  Currently on IV Solu-Medrol      5  Elevated anion gap metabolic acidosis:  Resolved  Off bicarb drip  6  Hypovolemic shock, hypokalemia- resolved  Stable vitals  Normal electrolytes  SBP goal >130      7  Pituitary adenoma:  Incidentally found on CT head  Evaluated by neurosurgery  Outpatient follow-up with Neurosurgery for outpatient MRI in 2-4 weeks  SUBJECTIVE     Patient seen and examined  No acute events overnight  Patient admits to feeling fine  Denies any chest pain, shortness of breath, abdominal pain, nausea or vomiting  Denies any recent bleeding episodes  OBJECTIVE     Vitals:    20 1916 20 2206 20 0600 20 0706   BP: 147/65 141/64  136/64   Pulse: 66 62  57   Resp: 16 16  16   Temp: 98 8 °F (37 1 °C) 98 2 °F (36 8 °C)  98 1 °F (36 7 °C)   TempSrc:       SpO2: 97% 96%  97%   Weight:   63 7 kg (140 lb 6 9 oz)    Height:          Temperature:   Temp (24hrs), Av 1 °F (36 7 °C), Min:97 8 °F (36 6 °C), Max:98 8 °F (37 1 °C)    Temperature: 98 1 °F (36 7 °C)  Intake & Output:  I/O        07 -  0700  07 -  07 07 -  0700    P  O  825 360     I V  (mL/kg)       Total Intake(mL/kg) 825 (13) 360 (5 7)     Urine (mL/kg/hr) 1350 (0 9) 1775 (1 2)     Stool       Total Output 1350 1775     Net -525 -1415                Weights:   IBW: 63 8 kg    Body mass index is 22 67 kg/m²  Weight (last 2 days)     Date/Time   Weight    20 06   63 7 (140 43)    20 06   63 6 (140 21)    20 06   62 4 (137 57)            Physical Exam   Constitutional: He is oriented to person, place, and time  He appears well-developed and well-nourished  No distress  HENT:   Head: Normocephalic and atraumatic  Nose: Nose normal    Mouth/Throat: Oropharynx is clear and moist  No oropharyngeal exudate  Eyes: Right eye exhibits no discharge  Left eye exhibits no discharge  No scleral icterus  Neck: Normal range of motion  Neck supple     Cardiovascular: Normal rate, regular rhythm, normal heart sounds and intact distal pulses  Exam reveals no gallop and no friction rub  No murmur heard  Pulmonary/Chest: Effort normal  No stridor  No respiratory distress  He has no wheezes  He has no rales  Abdominal: Soft  Bowel sounds are normal  He exhibits no distension  There is no tenderness  There is no guarding  Musculoskeletal: Normal range of motion  He exhibits no edema  Neurological: He is alert and oriented to person, place, and time  Skin: Skin is warm  He is not diaphoretic  No erythema  Psychiatric: He has a normal mood and affect  Vitals reviewed  LABORATORY DATA     Labs: I have personally reviewed pertinent reports  Results from last 7 days   Lab Units 03/30/20  0532 03/29/20  0539 03/28/20  0623  03/24/20  0505  03/23/20  2138  03/23/20  1322   WBC Thousand/uL 2 76* 3 19* 4 09*   < > 14 37*  --  15 21*  --  14 89*   HEMOGLOBIN g/dL 7 4* 8 2* 8 7*   < > 7 5*   < > 6 4*   < > 5 9*   HEMATOCRIT % 23 4* 25 8* 27 2*   < > 23 1*   < > 20 1*   < > 18 8*   PLATELETS Thousands/uL 38* 16* 5*   < > 7*  --  13*  --  2*   NEUTROS PCT %  --   --   --   --  49  --   --   --  48   MONOS PCT %  --   --   --   --  10  --   --   --  10   MONO PCT % 6  --   --   --   --   --  10  --   --     < > = values in this interval not displayed        Results from last 7 days   Lab Units 03/30/20  0532 03/29/20  0539 03/28/20  2256  03/26/20  0547 03/25/20  0447  03/24/20  0505   POTASSIUM mmol/L 4 0 3 4* 3 7   < > 3 6 3 5   < > 3 8   CHLORIDE mmol/L 113* 112* 111*   < > 110* 117*   < > 123*   CO2 mmol/L 21 23 24   < > 24 19*   < > 14*   BUN mg/dL 50* 42* 47*   < > 60* 76*   < > 108*   CREATININE mg/dL 1 71* 1 83* 1 89*   < > 1 96* 2 31*   < > 2 84*   CALCIUM mg/dL 7 6* 7 3* 7 8*   < > 7 7* 7 5*   < > 7 8*   ALK PHOS U/L  --   --   --   --  36* 39*  --  40*   ALT U/L  --   --   --   --  26 29  --  30   AST U/L  --   --   --   --  104* 162*  --  215*    < > = values in this interval not displayed  Results from last 7 days   Lab Units 03/30/20  0532 03/26/20  0547 03/23/20  2139   MAGNESIUM mg/dL 1 9 1 8 1 6     Results from last 7 days   Lab Units 03/23/20  2139   PHOSPHORUS mg/dL 3 2      Results from last 7 days   Lab Units 03/25/20  0447 03/23/20  1322   INR  1 36* 1 36*   PTT seconds 32  --      Results from last 7 days   Lab Units 03/24/20  0230   LACTIC ACID mmol/L 0 8     Results from last 7 days   Lab Units 03/24/20  0230 03/23/20  2138 03/23/20  1754   TROPONIN I ng/mL >40 00* 37 20*  37 70* 23 18*       IMAGING & DIAGNOSTIC TESTING     Radiology Results: I have personally reviewed pertinent reports  Ct Abdomen Pelvis Wo Contrast    Result Date: 3/25/2020  Impression: Nodular liver concerning for underlying cirrhosis  Multiple bilateral renal nodules, some of which meet the criteria for a simple cyst and others which do not  Recommend an ultrasound for further evaluation with attention to the exophytic left upper pole nodule  Diverticulosis without evidence for acute diverticulitis  The study was marked in EPIC for significant notification  Workstation performed: [id number]     Xr Chest 1 View Portable    Result Date: 3/23/2020  Impression: No acute cardiopulmonary disease  Workstation performed: [id number]     Ct Head Without Contrast    Result Date: 3/23/2020  Impression: 1  No acute intracranial abnormality  2  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 and extension into the sphenoid sinus on the left  Differential considerations include pituitary macroadenoma, pituitary carcinoma, craniopharyngioma, meningioma as well as less likely considerations such as giant saccular aneurysm  Nonemergent MRI brain with IV contrast recommended for further characterization  3  Prominence of the subdural CSF spaces bilaterally 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  The study was marked in Epic for follow-up  Workstation performed: [id number]     Us Abdomen Complete    Result Date: 3/24/2020  Impression: 1  Liver cirrhosis  No suspicious lesions identified  2   Bilateral simple renal cysts  3   Status post cholecystectomy  Workstation performed: [id number]     Other Diagnostic Testing: I have personally reviewed pertinent reports  ACTIVE MEDICATIONS     Current Facility-Administered Medications   Medication Dose Route Frequency    acetaminophen (TYLENOL) tablet 650 mg  650 mg Oral Q6H PRN    atorvastatin (LIPITOR) tablet 40 mg  40 mg Oral Daily With Dinner    bisacodyl (DULCOLAX) rectal suppository 10 mg  10 mg Rectal Daily PRN    chlorhexidine (PERIDEX) 0 12 % oral rinse 15 mL  15 mL Swish & Spit Q12H BELLE    dextran 70-hypromellose (GENTEAL TEARS) 0 1-0 3 % ophthalmic solution 2 drop  2 drop Both Eyes BID    ferrous sulfate tablet 325 mg  325 mg Oral BID With Meals    insulin glargine (LANTUS) subcutaneous injection 5 Units 0 05 mL  5 Units Subcutaneous HS    insulin lispro (HumaLOG) 100 units/mL subcutaneous injection 1-5 Units  1-5 Units Subcutaneous 4x Daily (AC & HS)    loperamide (IMODIUM) capsule 2 mg  2 mg Oral TID PRN    methylPREDNISolone sodium succinate (Solu-MEDROL) injection 40 mg  40 mg Intravenous BID    pantoprazole (PROTONIX) EC tablet 40 mg  40 mg Oral BID AC         Portions of the record may have been created with voice recognition software  Occasional wrong word or "sound a like" substitutions may have occurred due to the inherent limitations of voice recognition software    Read the chart carefully and recognize, using context, where substitutions have occurred   ==  Christiana Vanessa, [address] [facility]  Internal Medicine Residency PGY-1

## 2020-03-30 NOTE — ASSESSMENT & PLAN NOTE
No results found for: HGBA1C    Recent Labs     03/29/20  0724 03/29/20  1125 03/29/20  1635 03/29/20  2105   POCGLU 92 197* 259* 272*   Blood Sugar Average: Last 72 hrs:  (P) 777 [id number]   Continue Lantus q h s    Continue sliding scale insulin

## 2020-03-30 NOTE — PLAN OF CARE
Problem: OCCUPATIONAL THERAPY ADULT  Goal: Performs self-care activities at highest level of function for planned discharge setting  See evaluation for individualized goals  Description  Treatment Interventions: ADL retraining, Functional transfer training, UE strengthening/ROM, Endurance training, Cognitive reorientation, Patient/family training, Equipment evaluation/education, Compensatory technique education, Continued evaluation, Energy conservation, Activityengagement          See flowsheet documentation for full assessment, interventions and recommendations  Outcome: Progressing  Note:   Limitation: Decreased ADL status, Decreased Safe judgement during ADL, Decreased endurance, Decreased cognition, Decreased self-care trans, Decreased high-level ADLs  Prognosis: Fair  Assessment: Pt participated in occupational therapy with focus on activity tolerance,  Functional transfers/standing tolerance and balance for pt engagement in LB self-care  Pt cleared by RN/Tabitha for pt participation in  Occupational therapy  Pt received sitting out of bed to bedside chair and agreeable to therapy following pt Identifiers confirmed  Pt reported his goal today was to "go home"  Pt required assist for LB self-care/LB dressing task 2* pt decreased overall strength and activity tolerance  Pt will benefit from post acute care rehab services to continue to address pt noted deficits which currently impair pt ADL and functional mob  Pt chair alarm active post session all needs within reach        OT Discharge Recommendation: Other (Comment)(return to facility)  OT - OK to Discharge: Yes(when medically stable)

## 2020-03-30 NOTE — OCCUPATIONAL THERAPY NOTE
Occupational Therapy Treatment Note     03/30/20 1135   Restrictions/Precautions   Weight Bearing Precautions Per Order No   Other Precautions Chair Alarm;Cognitive; Fall Risk   Lifestyle   Autonomy Assist ADLS/IADLS, Mod I w/ transfers and functional mobility PTA   Reciprocal Relationships Pt lives w/ facility staff; reports no family in the area   Service to Others Pt is retired   [address] Enjoys going to the [employer]; and going outside to smoke w/ his friends   Pain Assessment   Pain Assessment Tool 0-10   Pain Score No Pain   ADL   Where Assessed Chair   LB Dressing Assistance 3  Moderate Assistance   LB Dressing Deficit Thread RLE into pants; Thread LLE into pants   Transfers   Sit to Stand 5  Supervision   Additional items Increased time required   Stand to Sit 5  Supervision   Additional items Increased time required   Cognition   Overall Cognitive Status Impaired   Arousal/Participation Responsive; Cooperative   Attention Attends with cues to redirect   Orientation Level Oriented X4   Memory Decreased recall of precautions;Decreased recall of recent events;Decreased short term memory   Following Commands Follows one step commands without difficulty   Activity Tolerance   Activity Tolerance Patient tolerated treatment well   Assessment   Assessment Pt participated in occupational therapy with focus on activity tolerance,  Functional transfers/standing tolerance and balance for pt engagement in LB self-care  Pt cleared by RN/Tabitha for pt participation in  Occupational therapy  Pt received sitting out of bed to bedside chair and agreeable to therapy following pt Identifiers confirmed  Pt reported his goal today was to "go home"  Pt required assist for LB self-care/LB dressing task 2* pt decreased overall strength and activity tolerance  Pt will benefit from post acute care rehab services to continue to address pt noted deficits which currently impair pt ADL and functional mob   Pt chair alarm active post session all needs within reach      Plan   Treatment Interventions ADL retraining   Goal Expiration Date 04/07/20   OT Treatment Day 3   OT Frequency 3-5x/wk   Recommendation   OT Discharge Recommendation Other (Comment)  (return to facility)   Barthel Index   Grooming Score 0   Dressing Score 5   Toilet Use Score 5   Transfers (Bed/Chair) Score 10   Mobility (Level Surface) Score 0       Marcia Zendejas  MAHMOOD/L

## 2020-03-30 NOTE — PROGRESS NOTES
[facility]  Progress Note - Dilcia Ward 69 y o  male MRN: [id number]    Unit/Bed#: [facility] 926-01 Encounter: [id number]    Assessment/Plan:  Cirrhosis  TORRES  -appears to be compensated, likely secondary to TORRES  -MELD on 3/25 18, not a transplant candidate given his advanced age and comorbid conditions  -patient will likely require updated EGD for variceal screening and further evaluation of his anemia and longstanding GERD  -HCC screening up to date, repeat ultrasound due 09/2020  -no ascites on exam or ultrasound  -no evidence of hepatic encephalopathy, although patient does carry a diagnosis of dementia  -no indication from GI standpoint for diuretics or hepatic encephalopathy medications    GERD  -continue low-dose PPI  -EGD further evaluation for Moody's esophagus and anemia workup(can consider as an outpatient)    Hematochezia  Anemia  -initial presentation of hemoglobin 5 9 status post 2 units PRBCs on 03/24 with appropriate response however hemoglobin mildly downtrending to 7 4 from 8 2 however brown stool this morning and no signs of overt GI bleeding  -last colonoscopy appears to be in 2017 by Dr Usman Hurtado  -patient is insistent on pursuing endoscopic evaluation as an outpatient given no signs of overt GI bleeding with more or less stable hemoglobin which is certainly not unreasonable    I did discuss with him that given current COVID situation this may delay him in getting important procedures and he states that he would be willing to accept this risk as he wants to be home   -currently his severe thrombocytopenia(although improving) is still too low for him to undergo endoscopic evaluation as risks outweigh benefits and would ideally need platelet count to be greater than 50,000  -continue monitor signs of overt GI bleeding and hemoglobin    Thrombocytopenia  -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  -nonresponsive to platelet transfusionsX2  -appreciate hematology recommendations  -on IV steroids and IVIG   -platelet count increasing today at 38,000 no reported spontaneous bleeding    In the absence of overt GI bleeding or transfusion dependent anemia it would not be unreasonable to pursue endoscopic evaluation as an outpatient as patient is insistent on discharge as soon as possible and his platelet count continues to be not optimized for procedures  Subjective:   Patient seen examined  Denies nausea, vomiting, abdominal pain, blood or melena in stool  Brown stool this a m       Objective:     Vitals: Blood pressure 136/64, pulse 57, temperature 98 1 °F (36 7 °C), resp  rate 16, height 5' 6" (1 676 m), weight 63 7 kg (140 lb 6 9 oz), SpO2 97 %  ,Body mass index is 22 67 kg/m²  Intake/Output Summary (Last 24 hours) at 3/30/2020 1018  Last data filed at 3/30/2020 0541  Gross per 24 hour   Intake 240 ml   Output 1450 ml   Net -1210 ml       Review of Systems: as per HPI  Review of Systems    Physical Exam:     Physical Exam   Constitutional: He appears distressed  HENT:   Head: Atraumatic  Eyes: No scleral icterus  Neck: Neck supple  Cardiovascular: Normal rate  Pulmonary/Chest: Effort normal and breath sounds normal    Abdominal: Soft  Bowel sounds are normal  He exhibits no distension  There is no tenderness  Musculoskeletal: He exhibits no edema  Neurological: He is alert  Skin: Skin is warm  Psychiatric: He has a normal mood and affect           Invasive Devices     Peripheral Intravenous Line            Peripheral IV 03/27/20 Right Forearm 2 days                        CBC:   Lab Results   Component Value Date    WBC 2 76 (L) 03/30/2020    HGB 7 4 (L) 03/30/2020    HCT 23 4 (L) 03/30/2020    MCV 98 03/30/2020    PLT 38 (LL) 03/30/2020    MCH 30 8 03/30/2020    MCHC 31 6 03/30/2020    RDW 17 2 (H) 03/30/2020    MPV 13 2 (H) 03/30/2020    NRBC 1 03/30/2020   ,   CMP:   Lab Results   Component Value Date    K 4 0 03/30/2020     (H) 03/30/2020 CO2 21 03/30/2020    BUN 50 (H) 03/30/2020    CREATININE 1 71 (H) 03/30/2020    CALCIUM 7 6 (L) 03/30/2020    EGFR 39 03/30/2020

## 2020-03-30 NOTE — PHYSICAL THERAPY NOTE
PHYSICAL THERAPY Treatment NOTE    Patient Name: Yas Banks  [id number] Date: 3/30/2020        03/30/20 2265   Pain Assessment   Pain Assessment Tool 0-10   Pain Score No Pain   Restrictions/Precautions   Weight Bearing Precautions Per Order No   Other Precautions Chair Alarm; Bed Alarm;Cognitive; Fall Risk   General   Chart Reviewed Yes   Additional Pertinent History Pt  is a 68 yo M who presents with shock and anemia   Family/Caregiver Present No   Cognition   Overall Cognitive Status Impaired   Arousal/Participation Arousable; Cooperative   Attention Attends with cues to redirect   Orientation Level Oriented X4   Memory Decreased recall of recent events   Following Commands Follows one step commands with increased time or repetition   Comments Pt  was identified with full name and birthdate   Subjective   Subjective Pt  agreeable to PT session   Bed Mobility   Additional Comments Pt  seated OOB in recliner prior to and followig PT session   Transfers   Sit to Stand 5  Supervision   Additional items Assist x 1; Increased time required;Verbal cues  (for hand placement and sequencing)   Stand to Sit 5  Supervision   Additional items Impulsive;Verbal cues  (to reach back to control descent)   Additional Comments Pt  performed sit<>stand transfers with supervision requiring increased time to stand and impulsive to return to sitting with VCs for hand placement and sequencing and to reach back to control descent   Ambulation/Elevation   Gait pattern Improper Weight shift;Narrow MAGGY; Forward Flexion;Decreased foot clearance;Shuffling; Inconsistent tali; Redundant gait at times; Short stride; Step to;Excessively slow   Gait Assistance 5  Supervision   Additional items Assist x 1;Verbal cues  (for posture and gait mechanics)   Assistive Device Other (Comment)  (rollator)   Distance 350'x1   Balance   Static Sitting Good   Dynamic Sitting Fair + Static Standing Fair   Dynamic Standing Fair -   Ambulatory Poor +   Endurance Deficit   Endurance Deficit Yes   Endurance Deficit Description postural and gait degradation noted with fatigue   Activity Tolerance   Activity Tolerance Patient tolerated treatment well   Medical Staff Made Aware Spoke to CM    Nurse Made Aware Spoke to RN   Assessment   Prognosis Fair   Problem List Decreased strength;Decreased range of motion;Decreased endurance; Impaired balance;Decreased mobility; Decreased coordination;Decreased safety awareness   Assessment Pt  is a 70 yo M who presents with shock and anemia  Pt  Agreeable to PT session, Pt  Identified with full name and birthdate  Pt  Demonstrated increased functional independence and increased activity tolerance as evidenced above  Pt  Is progressing towards goals, as expected and will benefit from continued skilled therapy to increase functional independence and aid patient in return to PLOF with decreased burden of care  D/C recommendation is return to facility with increased assistance when medically appropriate  Goals   Patient Goals to keep active and see my wife! I need to get out of here! STG Expiration Date 04/05/20   PT Treatment Day 2   Plan   Treatment/Interventions Functional transfer training;LE strengthening/ROM; Therapeutic exercise; Endurance training;Cognitive reorientation;Patient/family training;Equipment eval/education; Bed mobility;Gait training;Spoke to nursing;Spoke to case management   Progress Progressing toward goals   PT Frequency   (3-5x/wk)   Recommendation   Recommendation Other (Comment)  (return to facility with inccreased assistance)   Equipment Recommended Trino Germain   PT - OK to Discharge Yes   Additional Comments return to facility when medically appropriate     Yoshi Wooten PT, DPT 3/30/2020

## 2020-03-30 NOTE — PLAN OF CARE
Problem: PHYSICAL THERAPY ADULT  Goal: Performs mobility at highest level of function for planned discharge setting  See evaluation for individualized goals  Description  Treatment/Interventions: OT, Spoke to case management, Spoke to nursing, Gait training, Bed mobility, Patient/family training, Endurance training, LE strengthening/ROM, Functional transfer training          See flowsheet documentation for full assessment, interventions and recommendations  Outcome: Progressing  Note:   Prognosis: Fair  Problem List: Decreased strength, Decreased range of motion, Decreased endurance, Impaired balance, Decreased mobility, Decreased coordination, Decreased safety awareness  Assessment: Pt  is a 70 yo M who presents with shock and anemia  Pt  Agreeable to PT session, Pt  Identified with full name and birthdate  Pt  Demonstrated increased functional independence and increased activity tolerance as evidenced above  Pt  Is progressing towards goals, as expected and will benefit from continued skilled therapy to increase functional independence and aid patient in return to PLOF with decreased burden of care  D/C recommendation is return to facility with increased assistance when medically appropriate  Recommendation: Other (Comment)(return to facility with inccreased assistance)     PT - OK to Discharge: Yes    See flowsheet documentation for full assessment

## 2020-03-30 NOTE — PROGRESS NOTES
Hematology - Oncology Progress Note    [place] [birth year], [id number]  [facility] 926/[facility] 926-01      Impression and plan:    71-year-old male admitted with severe thrombocytopenia  Patient did not respond to the initial platelet transfusion  Mr Chemo Broussrad was started on steroids - minimal response  Patient subsequently began IVIG, completed 2 day course of 1 g/kg IV  Platelet count is trended below - higher/better than before  Patient can have the IV steroids switched to an oral equivalent - Mr Chemo Broussard can then begin a slow taper as an outpatient  Hemoglobin level is lower today as compared to yesterday (been up and down)  There has been no evidence for bleeding  Patient also has a history of cirrhosis  Etiology for the anemia is likely multifactorial (age, cirrhosis, acute illness)  Patient is not symptomatic from the decreased hemoglobin level  Mr Chemo Broussard stated that he wanted to be discharged today  From a hematology standpoint, the patient can be discharged  The platelet count will need to be monitored, rechecked in 1 or 2 days  Same with the hemoglobin level  Patient can be seen by 1 of the hematologist as an outpatient in the next 1-2 weeks  I re-explained to the patient that final decision for discharge is between him and hospitalist service  Patient is also being followed by Nephrology and GI   __________________________________________________________________________________________    Chief complaint: Thrombocytopenia, anemia    History of present illness: 71-year-old male with a number of medical issues including COPD, hyperlipidemia, diabetes and chronic kidney transferred from [facility] [place] because of severe thrombocytopenia  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    Demian Ortega has been on steroids      Patient states feeling okay, about the same as before  Mr Chemo Broussard denies any problems with excessive bruising or bleeding  No GI or  issues  No pain control issues  Patient wishes to be discharged      Hospital medications list:    Current Facility-Administered Medications:  acetaminophen 650 mg Oral Q6H PRN Kaycee Greenberg MD   atorvastatin 40 mg Oral Daily With [employer] KARLA Greenberg MD   bisacodyl 10 mg Rectal Daily PRN Kaycee Greenberg MD   chlorhexidine 15 mL Swish & Spit Q12H [facility] Kaycee Greenberg MD   dextran 70-hypromellose 2 drop Both Eyes BID Kaycee Greenberg MD   ferrous sulfate 325 mg Oral BID With Meals Kaycee Greenberg MD   insulin glargine 5 Units Subcutaneous HS Camille Tapia DO   insulin lispro 1-5 Units Subcutaneous 4x Daily (AC & HS) John Peralta PA-C   loperamide 2 mg Oral TID PRN Jessika Barbosa PA-C   methylPREDNISolone sodium succinate 40 mg Intravenous BID Kaycee Greenberg MD   pantoprazole 40 mg Oral BID AC Kaycee Greenberg MD     Physical exam  /64   Pulse 57   Temp 98 1 °F (36 7 °C)   Resp 16   Ht 5' 6" (1 676 m)   Wt 63 7 kg (140 lb 6 9 oz)   SpO2 97%   BMI 22 67 kg/m²   Constitutional:  Thin elderly male, no respiratory distress  Eyes: PERRL, conjunctiva pale, anicteric    HENT: Atraumatic, external ears normal, nose normal,    oropharynx moist, no pharyngeal exudates, no thrush, pink  Neck: Good range of motion, no adenopathy  Respiratory:  Fair to good air entry bilaterally, scattered bilateral rhonchi  Cardiovascular: Normal rate, normal rhythm, no murmurs, no gallops, no rubs    GI: Soft, nondistended, normal bowel sounds, nontender, no organomegaly, no mass, no rebound, no guarding    : No costovertebral angle tenderness, normal reproductive organs, no discharge  Musculoskeletal: No pain or tenderness with palpation of joints, muscles or bones  Integument:  Pale, warm, moist, few scattered purpura, scabs on lower extremities same as before, no active bleeding, no petechia  Lymphatic: No adenopathy in the neck, supra-clavicular region, axilla and groin bilaterally  Extremities:  No lower extremity edema, no cords, pulses are 1+  Neurologic: Alert & oriented x 3, CN 2-12 normal, normal motor function, normal sensory function, no focal deficits noted  Psychiatric:  Pleasant, responsive, appropriate  Rectal: Deferred    Laboratory test results

## 2020-03-30 NOTE — ASSESSMENT & PLAN NOTE
Patient initially presented to [address] with lightheadedness, found to have hemoglobin 5 6 in platelet 2, status post 3 units PRBCs this admission  Unknown baseline hemoglobin  FOBT positive, imaging revealed liver cirrhosis likely secondary to TORRES  Patient was evaluated by GI, no overt GI bleeding, recommendation for outpatient follow-up and into endoscopy evaluation

## 2020-03-31 ENCOUNTER — TELEPHONE (OUTPATIENT)
Dept: GASTROENTEROLOGY | Facility: CLINIC | Age: 75
End: 2020-03-31

## 2020-03-31 NOTE — TELEPHONE ENCOUNTER
Spoke to [employer]  Patient was just returned on 03/30/20  They are not scheduling any outside OV for the next 4 weeks  Nursing Home will call when they are back to scheduling OV  They have patient's discharge papers

## 2020-04-01 ENCOUNTER — TRANSCRIBE ORDERS (OUTPATIENT)
Dept: NEUROSURGERY | Facility: CLINIC | Age: 75
End: 2020-04-01

## 2020-04-01 ENCOUNTER — TELEPHONE (OUTPATIENT)
Dept: NEUROSURGERY | Facility: CLINIC | Age: 75
End: 2020-04-01

## 2020-04-01 DIAGNOSIS — D35.2 PITUITARY MACROADENOMA (HCC): Primary | ICD-10-CM

## 2020-04-06 ENCOUNTER — TELEPHONE (OUTPATIENT)
Dept: NEPHROLOGY | Facility: CLINIC | Age: 75
End: 2020-04-06

## 2020-04-08 ENCOUNTER — TELEPHONE (OUTPATIENT)
Dept: NEUROSURGERY | Facility: CLINIC | Age: 75
End: 2020-04-08

## 2020-04-10 NOTE — UTILIZATION REVIEW
Notification of Discharge  This is a Notification of Discharge from our facility [address]  Please be advised that this patient has been discharge from our facility  Below you will find the admission and discharge date and time including the patients disposition  PRESENTATION DATE: 3/23/2020  7:32 PM    IP ADMISSION DATE: 3/23/20 1932   DISCHARGE DATE: 3/30/2020  5:51 PM  DISPOSITION: Non [facility]/S[facility] Non [facility]/S[facility]   Admission Orders listed below:  Admission Orders (From admission, onward)     Ordered        03/23/20 2058  Inpatient Admission  Once                   Please contact the UR Department if additional information is required to close this patient's authorization/case  [facility] Utilization Review Department  Main: [phone] x carefully listen to the prompts  All voicemails are confidential   [email or web address]  Send all requests for admission clinical reviews, approved or denied determinations and any other requests to dedicated fax number below belonging to the campus where the patient is receiving treatment   List of dedicated fax numbers:  [address] DENIALS (Administrative/Medical Necessity) [phone]   [address] (Maternity/NICU/Pediatrics) [phone]   Radha Rosa [phone]   Juany Alamo [phone]   Sonysaurabh Nashs [phone]   [facility] 1525 [facility] [phone]   [facility]  [phone]   [address] [facility]  2401 [facility] [address] [facility] [phone]

## 2020-04-13 ENCOUNTER — OFFICE VISIT (OUTPATIENT)
Dept: NEPHROLOGY | Facility: CLINIC | Age: 75
End: 2020-04-13
Payer: COMMERCIAL

## 2020-04-13 ENCOUNTER — TELEPHONE (OUTPATIENT)
Dept: SURGICAL ONCOLOGY | Facility: CLINIC | Age: 75
End: 2020-04-13

## 2020-04-13 VITALS
SYSTOLIC BLOOD PRESSURE: 122 MMHG | HEIGHT: 66 IN | TEMPERATURE: 96.4 F | BODY MASS INDEX: 22.67 KG/M2 | HEART RATE: 76 BPM | OXYGEN SATURATION: 99 % | DIASTOLIC BLOOD PRESSURE: 72 MMHG

## 2020-04-13 DIAGNOSIS — E87.6 HYPOKALEMIA: ICD-10-CM

## 2020-04-13 DIAGNOSIS — E11.22 TYPE 2 DIABETES MELLITUS WITH STAGE 3 CHRONIC KIDNEY DISEASE, WITH LONG-TERM CURRENT USE OF INSULIN (HCC): ICD-10-CM

## 2020-04-13 DIAGNOSIS — N18.30 TYPE 2 DIABETES MELLITUS WITH STAGE 3 CHRONIC KIDNEY DISEASE, WITH LONG-TERM CURRENT USE OF INSULIN (HCC): ICD-10-CM

## 2020-04-13 DIAGNOSIS — D64.9 SYMPTOMATIC ANEMIA: ICD-10-CM

## 2020-04-13 DIAGNOSIS — D69.6 THROMBOCYTOPENIA (HCC): ICD-10-CM

## 2020-04-13 DIAGNOSIS — Z79.4 TYPE 2 DIABETES MELLITUS WITH STAGE 3 CHRONIC KIDNEY DISEASE, WITH LONG-TERM CURRENT USE OF INSULIN (HCC): ICD-10-CM

## 2020-04-13 DIAGNOSIS — N18.30 CKD (CHRONIC KIDNEY DISEASE) STAGE 3, GFR 30-59 ML/MIN (HCC): Primary | ICD-10-CM

## 2020-04-13 PROCEDURE — G2012 BRIEF CHECK IN BY MD/QHP: HCPCS | Performed by: NURSE PRACTITIONER

## 2020-04-17 ENCOUNTER — TELEPHONE (OUTPATIENT)
Dept: HEMATOLOGY ONCOLOGY | Facility: CLINIC | Age: 75
End: 2020-04-17

## 2020-04-21 ENCOUNTER — TELEMEDICINE (OUTPATIENT)
Dept: HEMATOLOGY ONCOLOGY | Facility: CLINIC | Age: 75
End: 2020-04-21
Payer: COMMERCIAL

## 2020-04-21 ENCOUNTER — TELEPHONE (OUTPATIENT)
Dept: HEMATOLOGY ONCOLOGY | Facility: CLINIC | Age: 75
End: 2020-04-21

## 2020-04-21 DIAGNOSIS — D64.9 SYMPTOMATIC ANEMIA: Primary | ICD-10-CM

## 2020-04-21 DIAGNOSIS — D69.6 THROMBOCYTOPENIA (HCC): ICD-10-CM

## 2020-04-21 PROCEDURE — 99215 OFFICE O/P EST HI 40 MIN: CPT | Performed by: INTERNAL MEDICINE

## 2020-04-23 ENCOUNTER — TELEPHONE (OUTPATIENT)
Dept: HEMATOLOGY ONCOLOGY | Facility: MEDICAL CENTER | Age: 75
End: 2020-04-23

## 2020-04-29 ENCOUNTER — TELEPHONE (OUTPATIENT)
Dept: HEMATOLOGY ONCOLOGY | Facility: CLINIC | Age: 75
End: 2020-04-29

## 2020-05-04 DIAGNOSIS — D64.9 SYMPTOMATIC ANEMIA: ICD-10-CM

## 2020-05-04 DIAGNOSIS — D69.6 THROMBOCYTOPENIA (HCC): Primary | ICD-10-CM

## 2020-05-05 ENCOUNTER — TELEPHONE (OUTPATIENT)
Dept: INTERVENTIONAL RADIOLOGY/VASCULAR | Facility: HOSPITAL | Age: 75
End: 2020-05-05

## 2020-05-12 ENCOUNTER — HOSPITAL ENCOUNTER (OUTPATIENT)
Dept: CT IMAGING | Facility: HOSPITAL | Age: 75
Discharge: HOME | End: 2020-05-12
Admitting: RADIOLOGY
Payer: COMMERCIAL

## 2020-05-12 VITALS
RESPIRATION RATE: 20 BRPM | OXYGEN SATURATION: 100 % | HEART RATE: 80 BPM | SYSTOLIC BLOOD PRESSURE: 149 MMHG | TEMPERATURE: 96.9 F | BODY MASS INDEX: 22.5 KG/M2 | WEIGHT: 140 LBS | DIASTOLIC BLOOD PRESSURE: 78 MMHG | HEIGHT: 66 IN

## 2020-05-12 DIAGNOSIS — D69.6 THROMBOCYTOPENIA (HCC): ICD-10-CM

## 2020-05-12 DIAGNOSIS — D64.9 SYMPTOMATIC ANEMIA: ICD-10-CM

## 2020-05-12 LAB — GLUCOSE SERPL-MCNC: 89 MG/DL (ref 65–140)

## 2020-05-12 PROCEDURE — 88184 FLOWCYTOMETRY/ TC 1 MARKER: CPT

## 2020-05-12 PROCEDURE — 88341 IMHCHEM/IMCYTCHM EA ADD ANTB: CPT | Performed by: PATHOLOGY

## 2020-05-12 PROCEDURE — 85097 BONE MARROW INTERPRETATION: CPT | Performed by: PATHOLOGY

## 2020-05-12 PROCEDURE — 88311 DECALCIFY TISSUE: CPT | Performed by: PATHOLOGY

## 2020-05-12 PROCEDURE — 99153 MOD SED SAME PHYS/QHP EA: CPT

## 2020-05-12 PROCEDURE — 88305 TISSUE EXAM BY PATHOLOGIST: CPT | Performed by: PATHOLOGY

## 2020-05-12 PROCEDURE — 99152 MOD SED SAME PHYS/QHP 5/>YRS: CPT | Performed by: RADIOLOGY

## 2020-05-12 PROCEDURE — 77012 CT SCAN FOR NEEDLE BIOPSY: CPT

## 2020-05-12 PROCEDURE — 38222 DX BONE MARROW BX & ASPIR: CPT | Performed by: RADIOLOGY

## 2020-05-12 PROCEDURE — 88342 IMHCHEM/IMCYTCHM 1ST ANTB: CPT | Performed by: PATHOLOGY

## 2020-05-12 PROCEDURE — 77012 CT SCAN FOR NEEDLE BIOPSY: CPT | Performed by: RADIOLOGY

## 2020-05-12 PROCEDURE — 88185 FLOWCYTOMETRY/TC ADD-ON: CPT

## 2020-05-12 PROCEDURE — 82948 REAGENT STRIP/BLOOD GLUCOSE: CPT

## 2020-05-12 PROCEDURE — 88313 SPECIAL STAINS GROUP 2: CPT | Performed by: PATHOLOGY

## 2020-05-12 PROCEDURE — 38222 DX BONE MARROW BX & ASPIR: CPT

## 2020-05-12 PROCEDURE — 99152 MOD SED SAME PHYS/QHP 5/>YRS: CPT

## 2020-05-12 RX ORDER — MIDAZOLAM HYDROCHLORIDE 2 MG/2ML
INJECTION, SOLUTION INTRAMUSCULAR; INTRAVENOUS CODE/TRAUMA/SEDATION MEDICATION
Status: COMPLETED | OUTPATIENT
Start: 2020-05-12 | End: 2020-05-12

## 2020-05-12 RX ORDER — SODIUM CHLORIDE, SODIUM LACTATE, POTASSIUM CHLORIDE, CALCIUM CHLORIDE 600; 310; 30; 20 MG/100ML; MG/100ML; MG/100ML; MG/100ML
50 INJECTION, SOLUTION INTRAVENOUS CONTINUOUS
Status: DISCONTINUED | OUTPATIENT
Start: 2020-05-12 | End: 2020-05-16 | Stop reason: HOSPADM

## 2020-05-12 RX ORDER — FENTANYL CITRATE 50 UG/ML
INJECTION, SOLUTION INTRAMUSCULAR; INTRAVENOUS CODE/TRAUMA/SEDATION MEDICATION
Status: COMPLETED | OUTPATIENT
Start: 2020-05-12 | End: 2020-05-12

## 2020-05-12 RX ORDER — LIDOCAINE HYDROCHLORIDE 10 MG/ML
INJECTION, SOLUTION EPIDURAL; INFILTRATION; INTRACAUDAL; PERINEURAL CODE/TRAUMA/SEDATION MEDICATION
Status: COMPLETED | OUTPATIENT
Start: 2020-05-12 | End: 2020-05-12

## 2020-05-12 RX ADMIN — MIDAZOLAM HYDROCHLORIDE 1 MG: 1 INJECTION, SOLUTION INTRAMUSCULAR; INTRAVENOUS at 09:42

## 2020-05-12 RX ADMIN — MIDAZOLAM HYDROCHLORIDE 1 MG: 1 INJECTION, SOLUTION INTRAMUSCULAR; INTRAVENOUS at 09:28

## 2020-05-12 RX ADMIN — LIDOCAINE HYDROCHLORIDE 10 ML: 10 INJECTION, SOLUTION EPIDURAL; INFILTRATION; INTRACAUDAL; PERINEURAL at 09:41

## 2020-05-12 RX ADMIN — FENTANYL CITRATE 50 MCG: 50 INJECTION INTRAMUSCULAR; INTRAVENOUS at 09:28

## 2020-05-12 RX ADMIN — MIDAZOLAM HYDROCHLORIDE 1 MG: 1 INJECTION, SOLUTION INTRAMUSCULAR; INTRAVENOUS at 09:38

## 2020-05-12 RX ADMIN — FENTANYL CITRATE 50 MCG: 50 INJECTION INTRAMUSCULAR; INTRAVENOUS at 09:42

## 2020-05-12 RX ADMIN — SODIUM CHLORIDE, SODIUM LACTATE, POTASSIUM CHLORIDE, AND CALCIUM CHLORIDE 50 ML/HR: .6; .31; .03; .02 INJECTION, SOLUTION INTRAVENOUS at 08:26

## 2020-05-12 RX ADMIN — FENTANYL CITRATE 50 MCG: 50 INJECTION INTRAMUSCULAR; INTRAVENOUS at 09:38

## 2020-05-13 ENCOUNTER — TELEPHONE (OUTPATIENT)
Dept: HEMATOLOGY ONCOLOGY | Facility: MEDICAL CENTER | Age: 75
End: 2020-05-13

## 2020-05-13 LAB — SCAN RESULT: NORMAL

## 2020-05-18 LAB — MISCELLANEOUS LAB TEST RESULT: NORMAL

## 2020-05-19 ENCOUNTER — TELEMEDICINE (OUTPATIENT)
Dept: HEMATOLOGY ONCOLOGY | Facility: CLINIC | Age: 75
End: 2020-05-19
Payer: COMMERCIAL

## 2020-05-19 DIAGNOSIS — D64.9 SYMPTOMATIC ANEMIA: ICD-10-CM

## 2020-05-19 DIAGNOSIS — D69.6 THROMBOCYTOPENIA (HCC): Primary | ICD-10-CM

## 2020-05-19 LAB — MISCELLANEOUS LAB TEST RESULT: NORMAL

## 2020-05-19 PROCEDURE — 99215 OFFICE O/P EST HI 40 MIN: CPT | Performed by: INTERNAL MEDICINE

## 2020-06-24 ENCOUNTER — DOCUMENTATION (OUTPATIENT)
Dept: HEMATOLOGY ONCOLOGY | Facility: CLINIC | Age: 75
End: 2020-06-24

## 2020-06-24 ENCOUNTER — HOSPITAL ENCOUNTER (OUTPATIENT)
Dept: RADIOLOGY | Facility: HOSPITAL | Age: 75
Discharge: HOME | End: 2020-06-24
Payer: COMMERCIAL

## 2020-06-24 DIAGNOSIS — G93.89 BRAIN MASS: ICD-10-CM

## 2020-06-24 PROCEDURE — A9585 GADOBUTROL INJECTION: HCPCS | Performed by: PHYSICIAN ASSISTANT

## 2020-06-24 PROCEDURE — 70553 MRI BRAIN STEM W/O & W/DYE: CPT

## 2020-06-24 RX ADMIN — GADOBUTROL 6 ML: 604.72 INJECTION INTRAVENOUS at 16:49

## 2020-06-29 ENCOUNTER — TELEPHONE (OUTPATIENT)
Dept: NEUROSURGERY | Facility: CLINIC | Age: 75
End: 2020-06-29

## 2020-06-29 ENCOUNTER — TELEMEDICINE (OUTPATIENT)
Dept: NEUROSURGERY | Facility: CLINIC | Age: 75
End: 2020-06-29
Payer: COMMERCIAL

## 2020-06-29 ENCOUNTER — DOCUMENTATION (OUTPATIENT)
Dept: HEMATOLOGY ONCOLOGY | Facility: MEDICAL CENTER | Age: 75
End: 2020-06-29

## 2020-06-29 VITALS — WEIGHT: 138 LBS | BODY MASS INDEX: 22.18 KG/M2 | HEIGHT: 66 IN

## 2020-06-29 DIAGNOSIS — H47.49 COMPRESSION OF OPTIC CHIASM: ICD-10-CM

## 2020-06-29 DIAGNOSIS — D35.2 PITUITARY MACROADENOMA (HCC): Primary | ICD-10-CM

## 2020-06-29 PROCEDURE — 99213 OFFICE O/P EST LOW 20 MIN: CPT | Performed by: NEUROLOGICAL SURGERY

## 2020-06-30 ENCOUNTER — OFFICE VISIT (OUTPATIENT)
Dept: HEMATOLOGY ONCOLOGY | Facility: CLINIC | Age: 75
End: 2020-06-30
Payer: COMMERCIAL

## 2020-06-30 VITALS
DIASTOLIC BLOOD PRESSURE: 104 MMHG | OXYGEN SATURATION: 95 % | HEIGHT: 66 IN | SYSTOLIC BLOOD PRESSURE: 133 MMHG | HEART RATE: 73 BPM | TEMPERATURE: 99.4 F | BODY MASS INDEX: 22.18 KG/M2 | WEIGHT: 138 LBS

## 2020-06-30 DIAGNOSIS — D64.9 SYMPTOMATIC ANEMIA: ICD-10-CM

## 2020-06-30 DIAGNOSIS — D69.6 THROMBOCYTOPENIA (HCC): Primary | ICD-10-CM

## 2020-06-30 PROCEDURE — 99214 OFFICE O/P EST MOD 30 MIN: CPT | Performed by: NURSE PRACTITIONER

## 2020-11-03 ENCOUNTER — OFFICE VISIT (OUTPATIENT)
Dept: HEMATOLOGY ONCOLOGY | Facility: CLINIC | Age: 75
End: 2020-11-03
Payer: COMMERCIAL

## 2020-11-03 VITALS
HEART RATE: 80 BPM | WEIGHT: 138 LBS | BODY MASS INDEX: 22.18 KG/M2 | HEIGHT: 66 IN | DIASTOLIC BLOOD PRESSURE: 83 MMHG | TEMPERATURE: 98.6 F | SYSTOLIC BLOOD PRESSURE: 133 MMHG

## 2020-11-03 DIAGNOSIS — D69.6 THROMBOCYTOPENIA (HCC): Primary | ICD-10-CM

## 2020-11-03 DIAGNOSIS — D64.9 SYMPTOMATIC ANEMIA: ICD-10-CM

## 2020-11-03 PROCEDURE — 99213 OFFICE O/P EST LOW 20 MIN: CPT | Performed by: NURSE PRACTITIONER

## 2021-03-07 ENCOUNTER — HOSPITAL ENCOUNTER (EMERGENCY)
Facility: HOSPITAL | Age: 76
End: 2021-03-07
Attending: EMERGENCY MEDICINE
Payer: COMMERCIAL

## 2021-03-07 ENCOUNTER — APPOINTMENT (EMERGENCY)
Dept: CT IMAGING | Facility: HOSPITAL | Age: 76
End: 2021-03-07
Payer: COMMERCIAL

## 2021-03-07 VITALS
WEIGHT: 129.41 LBS | HEART RATE: 80 BPM | SYSTOLIC BLOOD PRESSURE: 103 MMHG | OXYGEN SATURATION: 99 % | DIASTOLIC BLOOD PRESSURE: 56 MMHG | TEMPERATURE: 97.1 F | RESPIRATION RATE: 17 BRPM | BODY MASS INDEX: 20.89 KG/M2

## 2021-03-07 DIAGNOSIS — K92.2 GASTROINTESTINAL BLEEDING: Primary | ICD-10-CM

## 2021-03-07 DIAGNOSIS — N18.9 CHRONIC KIDNEY DISEASE: ICD-10-CM

## 2021-03-07 DIAGNOSIS — N17.9 ACUTE KIDNEY INJURY (HCC): ICD-10-CM

## 2021-03-07 PROBLEM — K74.60 CIRRHOSIS (HCC): Status: ACTIVE | Noted: 2021-03-07

## 2021-03-07 LAB
ABO GROUP BLD: NORMAL
ALBUMIN SERPL BCP-MCNC: 2.6 G/DL (ref 3.5–5)
ALP SERPL-CCNC: 35 U/L (ref 46–116)
ALT SERPL W P-5'-P-CCNC: 20 U/L (ref 12–78)
ANION GAP SERPL CALCULATED.3IONS-SCNC: 13 MMOL/L (ref 4–13)
AST SERPL W P-5'-P-CCNC: 55 U/L (ref 5–45)
BASOPHILS # BLD AUTO: 0.07 THOUSANDS/ΜL (ref 0–0.1)
BASOPHILS NFR BLD AUTO: 1 % (ref 0–1)
BILIRUB SERPL-MCNC: 0.9 MG/DL (ref 0.2–1)
BLD GP AB SCN SERPL QL: NEGATIVE
BUN SERPL-MCNC: 40 MG/DL (ref 5–25)
CALCIUM ALBUM COR SERPL-MCNC: 9.7 MG/DL (ref 8.3–10.1)
CALCIUM SERPL-MCNC: 8.6 MG/DL (ref 8.3–10.1)
CHLORIDE SERPL-SCNC: 111 MMOL/L (ref 100–108)
CO2 SERPL-SCNC: 17 MMOL/L (ref 21–32)
CREAT SERPL-MCNC: 2.69 MG/DL (ref 0.6–1.3)
EOSINOPHIL # BLD AUTO: 0.15 THOUSAND/ΜL (ref 0–0.61)
EOSINOPHIL NFR BLD AUTO: 2 % (ref 0–6)
ERYTHROCYTE [DISTWIDTH] IN BLOOD BY AUTOMATED COUNT: 16.2 % (ref 11.6–15.1)
FLUAV RNA RESP QL NAA+PROBE: NEGATIVE
FLUBV RNA RESP QL NAA+PROBE: NEGATIVE
GFR SERPL CREATININE-BSD FRML MDRD: 22 ML/MIN/1.73SQ M
GLUCOSE SERPL-MCNC: 117 MG/DL (ref 65–140)
HCT VFR BLD AUTO: 25.5 % (ref 36.5–49.3)
HGB BLD-MCNC: 7.9 G/DL (ref 12–17)
IMM GRANULOCYTES # BLD AUTO: 0.04 THOUSAND/UL (ref 0–0.2)
IMM GRANULOCYTES NFR BLD AUTO: 0 % (ref 0–2)
INR PPP: 1.89 (ref 0.84–1.19)
LACTATE SERPL-SCNC: 2 MMOL/L (ref 0.5–2)
LIPASE SERPL-CCNC: 29 U/L (ref 73–393)
LYMPHOCYTES # BLD AUTO: 3.18 THOUSANDS/ΜL (ref 0.6–4.47)
LYMPHOCYTES NFR BLD AUTO: 35 % (ref 14–44)
MAGNESIUM SERPL-MCNC: 1.4 MG/DL (ref 1.6–2.6)
MCH RBC QN AUTO: 30.6 PG (ref 26.8–34.3)
MCHC RBC AUTO-ENTMCNC: 31 G/DL (ref 31.4–37.4)
MCV RBC AUTO: 99 FL (ref 82–98)
MONOCYTES # BLD AUTO: 0.97 THOUSAND/ΜL (ref 0.17–1.22)
MONOCYTES NFR BLD AUTO: 11 % (ref 4–12)
NEUTROPHILS # BLD AUTO: 4.72 THOUSANDS/ΜL (ref 1.85–7.62)
NEUTS SEG NFR BLD AUTO: 51 % (ref 43–75)
NRBC BLD AUTO-RTO: 0 /100 WBCS
PLATELET # BLD AUTO: 208 THOUSANDS/UL (ref 149–390)
PMV BLD AUTO: 10.3 FL (ref 8.9–12.7)
POTASSIUM SERPL-SCNC: 4.5 MMOL/L (ref 3.5–5.3)
PROT SERPL-MCNC: 5.7 G/DL (ref 6.4–8.2)
PROTHROMBIN TIME: 21.3 SECONDS (ref 11.6–14.5)
RBC # BLD AUTO: 2.58 MILLION/UL (ref 3.88–5.62)
RH BLD: POSITIVE
RSV RNA RESP QL NAA+PROBE: NEGATIVE
SARS-COV-2 RNA RESP QL NAA+PROBE: NEGATIVE
SODIUM SERPL-SCNC: 141 MMOL/L (ref 136–145)
SPECIMEN EXPIRATION DATE: NORMAL
TROPONIN I SERPL-MCNC: 0.02 NG/ML
WBC # BLD AUTO: 9.13 THOUSAND/UL (ref 4.31–10.16)

## 2021-03-07 PROCEDURE — 96368 THER/DIAG CONCURRENT INF: CPT

## 2021-03-07 PROCEDURE — 36415 COLL VENOUS BLD VENIPUNCTURE: CPT | Performed by: EMERGENCY MEDICINE

## 2021-03-07 PROCEDURE — 83735 ASSAY OF MAGNESIUM: CPT | Performed by: EMERGENCY MEDICINE

## 2021-03-07 PROCEDURE — 86900 BLOOD TYPING SEROLOGIC ABO: CPT | Performed by: EMERGENCY MEDICINE

## 2021-03-07 PROCEDURE — 0241U HB NFCT DS VIR RESP RNA 4 TRGT: CPT | Performed by: EMERGENCY MEDICINE

## 2021-03-07 PROCEDURE — G1004 CDSM NDSC: HCPCS

## 2021-03-07 PROCEDURE — C9113 INJ PANTOPRAZOLE SODIUM, VIA: HCPCS | Performed by: EMERGENCY MEDICINE

## 2021-03-07 PROCEDURE — 93005 ELECTROCARDIOGRAM TRACING: CPT

## 2021-03-07 PROCEDURE — 96365 THER/PROPH/DIAG IV INF INIT: CPT

## 2021-03-07 PROCEDURE — 80053 COMPREHEN METABOLIC PANEL: CPT | Performed by: EMERGENCY MEDICINE

## 2021-03-07 PROCEDURE — 85610 PROTHROMBIN TIME: CPT | Performed by: EMERGENCY MEDICINE

## 2021-03-07 PROCEDURE — 99291 CRITICAL CARE FIRST HOUR: CPT | Performed by: EMERGENCY MEDICINE

## 2021-03-07 PROCEDURE — 85025 COMPLETE CBC W/AUTO DIFF WBC: CPT | Performed by: EMERGENCY MEDICINE

## 2021-03-07 PROCEDURE — 86850 RBC ANTIBODY SCREEN: CPT | Performed by: EMERGENCY MEDICINE

## 2021-03-07 PROCEDURE — 83605 ASSAY OF LACTIC ACID: CPT | Performed by: EMERGENCY MEDICINE

## 2021-03-07 PROCEDURE — 83690 ASSAY OF LIPASE: CPT | Performed by: EMERGENCY MEDICINE

## 2021-03-07 PROCEDURE — 86901 BLOOD TYPING SEROLOGIC RH(D): CPT | Performed by: EMERGENCY MEDICINE

## 2021-03-07 PROCEDURE — 74176 CT ABD & PELVIS W/O CONTRAST: CPT

## 2021-03-07 PROCEDURE — 84484 ASSAY OF TROPONIN QUANT: CPT | Performed by: EMERGENCY MEDICINE

## 2021-03-07 PROCEDURE — 96366 THER/PROPH/DIAG IV INF ADDON: CPT

## 2021-03-07 PROCEDURE — 96375 TX/PRO/DX INJ NEW DRUG ADDON: CPT

## 2021-03-07 PROCEDURE — 99285 EMERGENCY DEPT VISIT HI MDM: CPT

## 2021-03-07 RX ORDER — MINERAL OIL 100 G/100G
1 OIL RECTAL AS NEEDED
COMMUNITY

## 2021-03-07 RX ORDER — EYELID CLEANSER COMBINATION 3
1 PADS, MEDICATED (EA) TOPICAL 2 TIMES DAILY
COMMUNITY

## 2021-03-07 RX ORDER — POTASSIUM CHLORIDE 750 MG/1
10 CAPSULE, EXTENDED RELEASE ORAL DAILY
COMMUNITY
End: 2021-08-27 | Stop reason: SDUPTHER

## 2021-03-07 RX ORDER — ERGOCALCIFEROL 1.25 MG/1
50000 CAPSULE ORAL
COMMUNITY

## 2021-03-07 RX ORDER — CALCIUM CARBONATE 200(500)MG
2 TABLET,CHEWABLE ORAL 2 TIMES DAILY
COMMUNITY

## 2021-03-07 RX ORDER — CEFTRIAXONE 1 G/50ML
1000 INJECTION, SOLUTION INTRAVENOUS ONCE
Status: DISCONTINUED | OUTPATIENT
Start: 2021-03-07 | End: 2021-03-07

## 2021-03-07 RX ORDER — PANTOPRAZOLE SODIUM 40 MG/1
40 INJECTION, POWDER, FOR SOLUTION INTRAVENOUS ONCE
Status: COMPLETED | OUTPATIENT
Start: 2021-03-07 | End: 2021-03-07

## 2021-03-07 RX ORDER — IBUPROFEN 600 MG/1
1 TABLET ORAL AS NEEDED
COMMUNITY

## 2021-03-07 RX ORDER — ONDANSETRON 2 MG/ML
4 INJECTION INTRAMUSCULAR; INTRAVENOUS ONCE
Status: COMPLETED | OUTPATIENT
Start: 2021-03-07 | End: 2021-03-07

## 2021-03-07 RX ORDER — CEFTRIAXONE SODIUM 2 G/50ML
2000 INJECTION, SOLUTION INTRAVENOUS ONCE
Status: COMPLETED | OUTPATIENT
Start: 2021-03-07 | End: 2021-03-07

## 2021-03-07 RX ADMIN — CEFTRIAXONE SODIUM 2000 MG: 2 INJECTION, SOLUTION INTRAVENOUS at 20:12

## 2021-03-07 RX ADMIN — PANTOPRAZOLE SODIUM 40 MG: 40 INJECTION, POWDER, FOR SOLUTION INTRAVENOUS at 19:17

## 2021-03-07 RX ADMIN — ONDANSETRON 4 MG: 2 INJECTION INTRAMUSCULAR; INTRAVENOUS at 19:34

## 2021-03-07 RX ADMIN — SODIUM CHLORIDE, SODIUM LACTATE, POTASSIUM CHLORIDE, AND CALCIUM CHLORIDE 1000 ML: .6; .31; .03; .02 INJECTION, SOLUTION INTRAVENOUS at 19:08

## 2021-03-08 ENCOUNTER — ANESTHESIA (INPATIENT)
Dept: GASTROENTEROLOGY | Facility: HOSPITAL | Age: 76
DRG: 369 | End: 2021-03-08
Payer: COMMERCIAL

## 2021-03-08 ENCOUNTER — ANESTHESIA EVENT (INPATIENT)
Dept: GASTROENTEROLOGY | Facility: HOSPITAL | Age: 76
DRG: 369 | End: 2021-03-08
Payer: COMMERCIAL

## 2021-03-08 ENCOUNTER — HOSPITAL ENCOUNTER (INPATIENT)
Facility: HOSPITAL | Age: 76
LOS: 6 days | Discharge: NURSING, REHAB OR LONG-TERM CARE | DRG: 369 | End: 2021-03-14
Attending: INTERNAL MEDICINE | Admitting: INTERNAL MEDICINE
Payer: COMMERCIAL

## 2021-03-08 ENCOUNTER — DOCUMENTATION (OUTPATIENT)
Dept: HEMATOLOGY ONCOLOGY | Facility: MEDICAL CENTER | Age: 76
End: 2021-03-08

## 2021-03-08 ENCOUNTER — APPOINTMENT (INPATIENT)
Dept: GASTROENTEROLOGY | Facility: HOSPITAL | Age: 76
DRG: 369 | End: 2021-03-08
Payer: COMMERCIAL

## 2021-03-08 DIAGNOSIS — D35.2 PITUITARY MACROADENOMA (HCC): ICD-10-CM

## 2021-03-08 DIAGNOSIS — N18.30 CKD (CHRONIC KIDNEY DISEASE) STAGE 3, GFR 30-59 ML/MIN (HCC): ICD-10-CM

## 2021-03-08 DIAGNOSIS — D64.9 SYMPTOMATIC ANEMIA: ICD-10-CM

## 2021-03-08 DIAGNOSIS — N17.9 ACUTE KIDNEY INJURY SUPERIMPOSED ON CHRONIC KIDNEY DISEASE (HCC): ICD-10-CM

## 2021-03-08 DIAGNOSIS — K92.2 GI BLEED: Primary | ICD-10-CM

## 2021-03-08 DIAGNOSIS — N18.9 ACUTE KIDNEY INJURY SUPERIMPOSED ON CHRONIC KIDNEY DISEASE (HCC): ICD-10-CM

## 2021-03-08 LAB
ABO GROUP BLD: NORMAL
ANION GAP SERPL CALCULATED.3IONS-SCNC: 13 MMOL/L (ref 4–13)
ANISOCYTOSIS BLD QL SMEAR: PRESENT
ATRIAL RATE: 85 BPM
BASOPHILS # BLD MANUAL: 0 THOUSAND/UL (ref 0–0.1)
BASOPHILS NFR MAR MANUAL: 0 % (ref 0–1)
BLD GP AB SCN SERPL QL: NEGATIVE
BUN SERPL-MCNC: 37 MG/DL (ref 5–25)
C DIFF TOX B TCDB STL QL NAA+PROBE: NEGATIVE
CALCIUM SERPL-MCNC: 8 MG/DL (ref 8.3–10.1)
CHLORIDE SERPL-SCNC: 114 MMOL/L (ref 100–108)
CO2 SERPL-SCNC: 16 MMOL/L (ref 21–32)
CREAT SERPL-MCNC: 2.21 MG/DL (ref 0.6–1.3)
EOSINOPHIL # BLD MANUAL: 0 THOUSAND/UL (ref 0–0.4)
EOSINOPHIL NFR BLD MANUAL: 0 % (ref 0–6)
ERYTHROCYTE [DISTWIDTH] IN BLOOD BY AUTOMATED COUNT: 16.4 % (ref 11.6–15.1)
FERRITIN SERPL-MCNC: 56 NG/ML (ref 8–388)
GFR SERPL CREATININE-BSD FRML MDRD: 28 ML/MIN/1.73SQ M
GLUCOSE SERPL-MCNC: 110 MG/DL (ref 65–140)
GLUCOSE SERPL-MCNC: 81 MG/DL (ref 65–140)
GLUCOSE SERPL-MCNC: 86 MG/DL (ref 65–140)
GLUCOSE SERPL-MCNC: 86 MG/DL (ref 65–140)
GLUCOSE SERPL-MCNC: 92 MG/DL (ref 65–140)
HCT VFR BLD AUTO: 18.3 % (ref 36.5–49.3)
HGB BLD-MCNC: 10.4 G/DL (ref 12–17)
HGB BLD-MCNC: 5.7 G/DL (ref 12–17)
INR PPP: 2.1 (ref 0.84–1.19)
IRON SATN MFR SERPL: 41 %
IRON SERPL-MCNC: 51 UG/DL (ref 65–175)
LYMPHOCYTES # BLD AUTO: 1.73 THOUSAND/UL (ref 0.6–4.47)
LYMPHOCYTES # BLD AUTO: 44 % (ref 14–44)
MCH RBC QN AUTO: 30.6 PG (ref 26.8–34.3)
MCHC RBC AUTO-ENTMCNC: 31.1 G/DL (ref 31.4–37.4)
MCV RBC AUTO: 98 FL (ref 82–98)
MONOCYTES # BLD AUTO: 0.28 THOUSAND/UL (ref 0–1.22)
MONOCYTES NFR BLD: 7 % (ref 4–12)
NEUTROPHILS # BLD MANUAL: 1.85 THOUSAND/UL (ref 1.85–7.62)
NEUTS SEG NFR BLD AUTO: 49 % (ref 43–75)
NRBC BLD AUTO-RTO: 0 /100 WBCS
P AXIS: 34 DEGREES
PLATELET # BLD AUTO: 92 THOUSANDS/UL (ref 149–390)
PLATELET BLD QL SMEAR: ABNORMAL
PMV BLD AUTO: 10.2 FL (ref 8.9–12.7)
POLYCHROMASIA BLD QL SMEAR: PRESENT
POTASSIUM SERPL-SCNC: 4.4 MMOL/L (ref 3.5–5.3)
PR INTERVAL: 106 MS
PROTHROMBIN TIME: 23.1 SECONDS (ref 11.6–14.5)
QRS AXIS: 58 DEGREES
QRSD INTERVAL: 80 MS
QT INTERVAL: 388 MS
QTC INTERVAL: 461 MS
RBC # BLD AUTO: 1.86 MILLION/UL (ref 3.88–5.62)
RH BLD: POSITIVE
SODIUM SERPL-SCNC: 143 MMOL/L (ref 136–145)
SPECIMEN EXPIRATION DATE: NORMAL
T WAVE AXIS: -62 DEGREES
TIBC SERPL-MCNC: 124 UG/DL (ref 250–450)
TOTAL CELLS COUNTED SPEC: 100
VENTRICULAR RATE: 85 BPM
WBC # BLD AUTO: 3.94 THOUSAND/UL (ref 4.31–10.16)

## 2021-03-08 PROCEDURE — 82728 ASSAY OF FERRITIN: CPT | Performed by: PHYSICIAN ASSISTANT

## 2021-03-08 PROCEDURE — 85610 PROTHROMBIN TIME: CPT | Performed by: NURSE PRACTITIONER

## 2021-03-08 PROCEDURE — 80048 BASIC METABOLIC PNL TOTAL CA: CPT | Performed by: PHYSICIAN ASSISTANT

## 2021-03-08 PROCEDURE — 85007 BL SMEAR W/DIFF WBC COUNT: CPT | Performed by: PHYSICIAN ASSISTANT

## 2021-03-08 PROCEDURE — C9113 INJ PANTOPRAZOLE SODIUM, VIA: HCPCS | Performed by: PHYSICIAN ASSISTANT

## 2021-03-08 PROCEDURE — 86920 COMPATIBILITY TEST SPIN: CPT

## 2021-03-08 PROCEDURE — 82948 REAGENT STRIP/BLOOD GLUCOSE: CPT

## 2021-03-08 PROCEDURE — P9016 RBC LEUKOCYTES REDUCED: HCPCS

## 2021-03-08 PROCEDURE — 99222 1ST HOSP IP/OBS MODERATE 55: CPT | Performed by: NURSE PRACTITIONER

## 2021-03-08 PROCEDURE — 43244 EGD VARICES LIGATION: CPT | Performed by: INTERNAL MEDICINE

## 2021-03-08 PROCEDURE — 06L38CZ OCCLUSION OF ESOPHAGEAL VEIN WITH EXTRALUMINAL DEVICE, VIA NATURAL OR ARTIFICIAL OPENING ENDOSCOPIC: ICD-10-PCS | Performed by: INTERNAL MEDICINE

## 2021-03-08 PROCEDURE — 83540 ASSAY OF IRON: CPT | Performed by: PHYSICIAN ASSISTANT

## 2021-03-08 PROCEDURE — 85027 COMPLETE CBC AUTOMATED: CPT | Performed by: PHYSICIAN ASSISTANT

## 2021-03-08 PROCEDURE — 87493 C DIFF AMPLIFIED PROBE: CPT | Performed by: PHYSICIAN ASSISTANT

## 2021-03-08 PROCEDURE — 93010 ELECTROCARDIOGRAM REPORT: CPT | Performed by: INTERNAL MEDICINE

## 2021-03-08 PROCEDURE — 86850 RBC ANTIBODY SCREEN: CPT | Performed by: PHYSICIAN ASSISTANT

## 2021-03-08 PROCEDURE — 99223 1ST HOSP IP/OBS HIGH 75: CPT | Performed by: INTERNAL MEDICINE

## 2021-03-08 PROCEDURE — 97163 PT EVAL HIGH COMPLEX 45 MIN: CPT

## 2021-03-08 PROCEDURE — 86923 COMPATIBILITY TEST ELECTRIC: CPT

## 2021-03-08 PROCEDURE — 86901 BLOOD TYPING SEROLOGIC RH(D): CPT | Performed by: PHYSICIAN ASSISTANT

## 2021-03-08 PROCEDURE — 85018 HEMOGLOBIN: CPT | Performed by: PHYSICIAN ASSISTANT

## 2021-03-08 PROCEDURE — 83550 IRON BINDING TEST: CPT | Performed by: PHYSICIAN ASSISTANT

## 2021-03-08 PROCEDURE — 86900 BLOOD TYPING SEROLOGIC ABO: CPT | Performed by: PHYSICIAN ASSISTANT

## 2021-03-08 RX ORDER — MAGNESIUM HYDROXIDE/ALUMINUM HYDROXICE/SIMETHICONE 120; 1200; 1200 MG/30ML; MG/30ML; MG/30ML
30 SUSPENSION ORAL EVERY 4 HOURS PRN
Status: DISCONTINUED | OUTPATIENT
Start: 2021-03-08 | End: 2021-03-10

## 2021-03-08 RX ORDER — CALCIUM CARBONATE 200(500)MG
1000 TABLET,CHEWABLE ORAL DAILY PRN
Status: DISCONTINUED | OUTPATIENT
Start: 2021-03-08 | End: 2021-03-14 | Stop reason: HOSPADM

## 2021-03-08 RX ORDER — BISACODYL 10 MG
10 SUPPOSITORY, RECTAL RECTAL DAILY PRN
Status: DISCONTINUED | OUTPATIENT
Start: 2021-03-08 | End: 2021-03-14 | Stop reason: HOSPADM

## 2021-03-08 RX ORDER — LIDOCAINE HYDROCHLORIDE 20 MG/ML
INJECTION, SOLUTION EPIDURAL; INFILTRATION; INTRACAUDAL; PERINEURAL AS NEEDED
Status: DISCONTINUED | OUTPATIENT
Start: 2021-03-08 | End: 2021-03-08

## 2021-03-08 RX ORDER — ONDANSETRON 2 MG/ML
4 INJECTION INTRAMUSCULAR; INTRAVENOUS EVERY 6 HOURS PRN
Status: DISCONTINUED | OUTPATIENT
Start: 2021-03-08 | End: 2021-03-14 | Stop reason: HOSPADM

## 2021-03-08 RX ORDER — SODIUM CHLORIDE, SODIUM LACTATE, POTASSIUM CHLORIDE, CALCIUM CHLORIDE 600; 310; 30; 20 MG/100ML; MG/100ML; MG/100ML; MG/100ML
50 INJECTION, SOLUTION INTRAVENOUS CONTINUOUS
Status: DISCONTINUED | OUTPATIENT
Start: 2021-03-08 | End: 2021-03-09

## 2021-03-08 RX ORDER — PANTOPRAZOLE SODIUM 40 MG/1
40 INJECTION, POWDER, FOR SOLUTION INTRAVENOUS EVERY 12 HOURS
Status: DISCONTINUED | OUTPATIENT
Start: 2021-03-08 | End: 2021-03-10

## 2021-03-08 RX ORDER — INSULIN GLARGINE 100 [IU]/ML
5 INJECTION, SOLUTION SUBCUTANEOUS
Status: DISCONTINUED | OUTPATIENT
Start: 2021-03-08 | End: 2021-03-14 | Stop reason: HOSPADM

## 2021-03-08 RX ORDER — SODIUM CHLORIDE 9 MG/ML
125 INJECTION, SOLUTION INTRAVENOUS CONTINUOUS
Status: DISCONTINUED | OUTPATIENT
Start: 2021-03-08 | End: 2021-03-08

## 2021-03-08 RX ORDER — PROPOFOL 10 MG/ML
INJECTION, EMULSION INTRAVENOUS AS NEEDED
Status: DISCONTINUED | OUTPATIENT
Start: 2021-03-08 | End: 2021-03-08

## 2021-03-08 RX ORDER — ACETAMINOPHEN 325 MG/1
650 TABLET ORAL EVERY 6 HOURS PRN
Status: DISCONTINUED | OUTPATIENT
Start: 2021-03-08 | End: 2021-03-14 | Stop reason: HOSPADM

## 2021-03-08 RX ORDER — ATORVASTATIN CALCIUM 40 MG/1
40 TABLET, FILM COATED ORAL
Status: DISCONTINUED | OUTPATIENT
Start: 2021-03-08 | End: 2021-03-14 | Stop reason: HOSPADM

## 2021-03-08 RX ORDER — PHYTONADIONE 5 MG/1
10 TABLET ORAL ONCE
Status: COMPLETED | OUTPATIENT
Start: 2021-03-08 | End: 2021-03-08

## 2021-03-08 RX ADMIN — SODIUM CHLORIDE 125 ML/HR: 0.9 INJECTION, SOLUTION INTRAVENOUS at 13:57

## 2021-03-08 RX ADMIN — CALCIUM CARBONATE (ANTACID) CHEW TAB 500 MG 1000 MG: 500 CHEW TAB at 15:22

## 2021-03-08 RX ADMIN — SODIUM CHLORIDE, SODIUM LACTATE, POTASSIUM CHLORIDE, AND CALCIUM CHLORIDE 50 ML/HR: .6; .31; .03; .02 INJECTION, SOLUTION INTRAVENOUS at 02:10

## 2021-03-08 RX ADMIN — PROPOFOL 30 MG: 10 INJECTION, EMULSION INTRAVENOUS at 14:08

## 2021-03-08 RX ADMIN — INSULIN GLARGINE 5 UNITS: 100 INJECTION, SOLUTION SUBCUTANEOUS at 21:49

## 2021-03-08 RX ADMIN — PANTOPRAZOLE SODIUM 40 MG: 40 INJECTION, POWDER, FOR SOLUTION INTRAVENOUS at 18:31

## 2021-03-08 RX ADMIN — PHYTONADIONE 10 MG: 5 TABLET ORAL at 02:10

## 2021-03-08 RX ADMIN — ALUMINUM HYDROXIDE, MAGNESIUM HYDROXIDE, AND SIMETHICONE 30 ML: 200; 200; 20 SUSPENSION ORAL at 20:43

## 2021-03-08 RX ADMIN — PROPOFOL 30 MG: 10 INJECTION, EMULSION INTRAVENOUS at 14:11

## 2021-03-08 RX ADMIN — SODIUM CHLORIDE, SODIUM LACTATE, POTASSIUM CHLORIDE, AND CALCIUM CHLORIDE 50 ML/HR: .6; .31; .03; .02 INJECTION, SOLUTION INTRAVENOUS at 19:38

## 2021-03-08 RX ADMIN — LIDOCAINE HYDROCHLORIDE 80 MG: 20 INJECTION, SOLUTION EPIDURAL; INFILTRATION; INTRACAUDAL; PERINEURAL at 14:05

## 2021-03-08 RX ADMIN — PROPOFOL 80 MG: 10 INJECTION, EMULSION INTRAVENOUS at 14:06

## 2021-03-08 RX ADMIN — PANTOPRAZOLE SODIUM 40 MG: 40 INJECTION, POWDER, FOR SOLUTION INTRAVENOUS at 05:27

## 2021-03-08 RX ADMIN — PROPOFOL 30 MG: 10 INJECTION, EMULSION INTRAVENOUS at 14:14

## 2021-03-08 RX ADMIN — ATORVASTATIN CALCIUM 40 MG: 40 TABLET, FILM COATED ORAL at 18:31

## 2021-03-08 RX ADMIN — PROPOFOL 40 MG: 10 INJECTION, EMULSION INTRAVENOUS at 14:13

## 2021-03-08 RX ADMIN — INSULIN GLARGINE 5 UNITS: 100 INJECTION, SOLUTION SUBCUTANEOUS at 02:10

## 2021-03-08 RX ADMIN — DEXTROSE 2000 MG: 50 INJECTION, SOLUTION INTRAVENOUS at 20:46

## 2021-03-08 RX ADMIN — PROPOFOL 30 MG: 10 INJECTION, EMULSION INTRAVENOUS at 14:10

## 2021-03-08 NOTE — CONSULTS
Patient MRN: [id number]  Date of Service: 3/8/2021  Referring Physician: Dr Marko Godoy  Provider Creating Note: JOSE ALEJANDRO Brizuela  PCP: Radha Whalen  Reason for Consult:  GI bleed  HPI  Aguilar Bardales is a 77 y o  male who was admitted with GI bleed  He presented to the ER from the nursing home which time he had passed a large amount of bloody/melanotic stool  Upon admission he was noted to have a hemoglobin of 7 9 with an INR of 1 89  CT showed moderate circumferential thickening of the proximal duodenum suggesting duodenitis  Patient was given vitamin K 10 mg IV, patient is not on any anticoagulation  He is on Protonix 40 mg prior to admission  Patient had a large GI bleed in March of 2020 at which time he was noted have a hemoglobin of 5 9 and platelets of 1  Patient had oncology hematology evaluation was diagnosed with ITP and treated with Solu-Medrol  Patient had a GI consult at that time  He was not passing any bloody stools or black stools  He was to have a follow-up appointment for outpatient EGD and colonoscopy however that did not occur  Last colonoscopy is 2017 by Dr Verónica Latham in [place]  According to notes patient was having 1 week of diarrheal illness reporting 5-6 bowel movements a day however these were not noted to be bloody or black  The patient also states that he has had increased weakness nausea with dry heaves and fatigue over the past week he  He was complaining of pain in the upper abdomen which he says has now resolved  CT of the abdomen pelvis also showed cirrhosis    Patient denies excessive alcohol use in the past   Patient's baseline hemoglobin is around 8 7  Past Medical History:   Diagnosis Date    Cirrhosis ([facility] )     COPD (chronic obstructive pulmonary disease) ([facility] )     Coronary artery disease     Dementia ([facility] )     Diabetes mellitus ([facility] )     Diverticulosis     Gastric reflux     Hyperlipemia     Panlobular emphysema (HCC)     Pituitary mass ([facility] )     Renal disorder     CKD Stage 3 & Bilat multi renal nodules    Thrombocyte disorder (HCC)     thrombocytopenia    Unstable angina (HCC)      Past Surgical History:   Procedure Laterality Date    CARDIAC SURGERY      unspecified    CHOLECYSTECTOMY      CORONARY ARTERY BYPASS GRAFT      2-3 years ago    HERNIA REPAIR      IR BIOPSY BONE MARROW  5/12/2020     Medications  Home Medications:   Prior to Admission medications    Medication Sig Start Date End Date Taking?  Authorizing Provider   calcium carbonate (TUMS) 500 mg chewable tablet Chew 2 tablets 2 (two) times a day   Yes Historical Provider, MD   Eyelid Cleansers (OcuSoft Lid Scrub Plus) PADS Apply 1 each topically 2 (two) times a day   Yes Historical Provider, MD   fenofibrate (TRICOR) 145 mg tablet Take 145 mg by mouth daily   Yes Historical Provider, MD   magnesium cl-calcium carbonate (MAG-DELAY)  MG tablet Take 2 tablets by mouth daily   Yes Historical Provider, MD   potassium chloride (MICRO-K) 10 MEQ CR capsule Take 10 mEq by mouth daily   Yes Historical Provider, MD   rosuvastatin (CRESTOR) 20 MG tablet Take 20 mg by mouth daily   Yes Historical Provider, MD   acetaminophen (TYLENOL) 325 mg tablet Take 650 mg by mouth every 6 (six) hours as needed for mild pain    Historical Provider, MD   bisacodyl (DULCOLAX) 10 mg suppository Insert 10 mg into the rectum daily as needed for constipation    Historical Provider, MD   ergocalciferol (VITAMIN D2) 50,000 units Take 50,000 Units by mouth every 28 days    Historical Provider, MD   Glucagon rDNA, (Glucagon Emergency) 1 MG KIT Inject 1 mg as directed as needed    Historical Provider, MD   insulin glargine (LANTUS) 100 units/mL subcutaneous injection Inject 5 Units under the skin daily at bedtime 3/30/20   Reynaldo Yuan DO   magnesium hydroxide (MILK OF MAGNESIA) 400 mg/5 mL oral suspension Take by mouth as needed for constipation    Historical Provider, MD   mineral oil enema Insert 1 enema into the rectum as needed for constipation    Historical Provider, MD   pantoprazole (PROTONIX) 40 mg tablet Take 1 tablet (40 mg total) by mouth 2 (two) times a day before meals 3/30/20   Eben Villafana DO       Inhouse Medications    Current Facility-Administered Medications:     acetaminophen (TYLENOL) tablet 650 mg, 650 mg, Oral, Q6H PRN    atorvastatin (LIPITOR) tablet 40 mg, 40 mg, Oral, Daily With Dinner    bisacodyl (DULCOLAX) rectal suppository 10 mg, 10 mg, Rectal, Daily PRN    calcium carbonate (TUMS) chewable tablet 1,000 mg, 1,000 mg, Oral, Daily PRN    cefTRIAXone (ROCEPHIN) 2,000 mg in dextrose 5 % 50 mL IVPB, 2,000 mg, Intravenous, Q24H    insulin glargine (LANTUS) subcutaneous injection 5 Units 0 05 mL, 5 Units, Subcutaneous, HS, 5 Units at 03/08/21 0210    insulin lispro (HumaLOG) 100 units/mL subcutaneous injection 1-5 Units, 1-5 Units, Subcutaneous, Q6H [address] **AND** Fingerstick Glucose (POCT), , , Q6H    lactated ringers infusion, 50 mL/hr, Intravenous, Continuous, 50 mL/hr at 03/08/21 0210    ondansetron (ZOFRAN) injection 4 mg, 4 mg, Intravenous, Q6H PRN    pantoprazole (PROTONIX) injection 40 mg, 40 mg, Intravenous, Q12H, 40 mg at 03/08/21 0527    Allergies  Allergies   Allergen Reactions    Erythromycin     Penicillins     Shellfish-Derived Products      Social History   reports that he has quit smoking  He smoked 0 25 packs per day  He has never used smokeless tobacco  He reports that he does not drink alcohol or use drugs  Family History  No family history on file  ROS  ROS: Denies CP, SOB, fever  Positive for nausea with dry heaves, bloody/melanotic stools, diarrhea, upper abdominal pain  All others negative except as noted in HPI  Objective   Vitals  Blood pressure 114/65, pulse 76, temperature (!) 97 2 °F (36 2 °C), temperature source Temporal, resp  rate 19, SpO2 98 %  General: Alert, no apparent distress  Eyes: No scleral icterus    ENT: MMM  Card: RRR no murmur  Lungs: Clear to ascultation b/l  No wheezes, rales, rhonchi  Abdomen: Soft  Nontender  Nondistended  Bowel sounds present and normoactive  Skin: No jaundice, extreme pallor  Neuro: Alert and oriented x3        Laboratory Studies  Lab Results   Component Value Date    WBC 3 94 (L) 03/08/2021    HGB 5 7 (LL) 03/08/2021    HCT 18 3 (L) 03/08/2021    PLT 92 (L) 03/08/2021    MCV 98 03/08/2021     Lab Results   Component Value Date    CREATININE 2 21 (H) 03/08/2021    BUN 37 (H) 03/08/2021    SODIUM 143 03/08/2021    K 4 4 03/08/2021     (H) 03/08/2021    CO2 16 (L) 03/08/2021    CALCIUM 8 0 (L) 03/08/2021    ALKPHOS 35 (L) 03/07/2021    AST 55 (H) 03/07/2021    ALT 20 03/07/2021     Lab Results   Component Value Date    PROTIME 21 3 (H) 03/07/2021    INR 1 89 (H) 03/07/2021       Imaging and Other Studies      Assessment and Plan:  1  Bloody/melanotic stool with significant drop in hemoglobin in patient with a history of ITP current platelets are 92  Also with coagulopathy and INR 1 89  Status post 10 mg of vitamin K repeat PT INR is pending  Abnormal CT showing duodenitis  Patient will need EGD taday  Patient will need to be resuscitated 1st  Patient to receive 2 units of packed red blood cells at this time  Continue Protonix IV  Frequent H&H  2  Diarrhea C diff pending  3  New diagnosis of cirrhosis  AST slightly elevated at 55 and alkaline phosphatase is below normal at 35       Principal Problem:    GI bleed  Active Problems:    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HCC)    CAD (coronary artery disease)    Type 2 diabetes mellitus with stage 3 chronic kidney disease, with long-term current use of insulin (HCC)    Cirrhosis ([facility] )    Acute kidney injury superimposed on chronic kidney disease ([facility] )      JOSE ALEJANDRO Sue

## 2021-03-08 NOTE — PLAN OF CARE
Problem: Potential for Falls  Goal: Patient will remain free of falls  Description: INTERVENTIONS:  - Assess patient frequently for physical needs  -  Identify cognitive and physical deficits and behaviors that affect risk of falls    -  [place] fall precautions as indicated by assessment   - Educate patient/family on patient safety including physical limitations  - Instruct patient to call for assistance with activity based on assessment  - Modify environment to reduce risk of injury  - Consider OT/PT consult to assist with strengthening/mobility  Outcome: Progressing     Problem: Prexisting or High Potential for Compromised Skin Integrity  Goal: Skin integrity is maintained or improved  Description: INTERVENTIONS:  - Identify patients at risk for skin breakdown  - Assess and monitor skin integrity  - Assess and monitor nutrition and hydration status  - Monitor labs   - Assess for incontinence   - Turn and reposition patient  - Assist with mobility/ambulation  - Relieve pressure over bony prominences  - Avoid friction and shearing  - Provide appropriate hygiene as needed including keeping skin clean and dry  - Evaluate need for skin moisturizer/barrier cream  - Collaborate with interdisciplinary team   - Patient/family teaching  - Consider wound care consult   Outcome: Progressing     Problem: PAIN - ADULT  Goal: Verbalizes/displays adequate comfort level or baseline comfort level  Description: Interventions:  - Encourage patient to monitor pain and request assistance  - Assess pain using appropriate pain scale  - Administer analgesics based on type and severity of pain and evaluate response  - Implement non-pharmacological measures as appropriate and evaluate response  - Consider cultural and social influences on pain and pain management  - Notify physician/advanced practitioner if interventions unsuccessful or patient reports new pain  Outcome: Progressing     Problem: INFECTION - ADULT  Goal: Absence or prevention of progression during hospitalization  Description: INTERVENTIONS:  - Assess and monitor for signs and symptoms of infection  - Monitor lab/diagnostic results  - Monitor all insertion sites, i e  indwelling lines, tubes, and drains  - Monitor endotracheal if appropriate and nasal secretions for changes in amount and color  - [place] appropriate cooling/warming therapies per order  - Administer medications as ordered  - Instruct and encourage patient and family to use good hand hygiene technique  - Identify and instruct in appropriate isolation precautions for identified infection/condition  Outcome: Progressing  Goal: Absence of fever/infection during neutropenic period  Description: INTERVENTIONS:  - Monitor WBC    Outcome: Progressing     Problem: SAFETY ADULT  Goal: Patient will remain free of falls  Description: INTERVENTIONS:  - Assess patient frequently for physical needs  -  Identify cognitive and physical deficits and behaviors that affect risk of falls    -  [place] fall precautions as indicated by assessment   - Educate patient/family on patient safety including physical limitations  - Instruct patient to call for assistance with activity based on assessment  - Modify environment to reduce risk of injury  - Consider OT/PT consult to assist with strengthening/mobility  Outcome: Progressing  Goal: Maintain or return to baseline ADL function  Description: INTERVENTIONS:  -  Assess patient's ability to carry out ADLs; assess patient's baseline for ADL function and identify physical deficits which impact ability to perform ADLs (bathing, care of mouth/teeth, toileting, grooming, dressing, etc )  - Assess/evaluate cause of self-care deficits   - Assess range of motion  - Assess patient's mobility; develop plan if impaired  - Assess patient's need for assistive devices and provide as appropriate  - Encourage maximum independence but intervene and supervise when necessary  - Involve family in performance of ADLs  - Assess for home care needs following discharge   - Consider OT consult to assist with ADL evaluation and planning for discharge  - Provide patient education as appropriate  Outcome: Progressing  Goal: Maintain or return mobility status to optimal level  Description: INTERVENTIONS:  - Assess patient's baseline mobility status (ambulation, transfers, stairs, etc )    - Identify cognitive and physical deficits and behaviors that affect mobility  - Identify mobility aids required to assist with transfers and/or ambulation (gait belt, sit-to-stand, lift, walker, cane, etc )  - [place] fall precautions as indicated by assessment  - Record patient progress and toleration of activity level on Mobility SBAR; progress patient to next Phase/Stage  - Instruct patient to call for assistance with activity based on assessment  - Consider rehabilitation consult to assist with strengthening/weightbearing, etc   Outcome: Progressing     Problem: DISCHARGE PLANNING  Goal: Discharge to home or other facility with appropriate resources  Description: INTERVENTIONS:  - Identify barriers to discharge w/patient and caregiver  - Arrange for needed discharge resources and transportation as appropriate  - Identify discharge learning needs (meds, wound care, etc )  - Arrange for interpretive services to assist at discharge as needed  - Refer to Case Management Department for coordinating discharge planning if the patient needs post-hospital services based on physician/advanced practitioner order or complex needs related to functional status, cognitive ability, or social support system  Outcome: Progressing     Problem: Knowledge Deficit  Goal: Patient/family/caregiver demonstrates understanding of disease process, treatment plan, medications, and discharge instructions  Description: Complete learning assessment and assess knowledge base    Interventions:  - Provide teaching at level of understanding  - Provide teaching via preferred learning methods  Outcome: Progressing

## 2021-03-08 NOTE — PLAN OF CARE
Problem: Potential for Falls  Goal: Patient will remain free of falls  Description: INTERVENTIONS:  - Assess patient frequently for physical needs  -  Identify cognitive and physical deficits and behaviors that affect risk of falls    -  [place] fall precautions as indicated by assessment   - Educate patient/family on patient safety including physical limitations  - Instruct patient to call for assistance with activity based on assessment  - Modify environment to reduce risk of injury  - Consider OT/PT consult to assist with strengthening/mobility  Outcome: Progressing     Problem: Prexisting or High Potential for Compromised Skin Integrity  Goal: Skin integrity is maintained or improved  Description: INTERVENTIONS:  - Identify patients at risk for skin breakdown  - Assess and monitor skin integrity  - Assess and monitor nutrition and hydration status  - Monitor labs   - Assess for incontinence   - Turn and reposition patient  - Assist with mobility/ambulation  - Relieve pressure over bony prominences  - Avoid friction and shearing  - Provide appropriate hygiene as needed including keeping skin clean and dry  - Evaluate need for skin moisturizer/barrier cream  - Collaborate with interdisciplinary team   - Patient/family teaching  - Consider wound care consult   Outcome: Progressing     Problem: PAIN - ADULT  Goal: Verbalizes/displays adequate comfort level or baseline comfort level  Description: Interventions:  - Encourage patient to monitor pain and request assistance  - Assess pain using appropriate pain scale  - Administer analgesics based on type and severity of pain and evaluate response  - Implement non-pharmacological measures as appropriate and evaluate response  - Consider cultural and social influences on pain and pain management  - Notify physician/advanced practitioner if interventions unsuccessful or patient reports new pain  Outcome: Progressing     Problem: INFECTION - ADULT  Goal: Absence or prevention of progression during hospitalization  Description: INTERVENTIONS:  - Assess and monitor for signs and symptoms of infection  - Monitor lab/diagnostic results  - Monitor all insertion sites, i e  indwelling lines, tubes, and drains  - Monitor endotracheal if appropriate and nasal secretions for changes in amount and color  - [place] appropriate cooling/warming therapies per order  - Administer medications as ordered  - Instruct and encourage patient and family to use good hand hygiene technique  - Identify and instruct in appropriate isolation precautions for identified infection/condition  Outcome: Progressing  Goal: Absence of fever/infection during neutropenic period  Description: INTERVENTIONS:  - Monitor WBC    Outcome: Progressing     Problem: SAFETY ADULT  Goal: Patient will remain free of falls  Description: INTERVENTIONS:  - Assess patient frequently for physical needs  -  Identify cognitive and physical deficits and behaviors that affect risk of falls    -  [place] fall precautions as indicated by assessment   - Educate patient/family on patient safety including physical limitations  - Instruct patient to call for assistance with activity based on assessment  - Modify environment to reduce risk of injury  - Consider OT/PT consult to assist with strengthening/mobility  Outcome: Progressing  Goal: Maintain or return to baseline ADL function  Description: INTERVENTIONS:  -  Assess patient's ability to carry out ADLs; assess patient's baseline for ADL function and identify physical deficits which impact ability to perform ADLs (bathing, care of mouth/teeth, toileting, grooming, dressing, etc )  - Assess/evaluate cause of self-care deficits   - Assess range of motion  - Assess patient's mobility; develop plan if impaired  - Assess patient's need for assistive devices and provide as appropriate  - Encourage maximum independence but intervene and supervise when necessary  - Involve family in performance of ADLs  - Assess for home care needs following discharge   - Consider OT consult to assist with ADL evaluation and planning for discharge  - Provide patient education as appropriate  Outcome: Progressing  Goal: Maintain or return mobility status to optimal level  Description: INTERVENTIONS:  - Assess patient's baseline mobility status (ambulation, transfers, stairs, etc )    - Identify cognitive and physical deficits and behaviors that affect mobility  - Identify mobility aids required to assist with transfers and/or ambulation (gait belt, sit-to-stand, lift, walker, cane, etc )  - [place] fall precautions as indicated by assessment  - Record patient progress and toleration of activity level on Mobility SBAR; progress patient to next Phase/Stage  - Instruct patient to call for assistance with activity based on assessment  - Consider rehabilitation consult to assist with strengthening/weightbearing, etc   Outcome: Progressing     Problem: DISCHARGE PLANNING  Goal: Discharge to home or other facility with appropriate resources  Description: INTERVENTIONS:  - Identify barriers to discharge w/patient and caregiver  - Arrange for needed discharge resources and transportation as appropriate  - Identify discharge learning needs (meds, wound care, etc )  - Arrange for interpretive services to assist at discharge as needed  - Refer to Case Management Department for coordinating discharge planning if the patient needs post-hospital services based on physician/advanced practitioner order or complex needs related to functional status, cognitive ability, or social support system  Outcome: Progressing     Problem: Knowledge Deficit  Goal: Patient/family/caregiver demonstrates understanding of disease process, treatment plan, medications, and discharge instructions  Description: Complete learning assessment and assess knowledge base    Interventions:  - Provide teaching at level of understanding  - Provide teaching via preferred learning methods  Outcome: Progressing

## 2021-03-08 NOTE — PHYSICAL THERAPY NOTE
PHYSICAL THERAPY EVALUATION          Patient Name: Leif Purdy  CAQXE'A Date: 3/8/2021   PT EVALUATION    77 y o     [id number]    GI bleed [K92 2]    Past Medical History:   Diagnosis Date    [facility]osis [facility] )     COPD [facility]chronic obstructive pulmonary disease) ([facility] )     Coronary artery disease     Dementia ([place] [address] )     Diabetes mellitus ([place] [address] )     Diverticulosis     Gastric reflux     Hyperlipemia     Panlobular emphysema (HCC)     Pituitary mass (HCC)     Renal disorder     CKD Stage 3 & Bilat multi renal nodules    Thrombocyte disorder (HCC)     thrombocytopenia    Unstable angina (HCC)      Past Surgical History:   Procedure Laterality Date    CARDIAC SURGERY      unspecified    CHOLECYSTECTOMY      CORONARY ARTERY BYPASS GRAFT      2-3 years ago    HERNIA REPAIR      IR BIOPSY BONE MARROW  5/12/2020 03/08/21 1605   PT Last Visit   PT Visit Date 03/08/21   Note Type   Note type Evaluation   Pain Assessment   Pain Assessment Tool Pain Assessment not indicated - pt denies pain   Pain Score No Pain   Home Living   Type of Home Assisted living   Home Layout One level   Bathroom Shower/Tub Walk-in shower   Bathroom Toilet Raised   Bathroom Equipment Grab bars in shower; Shower chair;Grab bars around toilet   Bathroom Accessibility Accessible via wheelchair   [facility] bed; Other (Comment)  (Rollator, call bell)   Additional Comments from [facility]   Prior Function   Level of [place] Needs assistance with ADLs and functional mobility; Needs assistance with IADLs   Lives With Facility staff   Receives Help From Personal care attendant   ADL Assistance Needs assistance   IADLs Needs assistance   Falls in the last 6 months 0   Vocational Retired   Comments pt somewhat of an inconsistent historian  reports amb w Rollator at all times, indep for mobility but has A for ADLs      Restrictions/Precautions Other Precautions Cognitive; Chair Alarm; Bed Alarm;Multiple lines; Fall Risk;Hard of hearing  (abn labs (hgb))   General   Additional Pertinent History pt admitted 3/8/21 for GI bleed  s/p EGD and transfusion of PRBCs per RN  hx of COPD, CAD, dementia, DM, emphysema   Cognition   Overall Cognitive Status WFL   Arousal/Participation Cooperative   Orientation Level Oriented X4   Memory Decreased recall of precautions   Following Commands Follows one step commands with increased time or repetition   Comments require frequent cuing to redirect attention  very particular   RLE Assessment   RLE Assessment X  (3-)   LLE Assessment   LLE Assessment X  (3-)   Coordination   Sensation WFL   Bed Mobility   Supine to Sit 3  Moderate assistance   Additional items Assist x 1;HOB elevated; Bedrails; Increased time required;Verbal cues   Transfers   Sit to Stand 4  Minimal assistance   Additional items Assist x 1;Bedrails; Increased time required;Verbal cues; Other  (RW)   Stand to Sit 4  Minimal assistance   Additional items Assist x 1; Armrests; Increased time required;Verbal cues; Other  (RW)   Additional Comments cues for hand placement   Ambulation/Elevation   Gait pattern Narrow MAGGY; Decreased foot clearance; Short stride; Excessively slow   Gait Assistance 4  Minimal assist   Additional items Assist x 1;Verbal cues   Assistive Device Rolling walker   Distance 3' bed>chair   Balance   Dynamic Sitting Fair -   Static Standing Fair -   Dynamic Standing Poor +   Ambulatory Poor +   Endurance Deficit   Endurance Deficit No   Activity Tolerance   Activity Tolerance Patient tolerated treatment well;Treatment limited secondary to medical complications (Comment)   Nurse Made Aware Leidy RN; cleared for therapy   Assessment   Prognosis Fair   Problem List Decreased strength;Decreased endurance; Impaired balance;Decreased mobility; Impaired judgement;Decreased safety awareness; Impaired hearing   Assessment Karly Cowan is a 77 y o  male admitted to [address] on 3/8/2021 for GI bleed  S/p EGD  PT was consulted and pt was seen on 3/8/2021 for mobility assessment and d/c planning  Pt presents multiple lines, high fall risk  Pt is currently functioning at a moderate assistance x1 level for bed mobility, minimum assistance x1 level for transfers, minimum assistance x1 level for ambulation with Rolling Walker  Vitals supine: 140/63, 63, 100% on RA  Pt demonstrated increased time to complete all tasks w cues for safety throughout  At risk for falls d/t short stride, decreased foot clearance, slowed gait speeds and impaired righting reactions/ poor dynamic standing balance  Pt will benefit from continued skilled IP PT to address the above mentioned impairments  in order to maximize recovery and increase functional independence when completing mobility and ADLs  At this time PT recommendations for d/c are return to [facility] w increased support and PT services prn  End of session pt seated OOB, ble elevated and chair alarm engaged  Pt made comfortable w multiple blankets  Reinforced fall risk precautions and use of call bell; pt verbalize understanding  Barriers to Discharge None   Goals   Patient Goals to get dressed and walk more   STG Expiration Date 03/18/21   Short Term Goal #1 1)  Pt will perform bed mobility Pasha demonstrating appropriate technique 100% of the time in order to improve function  2)  Perform all transfers with Pasha demonstrating safe and appropriate technique 100% of the time in order to improve ability to negotiate safely in home environment  3) Amb with least restrictive AD > 50'x1 with mod I in order to demonstrate ability to negotiate in home environment  4)  Improve overall strength and balance 1/2 grade in order to optimize ability to perform functional tasks and reduce fall risk  5) Increase activity tolerance to 45 minutes in order to improve endurance to functional tasks  6) PT for ongoing patient and family/caregiver education, DME needs and d/c planning in order to promote highest level of function in least restrictive environment  PT Treatment Day 0   Plan   Treatment/Interventions Functional transfer training;LE strengthening/ROM; Therapeutic exercise; Endurance training;Patient/family training;Equipment eval/education; Bed mobility;Gait training;Continued evaluation; Compensatory technique education;Spoke to nursing;OT   PT Frequency Other (Comment)  (3-5x)   Recommendation   PT Discharge Recommendation Return to previous environment with social support;Home with skilled therapy  (PT at [facility])   PT - OK to Discharge Yes   Additional Comments with increased support from Torie Ward in Bed Without Bedrails 2   Lying on Back to Sitting on Edge of Flat Bed 2   Moving Bed to Chair 3   Standing Up From Chair 3   Walk in Room 3   Climb 3-5 Stairs 2   Basic Mobility Inpatient Raw Score 15   Basic Mobility Standardized Score 36 97   History: co - morbidities, age,  social background, fall risk, use of assistive device, assist for adl's, cognition, multiple lines  Exam: impairments in systems including musculoskeletal (strength), neuromuscular (balance, gait, transfers, motor function and sensation), cardiopulmonary, cognition  Clinical: unstable/unpredictable; ongoing management for admitting diagnosis  Complexity:high      Nirmala Marrero, PT

## 2021-03-08 NOTE — ASSESSMENT & PLAN NOTE
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 on 3/23/20  Redemonstrated on MRI 6/24/20 - 3 4 cm relatively homogeneous mass emanating from the pituitary gland consistent with microadenoma  Extension into the left cavernous sinus, sphenoid sinus, with elevation of contents of the suprasellar cyst   Diameter of the cavernous carotid arteries   do not appear significantly affected  Was following with [address] [place] [address] Neurosurgery, who had ordered pituitary function labs and recommended outpatient follow-up with ophthalmology and follow-up MRI  However, labs were never obtained and patient appeared to have been lost to follow-up  Patient states he was told it was "benign", also states he has seen an ophtholmologist, although no records in [facility]  Neurosurgery consult    Appreciate input

## 2021-03-08 NOTE — ASSESSMENT & PLAN NOTE
No results found for: HGBA1C    No results for input(s): POCGLU in the last 72 hours  Blood Sugar Average: Last 72 hrs: On Lantus 5mg at home    Will continue Lantus 5mg  Accuchecks q6h and sliding scale coverage while pt is NPO

## 2021-03-08 NOTE — OCCUPATIONAL THERAPY NOTE
OCCUPATIONAL THERAPY CANCELLATION     OT orders received and chart reviewed  Spoke w/ RN whom states HGB 5 7 and asked therapist to hold on evaluation at this time  Will perform OT evaluation when pt medically appropriate      Sher Duke MS, OTR/L

## 2021-03-08 NOTE — ASSESSMENT & PLAN NOTE
Pt presents with large bloody BM x 1  7 days of non-bloody diarrheal illness preceding bloody BM  Hgb 7 9 (Baseline between 7 0 - 8 0)  INR 1 89  CT abdomen (3/7/21) - Moderate circumferential thickening of the proximal duodenum in keeping with a nonspecific duodenitis  Ulcer disease is part of the differential diagnosis  Plan:  - Will give Vitamin K 10mg IV in setting of elevated INR with poor nutrition and diarrheal illness  Will consider cryoprecipitate if pt continues to have active bleeding   - Will order C  Diff test  - Ceftriaxone 2g daily for SBP prophylaxis  - Protonix 40mg BID  - Type and screen  Transfuse for hgb <7 0  - H/H q6h  - NPO  - GI consult  Appreciate input

## 2021-03-08 NOTE — PLAN OF CARE
Problem: PHYSICAL THERAPY ADULT  Goal: Performs mobility at highest level of function for planned discharge setting  See evaluation for individualized goals  Description: Treatment/Interventions: Functional transfer training, LE strengthening/ROM, Therapeutic exercise, Endurance training, Patient/family training, Equipment eval/education, Bed mobility, Gait training, Continued evaluation, Compensatory technique education, Spoke to nursing, OT          See flowsheet documentation for full assessment, interventions and recommendations  Note: Prognosis: Fair  Problem List: Decreased strength, Decreased endurance, Impaired balance, Decreased mobility, Impaired judgement, Decreased safety awareness, Impaired hearing  Assessment: Karly Cowan is a 77 y o  male admitted to [facility] on 3/8/2021 for GI bleed  S/p EGD  PT was consulted and pt was seen on 3/8/2021 for mobility assessment and d/c planning  Pt presents multiple lines, high fall risk  Pt is currently functioning at a moderate assistance x1 level for bed mobility, minimum assistance x1 level for transfers, minimum assistance x1 level for ambulation with Rolling Walker  Vitals supine: 140/63, 63, 100% on RA  Pt demonstrated increased time to complete all tasks w cues for safety throughout  At risk for falls d/t short stride, decreased foot clearance, slowed gait speeds and impaired righting reactions/ poor dynamic standing balance  Pt will benefit from continued skilled IP PT to address the above mentioned impairments  in order to maximize recovery and increase functional independence when completing mobility and ADLs  At this time PT recommendations for d/c are return to custodial w increased support and PT services prn  End of session pt seated OOB, ble elevated and chair alarm engaged  Pt made comfortable w multiple blankets  Reinforced fall risk precautions and use of call bell; pt verbalize understanding    Barriers to Discharge: None     PT Discharge Recommendation: Return to previous environment with social support, Home with skilled therapy(PT at [facility])     PT - OK to Discharge: Yes    See flowsheet documentation for full assessment

## 2021-03-08 NOTE — ASSESSMENT & PLAN NOTE
Not on antiplatelet therapy, not on beta blocker therapy  Continue atorvastatin  Pt is at high-risk for Type 2 MI in the setting of dehydration/hypovolemia with underlying CAD  Will give gentle hydration

## 2021-03-08 NOTE — ANESTHESIA PREPROCEDURE EVALUATION
Procedure:  EGD    Relevant Problems   CARDIO   (+) CAD (coronary artery disease)      ENDO   (+) Type 2 diabetes mellitus with stage 3 chronic kidney disease, with long-term current use of insulin (HCC)      GI/HEPATIC   (+) Cirrhosis (HCC)   (+) GI bleed      /RENAL   (+) Acute kidney injury superimposed on chronic kidney disease (HCC)   (+) CKD (chronic kidney disease) stage 3, GFR 30-59 ml/min   (+) Uremia      HEMATOLOGY   (+) Symptomatic anemia   (+) Thrombocytopenia (HCC)      PULMONARY   (+) COPD (chronic obstructive pulmonary disease) (HCC)        Physical Exam    Airway    Mallampati score: II  TM Distance: >3 FB  Neck ROM: full     Dental   No notable dental hx     Cardiovascular  Rate: normal, Cardiovascular exam normal    Pulmonary  Pulmonary exam normal Breath sounds clear to auscultation,     Other Findings        Anesthesia Plan  ASA Score- 4 Emergent    Anesthesia Type- IV sedation with anesthesia with ASA Monitors  Additional Monitors:   Airway Plan:     Comment: Hgb~ 5 7 this AM   1 U PRBCs infused, 2nd hanging now  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

## 2021-03-08 NOTE — ASSESSMENT & PLAN NOTE
Lab Results   Component Value Date    EGFR 22 03/07/2021    EGFR 39 03/30/2020    EGFR 36 03/29/2020    CREATININE 2 69 (H) 03/07/2021    CREATININE 1 71 (H) 03/30/2020    CREATININE 1 83 (H) 03/29/2020   Baseline creatinine 1 7-1 8   Currently, creatinine of 2 69  Likely prerenal due to dehydration/hypovolemia in the setting of diarrheal illness and GI bleed  Will give gentle IVF  Repeat CMP in a m
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
## 2021-03-08 NOTE — ASSESSMENT & PLAN NOTE
As evidenced by CT abdomen and Abdominal U/S in March of 2020  · CT abdomen - Nodular liver concerning for underlying cirrhosis  · Abdominal U/S - Liver cirrhosis  No suspicious lesions identified  Elevated INR 1 89  Platelets 241   · Pt has hx of thrombocytopenia that was being worked up by hematology   Monitor platelets closely in the setting of GI bleed
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
## 2021-03-08 NOTE — ASSESSMENT & PLAN NOTE
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 on 3/23/20  Redemonstrated on MRI 6/24/20 - 3 4 cm relatively homogeneous mass emanating from the pituitary gland consistent with microadenoma  Extension into the left cavernous sinus, sphenoid sinus, with elevation of contents of the suprasellar cyst   Diameter of the cavernous carotid arteries   do not appear significantly affected  Was following with [facility], who had ordered pituitary function labs and recommended outpatient follow-up with ophthalmology and follow-up MRI  However, labs were never obtained and patient appeared to have been lost to follow-up  Patient states he was told it was "benign", also states he has seen an ophtholmologist, although no records in [facility]    Neurosurgery had stated patient is not a surgical candidate

## 2021-03-08 NOTE — H&P
H&P- Taisha Torres 1945, 77 y o  male MRN: [id number]    Unit/Bed#: E5 -01 Encounter: [id number]    Primary Care Provider: Maren Guillermo MD   Date and time admitted to hospital: 3/8/2021 12:06 AM        * GI bleed  Assessment & Plan  Pt presents with large bloody BM x 1  7 days of non-bloody diarrheal illness preceding bloody BM  Hgb 7 9 (Baseline between 7 0 - 8 0)  INR 1 89  CT abdomen (3/7/21) - Moderate circumferential thickening of the proximal duodenum in keeping with a nonspecific duodenitis  Ulcer disease is part of the differential diagnosis  Plan:  - Will give Vitamin K 10mg IV in setting of elevated INR with poor nutrition and diarrheal illness  Will consider cryoprecipitate if pt continues to have active bleeding   - Will order C  Diff test  - Ceftriaxone 2g daily for SBP prophylaxis  - Protonix 40mg BID  - Type and screen  Transfuse for hgb <7 0  - H/H q6h  - NPO  - GI consult  Appreciate input  Acute kidney injury superimposed on chronic kidney disease [facility])  Assessment & Plan  Lab Results   Component Value Date    EGFR 22 03/07/2021    EGFR 39 03/30/2020    EGFR 36 03/29/2020    CREATININE 2 69 (H) 03/07/2021    CREATININE 1 71 (H) 03/30/2020    CREATININE 1 83 (H) 03/29/2020   Baseline creatinine 1 7-1 8  Currently, creatinine of 2 69  Likely prerenal due to dehydration/hypovolemia in the setting of diarrheal illness and GI bleed  Will give gentle IVF  Repeat CMP in a m    Cirrhosis [facility])  Assessment & Plan  As evidenced by CT abdomen and Abdominal U/S in March of 2020  · CT abdomen - Nodular liver concerning for underlying cirrhosis  · Abdominal U/S - Liver cirrhosis  No suspicious lesions identified  Elevated INR 1 89  Platelets 522   · Pt has hx of thrombocytopenia that was being worked up by hematology   Monitor platelets closely in the setting of GI bleed    CAD (coronary artery disease)  Assessment & Plan  Not on antiplatelet therapy, not on beta blocker therapy  Continue atorvastatin  Pt is at high-risk for Type 2 MI in the setting of dehydration/hypovolemia with underlying CAD  Will give gentle hydration  Type 2 diabetes mellitus with stage 3 chronic kidney disease, with long-term current use of insulin ([facility] [address] )  Assessment & Plan  No results found for: HGBA1C    No results for input(s): POCGLU in the last 72 hours  Blood Sugar Average: Last 72 hrs: On Lantus 5mg at home  Will continue Lantus 5mg  Accuchecks q6h and sliding scale coverage while pt is NPO      Pituitary macroadenoma [facility])  Assessment & Plan  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 on 3/23/20  Redemonstrated on MRI 6/24/20 - 3 4 cm relatively homogeneous mass emanating from the pituitary gland consistent with microadenoma  Extension into the left cavernous sinus, sphenoid sinus, with elevation of contents of the suprasellar cyst   Diameter of the cavernous carotid arteries   do not appear significantly affected  Was following with [facility] Neurosurgery, who had ordered pituitary function labs and recommended outpatient follow-up with ophthalmology and follow-up MRI  However, labs were never obtained and patient appeared to have been lost to follow-up  Patient states he was told it was "benign", also states he has seen an ophtholmologist, although no records in [facility]  Neurosurgery consult  Appreciate input      VTE Prophylaxis: Pharmacologic VTE Prophylaxis contraindicated due to GI bleed  / sequential compression device   Code Status:  Level 1-full code  Discussion with family:  Discussed with patient  No contact information for family  Anticipated Length of Stay:  Patient will be admitted on an Inpatient basis with an anticipated length of stay of  greater than 2 midnights  Justification for Hospital Stay:  GI bleed requiring GI workup    Total Time for Visit, including Counseling / Coordination of Care: 30 minutes    Greater than 50% of this total time spent on direct patient counseling and coordination of care  Chief Complaint:   GI bleed    History of Present Illness:    Shantal Webster is a 77 y o  male with PMH T2DM, CAD, cirrhosis, CKD, pituitary adenoma who is transferred from [facility] with melena and hematochezia x1 for GI workup with possible EGD  Patient resides at [facility], where he has had 1 week of diarrheal illness, reporting 5-6 bowel movements a day  Tonight patient had a large bloody bowel movement with melena and hematochezia, which was witnessed by staff  Patient also reports reports increased weakness, nausea and fatigue  He denies shortness of breath, chest pain, palpitations, lightheadedness, fever, chills, abdominal pain, numbness  Lab work shows a hemoglobin of 7 9 which is at baseline patient  CT scan of the abdomen showed duodenitis with possible ulcer disease  Of note, patient had a large GI bleed in March of 2020 requiring blood transfusion vasopressors  Review of Systems:    Review of Systems   Constitutional: Positive for appetite change (decreased)  Negative for chills, fatigue and fever  HENT: Negative for ear pain, sore throat and trouble swallowing  Eyes: Negative for visual disturbance  Respiratory: Negative for cough, chest tightness, shortness of breath and wheezing  Cardiovascular: Negative for chest pain, palpitations and leg swelling  Gastrointestinal: Positive for blood in stool, diarrhea and nausea  Negative for abdominal distention, abdominal pain and vomiting  Endocrine: Negative  Genitourinary: Negative for dysuria  Musculoskeletal: Negative for gait problem and myalgias  Skin: Negative for pallor  Allergic/Immunologic: Negative for immunocompromised state  Neurological: Negative for dizziness, syncope, light-headedness, numbness and headaches         Past Medical and Surgical History:     Past Medical History:   Diagnosis Date    Cirrhosis ([facility] [address] )     COPD (chronic obstructive pulmonary disease) (HCC)     Coronary artery disease     Dementia (HCC)     Diabetes mellitus ([facility] [address] )     Diverticulosis     Gastric reflux     Hyperlipemia     Panlobular emphysema (HCC)     Pituitary mass (HCC)     Renal disorder     CKD Stage 3 & Bilat multi renal nodules    Thrombocyte disorder (HCC)     thrombocytopenia    Unstable angina (HCC)        Past Surgical History:   Procedure Laterality Date    CARDIAC SURGERY      unspecified    CHOLECYSTECTOMY      CORONARY ARTERY BYPASS GRAFT      2-3 years ago    HERNIA REPAIR      IR BIOPSY BONE MARROW  5/12/2020       Meds/Allergies:    Prior to Admission medications    Medication Sig Start Date End Date Taking?  Authorizing Provider   calcium carbonate (TUMS) 500 mg chewable tablet Chew 2 tablets 2 (two) times a day   Yes Historical Provider, MD   Eyelid Cleansers (OcuSoft Lid Scrub Plus) PADS Apply 1 each topically 2 (two) times a day   Yes Historical Provider, MD   fenofibrate (TRICOR) 145 mg tablet Take 145 mg by mouth daily   Yes Historical Provider, MD   magnesium cl-calcium carbonate (MAG-DELAY)  MG tablet Take 2 tablets by mouth daily   Yes Historical Provider, MD   potassium chloride (MICRO-K) 10 MEQ CR capsule Take 10 mEq by mouth daily   Yes Historical Provider, MD   rosuvastatin (CRESTOR) 20 MG tablet Take 20 mg by mouth daily   Yes Historical Provider, MD   acetaminophen (TYLENOL) 325 mg tablet Take 650 mg by mouth every 6 (six) hours as needed for mild pain    Historical Provider, MD   bisacodyl (DULCOLAX) 10 mg suppository Insert 10 mg into the rectum daily as needed for constipation    Historical Provider, MD   ergocalciferol (VITAMIN D2) 50,000 units Take 50,000 Units by mouth every 28 days    Historical Provider, MD   Glucagon rDNA, (Glucagon Emergency) 1 MG KIT Inject 1 mg as directed as needed    Historical Provider, MD   insulin glargine (LANTUS) 100 units/mL subcutaneous injection Inject 5 Units under the skin daily at bedtime 3/30/20   Madison Max DO   magnesium hydroxide (MILK OF MAGNESIA) 400 mg/5 mL oral suspension Take by mouth as needed for constipation    Historical Provider, MD   mineral oil enema Insert 1 enema into the rectum as needed for constipation    Historical Provider, MD   pantoprazole (PROTONIX) 40 mg tablet Take 1 tablet (40 mg total) by mouth 2 (two) times a day before meals 3/30/20   Madison Max DO     I have reviewed home medications with patient personally  Allergies: Allergies   Allergen Reactions    Erythromycin     Penicillins     Shellfish-Derived Products        Social History:     Marital Status: /Civil Union   Occupation:  Retired  Patient Pre-hospital Living Situation: [place] [address]  Patient Pre-hospital Level of Mobility:  Independent until 1 week ago  Patient Pre-hospital Diet Restrictions:  Cardiac diabetic  Substance Use History:   Social History     Substance and Sexual Activity   Alcohol Use Never    Frequency: Never    Binge frequency: Never     Social History     Tobacco Use   Smoking Status Former Smoker    Packs/day: 0 25   Smokeless Tobacco Never Used   Tobacco Comment    Hasn't smoked since February     Social History     Substance and Sexual Activity   Drug Use Never       Family History:    non-contributory    Physical Exam:     Vitals:   Blood Pressure: 119/68 (03/08/21 0045)  Pulse: 80 (03/08/21 0045)  Temperature: 97 9 °F (36 6 °C) (03/08/21 0045)  Temp Source: Temporal (03/08/21 0045)  Respirations: 20 (03/08/21 0045)  SpO2: 100 % (03/08/21 0045)    Physical Exam  Vitals signs and nursing note reviewed  Constitutional:       Appearance: Normal appearance  HENT:      Head: Normocephalic and atraumatic  Mouth/Throat:      Mouth: Mucous membranes are moist       Pharynx: Oropharynx is clear  No oropharyngeal exudate  Eyes:      Extraocular Movements: Extraocular movements intact     Cardiovascular:      Rate and Rhythm: Normal rate and regular rhythm  Pulses: Normal pulses  Heart sounds: Normal heart sounds  No murmur  No friction rub  No gallop  Pulmonary:      Effort: Pulmonary effort is normal  No respiratory distress  Breath sounds: Normal breath sounds  No stridor  No wheezing or rales  Abdominal:      General: Abdomen is flat  Bowel sounds are normal  There is no distension  Palpations: Abdomen is soft  Tenderness: There is no abdominal tenderness  There is no guarding  Musculoskeletal:      Right lower leg: No edema  Left lower leg: No edema  Skin:     General: Skin is warm and dry  Neurological:      General: No focal deficit present  Mental Status: He is alert and oriented to person, place, and time  Motor: Weakness present  Additional Data:     Lab Results: I have personally reviewed pertinent reports  Results from last 7 days   Lab Units 03/07/21  1906   WBC Thousand/uL 9 13   HEMOGLOBIN g/dL 7 9*   HEMATOCRIT % 25 5*   PLATELETS Thousands/uL 208   NEUTROS PCT % 51   LYMPHS PCT % 35   MONOS PCT % 11   EOS PCT % 2     Results from last 7 days   Lab Units 03/07/21  1906   SODIUM mmol/L 141   POTASSIUM mmol/L 4 5   CHLORIDE mmol/L 111*   CO2 mmol/L 17*   BUN mg/dL 40*   CREATININE mg/dL 2 69*   ANION GAP mmol/L 13   CALCIUM mg/dL 8 6   ALBUMIN g/dL 2 6*   TOTAL BILIRUBIN mg/dL 0 90   ALK PHOS U/L 35*   ALT U/L 20   AST U/L 55*   GLUCOSE RANDOM mg/dL 117     Results from last 7 days   Lab Units 03/07/21  1906   INR  1 89*             Results from last 7 days   Lab Units 03/07/21  1906   LACTIC ACID mmol/L 2 0       Imaging: I have personally reviewed pertinent reports  No orders to display       EKG, Pathology, and Other Studies Reviewed on Admission:   · EKG  · CT abdomen    Allscripts / Epic Records Reviewed: Yes     ** Please Note: This note has been constructed using a voice recognition system   **

## 2021-03-09 ENCOUNTER — TELEPHONE (OUTPATIENT)
Dept: HEMATOLOGY ONCOLOGY | Facility: CLINIC | Age: 76
End: 2021-03-09

## 2021-03-09 LAB
ABO GROUP BLD BPU: NORMAL
ABO GROUP BLD BPU: NORMAL
AFP-TM SERPL-MCNC: 1.9 NG/ML (ref 0.5–8)
ANION GAP SERPL CALCULATED.3IONS-SCNC: 9 MMOL/L (ref 4–13)
BASOPHILS # BLD AUTO: 0.05 THOUSANDS/ΜL (ref 0–0.1)
BASOPHILS NFR BLD AUTO: 2 % (ref 0–1)
BILIRUB UR QL STRIP: NEGATIVE
BPU ID: NORMAL
BPU ID: NORMAL
BUN SERPL-MCNC: 39 MG/DL (ref 5–25)
CALCIUM SERPL-MCNC: 7.9 MG/DL (ref 8.3–10.1)
CHLORIDE SERPL-SCNC: 114 MMOL/L (ref 100–108)
CLARITY UR: CLEAR
CO2 SERPL-SCNC: 20 MMOL/L (ref 21–32)
COLOR UR: YELLOW
CREAT SERPL-MCNC: 2.62 MG/DL (ref 0.6–1.3)
CROSSMATCH: NORMAL
CROSSMATCH: NORMAL
EOSINOPHIL # BLD AUTO: 0.14 THOUSAND/ΜL (ref 0–0.61)
EOSINOPHIL NFR BLD AUTO: 5 % (ref 0–6)
ERYTHROCYTE [DISTWIDTH] IN BLOOD BY AUTOMATED COUNT: 17.8 % (ref 11.6–15.1)
GFR SERPL CREATININE-BSD FRML MDRD: 23 ML/MIN/1.73SQ M
GLUCOSE SERPL-MCNC: 179 MG/DL (ref 65–140)
GLUCOSE SERPL-MCNC: 72 MG/DL (ref 65–140)
GLUCOSE SERPL-MCNC: 78 MG/DL (ref 65–140)
GLUCOSE SERPL-MCNC: 79 MG/DL (ref 65–140)
GLUCOSE SERPL-MCNC: 94 MG/DL (ref 65–140)
GLUCOSE SERPL-MCNC: 99 MG/DL (ref 65–140)
GLUCOSE UR STRIP-MCNC: NEGATIVE MG/DL
HAV AB SER QL IA: NORMAL
HBV SURFACE AB SER-ACNC: <3.1 MIU/ML
HBV SURFACE AG SER QL: NORMAL
HCT VFR BLD AUTO: 28.3 % (ref 36.5–49.3)
HCV AB SER QL: NORMAL
HGB BLD-MCNC: 10.9 G/DL (ref 12–17)
HGB BLD-MCNC: 9.2 G/DL (ref 12–17)
HGB BLD-MCNC: 9.4 G/DL (ref 12–17)
HGB UR QL STRIP.AUTO: NEGATIVE
IMM GRANULOCYTES # BLD AUTO: 0.01 THOUSAND/UL (ref 0–0.2)
IMM GRANULOCYTES NFR BLD AUTO: 0 % (ref 0–2)
INR PPP: 1.55 (ref 0.84–1.19)
KETONES UR STRIP-MCNC: NEGATIVE MG/DL
LEUKOCYTE ESTERASE UR QL STRIP: NEGATIVE
LYMPHOCYTES # BLD AUTO: 1.13 THOUSANDS/ΜL (ref 0.6–4.47)
LYMPHOCYTES NFR BLD AUTO: 36 % (ref 14–44)
MCH RBC QN AUTO: 30.2 PG (ref 26.8–34.3)
MCHC RBC AUTO-ENTMCNC: 32.5 G/DL (ref 31.4–37.4)
MCV RBC AUTO: 93 FL (ref 82–98)
MONOCYTES # BLD AUTO: 0.28 THOUSAND/ΜL (ref 0.17–1.22)
MONOCYTES NFR BLD AUTO: 9 % (ref 4–12)
NEUTROPHILS # BLD AUTO: 1.53 THOUSANDS/ΜL (ref 1.85–7.62)
NEUTS SEG NFR BLD AUTO: 48 % (ref 43–75)
NITRITE UR QL STRIP: NEGATIVE
NRBC BLD AUTO-RTO: 0 /100 WBCS
PH UR STRIP.AUTO: 5.5 [PH]
PLATELET # BLD AUTO: 78 THOUSANDS/UL (ref 149–390)
PMV BLD AUTO: 10.4 FL (ref 8.9–12.7)
POTASSIUM SERPL-SCNC: 3.7 MMOL/L (ref 3.5–5.3)
PROT UR STRIP-MCNC: NEGATIVE MG/DL
PROTHROMBIN TIME: 18.3 SECONDS (ref 11.6–14.5)
RBC # BLD AUTO: 3.05 MILLION/UL (ref 3.88–5.62)
SODIUM SERPL-SCNC: 143 MMOL/L (ref 136–145)
SP GR UR STRIP.AUTO: 1.02 (ref 1–1.03)
UNIT DISPENSE STATUS: NORMAL
UNIT DISPENSE STATUS: NORMAL
UNIT PRODUCT CODE: NORMAL
UNIT PRODUCT CODE: NORMAL
UNIT RH: NORMAL
UNIT RH: NORMAL
UROBILINOGEN UR QL STRIP.AUTO: 0.2 E.U./DL
WBC # BLD AUTO: 3.14 THOUSAND/UL (ref 4.31–10.16)

## 2021-03-09 PROCEDURE — 86803 HEPATITIS C AB TEST: CPT | Performed by: INTERNAL MEDICINE

## 2021-03-09 PROCEDURE — 85610 PROTHROMBIN TIME: CPT | Performed by: PHYSICIAN ASSISTANT

## 2021-03-09 PROCEDURE — 82105 ALPHA-FETOPROTEIN SERUM: CPT | Performed by: INTERNAL MEDICINE

## 2021-03-09 PROCEDURE — 99232 SBSQ HOSP IP/OBS MODERATE 35: CPT | Performed by: PHYSICIAN ASSISTANT

## 2021-03-09 PROCEDURE — 81003 URINALYSIS AUTO W/O SCOPE: CPT | Performed by: INTERNAL MEDICINE

## 2021-03-09 PROCEDURE — 86706 HEP B SURFACE ANTIBODY: CPT | Performed by: INTERNAL MEDICINE

## 2021-03-09 PROCEDURE — 80048 BASIC METABOLIC PNL TOTAL CA: CPT | Performed by: INTERNAL MEDICINE

## 2021-03-09 PROCEDURE — 97530 THERAPEUTIC ACTIVITIES: CPT

## 2021-03-09 PROCEDURE — 85018 HEMOGLOBIN: CPT | Performed by: PHYSICIAN ASSISTANT

## 2021-03-09 PROCEDURE — 97166 OT EVAL MOD COMPLEX 45 MIN: CPT

## 2021-03-09 PROCEDURE — 87340 HEPATITIS B SURFACE AG IA: CPT | Performed by: INTERNAL MEDICINE

## 2021-03-09 PROCEDURE — C9113 INJ PANTOPRAZOLE SODIUM, VIA: HCPCS | Performed by: PHYSICIAN ASSISTANT

## 2021-03-09 PROCEDURE — 85025 COMPLETE CBC W/AUTO DIFF WBC: CPT | Performed by: INTERNAL MEDICINE

## 2021-03-09 PROCEDURE — 99232 SBSQ HOSP IP/OBS MODERATE 35: CPT | Performed by: INTERNAL MEDICINE

## 2021-03-09 PROCEDURE — 82948 REAGENT STRIP/BLOOD GLUCOSE: CPT

## 2021-03-09 PROCEDURE — 86708 HEPATITIS A ANTIBODY: CPT | Performed by: INTERNAL MEDICINE

## 2021-03-09 PROCEDURE — 97110 THERAPEUTIC EXERCISES: CPT

## 2021-03-09 RX ADMIN — DEXTROSE 2000 MG: 50 INJECTION, SOLUTION INTRAVENOUS at 22:30

## 2021-03-09 RX ADMIN — INSULIN LISPRO 1 UNITS: 100 INJECTION, SOLUTION INTRAVENOUS; SUBCUTANEOUS at 22:31

## 2021-03-09 RX ADMIN — ATORVASTATIN CALCIUM 40 MG: 40 TABLET, FILM COATED ORAL at 18:20

## 2021-03-09 RX ADMIN — INSULIN GLARGINE 5 UNITS: 100 INJECTION, SOLUTION SUBCUTANEOUS at 22:31

## 2021-03-09 RX ADMIN — PANTOPRAZOLE SODIUM 40 MG: 40 INJECTION, POWDER, FOR SOLUTION INTRAVENOUS at 06:24

## 2021-03-09 RX ADMIN — PANTOPRAZOLE SODIUM 40 MG: 40 INJECTION, POWDER, FOR SOLUTION INTRAVENOUS at 18:22

## 2021-03-09 RX ADMIN — ALUMINUM HYDROXIDE, MAGNESIUM HYDROXIDE, AND SIMETHICONE 30 ML: 200; 200; 20 SUSPENSION ORAL at 22:38

## 2021-03-09 NOTE — ASSESSMENT & PLAN NOTE
No results found for: HGBA1C    Recent Labs     03/08/21  1633 03/09/21  0007 03/09/21  0549 03/09/21  1152   POCGLU 110 94 72 78       Blood Sugar Average: Last 72 hrs:     Will continue Lantus 5mg  Accuchecks q6h and sliding scale coverage

## 2021-03-09 NOTE — ASSESSMENT & PLAN NOTE
Cirrhosis of unknown etiology patient did state use a regular user of alcohol when he was in the [employer] many years ago  · CT abdomen - Nodular liver concerning for underlying cirrhosis  · Abdominal U/S - Liver cirrhosis  No suspicious lesions identified    · GI input appreciated, check hepatitis-B and C, alpha fetoprotein  · Patient with significant thrombocytopenia in March 2020 at which point hematology was following and patient was started on IV steroids and received IVIG x2 platelets today 78, will closely monitor

## 2021-03-09 NOTE — ASSESSMENT & PLAN NOTE
Pt presents with large bloody BM x 1  7 days of non-bloody diarrheal illness preceding bloody BM    · hemoglobin 10 9 status post 2 units PRBCs on 03/08/2021 9  · CT abdomen (3/7/21) - Moderate circumferential thickening of the proximal duodenum in keeping with a nonspecific duodenitis  Ulcer disease is part of the differential diagnosis    · Status post EGD on 03/08/2021 which revealed 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, 3 superficial ulcers in duodenum, portal hypertensive gastropathy   · GI follow-up appreciated, will monitor H&H, continue PPI

## 2021-03-09 NOTE — PHYSICAL THERAPY NOTE
PHYSICAL THERAPY NOTE          Patient Name: Aydee Anne  UVYHB'[id number] Date: 3/9/2021   Time: 2560-8065       03/09/21 1146   PT Last Visit   PT Visit Date 03/09/21   Note Type   Note Type Treatment   Pain Assessment   Pain Assessment Tool Pain Assessment not indicated - pt denies pain   Pain Score No Pain   Restrictions/Precautions   Other Precautions Cognitive; Chair Alarm; Bed Alarm; Fall Risk   General   Chart Reviewed Yes   Cognition   Overall Cognitive Status Impaired   Arousal/Participation Cooperative   Attention Attends with cues to redirect   Orientation Level Oriented to person;Oriented to place;Oriented to time   Memory Decreased recall of precautions;Decreased recall of recent events   Following Commands Follows one step commands with increased time or repetition   Comments require frequent cuing to redirect attention   Subjective   Subjective "I want to walk around here and get my strength back"   Bed Mobility   Supine to Sit 5  Supervision   Additional items HOB elevated; Bedrails; Increased time required   Transfers   Sit to Stand 5  Supervision   Additional items Bedrails; Increased time required;Verbal cues; Other  (RW)   Stand to Sit 5  Supervision   Additional items Armrests; Increased time required;Verbal cues; Other  (RW)   Toilet transfer 5  Supervision   Additional items Increased time required;Verbal cues;Standard toilet; Other  (RW, grab bar)   Additional Comments cues for technique   Ambulation/Elevation   Gait pattern Decreased foot clearance; Short stride; Excessively slow   Gait Assistance 5  Supervision   Additional items Assist x 1;Verbal cues  (cues intially for safe use of RW)   Assistive Device Rolling walker   Distance 20', 10', 180'   Balance   Static Standing Fair   Dynamic Standing Fair -   Ambulatory Fair -   Endurance Deficit   Endurance Deficit No   Activity Tolerance   Activity Tolerance Patient tolerated treatment well   Medical Staff 115 Elizabeth Milner OT   Nurse Made Aware Leidy LORENZ   Exercises   Knee AROM Long Arc Quad Sitting;10 reps;Bilateral   Ankle Pumps Sitting;15 reps;Bilateral  (heel and toe raises)   Balance training  performed dynamic sitting and standing tasks 5' w no LOB and S for safety   Assessment   Prognosis Fair   Problem List Decreased strength; Impaired balance;Decreased mobility; Impaired judgement;Decreased safety awareness; Impaired hearing   Assessment Osmani was seen for a f/u session  No complaints start of session but requires frequent redirection d/t perseveration and inattention  Currently ambulating community distances and performing all tasks at S level for safety d/t fall risk, decreased safety awareness and occ impulsivity (will leave RW behind for short distances)  Improved foot clearance and gait speeds during ambulation w cuing  Overall tolerated session well today and recommendation is return to [facility] when medically stable  End of session pt seated OOB, w cushion on chair for comfort and chair alarm engaged, all needs in reach  Barriers to Discharge None   Goals   Patient Goals to walk more   STG Expiration Date 03/18/21   Short Term Goal #1 1)  Pt will perform bed mobility Pasha demonstrating appropriate technique 100% of the time in order to improve function  2)  Perform all transfers with Pasha demonstrating safe and appropriate technique 100% of the time in order to improve ability to negotiate safely in home environment  3) Amb with least restrictive AD > 50'x1 with mod I in order to demonstrate ability to negotiate in home environment  4)  Improve overall strength and balance 1/2 grade in order to optimize ability to perform functional tasks and reduce fall risk  5) Increase activity tolerance to 45 minutes in order to improve endurance to functional tasks  6) PT for ongoing patient and family/caregiver education, DME needs and d/c planning in order to promote highest level of function in least restrictive environment  PT Treatment Day 1   Plan   Treatment/Interventions Functional transfer training;LE strengthening/ROM; Therapeutic exercise; Endurance training;Patient/family training;Equipment eval/education; Bed mobility;Gait training; Compensatory technique education;Spoke to nursing;OT   Progress Progressing toward goals   PT Frequency Other (Comment)  (3-5x)   Recommendation   PT Discharge Recommendation Return to previous environment with social support;Home with skilled therapy  (PT at [facility] pr[facility])   PT - OK to Discharge Yes   Nichelle Palmer [phone]   Turning in Bed Without Bedrails 3   Lying on Back to Sitting on Edge of Flat Bed 3   Moving Bed to Chair 3   Standing Up From Chair 3   Walk in Room 3   Climb 3-5 Stairs 3   Basic Mobility Inpatient Raw Score 18   Basic Mobility Standardized Score 41 05     Onofre Mortensen, PT

## 2021-03-09 NOTE — PROGRESS NOTES
Patient Name: Leif Purdy  Patient MRN: [id number]  Date: 03/09/21  Service: [facility]    Shannen Montano is a 77 y o  male who was admitted with GI bleed   He feels well  States his stools are brown  Denies abdominal pain  Tolerating low-sodium diet  Patient admits:  Watery stools  Denies:  Abdominal pain, nausea, chest pain, dyspnea  All others negative except as noted in HPI  Objective     Vitals  /65 (BP Location: Right arm)   Pulse 63   Temp 97 5 °F (36 4 °C) (Temporal)   Resp 18   SpO2 99%   General: Alert, no apparent distress  Eyes:  No scleral icterus  ENT:  Mucous membranes moist  Card:  Regular rhythm  Lungs: Clear to ascultation b/l  No wheezes, rales, rhonchi  Abdomen:  Soft  Nontender    Bowel sounds normoactive  Skin:  No jaundice  Extremities:  No edema  Neuro: Alert     Laboratory Studies  Lab Results   Component Value Date    CREATININE 2 62 (H) 03/09/2021    BUN 39 (H) 03/09/2021    SODIUM 143 03/09/2021    K 3 7 03/09/2021     (H) 03/09/2021    CO2 20 (L) 03/09/2021    CALCIUM 7 9 (L) 03/09/2021    ALKPHOS 35 (L) 03/07/2021    ALB 2 6 (L) 03/07/2021    TBILI 0 90 03/07/2021    AST 55 (H) 03/07/2021    ALT 20 03/07/2021     Lab Results   Component Value Date    WBC 3 14 (L) 03/09/2021    HGB 9 2 (L) 03/09/2021    HCT 28 3 (L) 03/09/2021    PLT 78 (L) 03/09/2021    MCV 93 03/09/2021     Lab Results   Component Value Date    PROTIME 18 3 (H) 03/09/2021    INR 1 55 (H) 03/09/2021     Inhouse Medications       Current Facility-Administered Medications:     acetaminophen (TYLENOL) tablet 650 mg, 650 mg, Oral, Q6H PRN    aluminum-magnesium hydroxide-simethicone (MYLANTA) oral suspension 30 mL, 30 mL, Oral, Q4H PRN, 30 mL at 03/08/21 2043    atorvastatin (LIPITOR) tablet 40 mg, 40 mg, Oral, Daily With Dinner, 40 mg at 03/08/21 1831    bisacodyl (DULCOLAX) rectal suppository 10 mg, 10 mg, Rectal, Daily PRN    calcium carbonate (TUMS) chewable tablet 1,000 mg, 1,000 mg, Oral, Daily PRN, 1,000 mg at 03/08/21 1522    cefTRIAXone (ROCEPHIN) 2,000 mg in dextrose 5 % 50 mL IVPB, 2,000 mg, Intravenous, Q24H, 2,000 mg at 03/08/21 2046    insulin glargine (LANTUS) subcutaneous injection 5 Units 0 05 mL, 5 Units, Subcutaneous, HS, 5 Units at 03/08/21 2149    insulin lispro (HumaLOG) 100 units/mL subcutaneous injection 1-5 Units, 1-5 Units, Subcutaneous, Q6H [address] **AND** Fingerstick Glucose (POCT), , , Q6H    ondansetron (ZOFRAN) injection 4 mg, 4 mg, Intravenous, Q6H PRN    pantoprazole (PROTONIX) injection 40 mg, 40 mg, Intravenous, Q12H, 40 mg at 03/09/21 0624      Assessment/Plan:  1  Anemia with melenic stool  EGD 3/8 shows multiple grade 2 varices with red helena sign, portal hypertensive gastropathy and 3 superficial duodenal ulcers with a clean base  Banded x3  Stools brown today but hemoglobin slightly lower than yesterday  Continue pantoprazole  Will need repeat EGD with banding in 1 month  2  Cryptogenic cirrhosis  TORRES is quoted in [facility] records 2017      Principal Problem:    GI bleed  Active Problems:    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HCC)    CAD (coronary artery disease)    Type 2 diabetes mellitus with stage 3 chronic kidney disease, with long-term current use of insulin (HCC)    Cirrhosis ([facility] [address] )    Acute kidney injury superimposed on chronic kidney disease (HCC)    Kellie Moeller PA-C

## 2021-03-09 NOTE — OCCUPATIONAL THERAPY NOTE
Occupational Therapy Evaluation     Patient Name: Sade Camejo  ERFBJ'T Date: 3/9/2021  Problem List  Principal Problem:    GI bleed  Active Problems:    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[facility] )    CAD (coronary artery disease)    Type 2 diabetes mellitus with stage 3 chronic kidney disease, with long-term current use of insulin (HCC)    Cirrhosis ([facility] )    Acute kidney injury superimposed on chronic kidney disease ([facility] )    Past Medical History  Past Medical History:   Diagnosis Date    Cirrhosis ([place] [address] )     COPD (chronic obstructive pulmonary disease) ([facility] )     Coronary artery disease     Dementia ([facility] )     Diabetes mellitus ([place] [address] )     Diverticulosis     Gastric reflux     Hyperlipemia     Panlobular emphysema (HCC)     Pituitary mass (HCC)     Renal disorder     CKD Stage 3 & Bilat multi renal nodules    Thrombocyte disorder (HCC)     thrombocytopenia    Unstable angina (HCC)      Past Surgical History  Past Surgical History:   Procedure Laterality Date    CARDIAC SURGERY      unspecified    CHOLECYSTECTOMY      CORONARY ARTERY BYPASS GRAFT      2-3 years ago    HERNIA REPAIR      IR BIOPSY BONE MARROW  5/12/2020 03/09/21 1116   OT Last Visit   OT Visit Date 03/09/21   Note Type   Note type Evaluation   Restrictions/Precautions   Weight Bearing Precautions Per Order No   Other Precautions Cognitive; Chair Alarm; Bed Alarm;Multiple lines; Fall Risk;Hard of hearing   Pain Assessment   Pain Assessment Tool Pain Assessment not indicated - pt denies pain   Pain Score No Pain   Home Living   Type of Home Assisted living  [facility])   Home Layout One level;Ramped entrance;Elevator  (0 CLARI)   Bathroom Shower/Tub Walk-in shower   Bathroom Toilet Raised   Bathroom Equipment Grab bars in shower; Shower chair;Grab bars around toilet   Bathroom Accessibility Accessible via wheelchair   [facility] bed; Other (Comment)  (Rollator)   Additional Comments Pt lives at [facility]     Prior Function   Level of [place] Needs assistance with ADLs and functional mobility; Needs assistance with IADLs   Lives With Facility staff   Receives Help From Personal care attendant   ADL Assistance Needs assistance  (I w/ dressing and toileting; Assist w/ showers)   IADLs Needs assistance   Falls in the last 6 months 0   Vocational Retired   Comments At baseline, pt required assist w/ ADLs and IADLs, reports Mod I for functional mobility/transfers w/ use of RW, (-) , and denies falls PTA  Lifestyle   Autonomy At baseline, pt required assist w/ ADLs and IADLs, reports Mod I for functional mobility/transfers w/ use of RW, (-) , and denies falls PTA  Reciprocal Relationships Facility staff   Service to Others Retired- Worked in a [address] [employer] [employer] Watching TV   Psychosocial   Psychosocial (WDL) WDL   ADL   Where Assessed Edge of bed   Eating Assistance 5  Martin Urbano 0031 5  Nichelle De La [address] 5  Supervision/Setup   Bed Mobility   Supine to Sit 5  Supervision   Additional items HOB elevated; Bedrails; Increased time required   Sit to Supine Unable to assess   Additional Comments Pt seated EOB with PT present at end of session  Call bell and phone within reach  All needs met and pt reports no further questions for OT at this time   Transfers   Sit to Stand 5  Supervision   Additional items Bedrails; Increased time required;Verbal cues   Stand to Sit 5  Supervision   Additional items Increased time required;Verbal cues   Additional Comments Cues for safe technique and hand placement with poor carryover demonstrated   Functional Mobility   Functional Mobility 5  Supervision   Additional Comments Assist x1 Additional items Rolling walker   Balance   Static Sitting Fair +   Dynamic Sitting Fair   Static Standing Fair   Dynamic Standing Fair -   Ambulatory Fair -   Activity Tolerance   Activity Tolerance Patient tolerated treatment well;Patient limited by fatigue   Medical Staff Made DOUGLAS Blackburn   Nurse Made Aware yes; KELECHI Forbes   RUE Assessment   RUE Assessment WFL   RUE Strength   RUE Overall Strength Within Functional Limits - able to perform ADL tasks with strength  (4-/5 throughout)   LUE Assessment   LUE Assessment WFL   LUE Strength   LUE Overall Strength Within Functional Limits - able to perform ADL tasks with strength  (4-/5 throughout)   Hand Function   Gross Motor Coordination Functional   Fine Motor Coordination Functional   Sensation   Light Touch No apparent deficits   Proprioception   Proprioception No apparent deficits   Vision-Basic Assessment   Current Vision Wears glasses only for reading   Vision - Complex Assessment   Ocular Range of Motion [facility]   Acuity Able to read clock/calendar on wall without difficulty; Able to read employee name badge without difficulty   Perception   Inattention/Neglect Appears intact   Cognition   Overall Cognitive Status Impaired   Arousal/Participation Alert   Attention Attends with cues to redirect   Orientation Level Oriented to person;Oriented to place;Oriented to time  (general to time, "March 2021")   Memory Decreased recall of precautions;Decreased recall of recent events   Following Commands Follows one step commands with increased time or repetition   Comments Limited insight into deficits, cues for redirection   Assessment   Limitation Decreased ADL status; Decreased UE strength;Decreased Safe judgement during ADL;Decreased endurance;Decreased high-level ADLs; Decreased self-care trans   Prognosis Fair   Assessment Pt is a 77 y o  male seen for OT evaluation s/p adm to [facility] on 3/8/2021 as a transfer from [address] with melena and hematochezia x1 for GI workout and possible EGD  Pt admitted w/ GI bleed, BEN superimposed on CKD, and Cirrhosis  Comorbidities affecting pts functional performance include a significant PMH of Cirrhosis, COPD, CAD, DM, Dementia, Diverticulosis, HLD, Panlobular emphysema, Renal disorder, thrombocytopenia, and Unstable angina  Pt with active OT orders and activity orders for Ambulate  Pt lives at [facility]  At baseline, pt required assist w/ ADLs and IADLs, reports Mod I for functional mobility/transfers w/ use of RW, (-) , and denies falls PTA  Upon evaluation, pt currently requires Supervision for UB ADLs, Min A for LB ADLs, Min A for toileting, Supervision for bed mobility, and Supervision for functional mobility/transfers 2* the following deficits impacting occupational performance: weakness, decreased strength, decreased balance, decreased tolerance, impaired memory and decreased safety awareness  These impairments, as well at pts difficulty performing ADLS and limited insight into deficits limit pts ability to safely engage in all baseline areas of occupation  Based on the aforementioned OT evaluation, functional performance deficits, and assessments, pt has been identified as a Moderate complexity evaluation  Pt to continue to benefit from continued acute OT services during hospital stay to address defined deficits and to maximize level of functional independence in the following Occupational Performance areas: grooming, bathing/shower, toilet hygiene, dressing, medication management, health maintenance, functional mobility, community mobility, clothing management and social participation  The patient's raw score on the AM-PAC Daily Activity inpatient short form is 19, standardized score is 40 22, greater than 39 4  Patients at this level are likely to benefit from discharge to home  Please refer to the recommendation of the Occupational Therapist for safe discharge planning   From OT standpoint, recommend return to shelter w/ continued OT upon D/C  OT will continue to follow pt 3-5x/wk to address the following goals to  w/in 10-14 days:   Goals   Patient Goals To go home tomorrow   LTG Time Frame 10-14   Long Term Goal Please refer to LTGs listed below   Plan   Treatment Interventions ADL retraining;Functional transfer training;UE strengthening/ROM; Endurance training;Patient/family training;Equipment evaluation/education; Compensatory technique education; Energy conservation; Activityengagement   Goal Expiration Date 21   OT Treatment Day 0   OT Frequency 3-5x/wk   Recommendation   OT Discharge Recommendation Home with skilled therapy  (return to shelter w/ continued OT)   OT - OK to Discharge Yes  (when medically cleared)   AM-PAC Daily Activity Inpatient   Lower Body Dressing 3   Bathing 3   Toileting 3   Upper Body Dressing 3   Grooming 3   Eating 4   Daily Activity Raw Score 19   Daily Activity Standardized Score (Calc for Raw Score >=11) 40 22   AM-PAC Applied Cognition Inpatient   Following a Speech/Presentation 3   Understanding Ordinary Conversation 3   Taking Medications 3   Remembering Where Things Are Placed or Put Away 3   Remembering List of 4-5 Errands 2   Taking Care of Complicated Tasks 2   Applied Cognition Raw Score 16   Applied Cognition Standardized Score 35 03       GOALS    1  Pt will improve activity tolerance to G for min 30 min txment sessions for increase engagement in functional tasks    2  Pt will complete bed mobility at a Mod I level w/ G balance/safety demonstrated to decrease caregiver assistance required     3  Pt will complete UB dressing/self care w/ mod I using adaptive device and DME as needed     4  Pt will complete LB dressing/self care w/ Supervision using adaptive device and DME as needed    5  Pt will complete toileting w/ mod I w/ G hygiene/thoroughness using DME as needed    6   Pt will improve functional transfers to Mod I on/off all surfaces using DME as needed w/ G balance/safety     7  Pt will improve functional mobility during ADL/IADL/leisure tasks to Mod I using DME as needed w/ G balance/safety     8  Pt will be attentive 100% of the time during ongoing cognitive assessment w/ G participation to assist w/ safe d/c planning/recommendations    9  Pt will demonstrate G carryover of pt/caregiver education and training as appropriate w/o cues w/ good tolerance to increase safety during functional tasks    10  Pt will increase BUE strength by 1MM grade via AROM exercises to increase independence in ADLs and transfers    11  Pt will verbalize 3 potential fall hazards and identify appropriate compensatory techniques to decrease fall risk in home environment     12   Pt will increase standing tolerance to 8-10 mins with Fair+ dynamic standing balance to increase safety during participation in ADLs       Raciel Montemayor, OTR/L

## 2021-03-09 NOTE — PROGRESS NOTES
[facility]  Progress Note - Aydee Anne 1945, 77 y o  male MRN: [id number]  Unit/Bed#: E5 -01 Encounter: [id number]  Primary Care Provider: Lei Whatley MD   Date and time admitted to hospital: 3/8/2021 12:06 AM    * GI bleed  Assessment & Plan  Pt presents with large bloody BM x 1  7 days of non-bloody diarrheal illness preceding bloody BM    · hemoglobin 10 9 status post 2 units PRBCs on 03/08/2021 9  · CT abdomen (3/7/21) - Moderate circumferential thickening of the proximal duodenum in keeping with a nonspecific duodenitis  Ulcer disease is part of the differential diagnosis  · Status post EGD on 03/08/2021 which revealed 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, 3 superficial ulcers in duodenum, portal hypertensive gastropathy   · GI follow-up appreciated, will monitor H&H, continue PPI        Acute kidney injury superimposed on chronic kidney disease [facility])  Assessment & Plan  Lab Results   Component Value Date    EGFR 23 03/09/2021    EGFR 28 03/08/2021    EGFR 22 03/07/2021    CREATININE 2 62 (H) 03/09/2021    CREATININE 2 21 (H) 03/08/2021    CREATININE 2 69 (H) 03/07/2021   Baseline creatinine 1 3 however was closer to 1 7 in 2020  Acute kidney injury likely secondary to anemia and GI bleed  Will monitor BMP closely, if renal function not improving will consider Nephrology evaluation    Cirrhosis [facility])  Assessment & Plan  Cirrhosis of unknown etiology patient did state use a regular user of alcohol when he was in the [employer] many years ago  · CT abdomen - Nodular liver concerning for underlying cirrhosis  · Abdominal U/S - Liver cirrhosis  No suspicious lesions identified    · GI input appreciated, check hepatitis-B and C, alpha fetoprotein  · Patient with significant thrombocytopenia in March 2020 at which point hematology was following and patient was started on IV steroids and received IVIG x2 platelets today 78, will closely monitor    Type 2 diabetes mellitus with stage 3 chronic kidney disease, with long-term current use of insulin [facility])  Assessment & Plan  No results found for: HGBA1C    Recent Labs     03/08/21  1633 03/09/21  0007 03/09/21  0549 03/09/21  1152   POCGLU 110 94 72 78       Blood Sugar Average: Last 72 hrs: Will continue Lantus 5mg  Accuchecks q6h and sliding scale coverage      CAD (coronary artery disease)  Assessment & Plan  Not on antiplatelet therapy, not on beta blocker therapy  Continue atorvastatin      Pituitary macroadenoma [facility])  Assessment & Plan  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 on 3/23/20  Redemonstrated on MRI 6/24/20 - 3 4 cm relatively homogeneous mass emanating from the pituitary gland consistent with microadenoma  Extension into the left cavernous sinus, sphenoid sinus, with elevation of contents of the suprasellar cyst   Diameter of the cavernous carotid arteries   do not appear significantly affected  Was following with [facility] Neurosurgery, who had ordered pituitary function labs and recommended outpatient follow-up with ophthalmology and follow-up MRI  However, labs were never obtained and patient appeared to have been lost to follow-up  Patient states he was told it was "benign", also states he has seen an ophtholmologist, although no records in [facility]  Neurosurgery had stated patient is not a surgical candidate          VTE Pharmacologic Prophylaxis:   Pharmacologic:  Contraindicated due to GI bleed    Patient Centered Rounds: I have performed bedside rounds with nursing staff today  Discussions with Specialists or Other Care Team Provider:  ELMO, Dr Leesa Austin    Education and Discussions with Family / Patient: Updated Eusebio Garg    Time Spent for Care: 20 minutes  More than 50% of total time spent on counseling and coordination of care as described above      Current Length of Stay: 1 day(s)    Current Patient Status: Inpatient   Certification Statement: The patient will continue to require additional inpatient hospital stay due to GI bleed    Discharge Plan / Estimated Discharge Date: 24-48H    Code Status: Level 1 - Full Code      Subjective:   Patient seen and examined at bedside, denies any abdominal pain, nausea vomiting  Did have bowel movement today which was brown in color    Objective:     Vitals:   Temp (24hrs), Av 5 °F (36 4 °C), Min:97 °F (36 1 °C), Max:98 3 °F (36 8 °C)    Temp:  [97 °F (36 1 °C)-98 3 °F (36 8 °C)] 97 5 °F (36 4 °C)  HR:  [63-75] 63  Resp:  [17-18] 18  BP: (131-142)/(62-65) 142/65  SpO2:  [97 %-99 %] 99 %  There is no height or weight on file to calculate BMI  Input and Output Summary (last 24 hours): Intake/Output Summary (Last 24 hours) at 3/9/2021 1459  Last data filed at 3/9/2021 0532  Gross per 24 hour   Intake 2391 67 ml   Output --   Net 2391 67 ml       Physical Exam:    Constitutional: Patient is oriented to person, place and time, no acute distress  HEENT:  Normocephalic, atraumatic  Cardiovascular: Normal S1S2, RRR, No murmurs/rubs/gallops appreciated  Pulmonary:  Bilateral air entry, No rhonchi/rales/wheezing appreciated  Abdominal: Soft, Bowel sounds present, Non-tender, Non-distended  Extremities:  No cyanosis, clubbing or edema  Neurological: Cranial nerves II-XII grossly intact, sensation intact, otherwise no focal neurological symptoms     Skin:  Warm, dry    Additional Data:     Labs:    Results from last 7 days   Lab Units 21  1220 21  0703   WBC Thousand/uL  --  3 14*   HEMOGLOBIN g/dL 10 9* 9 2*   HEMATOCRIT %  --  28 3*   PLATELETS Thousands/uL  --  78*   NEUTROS PCT %  --  48   LYMPHS PCT %  --  36   MONOS PCT %  --  9   EOS PCT %  --  5     Results from last 7 days   Lab Units 21  0703  21  1906   POTASSIUM mmol/L 3 7   < > 4 5   CHLORIDE mmol/L 114*   < > 111*   CO2 mmol/L 20*   < > 17*   BUN mg/dL 39*   < > 40*   CREATININE mg/dL 2 62*   < > 2 69*   CALCIUM mg/dL 7 9*   < > 8 6   ALK PHOS U/L  --   --  35*   ALT U/L  --   --  20   AST U/L  --   --  55*    < > = values in this interval not displayed  Results from last 7 days   Lab Units 03/09/21  0703   INR  1 55*        I Have Reviewed All Lab Data Listed Above          Recent Cultures (last 7 days):     Results from last 7 days   Lab Units 03/08/21  0614   C DIFF TOXIN B BY PCR  Negative       Last 24 Hours Medication List:   Current Facility-Administered Medications   Medication Dose Route Frequency Provider Last Rate    acetaminophen  650 mg Oral Q6H PRN Harman Closs, PA-C      aluminum-magnesium hydroxide-simethicone  30 mL Oral Q4H PRN Louise Eaton PA-C      atorvastatin  40 mg Oral Daily With [employer], [place]      bisacodyl  10 mg Rectal Daily PRN Harman Closs, PA-C      calcium carbonate  1,000 mg Oral Daily PRN Harman Closs, PA-C      cefTRIAXone  2,000 mg Intravenous Q24H Harman Closs, PA-C 2,000 mg (03/08/21 2046)    insulin glargine  5 Units Subcutaneous HS Harman Closs, PA-C      insulin lispro  1-5 Units Subcutaneous Q6H [facility], [place]      ondansetron  4 mg Intravenous Q6H PRN Harman Closs, PA-C      pantoprazole  40 mg Intravenous Q12H Harman Closs, PA-C          Today, Patient Was Seen By: Allen Harden MD

## 2021-03-09 NOTE — UTILIZATION REVIEW
Notification of Inpatient Admission/Inpatient Authorization Request   This is a Notification of Inpatient Admission for [facility]  Be advised that this patient was admitted to our facility under Inpatient Status  Contact Luz Morris at [phone] for additional admission information  Deneise Notice UR DEPT  DEDICATED -[phone]  Patient Name:   Debbie Santana   YOB: 1945       [address]   [address]:   [facility]  Tax ID: [id number]  NPI: [id number] Attending Provider/NPI:  Phone:  Address: Khushbu Harman [[id number]]  [phone]  Same as the facility   Place of Service Code: 24 Place of Service Name:  [address] [place] [address]   Start Date: 3/8/21 0006 Discharge Date & Time: No discharge date for patient encounter  Type of Admission: Inpatient Status Discharge Disposition   (if discharged): [address] [place] [address]   Patient Diagnoses: GI bleed [K92 2]     Orders: Admission Orders (From admission, onward)     Ordered        03/08/21 0122  INPATIENT ADMISSION  Once                    Assigned Utilization Review Contact: Casa Irwin  Utilization   Network Utilization Review Department  Phone: [phone]; Fax [phone]  Email: [email or web address]   ATTENTION PAYERS: Please call the assigned Utilization  directly with any questions or concerns ALL voicemails in the department are confidential  Send all requests for admission clinical reviews, approved or denied determinations and any other requests to dedicated fax number belonging to the campus where the patient is receiving treatment

## 2021-03-09 NOTE — PLAN OF CARE
Problem: Potential for Falls  Goal: Patient will remain free of falls  Description: INTERVENTIONS:  - Assess patient frequently for physical needs  -  Identify cognitive and physical deficits and behaviors that affect risk of falls    -  [place] fall precautions as indicated by assessment   - Educate patient/family on patient safety including physical limitations  - Instruct patient to call for assistance with activity based on assessment  - Modify environment to reduce risk of injury  - Consider OT/PT consult to assist with strengthening/mobility  Outcome: Progressing     Problem: Prexisting or High Potential for Compromised Skin Integrity  Goal: Skin integrity is maintained or improved  Description: INTERVENTIONS:  - Identify patients at risk for skin breakdown  - Assess and monitor skin integrity  - Assess and monitor nutrition and hydration status  - Monitor labs   - Assess for incontinence   - Turn and reposition patient  - Assist with mobility/ambulation  - Relieve pressure over bony prominences  - Avoid friction and shearing  - Provide appropriate hygiene as needed including keeping skin clean and dry  - Evaluate need for skin moisturizer/barrier cream  - Collaborate with interdisciplinary team   - Patient/family teaching  - Consider wound care consult   Outcome: Progressing     Problem: PAIN - ADULT  Goal: Verbalizes/displays adequate comfort level or baseline comfort level  Description: Interventions:  - Encourage patient to monitor pain and request assistance  - Assess pain using appropriate pain scale  - Administer analgesics based on type and severity of pain and evaluate response  - Implement non-pharmacological measures as appropriate and evaluate response  - Consider cultural and social influences on pain and pain management  - Notify physician/advanced practitioner if interventions unsuccessful or patient reports new pain  Outcome: Progressing     Problem: INFECTION - ADULT  Goal: Absence or prevention of progression during hospitalization  Description: INTERVENTIONS:  - Assess and monitor for signs and symptoms of infection  - Monitor lab/diagnostic results  - Monitor all insertion sites, i e  indwelling lines, tubes, and drains  - Monitor endotracheal if appropriate and nasal secretions for changes in amount and color  - [place] appropriate cooling/warming therapies per order  - Administer medications as ordered  - Instruct and encourage patient and family to use good hand hygiene technique  - Identify and instruct in appropriate isolation precautions for identified infection/condition  Outcome: Progressing  Goal: Absence of fever/infection during neutropenic period  Description: INTERVENTIONS:  - Monitor WBC    Outcome: Progressing     Problem: SAFETY ADULT  Goal: Patient will remain free of falls  Description: INTERVENTIONS:  - Assess patient frequently for physical needs  -  Identify cognitive and physical deficits and behaviors that affect risk of falls    -  [place] fall precautions as indicated by assessment   - Educate patient/family on patient safety including physical limitations  - Instruct patient to call for assistance with activity based on assessment  - Modify environment to reduce risk of injury  - Consider OT/PT consult to assist with strengthening/mobility  Outcome: Progressing  Goal: Maintain or return to baseline ADL function  Description: INTERVENTIONS:  -  Assess patient's ability to carry out ADLs; assess patient's baseline for ADL function and identify physical deficits which impact ability to perform ADLs (bathing, care of mouth/teeth, toileting, grooming, dressing, etc )  - Assess/evaluate cause of self-care deficits   - Assess range of motion  - Assess patient's mobility; develop plan if impaired  - Assess patient's need for assistive devices and provide as appropriate  - Encourage maximum independence but intervene and supervise when necessary  - Involve family in performance of ADLs  - Assess for home care needs following discharge   - Consider OT consult to assist with ADL evaluation and planning for discharge  - Provide patient education as appropriate  Outcome: Progressing  Goal: Maintain or return mobility status to optimal level  Description: INTERVENTIONS:  - Assess patient's baseline mobility status (ambulation, transfers, stairs, etc )    - Identify cognitive and physical deficits and behaviors that affect mobility  - Identify mobility aids required to assist with transfers and/or ambulation (gait belt, sit-to-stand, lift, walker, cane, etc )  - [place] fall precautions as indicated by assessment  - Record patient progress and toleration of activity level on Mobility SBAR; progress patient to next Phase/Stage  - Instruct patient to call for assistance with activity based on assessment  - Consider rehabilitation consult to assist with strengthening/weightbearing, etc   Outcome: Progressing     Problem: DISCHARGE PLANNING  Goal: Discharge to home or other facility with appropriate resources  Description: INTERVENTIONS:  - Identify barriers to discharge w/patient and caregiver  - Arrange for needed discharge resources and transportation as appropriate  - Identify discharge learning needs (meds, wound care, etc )  - Arrange for interpretive services to assist at discharge as needed  - Refer to Case Management Department for coordinating discharge planning if the patient needs post-hospital services based on physician/advanced practitioner order or complex needs related to functional status, cognitive ability, or social support system  Outcome: Progressing     Problem: Knowledge Deficit  Goal: Patient/family/caregiver demonstrates understanding of disease process, treatment plan, medications, and discharge instructions  Description: Complete learning assessment and assess knowledge base    Interventions:  - Provide teaching at level of understanding  - Provide teaching via preferred learning methods  Outcome: Progressing

## 2021-03-09 NOTE — PLAN OF CARE
Problem: OCCUPATIONAL THERAPY ADULT  Goal: Performs self-care activities at highest level of function for planned discharge setting  See evaluation for individualized goals  Description: Treatment Interventions: ADL retraining, Functional transfer training, UE strengthening/ROM, Endurance training, Patient/family training, Equipment evaluation/education, Compensatory technique education, Energy conservation, Activityengagement          See flowsheet documentation for full assessment, interventions and recommendations  Note: Limitation: Decreased ADL status, Decreased UE strength, Decreased Safe judgement during ADL, Decreased endurance, Decreased high-level ADLs, Decreased self-care trans  Prognosis: Fair  Assessment: Pt is a 77 y o  male seen for OT evaluation s/p adm to [facility] on 3/8/2021 as a transfer from [facility] with melena and hematochezia x1 for GI workout and possible EGD  Pt admitted w/ GI bleed, BEN superimposed on CKD, and Cirrhosis  Comorbidities affecting pts functional performance include a significant PMH of Cirrhosis, COPD, CAD, DM, Dementia, Diverticulosis, HLD, Panlobular emphysema, Renal disorder, thrombocytopenia, and Unstable angina  Pt with active OT orders and activity orders for Ambulate  Pt [facility] at [facility]  At baseline, pt required assist w/ ADLs and IADLs, reports Mod I for functional mobility/transfers w/ use of RW, (-) , and denies falls PTA  Upon evaluation, pt currently requires Supervision for UB ADLs, Min A for LB ADLs, Min A for toileting, Supervision for bed mobility, and Supervision for functional mobility/transfers 2* the following deficits impacting occupational performance: weakness, decreased strength, decreased balance, decreased tolerance, impaired memory and decreased safety awareness   These impairments, as well at pts difficulty performing ADLS and limited insight into deficits limit pts ability to safely engage in all baseline areas of occupation  Based on the aforementioned OT evaluation, functional performance deficits, and assessments, pt has been identified as a Moderate complexity evaluation  Pt to continue to benefit from continued acute OT services during hospital stay to address defined deficits and to maximize level of functional independence in the following Occupational Performance areas: grooming, bathing/shower, toilet hygiene, dressing, medication management, health maintenance, functional mobility, community mobility, clothing management and social participation  The patient's raw score on the AM-PAC Daily Activity inpatient short form is 19, standardized score is 40 22, greater than 39 4  Patients at this level are likely to benefit from discharge to home  Please refer to the recommendation of the Occupational Therapist for safe discharge planning  From OT standpoint, recommend return to [facility] w/ continued OT upon D/C   OT will continue to follow pt 3-5x/wk to address the following goals to  w/in 10-14 days:     OT Discharge Recommendation: Home with skilled therapy(return to [facility] w/ continued OT)  OT - OK to Discharge: Yes(when medically cleared)

## 2021-03-09 NOTE — UTILIZATION REVIEW
Initial Clinical Review    Transfer from [facility]  ED    Admission: Date/Time/Statement:   Admission Orders (From admission, onward)     Ordered        03/08/21 0122  INPATIENT ADMISSION  Once                   Orders Placed This Encounter   Procedures    INPATIENT ADMISSION     Standing Status:   Standing     Number of Occurrences:   1     Order Specific Question:   Level of Care     Answer:   Med Surg [16]     Order Specific Question:   Estimated length of stay     Answer:   More than 2 Midnights     Order Specific Question:   Certification     Answer:   I certify that inpatient services are medically necessary for this patient for a duration of greater than two midnights  See H&P and MD Progress Notes for additional information about the patient's course of treatment  Assessment/Plan:    77  Y O  Male presents to ED   At  [facility] from [facility]    With diarrhea for past week, 5-6  Bowel movements daily  The evening of admission  Patient had a  Large bloody bowel movement with melena and  Hematochezia, noted by staff with increased weakness , nausea and fatigue  Denies shortness of breath, abdominal pain  Or fevers  Labs reveal hemoglobin of  7 9, baseline  Ct scan shows duodenitis and possible  PUD  PMH  Is  Large GIB  ( 3/20), required blood transfusion/vasopressors,  ITP, anemia,  DM2, CAD, CKD  And  Cirrhosis  Transferred to [facility] for further care  Admit  Ip with  GIB, BEN and plan is GI consult, NPO, monitor labs,  Transfuse  1 U PRBC,   Stool c/diff,  IV rocephin, PPI, Vitamin K and gentle  IVF  GI consult  ( 3/8)    Patient presents  With  GIB, mild  Drop in platelet count, not nearly as significant as  Previously  Imaging  Suggests duodenal process  Resuscitate with blood products and  Monitor labs  Plan  Upper endoscopy and  Further  Plans  Depending on  Finding       EGD  ( 3/8)     shows multiple grade 2 varices with red helena sign, portal hypertensive gastropathy and 3 superficial duodenal ulcers with a clean base  Banded x3       3/9      Stools  Brown today,  Watery,  hemoglobin slightly lower  Continue protonix  Continue  Current treatment plan  Additional Vital Signs:   03/08/21 2300  98 3 °F (36 8 °C)  69  18  131/62  --  97 %  --  --  Lying   03/08/21 1548  97 3 °F (36 3 °C)Abnormal   75  17  140/63  --  --  --  --  --   03/08/21 1510  97 °F (36 1 °C)Abnormal   70  18  132/62  --  99 %  --  --  Lying   03/08/21 1438  --  71  18  103/55  --  100 %  --  None (Room air)  --   03/08/21 1423  --  72  14  84/47Abnormal   --  100 %  4 L/min  Simple mask  --   03/08/21 1350  98 °F (36 7 °C)  71  20  140/63  --  100 %  --  None (Room air)  --   03/08/21 1346  98 °F (36 7 °C)  72  18  140/63  --  98 %  --  --  --   03/08/21 1328  96 2 °F (35 7 °C)Abnormal   71  18  125/59  --  --  --  --  --   03/08/21 1249  --  70  16  126/59  --  98 %  --  None (Room air)  Lying   03/08/21 1104  98 1 °F (36 7 °C)  74  16  116/53  --  100 %  --  --  --   03/08/21 1043  97 6 °F (36 4 °C)  78  18  135/58  --  --  --  --  --   03/08/21 0734  97 2 °F (36 2 °C)Abnormal   76  19  114/65  --  98 %  --  None (Room air)  Lying   03/08/21 0045  97 9 °F (36 6 °C)  80  20  119/68  --  100 %  --  None (Room air)           Pertinent Labs/Diagnostic Test Results:   Ct  Abd/eplvis  ( 3/7)    Moderate circumferential thickening of the proximal duodenum in keeping with a nonspecific duodenitis  Ulcer disease is part of the differential diagnosis     Results from last 7 days   Lab Units 03/07/21 2058   SARS-COV-2  Negative     Results from last 7 days   Lab Units 03/09/21  0703 03/09/21  0037 03/08/21  1813 03/08/21  0526 03/07/21  1906   WBC Thousand/uL 3 14*  --   --  3 94* 9 13   HEMOGLOBIN g/dL 9 2* 9 4* 10 4* 5 7* 7 9*   HEMATOCRIT % 28 3*  --   --  18 3* 25 5*   PLATELETS Thousands/uL 78*  --   --  92* 208   NEUTROS ABS Thousands/µL 1 53*  --   --   --  4 72         Results from last 7 days   Lab Units 03/09/21  0703 03/08/21  0526 03/07/21  1906   SODIUM mmol/L 143 143 141   POTASSIUM mmol/L 3 7 4 4 4 5   CHLORIDE mmol/L 114* 114* 111*   CO2 mmol/L 20* 16* 17*   ANION GAP mmol/L 9 13 13   BUN mg/dL 39* 37* 40*   CREATININE mg/dL 2 62* 2 21* 2 69*   EGFR ml/min/1 73sq m 23 28 22   CALCIUM mg/dL 7 9* 8 0* 8 6   MAGNESIUM mg/dL  --   --  1 4*     Results from last 7 days   Lab Units 03/07/21  1906   AST U/L 55*   ALT U/L 20   ALK PHOS U/L 35*   TOTAL PROTEIN g/dL 5 7*   ALBUMIN g/dL 2 6*   TOTAL BILIRUBIN mg/dL 0 90     Results from last 7 days   Lab Units 03/09/21  0549 03/09/21  0007 03/08/21  1633 03/08/21  1204 03/08/21  0511 03/08/21  0209   POC GLUCOSE mg/dl 72 94 110 86 86 92     Results from last 7 days   Lab Units 03/09/21  0703 03/08/21  0526 03/07/21  1906   GLUCOSE RANDOM mg/dL 79 81 117           Results from last 7 days   Lab Units 03/07/21  1906   TROPONIN I ng/mL 0 02         Results from last 7 days   Lab Units 03/09/21  0703 03/08/21  1104 03/07/21  1906   PROTIME seconds 18 3* 23 1* 21 3*   INR  1 55* 2 10* 1 89*             Results from last 7 days   Lab Units 03/07/21  1906   LACTIC ACID mmol/L 2 0                 Results from last 7 days   Lab Units 03/08/21  0148   FERRITIN ng/mL 56         Results from last 7 days   Lab Units 03/07/21  1906   LIPASE u/L 29*                 Results from last 7 days   Lab Units 03/07/21  2058   INFLUENZA A PCR  Negative   INFLUENZA B PCR  Negative   RSV PCR  Negative                 Results from last 7 days   Lab Units 03/08/21  0614   C DIFF TOXIN B BY PCR  Negative                 Results from last 7 days   Lab Units 03/08/21  0526   TOTAL COUNTED  100         Present on Admission:   CAD (coronary artery disease)   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HCC)      Admitting Diagnosis: GI bleed [K92 2]  Age/Sex: 77 y o  male  Admission Orders:  Scheduled Medications:  atorvastatin, 40 mg, Oral, Daily With Dinner  cefTRIAXone, 2,000 mg, Intravenous, Q24H  insulin glargine, 5 Units, Subcutaneous, HS  insulin lispro, 1-5 Units, Subcutaneous, Q6H BELLE  pantoprazole, 40 mg, Intravenous, Q12H      Continuous IV Infusions:     PRN Meds:  acetaminophen, 650 mg, Oral, Q6H PRN  aluminum-magnesium hydroxide-simethicone, 30 mL, Oral, Q4H PRN  bisacodyl, 10 mg, Rectal, Daily PRN  calcium carbonate, 1,000 mg, Oral, Daily PRN  ondansetron, 4 mg, Intravenous, Q6H PRN        IP CONSULT TO GASTROENTEROLOGY    Network Utilization Review Department  ATTENTION: Please call with any questions or concerns to [phone] and carefully listen to the prompts so that you are directed to the right person  All voicemails are confidential   Bere Gillis all requests for admission clinical reviews, approved or denied determinations and any other requests to dedicated fax number below belonging to the campus where the patient is receiving treatment   List of dedicated fax numbers for the Facilities:  [address] DENIALS (Administrative/Medical Necessity) [phone]   [address] (Maternity/NICU/Pediatrics) [phone]   [address] [place] [address] Dr Stanislav Cagle [address] (Joyce Santamaria "Deirdre" 103) [address] [place] [address] Nichelle Alma Rosa [address] [place]) [facility] [place] [address] [phone]

## 2021-03-09 NOTE — CASE MANAGEMENT
CM called Pedro Willard  [phone]- Nurse from unit that pt resides is unavailable   Per  Cesar Tsai, facility is a SNF - referral through [employer] sent to check bed hold status  Will continue to follow  Addendum from 1200: Message received from Morgan ayala at [address]  Pt is [employer] [facility] and will require auth on DC - Pedro Willard to obtain

## 2021-03-09 NOTE — PLAN OF CARE
Problem: PHYSICAL THERAPY ADULT  Goal: Performs mobility at highest level of function for planned discharge setting  See evaluation for individualized goals  Description: Treatment/Interventions: Functional transfer training, LE strengthening/ROM, Therapeutic exercise, Endurance training, Patient/family training, Equipment eval/education, Bed mobility, Gait training, Continued evaluation, Compensatory technique education, Spoke to nursing, OT          See flowsheet documentation for full assessment, interventions and recommendations  Outcome: Progressing  Note: Prognosis: Fair  Problem List: Decreased strength, Impaired balance, Decreased mobility, Impaired judgement, Decreased safety awareness, Impaired hearing  Assessment: Naila Goldsmith was seen for a f/u session  No complaints start of session but requires frequent redirection d/t perseveration and inattention  Currently ambulating community distances and performing all tasks at S level for safety d/t fall risk, decreased safety awareness and occ impulsivity (will leave RW behind for short distances)  Improved foot clearance and gait speeds during ambulation w cuing  Overall tolerated session well today and recommendation is return to [facility] when medically stable  End of session pt seated OOB, w cushion on chair for comfort and chair alarm engaged, all needs in reach  Barriers to Discharge: None     PT Discharge Recommendation: Return to previous environment with social support, Home with skilled therapy(PT at [facility] prn)     PT - OK to Discharge: Yes    See flowsheet documentation for full assessment

## 2021-03-09 NOTE — ASSESSMENT & PLAN NOTE
Lab Results   Component Value Date    EGFR 23 03/09/2021    EGFR 28 03/08/2021    EGFR 22 03/07/2021    CREATININE 2 62 (H) 03/09/2021    CREATININE 2 21 (H) 03/08/2021    CREATININE 2 69 (H) 03/07/2021   Baseline creatinine 1 3 however was closer to 1 7 in 2020  Acute kidney injury likely secondary to anemia and GI bleed  Will monitor BMP closely, if renal function not improving will consider Nephrology evaluation

## 2021-03-10 LAB
ALBUMIN SERPL BCP-MCNC: 2.1 G/DL (ref 3.5–5)
ANION GAP SERPL CALCULATED.3IONS-SCNC: 10 MMOL/L (ref 4–13)
BUN SERPL-MCNC: 39 MG/DL (ref 5–25)
CALCIUM SERPL-MCNC: 8.4 MG/DL (ref 8.3–10.1)
CHLORIDE SERPL-SCNC: 112 MMOL/L (ref 100–108)
CO2 SERPL-SCNC: 20 MMOL/L (ref 21–32)
CREAT SERPL-MCNC: 2.76 MG/DL (ref 0.6–1.3)
ERYTHROCYTE [DISTWIDTH] IN BLOOD BY AUTOMATED COUNT: 17.8 % (ref 11.6–15.1)
GFR SERPL CREATININE-BSD FRML MDRD: 22 ML/MIN/1.73SQ M
GLUCOSE SERPL-MCNC: 185 MG/DL (ref 65–140)
GLUCOSE SERPL-MCNC: 193 MG/DL (ref 65–140)
GLUCOSE SERPL-MCNC: 72 MG/DL (ref 65–140)
GLUCOSE SERPL-MCNC: 72 MG/DL (ref 65–140)
GLUCOSE SERPL-MCNC: 96 MG/DL (ref 65–140)
HCT VFR BLD AUTO: 27.3 % (ref 36.5–49.3)
HGB BLD-MCNC: 8.7 G/DL (ref 12–17)
MCH RBC QN AUTO: 29.9 PG (ref 26.8–34.3)
MCHC RBC AUTO-ENTMCNC: 31.9 G/DL (ref 31.4–37.4)
MCV RBC AUTO: 94 FL (ref 82–98)
NRBC BLD AUTO-RTO: 0 /100 WBCS
PLATELET # BLD AUTO: 74 THOUSANDS/UL (ref 149–390)
PMV BLD AUTO: 10.3 FL (ref 8.9–12.7)
POTASSIUM SERPL-SCNC: 3.7 MMOL/L (ref 3.5–5.3)
RBC # BLD AUTO: 2.91 MILLION/UL (ref 3.88–5.62)
SODIUM SERPL-SCNC: 142 MMOL/L (ref 136–145)
WBC # BLD AUTO: 2.93 THOUSAND/UL (ref 4.31–10.16)

## 2021-03-10 PROCEDURE — 99231 SBSQ HOSP IP/OBS SF/LOW 25: CPT | Performed by: PHYSICIAN ASSISTANT

## 2021-03-10 PROCEDURE — 99232 SBSQ HOSP IP/OBS MODERATE 35: CPT | Performed by: INTERNAL MEDICINE

## 2021-03-10 PROCEDURE — 97530 THERAPEUTIC ACTIVITIES: CPT

## 2021-03-10 PROCEDURE — 97110 THERAPEUTIC EXERCISES: CPT

## 2021-03-10 PROCEDURE — 99223 1ST HOSP IP/OBS HIGH 75: CPT | Performed by: INTERNAL MEDICINE

## 2021-03-10 PROCEDURE — 80048 BASIC METABOLIC PNL TOTAL CA: CPT | Performed by: INTERNAL MEDICINE

## 2021-03-10 PROCEDURE — 82040 ASSAY OF SERUM ALBUMIN: CPT | Performed by: NURSE PRACTITIONER

## 2021-03-10 PROCEDURE — C9113 INJ PANTOPRAZOLE SODIUM, VIA: HCPCS | Performed by: PHYSICIAN ASSISTANT

## 2021-03-10 PROCEDURE — 97116 GAIT TRAINING THERAPY: CPT

## 2021-03-10 PROCEDURE — 82948 REAGENT STRIP/BLOOD GLUCOSE: CPT

## 2021-03-10 PROCEDURE — 85027 COMPLETE CBC AUTOMATED: CPT | Performed by: INTERNAL MEDICINE

## 2021-03-10 RX ORDER — PANTOPRAZOLE SODIUM 40 MG/1
40 TABLET, DELAYED RELEASE ORAL
Status: DISCONTINUED | OUTPATIENT
Start: 2021-03-10 | End: 2021-03-14 | Stop reason: HOSPADM

## 2021-03-10 RX ORDER — ALBUMIN (HUMAN) 12.5 G/50ML
25 SOLUTION INTRAVENOUS EVERY 8 HOURS
Status: COMPLETED | OUTPATIENT
Start: 2021-03-10 | End: 2021-03-12

## 2021-03-10 RX ADMIN — ALBUMIN (HUMAN) 25 G: 0.25 INJECTION, SOLUTION INTRAVENOUS at 22:18

## 2021-03-10 RX ADMIN — ATORVASTATIN CALCIUM 40 MG: 40 TABLET, FILM COATED ORAL at 16:36

## 2021-03-10 RX ADMIN — INSULIN LISPRO 1 UNITS: 100 INJECTION, SOLUTION INTRAVENOUS; SUBCUTANEOUS at 15:22

## 2021-03-10 RX ADMIN — PANTOPRAZOLE SODIUM 40 MG: 40 INJECTION, POWDER, FOR SOLUTION INTRAVENOUS at 06:05

## 2021-03-10 RX ADMIN — INSULIN LISPRO 1 UNITS: 100 INJECTION, SOLUTION INTRAVENOUS; SUBCUTANEOUS at 11:36

## 2021-03-10 RX ADMIN — PANTOPRAZOLE SODIUM 40 MG: 40 TABLET, DELAYED RELEASE ORAL at 15:22

## 2021-03-10 RX ADMIN — ALBUMIN (HUMAN) 25 G: 0.25 INJECTION, SOLUTION INTRAVENOUS at 15:22

## 2021-03-10 RX ADMIN — INSULIN GLARGINE 5 UNITS: 100 INJECTION, SOLUTION SUBCUTANEOUS at 22:19

## 2021-03-10 NOTE — PROGRESS NOTES
Patient Name: Temo Solano  Patient MRN: [id number]  Date: 03/10/21  Service: [facility]    Maria C Summers is a 77 y o  male who was admitted with GI bleed   He feels well and wants to go home  No bowel movements today, stools were noted to be brown yesterday  Hemoglobin down to 8 7  Patient admits:  Heartburn  Denies:  Abdominal pain, nausea, dyspnea, dizziness  All others negative except as noted in HPI  Objective     Vitals  /61 (BP Location: Right arm)   Pulse 70   Temp (!) 97 4 °F (36 3 °C) (Temporal)   Resp 18   SpO2 98%   General: Alert, no apparent distress  Eyes:  No scleral icterus  ENT:  Mucous membranes moist  Card:  Regular rhythm  Lungs: Clear to ascultation b/l  No wheezes, rales, rhonchi  Abdomen:  Soft  Nontender    Bowel sounds normoactive  Skin:  No jaundice  Extremities:  No edema  Neuro: Alert and oriented x3    Laboratory Studies  Lab Results   Component Value Date    CREATININE 2 76 (H) 03/10/2021    BUN 39 (H) 03/10/2021    SODIUM 142 03/10/2021    K 3 7 03/10/2021     (H) 03/10/2021    CO2 20 (L) 03/10/2021    CALCIUM 8 4 03/10/2021    ALKPHOS 35 (L) 03/07/2021    ALB 2 6 (L) 03/07/2021    TBILI 0 90 03/07/2021    AST 55 (H) 03/07/2021    ALT 20 03/07/2021     Lab Results   Component Value Date    WBC 2 93 (L) 03/10/2021    HGB 8 7 (L) 03/10/2021    HCT 27 3 (L) 03/10/2021    PLT 74 (L) 03/10/2021    MCV 94 03/10/2021     Lab Results   Component Value Date    PROTIME 18 3 (H) 03/09/2021    INR 1 55 (H) 03/09/2021       Inhouse Medications       Current Facility-Administered Medications:     acetaminophen (TYLENOL) tablet 650 mg, 650 mg, Oral, Q6H PRN    aluminum-magnesium hydroxide-simethicone (MYLANTA) oral suspension 30 mL, 30 mL, Oral, Q4H PRN, 30 mL at 03/09/21 2133    atorvastatin (LIPITOR) tablet 40 mg, 40 mg, Oral, Daily With Dinner, 40 mg at 03/09/21 1820    bisacodyl (DULCOLAX) rectal suppository 10 mg, 10 mg, Rectal, Daily PRN    calcium carbonate (TUMS) chewable tablet 1,000 mg, 1,000 mg, Oral, Daily PRN, 1,000 mg at 03/08/21 1522    insulin glargine (LANTUS) subcutaneous injection 5 Units 0 05 mL, 5 Units, Subcutaneous, HS, 5 Units at 03/09/21 2231    insulin lispro (HumaLOG) 100 units/mL subcutaneous injection 1-5 Units, 1-5 Units, Subcutaneous, 4x Daily (AC & HS), 1 Units at 03/09/21 2231 **AND** Fingerstick Glucose (POCT), , , Q6H    ondansetron (ZOFRAN) injection 4 mg, 4 mg, Intravenous, Q6H PRN    pantoprazole (PROTONIX) injection 40 mg, 40 mg, Intravenous, Q12H, 40 mg at 03/10/21 0605      Assessment/Plan:  1  GI bleed, likely variceal   Status post banding x3  Hemoglobin 8 7 after 2 units  No bowel movements today  Will need repeat EGD with banding in 1-2 months  Consider nadolol 20 mg daily  2  Duodenal ulcers  Continue Protonix  Can check stool H pylori antigen as outpatient  3  Cryptogenic cirrhosis  Patient reportedly has a history of fatty liver and alcohol abuse remotely  Hepatitis-A, B and C negative  Alpha fetoprotein 1 9    Will need [place] [address]  surveillance with ultrasound and alpha fetoprotein q 6 months    Principal Problem:    GI bleed  Active Problems:    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HCC)    CAD (coronary artery disease)    Type 2 diabetes mellitus with stage 3 chronic kidney disease, with long-term current use of insulin (HCC)    Cirrhosis ([facility] [address] )    Acute kidney injury superimposed on chronic kidney disease (HCC)    Kellie Moeller PA-C

## 2021-03-10 NOTE — ASSESSMENT & PLAN NOTE
Lab Results   Component Value Date    EGFR 22 03/10/2021    EGFR 23 03/09/2021    EGFR 28 03/08/2021    CREATININE 2 76 (H) 03/10/2021    CREATININE 2 62 (H) 03/09/2021    CREATININE 2 21 (H) 03/08/2021   Baseline creatinine appears to be close to 2 4-2 6  Acute kidney injury likely secondary to anemia and GI bleed  Nephrology consultation appreciated  Recommend albumin 25 g every 8 hours for 48 hours  Will monitor BMP

## 2021-03-10 NOTE — APP STUDENT NOTE
Consultation - Nephrology   Amina Day 77 y o  male MRN: [id number]  Unit/Bed#: E5 -01 Encounter: [id number]    ASSESSMENT and PLAN:  Elevation of Creatinine on Progressive Chronic kidney disease IV:   Elevation of Creatinine: Suspect secondary to volume depletion in the setting of diarrhea, poor oral intake greater than a week, hemodynamic perturbations and acute blood loss anemia in the setting of GI bleed  Etiology of CKD IV: Suspect secondary to diabetic kidney disease, Liver cirrhosis, recurrent BEN's and age related nephron loss  - After review medical records baseline creatinine between 1 7-1 9 dating back to March 2020 with progression to creatinine around 2 4-2 6 dating back to November 2020   - On Admission creatinine 2 69 on 3/7/21  - Current creatinine 2 76 on 3/10/21  - S/P 1L LR in the ER, followed by LR @40 ml/hr  Since discontinued  Workup:  CT Abd/Pelvis w/o Contrast(3/7/21): "Kidney/Ureters: Unchanged renal cysts  Urinary Bladder: Tiny dependent bladder with hypodensities are likely tiny calculi  "  U/A (3/9/21): Juan Carlos Hint  "Color: yellow/ Clarity: clear  Specific gravity: 1 025 "  Plan:  - Given hypoalbuminemia with most recent albumin of 2 1, fluctuations in BP and overall examination with mild abdominal distention  Would recommend Albumin 25% q48hrs     - Monitor I/O and Volume Status  Poor documentation recorded  - Check Bladder scans qShift and PVR's  - Urinary retention protocol  - Send urine electrolytes r/o prerenal vs intrarenal etiologies  - Encourage Oral intake  - Avoid fluctuations in blood pressures and hypotensive episodes  - Avoid Nephrotoxins, NSAIDs, and IV Contrast use  - Adjust medications for appropriate eGFR's    - Trend renal function  - Check BMP/VBG in am  - Patient previously followed in the [facility] office in April 2020 but has not returned since last visit  - Will need follow-up on discharge in [facility] office      Blood pressure:  - Blood pressure was reviewed and is acceptable  - Most recent BP:126/61  - Notable perturbations with one hypotensive episode overnight  - Maximize hemodynamics to maintain MAP >65  - Avoid hypotension or fluctuations in blood pressure  - Will continue to trend    H&H/anemia/GI Bleed:   - Current hemoglobin 8 7  - S/P 2 units of pRBC's on 3/8/21  - EGD (3/8/21): "Multiple grade 2 varices s/p 3 bands, and 3 superficial ulcers In the duodenum, and portal hypertensive gastropathy "  - Iron profile (3/8/21): "Iron: 51  Ferritin:56  Iron Saturation: 41  TIBC: 124 "  - Consider restarting oral iron supplements outpatient  - Transfuse if hemoglobin less than 7 0  - GI following    Acid-base: Bicarb slightly decreased 20  - Adjust fluids as above  Volume status/Crypotgenic Cirrhosis:  - Clinically patient appears to be on the drier side with notable abdominal distention suspect possible ascites?  - Add Albumin as above  - GI following  Other Diagnosis: Per Primary Team  Diabetes II:  Accu-checks and SSI     HISTORY OF PRESENT ILLNESS:  Requesting Physician: Florencia Peter MD  Reason for Consult: Progressive CKD    Tedi Brittle is a 77 y o  male who has past medical history of cryptogenic cirrhosis, COPD, DM II, HLD, CKD IV (Baseline Crt  2 4-2 6) and pituitary adenoma who presented to the [address] [place] [address] on 3/7/21 from [address] [facility] for diarrhea x1 week with a large bloody BM on the morning of admission  Upon admission patient had one episode of hemoptysis/melana accompanied by anemia where he received 3 units of pRBC's and was transferred to [facility] for GI consult  The patient underwent an EGD where they noted multiple duodenal ulcers and esophageal varices  A renal consultation is requested today 3/10/21 for assistance in the management of progressive CKD IV  The patients creatinine upon admission was 2 69 on 3/7/21  The patients creatinine today was 2 75   Upon evaluation the patient denies any chest pain, shortness of breath, nausea, vomiting, chills, fever or diarrhea  The patient states that his appetitive has improved and overall feels a little better  The patient is requesting that he be discharged today  Renal function appears to be stable and within baseline  Will continue to monitor while admitted  Patient will need follow up upon discharge  PAST MEDICAL HISTORY:  Past Medical History:   Diagnosis Date    Cirrhosis ([facility] )     COPD (chronic obstructive pulmonary disease) ([place] [address] )     Coronary artery disease     Dementia ([place] [address] )     Diabetes mellitus ([place] [address] )     Diverticulosis     Gastric reflux     Hyperlipemia     Panlobular emphysema (HCC)     Pituitary mass (HCC)     Renal disorder     CKD Stage 3 & Bilat multi renal nodules    Thrombocyte disorder (HCC)     thrombocytopenia    Unstable angina (HCC)        PAST SURGICAL HISTORY:  Past Surgical History:   Procedure Laterality Date    CARDIAC SURGERY      unspecified    CHOLECYSTECTOMY      CORONARY ARTERY BYPASS GRAFT      2-3 years ago    HERNIA REPAIR      IR BIOPSY BONE MARROW  5/12/2020       ALLERGIES:  Allergies   Allergen Reactions    Erythromycin     Penicillins     Shellfish-Derived Products        SOCIAL HISTORY:  Social History     Substance and Sexual Activity   Alcohol Use Never    Frequency: Never    Binge frequency: Never     Social History     Substance and Sexual Activity   Drug Use Never     Social History     Tobacco Use   Smoking Status Former Smoker    Packs/day: 0 25   Smokeless Tobacco Never Used   Tobacco Comment    Hasn't smoked since February       FAMILY HISTORY:  History reviewed  No pertinent family history      MEDICATIONS:    Current Facility-Administered Medications:     acetaminophen (TYLENOL) tablet 650 mg, 650 mg, Oral, Q6H PRN, Taryn Solano PA-C    aluminum-magnesium hydroxide-simethicone (MYLANTA) oral suspension 30 mL, 30 mL, Oral, Q4H PRN, Elin Soto PA-C, 30 mL at 03/09/21 2238    atorvastatin (LIPITOR) tablet 40 mg, 40 mg, Oral, Daily With Carlito Gray, PA-C, 40 mg at 03/09/21 1820    bisacodyl (DULCOLAX) rectal suppository 10 mg, 10 mg, Rectal, Daily PRN, Nena Ripple, PA-C    calcium carbonate (TUMS) chewable tablet 1,000 mg, 1,000 mg, Oral, Daily PRN, Nena Ripple, PA-C, 1,000 mg at 03/08/21 1522    insulin glargine (LANTUS) subcutaneous injection 5 Units 0 05 mL, 5 Units, Subcutaneous, HS, Beacon Ripple, PA-C, 5 Units at 03/09/21 2231    insulin lispro (HumaLOG) 100 units/mL subcutaneous injection 1-5 Units, 1-5 Units, Subcutaneous, 4x Daily (AC & HS), 1 Units at 03/10/21 1136 **AND** Fingerstick Glucose (POCT), , , Q6H, Danis Guerrier MD    [facility]ansetron [facility]) injection 4 mg, 4 mg, Intravenous, Q6H PRN, Beacon Ripple, PA-C    pantoprazole (PROTONIX) EC tablet 40 mg, 40 mg, Oral, Early Morning, Smooth Parekh MD      REVIEW OF SYSTEMS:  A complete 10 point review of systems was performed and found to be negative unless otherwise noted below or in the HPI  General: Denies fevers, and chills  Notes Generalized weakness and fatigue  Cardiovascular:  Denies chest pain, shortness of breath, palpitations, or leg edema  Respiratory:  Denies cough, sputum production, shortness of breath  Gastrointestinal:  Denies nausea, vomiting, abdominal pain, diarrhea or constipation    Genitourinary: No dysuria, burning, hematuria or increased frequency or difficulty with stream     PHYSICAL EXAM:  Current Weight:    First Weight:    Vitals:    03/09/21 0721 03/09/21 1526 03/09/21 2300 03/10/21 0731   BP: 142/65 123/71 134/56 126/61   BP Location: Right arm Right arm Right arm Right arm   Pulse: 63 65 73 70   Resp: 18 19 18 18   Temp: 97 5 °F (36 4 °C)  97 5 °F (36 4 °C) (!) 97 4 °F (36 3 °C)   TempSrc: Temporal  Temporal Temporal   SpO2: 99% 100% 100% 98%       Intake/Output Summary (Last 24 hours) at 3/10/2021 1230  Last data filed at 3/10/2021 1138  Gross per 24 hour   Intake -- Output 325 ml   Net -325 ml     General: conscious, coherent, cooperative, not in acute distress, OOB  Skin: no rash, warm and dry  Generalized B/L le scabs/abrasiona  Eyes: pale conjunctivae, anicteric sclerae  ENT: dry lips and mucous membranes  Neck: supple, no JVD noted  Chest: clear breath sounds  CVS: distinct S1 & S2, normal rate, regular rhythm without rub  Abdomen: soft, non-tender, non-distended, normoactive bowel sounds  Extremities: no edema of both legs noted  Neuro: awake, alert, oriented, no gross deficits  Psych: appropriate affect, Irritable and resetless       Lab Results:   Results from last 7 days   Lab Units 03/10/21  0506 03/09/21  1220 03/09/21  0703  03/08/21  0526 03/07/21  1906   WBC Thousand/uL 2 93*  --  3 14*  --  3 94* 9 13   HEMOGLOBIN g/dL 8 7* 10 9* 9 2*   < > 5 7* 7 9*   HEMATOCRIT % 27 3*  --  28 3*  --  18 3* 25 5*   PLATELETS Thousands/uL 74*  --  78*  --  92* 208   SODIUM mmol/L 142  --  143  --  143 141   POTASSIUM mmol/L 3 7  --  3 7  --  4 4 4 5   CHLORIDE mmol/L 112*  --  114*  --  114* 111*   CO2 mmol/L 20*  --  20*  --  16* 17*   BUN mg/dL 39*  --  39*  --  37* 40*   CREATININE mg/dL 2 76*  --  2 62*  --  2 21* 2 69*   CALCIUM mg/dL 8 4  --  7 9*  --  8 0* 8 6   MAGNESIUM mg/dL  --   --   --   --   --  1 4*   ALK PHOS U/L  --   --   --   --   --  35*   ALT U/L  --   --   --   --   --  20   AST U/L  --   --   --   --   --  55*    < > = values in this interval not displayed       Severo Headings SNP

## 2021-03-10 NOTE — PHYSICAL THERAPY NOTE
Physical Therapy Progress Note     03/10/21 1418   PT Last Visit   PT Visit Date 03/10/21   Note Type   Note Type Treatment   Pain Assessment   Pain Assessment Tool Pain Assessment not indicated - pt denies pain   Pain Score No Pain   Restrictions/Precautions   Weight Bearing Precautions Per Order No   Other Precautions Cognitive; Chair Alarm; Bed Alarm; Fall Risk;Hard of hearing   General   Chart Reviewed Yes   Response to Previous Treatment Patient reporting fatigue but able to participate  Family/Caregiver Present No   Subjective   Subjective Willing to participate in therapy this PM    Transfers   Sit to Stand 5  Supervision   Additional items Assist x 1; Armrests; Increased time required;Verbal cues   Stand to Sit 5  Supervision   Additional items Assist x 1; Armrests; Increased time required;Verbal cues   Toilet transfer 5  Supervision   Additional items Assist x 1; Armrests; Increased time required;Verbal cues;Standard toilet; Other  (use of grab bar)   Ambulation/Elevation   Gait pattern Decreased foot clearance; Forward Flexion; Short stride; Excessively slow; Inconsistent tali; Shuffling   Gait Assistance 5  Supervision   Additional items Assist x 1;Verbal cues; Tactile cues   Assistive Device Rolling walker   Distance 100'   Balance   Static Sitting Fair +   Dynamic Sitting Fair   Static Standing Fair   Dynamic Standing Fair -   Ambulatory Fair -   Endurance Deficit   Endurance Deficit No   Activity Tolerance   Activity Tolerance Patient tolerated treatment well   Nurse Made Aware Yes   Exercises   TKR Sitting;Supine;10 reps;AAROM; Bilateral   Assessment   Prognosis Fair   Problem List Decreased strength;Decreased range of motion;Decreased endurance; Impaired balance;Decreased mobility; Decreased cognition; Impaired judgement;Decreased safety awareness;Decreased skin integrity; Impaired hearing   Assessment Pt  seated in bedside chair upon my arrival  Pt  reporting fatigue, however agreeable to therapeutic intervention  Performance of HEP with cues provided for proper completion  Progressed with transfers requiring A of therapist with cues for hand placement/technique  Pt  requested to use br, performance of br trial requiring A of therapist for completion of pericare  Pt  continued with an amb  trial with cues provided for LE sequencing  Pt  returned to seated in bedside chair at end of treatment session with chair alarm active  PT will continue to recommend d/c home to [facility] with PT provided when medically stable  Barriers to Discharge None   Goals   Patient Goals To go home  STG Expiration Date 03/18/21   PT Treatment Day 2   Plan   Treatment/Interventions Functional transfer training;LE strengthening/ROM; Therapeutic exercise; Endurance training;Gait training;Spoke to nursing;Spoke to case management   Progress Progressing toward goals   PT Frequency Other (Comment)  (3-5x/wk)   Recommendation   PT Discharge Recommendation Home with skilled therapy; Return to previous environment with social support  (PT at [facility])   Equipment Recommended Other (Comment)  (has DME at home  )   PT - OK to Discharge Yes  (if d/c when medically stable  )   AM-PAC Basic Mobility Inpatient   Turning in Bed Without Bedrails 3   Lying on Back to Sitting on Edge of Flat Bed 3   Moving Bed to Chair 3   Standing Up From Chair 3   Walk in Room 3   Climb 3-5 Stairs 3   Basic Mobility Inpatient Raw Score 18   Basic Mobility Standardized Score 41 05     Lilly Fried, PTA

## 2021-03-10 NOTE — ASSESSMENT & PLAN NOTE
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 on 3/23/20  Redemonstrated on MRI 6/24/20 - 3 4 cm relatively homogeneous mass emanating from the pituitary gland consistent with microadenoma  Extension into the left cavernous sinus, sphenoid sinus, with elevation of contents of the suprasellar cyst   Diameter of the cavernous carotid arteries   do not appear significantly affected  Was following with [facility] Neurosurgery, who had ordered pituitary function labs and recommended outpatient follow-up with ophthalmology and follow-up MRI  However, labs were never obtained and patient appeared to have been lost to follow-up  Patient states he was told it was "benign", also states he has seen an ophtholmologist, although no records in [facility]    Neurosurgery had stated patient is not a surgical candidate

## 2021-03-10 NOTE — CASE MANAGEMENT
Patient is here with GI bleed on PPI, consulted by GI  Patient is from [facility]  Patient will require insurance auth prior to discharge  CM will continue to follow

## 2021-03-10 NOTE — PROGRESS NOTES
[address] [facility]  Progress Note - Tedi Brittle 1945, 77 y o  male MRN: [id number]  Unit/Bed#: E5 -01 Encounter: [id number]  Primary Care Provider: Edgardo Adams MD   Date and time admitted to hospital: 3/8/2021 12:06 AM    * GI bleed  Assessment & Plan  Pt presents with large bloody BM x 1  7 days of non-bloody diarrheal illness preceding bloody BM    · hemoglobin 8 7 status post 2 units PRBCs on 03/08/2021   · CT abdomen (3/7/21) - Moderate circumferential thickening of the proximal duodenum in keeping with a nonspecific duodenitis  Ulcer disease is part of the differential diagnosis  · Status post EGD on 03/08/2021 which revealed 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, 3 superficial ulcers in duodenum, portal hypertensive gastropathy   · No further episodes of bleeding  · GI follow-up appreciated, will monitor H&H, continue PPI        Acute kidney injury superimposed on chronic kidney disease [facility])  Assessment & Plan  Lab Results   Component Value Date    EGFR 22 03/10/2021    EGFR 23 03/09/2021    EGFR 28 03/08/2021    CREATININE 2 76 (H) 03/10/2021    CREATININE 2 62 (H) 03/09/2021    CREATININE 2 21 (H) 03/08/2021   Baseline creatinine appears to be close to 2 4-2 6  Acute kidney injury likely secondary to anemia and GI bleed  Nephrology consultation appreciated  Recommend albumin 25 g every 8 hours for 48 hours  Will monitor BMP    Cirrhosis ([facility] [address] )  Assessment & Plan  Cirrhosis of unknown etiology patient did state use a regular user of alcohol when he was in the [employer] many years ago, hepatitis A, B and C negative  · CT abdomen - Nodular liver concerning for underlying cirrhosis  · Abdominal U/S - Liver cirrhosis  No suspicious lesions identified    · GI input appreciated, check hepatitis-B and C, alpha fetoprotein  · Patient with significant thrombocytopenia in March 2020 at which point hematology was following and patient was started on IV steroids and received IVIG x2 platelets today 74, will closely monitor    Type 2 diabetes mellitus with stage 3 chronic kidney disease, with long-term current use of insulin [facility])  Assessment & Plan  No results found for: HGBA1C    Recent Labs     03/09/21  1630 03/09/21  2048 03/10/21  0707 03/10/21  1118   POCGLU 99 179* 72 193*       Blood Sugar Average: Last 72 hrs: Will continue Lantus 5mg  Accuchecks q6h and sliding scale coverage      CAD (coronary artery disease)  Assessment & Plan  Not on antiplatelet therapy, not on beta blocker therapy  Continue atorvastatin      Pituitary macroadenoma [facility])  Assessment & Plan  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 on 3/23/20  Redemonstrated on MRI 6/24/20 - 3 4 cm relatively homogeneous mass emanating from the pituitary gland consistent with microadenoma  Extension into the left cavernous sinus, sphenoid sinus, with elevation of contents of the suprasellar cyst   Diameter of the cavernous carotid arteries   do not appear significantly affected  Was following with [facility] Neurosurgery, who had ordered pituitary function labs and recommended outpatient follow-up with ophthalmology and follow-up MRI  However, labs were never obtained and patient appeared to have been lost to follow-up  Patient states he was told it was "benign", also states he has seen an ophtholmologist, although no records in [facility]  Neurosurgery had stated patient is not a surgical candidate          VTE Pharmacologic Prophylaxis:   Pharmacologic:  Contraindicated due to GI bleed    Patient Centered Rounds: I have performed bedside rounds with nursing staff today  Time Spent for Care: 20 minutes  More than 50% of total time spent on counseling and coordination of care as described above      Current Length of Stay: 2 day(s)    Current Patient Status: Inpatient   Certification Statement: The patient will continue to require additional inpatient hospital stay due to Acute kidney injury    Discharge Plan / Estimated Discharge Date: TBD    Code Status: Level 1 - Full Code      Subjective:   Patient seen and examined at bedside, denies any abdominal pain, nausea vomiting  Did have a bowel movement overnight which was brown in color  Objective:     Vitals:   Temp (24hrs), Av 5 °F (36 4 °C), Min:97 4 °F (36 3 °C), Max:97 5 °F (36 4 °C)    Temp:  [97 4 °F (36 3 °C)-97 5 °F (36 4 °C)] 97 4 °F (36 3 °C)  HR:  [65-73] 70  Resp:  [18-19] 18  BP: (123-134)/(56-71) 126/61  SpO2:  [98 %-100 %] 98 %  There is no height or weight on file to calculate BMI  Input and Output Summary (last 24 hours): Intake/Output Summary (Last 24 hours) at 3/10/2021 1437  Last data filed at 3/10/2021 1138  Gross per 24 hour   Intake --   Output 325 ml   Net -325 ml       Physical Exam:    Constitutional: Patient is oriented to person, place and time, no acute distress  HEENT:  Normocephalic, atraumatic  Cardiovascular: Normal S1S2, RRR, No murmurs/rubs/gallops appreciated  Pulmonary:  Bilateral air entry, No rhonchi/rales/wheezing appreciated  Abdominal: Soft, Bowel sounds present, Non-tender, Non-distended  Extremities:  No cyanosis, clubbing or edema  Neurological: Cranial nerves II-XII grossly intact, sensation intact, otherwise no focal neurological symptoms  Skin:  Warm, dry    Additional Data:     Labs:    Results from last 7 days   Lab Units 03/10/21  0506  21  0703   WBC Thousand/uL 2 93*  --  3 14*   HEMOGLOBIN g/dL 8 7*   < > 9 2*   HEMATOCRIT % 27 3*  --  28 3*   PLATELETS Thousands/uL 74*  --  78*   NEUTROS PCT %  --   --  48   LYMPHS PCT %  --   --  36   MONOS PCT %  --   --  9   EOS PCT %  --   --  5    < > = values in this interval not displayed       Results from last 7 days   Lab Units 03/10/21  0506  21  1906   POTASSIUM mmol/L 3 7   < > 4 5   CHLORIDE mmol/L 112*   < > 111*   CO2 mmol/L 20*   < > 17*   BUN mg/dL 39*   < > 40*   CREATININE mg/dL 2 76*   < > 2 69*   CALCIUM mg/dL 8 4   < > 8 6   ALK PHOS U/L  --   --  35*   ALT U/L  --   --  20   AST U/L  --   --  55*    < > = values in this interval not displayed  Results from last 7 days   Lab Units 03/09/21  0703   INR  1 55*        I Have Reviewed All Lab Data Listed Above          Recent Cultures (last 7 days):     Results from last 7 days   Lab Units 03/08/21  0614   C DIFF TOXIN B BY PCR  Negative       Last 24 Hours Medication List:   Current Facility-Administered Medications   Medication Dose Route Frequency Provider Last Rate    acetaminophen  650 mg Oral Q6H PRN Haley Rodgers PA-C      albumin human  25 g Intravenous Q8H JOSE ALEJANDRO De La Garza      atorvastatin  40 mg Oral Daily With [employer] [place]      bisacodyl  10 mg Rectal Daily PRN VICKI Leonard-NETO      calcium carbonate  1,000 mg Oral Daily PRN Haley Rodgers, PA-C      insulin glargine  5 Units Subcutaneous HS VICKI Leonard-C      insulin lispro  1-5 Units Subcutaneous 4x Daily (AC & HS) Chidi Vela MD      ondansetron  4 mg Intravenous Q6H PRN VICKI Leonard-NETO      pantoprazole  40 mg Oral Early Morning Deena Garcia MD          Today, Patient Was Seen By: Chidi Vela MD

## 2021-03-10 NOTE — PLAN OF CARE
Problem: Potential for Falls  Goal: Patient will remain free of falls  Description: INTERVENTIONS:  - Assess patient frequently for physical needs  -  Identify cognitive and physical deficits and behaviors that affect risk of falls    -  [place] fall precautions as indicated by assessment   - Educate patient/family on patient safety including physical limitations  - Instruct patient to call for assistance with activity based on assessment  - Modify environment to reduce risk of injury  - Consider OT/PT consult to assist with strengthening/mobility  Outcome: Progressing     Problem: Prexisting or High Potential for Compromised Skin Integrity  Goal: Skin integrity is maintained or improved  Description: INTERVENTIONS:  - Identify patients at risk for skin breakdown  - Assess and monitor skin integrity  - Assess and monitor nutrition and hydration status  - Monitor labs   - Assess for incontinence   - Turn and reposition patient  - Assist with mobility/ambulation  - Relieve pressure over bony prominences  - Avoid friction and shearing  - Provide appropriate hygiene as needed including keeping skin clean and dry  - Evaluate need for skin moisturizer/barrier cream  - Collaborate with interdisciplinary team   - Patient/family teaching  - Consider wound care consult   Outcome: Progressing     Problem: PAIN - ADULT  Goal: Verbalizes/displays adequate comfort level or baseline comfort level  Description: Interventions:  - Encourage patient to monitor pain and request assistance  - Assess pain using appropriate pain scale  - Administer analgesics based on type and severity of pain and evaluate response  - Implement non-pharmacological measures as appropriate and evaluate response  - Consider cultural and social influences on pain and pain management  - Notify physician/advanced practitioner if interventions unsuccessful or patient reports new pain  Outcome: Progressing     Problem: INFECTION - ADULT  Goal: Absence or prevention of progression during hospitalization  Description: INTERVENTIONS:  - Assess and monitor for signs and symptoms of infection  - Monitor lab/diagnostic results  - Monitor all insertion sites, i e  indwelling lines, tubes, and drains  - Monitor endotracheal if appropriate and nasal secretions for changes in amount and color  - [place] appropriate cooling/warming therapies per order  - Administer medications as ordered  - Instruct and encourage patient and family to use good hand hygiene technique  - Identify and instruct in appropriate isolation precautions for identified infection/condition  Outcome: Progressing  Goal: Absence of fever/infection during neutropenic period  Description: INTERVENTIONS:  - Monitor WBC    Outcome: Progressing     Problem: SAFETY ADULT  Goal: Patient will remain free of falls  Description: INTERVENTIONS:  - Assess patient frequently for physical needs  -  Identify cognitive and physical deficits and behaviors that affect risk of falls    -  [place] fall precautions as indicated by assessment   - Educate patient/family on patient safety including physical limitations  - Instruct patient to call for assistance with activity based on assessment  - Modify environment to reduce risk of injury  - Consider OT/PT consult to assist with strengthening/mobility  Outcome: Progressing  Goal: Maintain or return to baseline ADL function  Description: INTERVENTIONS:  -  Assess patient's ability to carry out ADLs; assess patient's baseline for ADL function and identify physical deficits which impact ability to perform ADLs (bathing, care of mouth/teeth, toileting, grooming, dressing, etc )  - Assess/evaluate cause of self-care deficits   - Assess range of motion  - Assess patient's mobility; develop plan if impaired  - Assess patient's need for assistive devices and provide as appropriate  - Encourage maximum independence but intervene and supervise when necessary  - Involve family in performance of ADLs  - Assess for home care needs following discharge   - Consider OT consult to assist with ADL evaluation and planning for discharge  - Provide patient education as appropriate  Outcome: Progressing  Goal: Maintain or return mobility status to optimal level  Description: INTERVENTIONS:  - Assess patient's baseline mobility status (ambulation, transfers, stairs, etc )    - Identify cognitive and physical deficits and behaviors that affect mobility  - Identify mobility aids required to assist with transfers and/or ambulation (gait belt, sit-to-stand, lift, walker, cane, etc )  - [place] fall precautions as indicated by assessment  - Record patient progress and toleration of activity level on Mobility SBAR; progress patient to next Phase/Stage  - Instruct patient to call for assistance with activity based on assessment  - Consider rehabilitation consult to assist with strengthening/weightbearing, etc   Outcome: Progressing     Problem: DISCHARGE PLANNING  Goal: Discharge to home or other facility with appropriate resources  Description: INTERVENTIONS:  - Identify barriers to discharge w/patient and caregiver  - Arrange for needed discharge resources and transportation as appropriate  - Identify discharge learning needs (meds, wound care, etc )  - Arrange for interpretive services to assist at discharge as needed  - Refer to Case Management Department for coordinating discharge planning if the patient needs post-hospital services based on physician/advanced practitioner order or complex needs related to functional status, cognitive ability, or social support system  Outcome: Progressing     Problem: Knowledge Deficit  Goal: Patient/family/caregiver demonstrates understanding of disease process, treatment plan, medications, and discharge instructions  Description: Complete learning assessment and assess knowledge base    Interventions:  - Provide teaching at level of understanding  - Provide teaching via preferred learning methods  Outcome: Progressing

## 2021-03-10 NOTE — PLAN OF CARE
Problem: PHYSICAL THERAPY ADULT  Goal: Performs mobility at highest level of function for planned discharge setting  See evaluation for individualized goals  Description: Treatment/Interventions: Functional transfer training, LE strengthening/ROM, Therapeutic exercise, Endurance training, Patient/family training, Equipment eval/education, Bed mobility, Gait training, Continued evaluation, Compensatory technique education, Spoke to nursing, OT          See flowsheet documentation for full assessment, interventions and recommendations  Outcome: Progressing  Note: Prognosis: Fair  Problem List: Decreased strength, Decreased range of motion, Decreased endurance, Impaired balance, Decreased mobility, Decreased cognition, Impaired judgement, Decreased safety awareness, Decreased skin integrity, Impaired hearing  Assessment: Pt  seated in bedside chair upon my arrival  Pt  reporting fatigue, however agreeable to therapeutic intervention  Performance of HEP with cues provided for proper completion  Progressed with transfers requiring A of therapist with cues for hand placement/technique  Pt  requested to use br, performance of br trial requiring A of therapist for completion of pericare  Pt  continued with an amb  trial with cues provided for LE sequencing  Pt  returned to seated in bedside chair at end of treatment session with chair alarm active  PT will continue to recommend d/c home to [facility] with PT provided when medically stable  Barriers to Discharge: None     PT Discharge Recommendation: Home with skilled therapy, Return to previous environment with social support(PT at [facility])     PT - OK to Discharge: Yes(if d/c when medically stable )    See flowsheet documentation for full assessment

## 2021-03-10 NOTE — ASSESSMENT & PLAN NOTE
No results found for: HGBA1C    Recent Labs     03/09/21  1630 03/09/21  2048 03/10/21  0707 03/10/21  1118   POCGLU 99 179* 72 193*       Blood Sugar Average: Last 72 hrs:     Will continue Lantus 5mg  Accuchecks q6h and sliding scale coverage

## 2021-03-10 NOTE — NURSING NOTE
I took care of the patient from 11 pm to 7 am  I did assesment on him and agree with the previous RN assessment whom had assessed him from 7p to 11 pm

## 2021-03-10 NOTE — PLAN OF CARE
Problem: Potential for Falls  Goal: Patient will remain free of falls  Description: INTERVENTIONS:  - Assess patient frequently for physical needs  -  Identify cognitive and physical deficits and behaviors that affect risk of falls    -  [place] fall precautions as indicated by assessment   - Educate patient/family on patient safety including physical limitations  - Instruct patient to call for assistance with activity based on assessment  - Modify environment to reduce risk of injury  - Consider OT/PT consult to assist with strengthening/mobility  Outcome: Progressing     Problem: Prexisting or High Potential for Compromised Skin Integrity  Goal: Skin integrity is maintained or improved  Description: INTERVENTIONS:  - Identify patients at risk for skin breakdown  - Assess and monitor skin integrity  - Assess and monitor nutrition and hydration status  - Monitor labs   - Assess for incontinence   - Turn and reposition patient  - Assist with mobility/ambulation  - Relieve pressure over bony prominences  - Avoid friction and shearing  - Provide appropriate hygiene as needed including keeping skin clean and dry  - Evaluate need for skin moisturizer/barrier cream  - Collaborate with interdisciplinary team   - Patient/family teaching  - Consider wound care consult   Outcome: Progressing     Problem: PAIN - ADULT  Goal: Verbalizes/displays adequate comfort level or baseline comfort level  Description: Interventions:  - Encourage patient to monitor pain and request assistance  - Assess pain using appropriate pain scale  - Administer analgesics based on type and severity of pain and evaluate response  - Implement non-pharmacological measures as appropriate and evaluate response  - Consider cultural and social influences on pain and pain management  - Notify physician/advanced practitioner if interventions unsuccessful or patient reports new pain  Outcome: Progressing     Problem: INFECTION - ADULT  Goal: Absence or prevention of progression during hospitalization  Description: INTERVENTIONS:  - Assess and monitor for signs and symptoms of infection  - Monitor lab/diagnostic results  - Monitor all insertion sites, i e  indwelling lines, tubes, and drains  - Monitor endotracheal if appropriate and nasal secretions for changes in amount and color  - [place] appropriate cooling/warming therapies per order  - Administer medications as ordered  - Instruct and encourage patient and family to use good hand hygiene technique  - Identify and instruct in appropriate isolation precautions for identified infection/condition  Outcome: Progressing  Goal: Absence of fever/infection during neutropenic period  Description: INTERVENTIONS:  - Monitor WBC    Outcome: Progressing     Problem: SAFETY ADULT  Goal: Patient will remain free of falls  Description: INTERVENTIONS:  - Assess patient frequently for physical needs  -  Identify cognitive and physical deficits and behaviors that affect risk of falls    -  [place] fall precautions as indicated by assessment   - Educate patient/family on patient safety including physical limitations  - Instruct patient to call for assistance with activity based on assessment  - Modify environment to reduce risk of injury  - Consider OT/PT consult to assist with strengthening/mobility  Outcome: Progressing  Goal: Maintain or return to baseline ADL function  Description: INTERVENTIONS:  -  Assess patient's ability to carry out ADLs; assess patient's baseline for ADL function and identify physical deficits which impact ability to perform ADLs (bathing, care of mouth/teeth, toileting, grooming, dressing, etc )  - Assess/evaluate cause of self-care deficits   - Assess range of motion  - Assess patient's mobility; develop plan if impaired  - Assess patient's need for assistive devices and provide as appropriate  - Encourage maximum independence but intervene and supervise when necessary  - Involve family in performance of ADLs  - Assess for home care needs following discharge   - Consider OT consult to assist with ADL evaluation and planning for discharge  - Provide patient education as appropriate  Outcome: Progressing  Goal: Maintain or return mobility status to optimal level  Description: INTERVENTIONS:  - Assess patient's baseline mobility status (ambulation, transfers, stairs, etc )    - Identify cognitive and physical deficits and behaviors that affect mobility  - Identify mobility aids required to assist with transfers and/or ambulation (gait belt, sit-to-stand, lift, walker, cane, etc )  - [place] fall precautions as indicated by assessment  - Record patient progress and toleration of activity level on Mobility SBAR; progress patient to next Phase/Stage  - Instruct patient to call for assistance with activity based on assessment  - Consider rehabilitation consult to assist with strengthening/weightbearing, etc   Outcome: Progressing     Problem: DISCHARGE PLANNING  Goal: Discharge to home or other facility with appropriate resources  Description: INTERVENTIONS:  - Identify barriers to discharge w/patient and caregiver  - Arrange for needed discharge resources and transportation as appropriate  - Identify discharge learning needs (meds, wound care, etc )  - Arrange for interpretive services to assist at discharge as needed  - Refer to Case Management Department for coordinating discharge planning if the patient needs post-hospital services based on physician/advanced practitioner order or complex needs related to functional status, cognitive ability, or social support system  Outcome: Progressing     Problem: Knowledge Deficit  Goal: Patient/family/caregiver demonstrates understanding of disease process, treatment plan, medications, and discharge instructions  Description: Complete learning assessment and assess knowledge base    Interventions:  - Provide teaching at level of understanding  - Provide teaching via preferred learning methods  Outcome: Progressing

## 2021-03-10 NOTE — CONSULTS
Consultation - Nephrology   Darline Isaacs 77 y o  male MRN: [id number]  Unit/Bed#: E5 -01 Encounter: [id number]    ASSESSMENT and PLAN:  Progressive Chronic kidney disease, stage IV:  - etiology of elevated creatinine suspect prerenal secondary to volume depletion with poor oral intake/diarrhea, acute blood loss anemia in the setting of GI bleed, hemodynamic perturbations, plus or minus HRS  - underlying chronic kidney disease suspect secondary to diabetic nephropathy, cirrhosis, prior acute kidney injury, age-related nephron loss  - seen by AP with nephrology in April 2020    - to note, patient with acute kidney injuries in the past    - upon review of medical records, creatinine appears to be around 1 7- 1 9 mg/dL in 2020  Creatinine 2 4- 2 6 mg/dL more recently since mid 2020    - creatinine 2 69 mg/dL upon admission   - most recent creatinine 2 76 mg/dL today, peak creatinine thus far  - will provide albumin 25 g every 8 hours, scheduled for 48 hours  - UA: No proteinuria no hematuria  Will check urine lytes  - imaging: CT scan completed on 03/07/2021 revealed unchanged renal cyst   - check PVR with bladder scan, maintain urinary retention protocol   - avoid NSAIDs, nephrotoxic agents, IV contrast   - adjust medications to appropriate GFR  - monitor volume status with strict intake/output, daily weight    - intake/output not well documented  - will check BMP, magnesium, phosphorus in a m  Electrolytes, acid/base:  - most recent bicarbonate on the lower side at 20 suspect due to underlying cirrhosis with possible elevated creatinine     - will check VBG in the a m    - will continue to monitor with repeat lab studies  Blood pressure, hemodynamics:  - blood pressure stable recently, noted hypotension yesterday  - patient is not on any antihypertensive medications  - optimize hemodynamics; avoid hypotension and fluctuations of blood pressure   - maintain MAP > 65       H/H, anemia of chronic disease/GIB:  - most recent hemoglobin 8 7 grams/deciliter  - status post 2 units PRBC on 3/8   - goal hemoglobin greater than 8 grams/deciliter  - recommend PRBC transfusion for hemoglobin less than 7   - iron panel: Iron saturation 41%, ferritin 56    - to note, as below, patient follows with Hematology as an outpatient  Cryptogenic cirrhosis:  - with history of fatty liver and alcohol abuse remotely, hepatitis A, B and C negative  - to note, patient with significant thrombocytopenia in March 2020 warranting Hematology consult in which patient was started on IV steroids and received IVIG x 2    - CT scan revealed nodule liver concerning for underlying cirrhosis, abdominal ultrasound revealed liver cirrhosis with no suspicious lesions identified  - with evidence of portal hypertension with gastropathy and varices post band ligation  - GI following  Other medical problems:  - pituitary macroadenoma, incidental finding per CT scan of head in March 2020   - diabetes, on insulin per primary team     HISTORY OF PRESENT ILLNESS:  Requesting Physician: Radha Vogel MD  Reason for Consult:  Acute kidney injury    Sade Camejo is a 77 y o  male with history of diabetes, CAD, cirrhosis, CKD 4, pituitary adenoma who was [facility] to [facility] after presenting with melena and hematochezia x 1 for Gi workup and possible EGD  During stay, patient had undergone EGD revealing multiple grade 2 varices in which bands were placed, 3 superficial ulcers in the duodenum, portal hypertension gastropathy, no active GI bleeding  Upon assessment and evaluation, patient resting in chair  Patient is without shortness of breath or chest pain  Patient without current nausea, vomiting, diarrhea  Clinically patient appears hypovolemic  He denies NSAID use or difficulty voiding  Patient wishes to be discharged and is eager to eat his spaghetti       A renal consultation is requested today for assistance in the management of acute kidney injury  PAST MEDICAL HISTORY:  Past Medical History:   Diagnosis Date    Cirrhosis ([facility] )     COPD (chronic obstructive pulmonary disease) ([facility] )     Coronary artery disease     Dementia ([facility] )     Diabetes mellitus ([facility] )     Diverticulosis     Gastric reflux     Hyperlipemia     Panlobular emphysema (HCC)     Pituitary mass (HCC)     Renal disorder     CKD Stage 3 & Bilat multi renal nodules    Thrombocyte disorder (HCC)     thrombocytopenia    Unstable angina (HCC)        PAST SURGICAL HISTORY:  Past Surgical History:   Procedure Laterality Date    CARDIAC SURGERY      unspecified    CHOLECYSTECTOMY      CORONARY ARTERY BYPASS GRAFT      2-3 years ago    HERNIA REPAIR      IR BIOPSY BONE MARROW  5/12/2020       ALLERGIES:  Allergies   Allergen Reactions    Erythromycin     Penicillins     Shellfish-Derived Products        SOCIAL HISTORY:  Social History     Substance and Sexual Activity   Alcohol Use Never    Frequency: Never    Binge frequency: Never     Social History     Substance and Sexual Activity   Drug Use Never     Social History     Tobacco Use   Smoking Status Former Smoker    Packs/day: 0 25   Smokeless Tobacco Never Used   Tobacco Comment    Hasn't smoked since February       FAMILY HISTORY:  History reviewed  No pertinent family history      MEDICATIONS:    Current Facility-Administered Medications:     acetaminophen (TYLENOL) tablet 650 mg, 650 mg, Oral, Q6H PRN, Benjie Arce PA-C    aluminum-magnesium hydroxide-simethicone (MYLANTA) oral suspension 30 mL, 30 mL, Oral, Q4H PRN, Elin Soto PA-C, 30 mL at 03/09/21 2238    atorvastatin (LIPITOR) tablet 40 mg, 40 mg, Oral, Daily With Eloy Wooten PA-C, 40 mg at 03/09/21 1820    bisacodyl (DULCOLAX) rectal suppository 10 mg, 10 mg, Rectal, Daily PRN, Benjie Arce PA-C    calcium carbonate (TUMS) chewable tablet 1,000 mg, 1,000 mg, Oral, Daily PRN, Anthony Dopp, PA-C, 1,000 mg at 03/08/21 1522    insulin glargine (LANTUS) subcutaneous injection 5 Units 0 05 mL, 5 Units, Subcutaneous, HS, Gilbert Dopp, PA-C, 5 Units at 03/09/21 2231    insulin lispro (HumaLOG) 100 units/mL subcutaneous injection 1-5 Units, 1-5 Units, Subcutaneous, 4x Daily (AC & HS), 1 Units at 03/10/21 1136 **AND** Fingerstick Glucose (POCT), , , Q6H, Melinda Grijalva MD    ondansetron [facility]) injection 4 mg, 4 mg, Intravenous, Q6H PRN, Gilbert Dopp, PA-C    pantoprazole (PROTONIX) EC tablet 40 mg, 40 mg, Oral, Early Morning, Forest Lewis MD    REVIEW OF SYSTEMS:  All the systems were reviewed and were negative except as documented on the HPI      PHYSICAL EXAM:  Current Weight:    First Weight:    Vitals:    03/09/21 0721 03/09/21 1526 03/09/21 2300 03/10/21 0731   BP: 142/65 123/71 134/56 126/61   BP Location: Right arm Right arm Right arm Right arm   Pulse: 63 65 73 70   Resp: 18 19 18 18   Temp: 97 5 °F (36 4 °C)  97 5 °F (36 4 °C) (!) 97 4 °F (36 3 °C)   TempSrc: Temporal  Temporal Temporal   SpO2: 99% 100% 100% 98%       Intake/Output Summary (Last 24 hours) at 3/10/2021 1235  Last data filed at 3/10/2021 1138  Gross per 24 hour   Intake --   Output 325 ml   Net -325 ml     General: conscious, coherent, cooperative, not in acute distress  Skin: no rash, warm, dry  Eyes: pale conjunctivae, anicteric sclerae  ENT:  Dry lips and mucous membranes  Neck: supple, with trachea midline  Chest: clear breath sounds bilaterally  CVS: normal rate, regular rhythm  Abdomen:  rounded, distended  Extremities: no edema of both legs  Neuro: awake, interactive  Psych:  Flat affect       Invasive Devices:        Lab Results:   Results from last 7 days   Lab Units 03/10/21  0506 03/09/21  1220 03/09/21  0703  03/08/21  0526 03/07/21  1906   WBC Thousand/uL 2 93*  --  3 14*  --  3 94* 9 13   HEMOGLOBIN g/dL 8 7* 10 9* 9 2*   < > 5 7* 7 9*   HEMATOCRIT % 27 3*  --  28 3*  --  18 3* 25 5*   PLATELETS Thousands/uL 74*  --  78*  --  92* 208   POTASSIUM mmol/L 3 7  --  3 7  --  4 4 4 5   CHLORIDE mmol/L 112*  --  114*  --  114* 111*   CO2 mmol/L 20*  --  20*  --  16* 17*   BUN mg/dL 39*  --  39*  --  37* 40*   CREATININE mg/dL 2 76*  --  2 62*  --  2 21* 2 69*   CALCIUM mg/dL 8 4  --  7 9*  --  8 0* 8 6   MAGNESIUM mg/dL  --   --   --   --   --  1 4*   ALK PHOS U/L  --   --   --   --   --  35*   ALT U/L  --   --   --   --   --  20   AST U/L  --   --   --   --   --  55*    < > = values in this interval not displayed

## 2021-03-10 NOTE — ASSESSMENT & PLAN NOTE
Pt presents with large bloody BM x 1  7 days of non-bloody diarrheal illness preceding bloody BM    · hemoglobin 8 7 status post 2 units PRBCs on 03/08/2021   · CT abdomen (3/7/21) - Moderate circumferential thickening of the proximal duodenum in keeping with a nonspecific duodenitis  Ulcer disease is part of the differential diagnosis    · Status post EGD on 03/08/2021 which revealed 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, 3 superficial ulcers in duodenum, portal hypertensive gastropathy   · No further episodes of bleeding  · GI follow-up appreciated, will monitor H&H, continue PPI

## 2021-03-10 NOTE — ASSESSMENT & PLAN NOTE
Cirrhosis of unknown etiology patient did state use a regular user of alcohol when he was in the [employer] many years ago, hepatitis A, B and C negative  · CT abdomen - Nodular liver concerning for underlying cirrhosis  · Abdominal U/S - Liver cirrhosis  No suspicious lesions identified    · GI input appreciated, check hepatitis-B and C, alpha fetoprotein  · Patient with significant thrombocytopenia in March 2020 at which point hematology was following and patient was started on IV steroids and received IVIG x2 platelets today 74, will closely monitor

## 2021-03-11 PROBLEM — D62 ACUTE POST-HEMORRHAGIC ANEMIA: Status: ACTIVE | Noted: 2021-03-11

## 2021-03-11 LAB
ABO GROUP BLD: NORMAL
ANION GAP SERPL CALCULATED.3IONS-SCNC: 8 MMOL/L (ref 4–13)
BASOPHILS # BLD AUTO: 0.02 THOUSANDS/ΜL (ref 0–0.1)
BASOPHILS NFR BLD AUTO: 1 % (ref 0–1)
BLD GP AB SCN SERPL QL: NEGATIVE
BUN SERPL-MCNC: 30 MG/DL (ref 5–25)
CALCIUM SERPL-MCNC: 8.1 MG/DL (ref 8.3–10.1)
CHLORIDE SERPL-SCNC: 110 MMOL/L (ref 100–108)
CO2 SERPL-SCNC: 23 MMOL/L (ref 21–32)
CREAT SERPL-MCNC: 2.57 MG/DL (ref 0.6–1.3)
CREAT UR-MCNC: 110.5 MG/DL
EOSINOPHIL # BLD AUTO: 0.07 THOUSAND/ΜL (ref 0–0.61)
EOSINOPHIL NFR BLD AUTO: 3 % (ref 0–6)
ERYTHROCYTE [DISTWIDTH] IN BLOOD BY AUTOMATED COUNT: 17.8 % (ref 11.6–15.1)
GFR SERPL CREATININE-BSD FRML MDRD: 24 ML/MIN/1.73SQ M
GLUCOSE SERPL-MCNC: 114 MG/DL (ref 65–140)
GLUCOSE SERPL-MCNC: 131 MG/DL (ref 65–140)
GLUCOSE SERPL-MCNC: 180 MG/DL (ref 65–140)
GLUCOSE SERPL-MCNC: 75 MG/DL (ref 65–140)
GLUCOSE SERPL-MCNC: 86 MG/DL (ref 65–140)
HCT VFR BLD AUTO: 23.9 % (ref 36.5–49.3)
HEMOCCULT STL QL: NEGATIVE
HGB BLD-MCNC: 7.6 G/DL (ref 12–17)
IMM GRANULOCYTES # BLD AUTO: 0.02 THOUSAND/UL (ref 0–0.2)
IMM GRANULOCYTES NFR BLD AUTO: 1 % (ref 0–2)
LYMPHOCYTES # BLD AUTO: 0.9 THOUSANDS/ΜL (ref 0.6–4.47)
LYMPHOCYTES NFR BLD AUTO: 37 % (ref 14–44)
MAGNESIUM SERPL-MCNC: 1.2 MG/DL (ref 1.6–2.6)
MCH RBC QN AUTO: 30 PG (ref 26.8–34.3)
MCHC RBC AUTO-ENTMCNC: 31.8 G/DL (ref 31.4–37.4)
MCV RBC AUTO: 95 FL (ref 82–98)
MONOCYTES # BLD AUTO: 0.22 THOUSAND/ΜL (ref 0.17–1.22)
MONOCYTES NFR BLD AUTO: 9 % (ref 4–12)
NEUTROPHILS # BLD AUTO: 1.22 THOUSANDS/ΜL (ref 1.85–7.62)
NEUTS SEG NFR BLD AUTO: 49 % (ref 43–75)
NRBC BLD AUTO-RTO: 0 /100 WBCS
PHOSPHATE SERPL-MCNC: 2 MG/DL (ref 2.3–4.1)
PLATELET # BLD AUTO: 62 THOUSANDS/UL (ref 149–390)
PMV BLD AUTO: 10.6 FL (ref 8.9–12.7)
POTASSIUM SERPL-SCNC: 3.6 MMOL/L (ref 3.5–5.3)
RBC # BLD AUTO: 2.53 MILLION/UL (ref 3.88–5.62)
RH BLD: POSITIVE
SODIUM 24H UR-SCNC: 95 MOL/L
SODIUM SERPL-SCNC: 141 MMOL/L (ref 136–145)
SPECIMEN EXPIRATION DATE: NORMAL
WBC # BLD AUTO: 2.45 THOUSAND/UL (ref 4.31–10.16)

## 2021-03-11 PROCEDURE — 97530 THERAPEUTIC ACTIVITIES: CPT

## 2021-03-11 PROCEDURE — P9016 RBC LEUKOCYTES REDUCED: HCPCS

## 2021-03-11 PROCEDURE — 99232 SBSQ HOSP IP/OBS MODERATE 35: CPT | Performed by: NURSE PRACTITIONER

## 2021-03-11 PROCEDURE — 86901 BLOOD TYPING SEROLOGIC RH(D): CPT | Performed by: INTERNAL MEDICINE

## 2021-03-11 PROCEDURE — 97110 THERAPEUTIC EXERCISES: CPT

## 2021-03-11 PROCEDURE — 82272 OCCULT BLD FECES 1-3 TESTS: CPT | Performed by: NURSE PRACTITIONER

## 2021-03-11 PROCEDURE — 80048 BASIC METABOLIC PNL TOTAL CA: CPT | Performed by: INTERNAL MEDICINE

## 2021-03-11 PROCEDURE — 85025 COMPLETE CBC W/AUTO DIFF WBC: CPT | Performed by: INTERNAL MEDICINE

## 2021-03-11 PROCEDURE — 82948 REAGENT STRIP/BLOOD GLUCOSE: CPT

## 2021-03-11 PROCEDURE — 30233N1 TRANSFUSION OF NONAUTOLOGOUS RED BLOOD CELLS INTO PERIPHERAL VEIN, PERCUTANEOUS APPROACH: ICD-10-PCS | Performed by: INTERNAL MEDICINE

## 2021-03-11 PROCEDURE — 99232 SBSQ HOSP IP/OBS MODERATE 35: CPT | Performed by: INTERNAL MEDICINE

## 2021-03-11 PROCEDURE — 82570 ASSAY OF URINE CREATININE: CPT | Performed by: NURSE PRACTITIONER

## 2021-03-11 PROCEDURE — 84300 ASSAY OF URINE SODIUM: CPT | Performed by: NURSE PRACTITIONER

## 2021-03-11 PROCEDURE — 83735 ASSAY OF MAGNESIUM: CPT | Performed by: NURSE PRACTITIONER

## 2021-03-11 PROCEDURE — 84100 ASSAY OF PHOSPHORUS: CPT | Performed by: NURSE PRACTITIONER

## 2021-03-11 PROCEDURE — 86900 BLOOD TYPING SEROLOGIC ABO: CPT | Performed by: INTERNAL MEDICINE

## 2021-03-11 PROCEDURE — 86850 RBC ANTIBODY SCREEN: CPT | Performed by: INTERNAL MEDICINE

## 2021-03-11 RX ADMIN — INSULIN GLARGINE 5 UNITS: 100 INJECTION, SOLUTION SUBCUTANEOUS at 23:07

## 2021-03-11 RX ADMIN — PANTOPRAZOLE SODIUM 40 MG: 40 TABLET, DELAYED RELEASE ORAL at 06:01

## 2021-03-11 RX ADMIN — ATORVASTATIN CALCIUM 40 MG: 40 TABLET, FILM COATED ORAL at 15:31

## 2021-03-11 RX ADMIN — ALBUMIN (HUMAN) 25 G: 0.25 INJECTION, SOLUTION INTRAVENOUS at 23:09

## 2021-03-11 RX ADMIN — ALBUMIN (HUMAN) 25 G: 0.25 INJECTION, SOLUTION INTRAVENOUS at 15:31

## 2021-03-11 RX ADMIN — ALBUMIN (HUMAN) 25 G: 0.25 INJECTION, SOLUTION INTRAVENOUS at 06:01

## 2021-03-11 NOTE — ASSESSMENT & PLAN NOTE
Cirrhosis of unknown etiology patient did state use a regular user of alcohol when he was in the [employer] many years ago, hepatitis A, B and C negative  · CT abdomen - Nodular liver concerning for underlying cirrhosis  · Abdominal U/S - Liver cirrhosis  No suspicious lesions identified    · GI input appreciated

## 2021-03-11 NOTE — PROGRESS NOTES
NEPHROLOGY PROGRESS NOTE   Verónica Pereira 77 y o  male MRN: [id number]  Unit/Bed#: E5 -01 Encounter: [id number]  Reason for Consult: BEN    ASSESSMENT AND PLAN:  Patient is 23-year-old male with significant medical issues of progressive CKD stage 4, cryptogenic cirrhosis, diabetes, presented with GI bleed  We are consulted for CKD management  Old medical records were reviewed  More than 10 systems were reviewed and no other pertinent positive findings other than mentioned in the note  CT scan shows unchanged renal cyst      Progressive CKD stage 4, baseline serum creatinine 2 4 to 2 8 since mid 2020, 1 6 to 1 8 in March 2020, follows with Dr Karin Estevez  -creatinine 2 5 slightly improved  -follow results for urine sodium, urine creatinine  -UA bland  -CT scan shows no ascites, bladder scan for PVR  -continue IV albumin 25 g Q 8 hours today  -CKD may be in the setting of diabetes,? Hepatorenal  -avoid nephrotoxins or NSAIDs      Low bicarb, could be in the setting of chronic respiratory alkalosis   -bicarb level has improved 23     Anemia in CKD, blood-loss anemia, GI follow-up, status post EGD this admission shows esophageal varices, duodenal ulcers  -status post blood transfusion this admission      Blood pressure acceptable  Avoid hypotension      Cryptogenic cirrhosis, GI follow-up  Volume status, currently seems to be fairly euvolemic  Avoiding diuretics for time being  Albumin trial as above      Discussed above plan in detail with primary team    SUBJECTIVE:  Patient seen and examined at bedside  No chest pain, denies worsening shortness of breath, nausea, vomiting, abdominal pain  No urinary complaints       OBJECTIVE:  Current Weight:    Vitals:    03/11/21 0739   BP: 117/57   Pulse: 66   Resp: 18   Temp: 99 7 °F (37 6 °C)   SpO2: 95%     No intake or output data in the 24 hours ending 03/11/21 1145  Wt Readings from Last 3 Encounters:   03/07/21 58 7 kg (129 lb 6 6 oz)   11/03/20 62 6 kg (138 lb)   06/30/20 62 6 kg (138 lb)     Temp Readings from Last 3 Encounters:   03/11/21 99 7 °F (37 6 °C) (Temporal)   03/07/21 (!) 97 1 °F (36 2 °C) (Temporal)   11/03/20 98 6 °F (37 °C)     BP Readings from Last 3 Encounters:   03/11/21 117/57   03/07/21 103/56   11/03/20 133/83     Pulse Readings from Last 3 Encounters:   03/11/21 66   03/07/21 80   11/03/20 80        Physical Examination:  General:  Sitting in bed, no acute distress   Eyes:  Mild conjunctival pallor present  ENT:  External examination of ears and nose unremarkable  Neck:  No obvious lymphadenopathy appreciated  Respiratory:  Bilateral air entry present  CVS:  S1, S2 present  GI:  Soft, non tender  CNS:  Active alert oriented x3  Skin:  No new rash in legs  Musculoskeletal:  No obvious gross deformity noted    Medications:    Current Facility-Administered Medications:     acetaminophen (TYLENOL) tablet 650 mg, 650 mg, Oral, Q6H PRN, Haleigh Brown PA-C    albumin human (FLEXBUMIN) 25 % injection 25 g, 25 g, Intravenous, Q8H, Flor Osman, CRNP, 25 g at 03/11/21 0601    atorvastatin (LIPITOR) tablet 40 mg, 40 mg, Oral, Daily With Richard Galloway PA-C, 40 mg at 03/10/21 1636    bisacodyl (DULCOLAX) rectal suppository 10 mg, 10 mg, Rectal, Daily PRN, Haleigh Brown PA-C    calcium carbonate (TUMS) chewable tablet 1,000 mg, 1,000 mg, Oral, Daily PRN, Haleigh Brown PA-C, 1,000 mg at 03/08/21 1522    insulin glargine (LANTUS) subcutaneous injection 5 Units 0 05 mL, 5 Units, Subcutaneous, HS, Haleigh Brown PA-C, 5 Units at 03/10/21 2219    insulin lispro (HumaLOG) 100 units/mL subcutaneous injection 1-5 Units, 1-5 Units, Subcutaneous, 4x Daily (AC & HS) **AND** Fingerstick Glucose (POCT), , , 4x Daily AC and at bedtime, Errol Campos PA-C    ondansetron [facility]) injection 4 mg, 4 mg, Intravenous, Q6H PRN, Haleigh Brown PA-C    pantoprazole (PROTONIX) EC tablet 40 mg, 40 mg, Oral, Early Morning, Alvin Valiente MD, 40 mg at 03/11/21 0601    Laboratory Results:  Results from last 7 days   Lab Units 03/11/21  0555 03/10/21  0506 03/09/21  1220 03/09/21  0703 03/09/21  0037 03/08/21  1813 03/08/21  0526 03/07/21  1906   WBC Thousand/uL 2 45* 2 93*  --  3 14*  --   --  3 94* 9 13   HEMOGLOBIN g/dL 7 6* 8 7* 10 9* 9 2* 9 4* 10 4* 5 7* 7 9*   HEMATOCRIT % 23 9* 27 3*  --  28 3*  --   --  18 3* 25 5*   PLATELETS Thousands/uL 62* 74*  --  78*  --   --  92* 208   SODIUM mmol/L 141 142  --  143  --   --  143 141   POTASSIUM mmol/L 3 6 3 7  --  3 7  --   --  4 4 4 5   CHLORIDE mmol/L 110* 112*  --  114*  --   --  114* 111*   CO2 mmol/L 23 20*  --  20*  --   --  16* 17*   BUN mg/dL 30* 39*  --  39*  --   --  37* 40*   CREATININE mg/dL 2 57* 2 76*  --  2 62*  --   --  2 21* 2 69*   CALCIUM mg/dL 8 1* 8 4  --  7 9*  --   --  8 0* 8 6   MAGNESIUM mg/dL 1 2*  --   --   --   --   --   --  1 4*   PHOSPHORUS mg/dL 2 0*  --   --   --   --   --   --   --        No orders to display       Portions of the record may have been created with voice recognition software  Occasional wrong word or "sound a like" substitutions may have occurred due to the inherent limitations of voice recognition software  Read the chart carefully and recognize, using context, where substitutions have occurred

## 2021-03-11 NOTE — ASSESSMENT & PLAN NOTE
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 on 3/23/20  Redemonstrated on MRI 6/24/20 - 3 4 cm relatively homogeneous mass emanating from the pituitary gland consistent with microadenoma  Extension into the left cavernous sinus, sphenoid sinus, with elevation of contents of the suprasellar cyst   Diameter of the cavernous carotid arteries   do not appear significantly affected  Was following with [facility], who had ordered pituitary function labs and recommended outpatient follow-up with ophthalmology and follow-up MRI  However, labs were never obtained and patient appeared to have been lost to follow-up  Patient states he was told it was "benign", also states he has seen an ophtholmologist, although no records in [facility]    Neurosurgery had stated patient is not a surgical candidate

## 2021-03-11 NOTE — PLAN OF CARE
Problem: Potential for Falls  Goal: Patient will remain free of falls  Description: INTERVENTIONS:  - Assess patient frequently for physical needs  -  Identify cognitive and physical deficits and behaviors that affect risk of falls    -  [place] fall precautions as indicated by assessment   - Educate patient/family on patient safety including physical limitations  - Instruct patient to call for assistance with activity based on assessment  - Modify environment to reduce risk of injury  - Consider OT/PT consult to assist with strengthening/mobility  Outcome: Progressing     Problem: Prexisting or High Potential for Compromised Skin Integrity  Goal: Skin integrity is maintained or improved  Description: INTERVENTIONS:  - Identify patients at risk for skin breakdown  - Assess and monitor skin integrity  - Assess and monitor nutrition and hydration status  - Monitor labs   - Assess for incontinence   - Turn and reposition patient  - Assist with mobility/ambulation  - Relieve pressure over bony prominences  - Avoid friction and shearing  - Provide appropriate hygiene as needed including keeping skin clean and dry  - Evaluate need for skin moisturizer/barrier cream  - Collaborate with interdisciplinary team   - Patient/family teaching  - Consider wound care consult   Outcome: Progressing     Problem: PAIN - ADULT  Goal: Verbalizes/displays adequate comfort level or baseline comfort level  Description: Interventions:  - Encourage patient to monitor pain and request assistance  - Assess pain using appropriate pain scale  - Administer analgesics based on type and severity of pain and evaluate response  - Implement non-pharmacological measures as appropriate and evaluate response  - Consider cultural and social influences on pain and pain management  - Notify physician/advanced practitioner if interventions unsuccessful or patient reports new pain  Outcome: Progressing     Problem: INFECTION - ADULT  Goal: Absence or prevention of progression during hospitalization  Description: INTERVENTIONS:  - Assess and monitor for signs and symptoms of infection  - Monitor lab/diagnostic results  - Monitor all insertion sites, i e  indwelling lines, tubes, and drains  - Monitor endotracheal if appropriate and nasal secretions for changes in amount and color  - [place] appropriate cooling/warming therapies per order  - Administer medications as ordered  - Instruct and encourage patient and family to use good hand hygiene technique  - Identify and instruct in appropriate isolation precautions for identified infection/condition  Outcome: Progressing  Goal: Absence of fever/infection during neutropenic period  Description: INTERVENTIONS:  - Monitor WBC    Outcome: Progressing     Problem: SAFETY ADULT  Goal: Patient will remain free of falls  Description: INTERVENTIONS:  - Assess patient frequently for physical needs  -  Identify cognitive and physical deficits and behaviors that affect risk of falls    -  [place] fall precautions as indicated by assessment   - Educate patient/family on patient safety including physical limitations  - Instruct patient to call for assistance with activity based on assessment  - Modify environment to reduce risk of injury  - Consider OT/PT consult to assist with strengthening/mobility  Outcome: Progressing  Goal: Maintain or return to baseline ADL function  Description: INTERVENTIONS:  -  Assess patient's ability to carry out ADLs; assess patient's baseline for ADL function and identify physical deficits which impact ability to perform ADLs (bathing, care of mouth/teeth, toileting, grooming, dressing, etc )  - Assess/evaluate cause of self-care deficits   - Assess range of motion  - Assess patient's mobility; develop plan if impaired  - Assess patient's need for assistive devices and provide as appropriate  - Encourage maximum independence but intervene and supervise when necessary  - Involve family in performance of ADLs  - Assess for home care needs following discharge   - Consider OT consult to assist with ADL evaluation and planning for discharge  - Provide patient education as appropriate  Outcome: Progressing  Goal: Maintain or return mobility status to optimal level  Description: INTERVENTIONS:  - Assess patient's baseline mobility status (ambulation, transfers, stairs, etc )    - Identify cognitive and physical deficits and behaviors that affect mobility  - Identify mobility aids required to assist with transfers and/or ambulation (gait belt, sit-to-stand, lift, walker, cane, etc )  - [place] fall precautions as indicated by assessment  - Record patient progress and toleration of activity level on Mobility SBAR; progress patient to next Phase/Stage  - Instruct patient to call for assistance with activity based on assessment  - Consider rehabilitation consult to assist with strengthening/weightbearing, etc   Outcome: Progressing

## 2021-03-11 NOTE — PHYSICAL THERAPY NOTE
Physical Therapy Progress Note     03/11/21 1359   PT Last Visit   PT Visit Date 03/11/21   Note Type   Note Type Treatment   Pain Assessment   Pain Assessment Tool Pain Assessment not indicated - pt denies pain   Pain Score No Pain   Restrictions/Precautions   Weight Bearing Precautions Per Order No   Other Precautions Cognitive; Chair Alarm; Bed Alarm; Fall Risk   General   Chart Reviewed Yes   Response to Previous Treatment Patient reporting fatigue but able to participate  Family/Caregiver Present No   Subjective   Subjective "I want to rest "   Bed Mobility   Supine to Sit 5  Supervision   Additional items Assist x 1;Bedrails;HOB elevated;Leg ; Increased time required;Verbal cues;LE management   Sit to Supine 5  Supervision   Additional items Assist x 1;Bedrails;Leg ; Increased time required;LE management;Verbal cues   Endurance Deficit   Endurance Deficit Yes   Endurance Deficit Description fatigue   Activity Tolerance   Activity Tolerance Patient limited by fatigue   Nurse Made Aware Yes   Exercises   TKR Supine;10 reps;AAROM; Bilateral   Assessment   Prognosis Fair   Problem List Decreased range of motion;Decreased strength;Decreased endurance; Impaired balance;Decreased mobility; Decreased cognition;Decreased safety awareness; Impaired judgement; Impaired hearing;Decreased skin integrity   Assessment Pt  supine in bed upon my arrival  Pt  reporting increased fatigue this PM, requesting to remain in bed  Pt  agreeable to limited therapeutic intervention  Performance of HEP supine in bed with cues provided for proper completion  Pt  progressed with bed level transfers with standbyA of therapist with cues for hand placement/technique  Pt  remained supine in bed with alarm active at end of treatment session  PT will continue to recommend d/c home with increased support and PT provided when medically stable      Barriers to Discharge None   Goals   Patient Goals To rest    STG Expiration Date 03/18/21   PT Treatment Day 3   Plan   Treatment/Interventions LE strengthening/ROM; Functional transfer training; Therapeutic exercise; Endurance training;Bed mobility;Spoke to nursing;Spoke to case management   Progress Slow progress, decreased activity tolerance   PT Frequency Other (Comment)  (3-5x/wk)   Recommendation   PT Discharge Recommendation Home with skilled therapy; Return to previous environment with social support  ([facility])   Equipment Recommended Other (Comment)  (has DME at home  )   PT - OK to Discharge Yes  (if d/c when medically stbale )   AM-PAC Basic Mobility Inpatient   Turning in Bed Without Bedrails 3   Lying on Back to Sitting on Edge of Flat Bed 3   Moving Bed to Chair 3   Standing Up From Chair 3   Walk in Room 3   Climb 3-5 Stairs 3   Basic Mobility Inpatient Raw Score 18   Basic Mobility Standardized Score 41 05     Heidi Velasquez, PTA

## 2021-03-11 NOTE — ASSESSMENT & PLAN NOTE
· Secondary to GI bleed  · Status post unit PRBC transfused on 03/08/2021  · Hemoglobin 7 6, plan to transfuse 1 unit PRBC today  · Monitor H&H

## 2021-03-11 NOTE — PROGRESS NOTES
Patient Name: Meka Fernandez  Patient MRN: [id number]  Date: 03/11/21  Service: [facility]    Wallace Franco is a 77 y o  male who was admitted with GI bleed  He was noted to have a drop in his hemoglobin to 7 6 today  We have been asked to see him again  He has had 1 bowel movement last evening which was dark brown but no melena and no bright red blood  He has no complaints of any abdominal pain he has had no nausea he is tolerating his diet very well  Objective     Vitals  Blood pressure 117/57, pulse 66, temperature 99 7 °F (37 6 °C), temperature source Temporal, resp  rate 18, SpO2 95 %  General: Alert, no apparent distress  Eyes: No scleral icterus  ENT: MMM  Card: RRR no murmur  Lungs: Clear to ascultation b/l  No wheezes, rales, rhonchi  Abdomen: Soft  Nontender  Nondistended  Bowel sounds present and normoactive    Skin: No jaundice  Neuro: Alert and oriented x3        Laboratory Studies  Lab Results   Component Value Date    CREATININE 2 57 (H) 03/11/2021    BUN 30 (H) 03/11/2021    SODIUM 141 03/11/2021    K 3 6 03/11/2021     (H) 03/11/2021    CO2 23 03/11/2021    CALCIUM 8 1 (L) 03/11/2021    ALKPHOS 35 (L) 03/07/2021    AST 55 (H) 03/07/2021    ALT 20 03/07/2021     Lab Results   Component Value Date    WBC 2 45 (L) 03/11/2021    HGB 7 6 (L) 03/11/2021    HCT 23 9 (L) 03/11/2021    PLT 62 (L) 03/11/2021    MCV 95 03/11/2021     Lab Results   Component Value Date    PROTIME 18 3 (H) 03/09/2021    INR 1 55 (H) 03/09/2021       Imaging and Other Studies    Inhouse Medications       Current Facility-Administered Medications:     acetaminophen (TYLENOL) tablet 650 mg, 650 mg, Oral, Q6H PRN    albumin human (FLEXBUMIN) 25 % injection 25 g, 25 g, Intravenous, Q8H, 25 g at 03/11/21 0601    atorvastatin (LIPITOR) tablet 40 mg, 40 mg, Oral, Daily With Dinner, 40 mg at 03/10/21 1636    bisacodyl (DULCOLAX) rectal suppository 10 mg, 10 mg, Rectal, Daily PRN    calcium carbonate (TUMS) chewable tablet 1,000 mg, 1,000 mg, Oral, Daily PRN, 1,000 mg at 03/08/21 1522    insulin glargine (LANTUS) subcutaneous injection 5 Units 0 05 mL, 5 Units, Subcutaneous, HS, 5 Units at 03/10/21 2219    insulin lispro (HumaLOG) 100 units/mL subcutaneous injection 1-5 Units, 1-5 Units, Subcutaneous, 4x Daily (AC & HS) **AND** Fingerstick Glucose (POCT), , , 4x Daily AC and at bedtime    ondansetron (ZOFRAN) injection 4 mg, 4 mg, Intravenous, Q6H PRN    pantoprazole (PROTONIX) EC tablet 40 mg, 40 mg, Oral, Early Morning, 40 mg at 03/11/21 0601      Assessment/Plan:  1  GI bleed status post EGD on 3/8 which showed esophageal varices with red color signs-of 3 bands placed successfully, 3 superficial duodenal ulcers with clean base and portal hypertensive gastropathy, no active bleeding was seen     Patient had been doing well and had responded to transfusion  He has been having progressive drops in his hemoglobin is currently at 7 6  He is not passing any melena or any bright red blood per rectum presently  He is currently on a regular diet  Patient is to receive 1 unit of packed red blood cells today will continue to monitor if hemoglobin continues to drop may need repeat EGD  Continue PPI b i d      JOSE ALEJANDRO Grant

## 2021-03-11 NOTE — ASSESSMENT & PLAN NOTE
Pt presents with large bloody BM x 1  7 days of non-bloody diarrheal illness preceding bloody BM    · hemoglobin 7 6 status post 2 units PRBCs on 03/08/2021 plan to transfuse 1 unit PRBC today  · CT abdomen (3/7/21) - Moderate circumferential thickening of the proximal duodenum in keeping with a nonspecific duodenitis  Ulcer disease is part of the differential diagnosis    · Status post EGD on 03/08/2021 which revealed 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, 3 superficial ulcers in duodenum, portal hypertensive gastropathy   · No further episodes of bleeding however hemoglobin dropped   · Discussed with GI bleed anemia is multifactorial secondary to chronic kidney disease will monitor H&H  · GI follow-up appreciated, will monitor H&H, continue PPI

## 2021-03-11 NOTE — ASSESSMENT & PLAN NOTE
Patient with significant thrombocytopenia in March 2020 at which point hematology was following and patient was started on IV steroids and received IVIG x2 platelets today [id number] secondary to anemia however if worsening thrombocytopenia will consider Hematology evaluation

## 2021-03-11 NOTE — NURSING NOTE
Agree w/ previous nurses assessment  Patient AOx3, not oriented to date; Pleasant  Dinner ordered and received  IV dressing changed due drainage, IV flushed and saline locked; Patent  Patient in bed w/ call bell within reach

## 2021-03-11 NOTE — PLAN OF CARE
Problem: Potential for Falls  Goal: Patient will remain free of falls  Description: INTERVENTIONS:  - Assess patient frequently for physical needs  -  Identify cognitive and physical deficits and behaviors that affect risk of falls    -  [place] fall precautions as indicated by assessment   - Educate patient/family on patient safety including physical limitations  - Instruct patient to call for assistance with activity based on assessment  - Modify environment to reduce risk of injury  - Consider OT/PT consult to assist with strengthening/mobility  Outcome: Progressing     Problem: Prexisting or High Potential for Compromised Skin Integrity  Goal: Skin integrity is maintained or improved  Description: INTERVENTIONS:  - Identify patients at risk for skin breakdown  - Assess and monitor skin integrity  - Assess and monitor nutrition and hydration status  - Monitor labs   - Assess for incontinence   - Turn and reposition patient  - Assist with mobility/ambulation  - Relieve pressure over bony prominences  - Avoid friction and shearing  - Provide appropriate hygiene as needed including keeping skin clean and dry  - Evaluate need for skin moisturizer/barrier cream  - Collaborate with interdisciplinary team   - Patient/family teaching  - Consider wound care consult   Outcome: Progressing     Problem: PAIN - ADULT  Goal: Verbalizes/displays adequate comfort level or baseline comfort level  Description: Interventions:  - Encourage patient to monitor pain and request assistance  - Assess pain using appropriate pain scale  - Administer analgesics based on type and severity of pain and evaluate response  - Implement non-pharmacological measures as appropriate and evaluate response  - Consider cultural and social influences on pain and pain management  - Notify physician/advanced practitioner if interventions unsuccessful or patient reports new pain  Outcome: Progressing     Problem: INFECTION - ADULT  Goal: Absence or prevention of progression during hospitalization  Description: INTERVENTIONS:  - Assess and monitor for signs and symptoms of infection  - Monitor lab/diagnostic results  - Monitor all insertion sites, i e  indwelling lines, tubes, and drains  - Monitor endotracheal if appropriate and nasal secretions for changes in amount and color  - [place] appropriate cooling/warming therapies per order  - Administer medications as ordered  - Instruct and encourage patient and family to use good hand hygiene technique  - Identify and instruct in appropriate isolation precautions for identified infection/condition  Outcome: Progressing  Goal: Absence of fever/infection during neutropenic period  Description: INTERVENTIONS:  - Monitor WBC    Outcome: Progressing     Problem: SAFETY ADULT  Goal: Patient will remain free of falls  Description: INTERVENTIONS:  - Assess patient frequently for physical needs  -  Identify cognitive and physical deficits and behaviors that affect risk of falls    -  [place] fall precautions as indicated by assessment   - Educate patient/family on patient safety including physical limitations  - Instruct patient to call for assistance with activity based on assessment  - Modify environment to reduce risk of injury  - Consider OT/PT consult to assist with strengthening/mobility  Outcome: Progressing  Goal: Maintain or return to baseline ADL function  Description: INTERVENTIONS:  -  Assess patient's ability to carry out ADLs; assess patient's baseline for ADL function and identify physical deficits which impact ability to perform ADLs (bathing, care of mouth/teeth, toileting, grooming, dressing, etc )  - Assess/evaluate cause of self-care deficits   - Assess range of motion  - Assess patient's mobility; develop plan if impaired  - Assess patient's need for assistive devices and provide as appropriate  - Encourage maximum independence but intervene and supervise when necessary  - Involve family in performance of ADLs  - Assess for home care needs following discharge   - Consider OT consult to assist with ADL evaluation and planning for discharge  - Provide patient education as appropriate  Outcome: Progressing  Goal: Maintain or return mobility status to optimal level  Description: INTERVENTIONS:  - Assess patient's baseline mobility status (ambulation, transfers, stairs, etc )    - Identify cognitive and physical deficits and behaviors that affect mobility  - Identify mobility aids required to assist with transfers and/or ambulation (gait belt, sit-to-stand, lift, walker, cane, etc )  - [place] fall precautions as indicated by assessment  - Record patient progress and toleration of activity level on Mobility SBAR; progress patient to next Phase/Stage  - Instruct patient to call for assistance with activity based on assessment  - Consider rehabilitation consult to assist with strengthening/weightbearing, etc   Outcome: Progressing     Problem: DISCHARGE PLANNING  Goal: Discharge to home or other facility with appropriate resources  Description: INTERVENTIONS:  - Identify barriers to discharge w/patient and caregiver  - Arrange for needed discharge resources and transportation as appropriate  - Identify discharge learning needs (meds, wound care, etc )  - Arrange for interpretive services to assist at discharge as needed  - Refer to Case Management Department for coordinating discharge planning if the patient needs post-hospital services based on physician/advanced practitioner order or complex needs related to functional status, cognitive ability, or social support system  Outcome: Progressing     Problem: Knowledge Deficit  Goal: Patient/family/caregiver demonstrates understanding of disease process, treatment plan, medications, and discharge instructions  Description: Complete learning assessment and assess knowledge base    Interventions:  - Provide teaching at level of understanding  - Provide teaching via preferred learning methods  Outcome: Progressing

## 2021-03-11 NOTE — ASSESSMENT & PLAN NOTE
Lab Results   Component Value Date    EGFR 24 03/11/2021    EGFR 22 03/10/2021    EGFR 23 03/09/2021    CREATININE 2 57 (H) 03/11/2021    CREATININE 2 76 (H) 03/10/2021    CREATININE 2 62 (H) 03/09/2021   · Baseline creatinine appears to be close to 2 4-2 6  · Acute kidney injury likely secondary to anemia and GI bleed  · Nephrology consultation appreciated  · Recommend albumin 25 g every 8 hours for 48 hours  · Will monitor BMP

## 2021-03-11 NOTE — ASSESSMENT & PLAN NOTE
No results found for: HGBA1C    Recent Labs     03/10/21  1515 03/10/21  2200 03/11/21  0804 03/11/21  1142   POCGLU 185* 96 75 131       Blood Sugar Average: Last 72 hrs:     Will continue Lantus 5mg  Accuchecks q6h and sliding scale coverage

## 2021-03-11 NOTE — PLAN OF CARE
Problem: PHYSICAL THERAPY ADULT  Goal: Performs mobility at highest level of function for planned discharge setting  See evaluation for individualized goals  Description: Treatment/Interventions: Functional transfer training, LE strengthening/ROM, Therapeutic exercise, Endurance training, Patient/family training, Equipment eval/education, Bed mobility, Gait training, Continued evaluation, Compensatory technique education, Spoke to nursing, OT          See flowsheet documentation for full assessment, interventions and recommendations  Outcome: Progressing  Note: Prognosis: Fair  Problem List: Decreased range of motion, Decreased strength, Decreased endurance, Impaired balance, Decreased mobility, Decreased cognition, Decreased safety awareness, Impaired judgement, Impaired hearing, Decreased skin integrity  Assessment: Pt  supine in bed upon my arrival  Pt  reporting increased fatigue this PM, requesting to remain in bed  Pt  agreeable to limited therapeutic intervention  Performance of HEP supine in bed with cues provided for proper completion  Pt  progressed with bed level transfers with standbyA of therapist with cues for hand placement/technique  Pt  remained supine in bed with alarm active at end of treatment session  PT will continue to recommend d/c home with increased support and PT provided when medically stable  Barriers to Discharge: None     PT Discharge Recommendation: Home with skilled therapy, Return to previous environment with social support(PT at [facility])     PT - OK to Discharge: Yes(if d/c when medically stbale )    See flowsheet documentation for full assessment

## 2021-03-11 NOTE — PROGRESS NOTES
[facility]  Progress Note - Laura Ray 1945, 77 y o  male MRN: [id number]  Unit/Bed#: E5 -01 Encounter: [id number]  Primary Care Provider: Chris Callaway MD   Date and time admitted to hospital: 3/8/2021 12:06 AM    * GI bleed  Assessment & Plan  Pt presents with large bloody BM x 1  7 days of non-bloody diarrheal illness preceding bloody BM    · hemoglobin 7 6 status post 2 units PRBCs on 03/08/2021 plan to transfuse 1 unit PRBC today  · CT abdomen (3/7/21) - Moderate circumferential thickening of the proximal duodenum in keeping with a nonspecific duodenitis  Ulcer disease is part of the differential diagnosis    · Status post EGD on 03/08/2021 which revealed 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, 3 superficial ulcers in duodenum, portal hypertensive gastropathy   · No further episodes of bleeding however hemoglobin dropped   · Discussed with GI bleed anemia is multifactorial secondary to chronic kidney disease will monitor H&H  · GI follow-up appreciated, will monitor H&H, continue PPI        Acute post-hemorrhagic anemia  Assessment & Plan  · Secondary to GI bleed  · Status post unit PRBC transfused on 03/08/2021  · Hemoglobin 7 6, plan to transfuse 1 unit PRBC today  · Monitor H&H    Acute kidney injury superimposed on chronic kidney disease [facility])  Assessment & Plan  Lab Results   Component Value Date    EGFR 24 03/11/2021    EGFR 22 03/10/2021    EGFR 23 03/09/2021    CREATININE 2 57 (H) 03/11/2021    CREATININE 2 76 (H) 03/10/2021    CREATININE 2 62 (H) 03/09/2021   · Baseline creatinine appears to be close to 2 4-2 6  · Acute kidney injury likely secondary to anemia and GI bleed  · Nephrology consultation appreciated  · Recommend albumin 25 g every 8 hours for 48 hours  · Will monitor BMP    Cirrhosis ([facility] [address] )  Assessment & Plan  Cirrhosis of unknown etiology patient did state use a regular user of alcohol when he was in the [employer] many years ago, hepatitis A, B and C negative  · CT abdomen - Nodular liver concerning for underlying cirrhosis  · Abdominal U/S - Liver cirrhosis  No suspicious lesions identified  · GI input appreciated    Type 2 diabetes mellitus with stage 3 chronic kidney disease, with long-term current use of insulin [facility])  Assessment & Plan  No results found for: HGBA1C    Recent Labs     03/10/21  1515 03/10/21  2200 03/11/21  0804 03/11/21  1142   POCGLU 185* 96 75 131       Blood Sugar Average: Last 72 hrs: Will continue Lantus 5mg  Accuchecks q6h and sliding scale coverage      Thrombocytopenia [facility])  Assessment & Plan  Patient with significant thrombocytopenia in March 2020 at which point hematology was following and patient was started on IV steroids and received IVIG x2 platelets today [id number]--[id number] secondary to anemia however if worsening thrombocytopenia will consider Hematology evaluation    CAD (coronary artery disease)  Assessment & Plan  Not on antiplatelet therapy, not on beta blocker therapy  Continue atorvastatin      Pituitary macroadenoma [facility])  Assessment & Plan  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 on 3/23/20  Redemonstrated on MRI 6/24/20 - 3 4 cm relatively homogeneous mass emanating from the pituitary gland consistent with microadenoma  Extension into the left cavernous sinus, sphenoid sinus, with elevation of contents of the suprasellar cyst   Diameter of the cavernous carotid arteries   do not appear significantly affected  Was following with [facility] Neurosurgery, who had ordered pituitary function labs and recommended outpatient follow-up with ophthalmology and follow-up MRI  However, labs were never obtained and patient appeared to have been lost to follow-up  Patient states he was told it was "benign", also states he has seen an ophtholmologist, although no records in [facility]    Neurosurgery had stated patient is not a surgical candidate          VTE Pharmacologic Prophylaxis: Pharmacologic:  Contraindicated due to anemia    Patient Centered Rounds: I have performed bedside rounds with nursing staff today  Discussions with Specialists or Other Care Team Provider:  GI team    Time Spent for Care: 20 minutes  More than 50% of total time spent on counseling and coordination of care as described above  Current Length of Stay: 3 day(s)    Current Patient Status: Inpatient   Certification Statement: The patient will continue to require additional inpatient hospital stay due to anemia, alexei    Discharge Plan / Estimated Discharge Date: TBD    Code Status: Level 1 - Full Code      Subjective:   Patient seen and examined at bedside, denies any abdominal pain, nausea or vomiting    Objective:     Vitals:   Temp (24hrs), Av 4 °F (36 9 °C), Min:97 7 °F (36 5 °C), Max:99 7 °F (37 6 °C)    Temp:  [97 7 °F (36 5 °C)-99 7 °F (37 6 °C)] 99 7 °F (37 6 °C)  HR:  [66-84] 66  Resp:  [18] 18  BP: (117-139)/(57-62) 117/57  SpO2:  [95 %-99 %] 95 %  There is no height or weight on file to calculate BMI  Input and Output Summary (last 24 hours): Intake/Output Summary (Last 24 hours) at 3/11/2021 1442  Last data filed at 3/11/2021 1223  Gross per 24 hour   Intake 240 ml   Output 300 ml   Net -60 ml       Physical Exam:    Constitutional: Patient is oriented to person, place and time, no acute distress  HEENT:  Normocephalic, atraumatic  Cardiovascular: Normal S1S2, RRR, No murmurs/rubs/gallops appreciated  Pulmonary:  Bilateral air entry, No rhonchi/rales/wheezing appreciated  Abdominal: Soft, Bowel sounds present, Non-tender, Non-distended  Extremities:  No cyanosis, clubbing or edema  Neurological: Cranial nerves II-XII grossly intact, sensation intact, otherwise no focal neurological symptoms     Skin:  Warm, dry    Additional Data:     Labs:    Results from last 7 days   Lab Units 21  0555   WBC Thousand/uL 2 45*   HEMOGLOBIN g/dL 7 6*   HEMATOCRIT % 23 9*   PLATELETS Thousands/uL 62*   NEUTROS PCT % 49   LYMPHS PCT % 37   MONOS PCT % 9   EOS PCT % 3     Results from last 7 days   Lab Units 03/11/21  0555  03/07/21  1906   POTASSIUM mmol/L 3 6   < > 4 5   CHLORIDE mmol/L 110*   < > 111*   CO2 mmol/L 23   < > 17*   BUN mg/dL 30*   < > 40*   CREATININE mg/dL 2 57*   < > 2 69*   CALCIUM mg/dL 8 1*   < > 8 6   ALK PHOS U/L  --   --  35*   ALT U/L  --   --  20   AST U/L  --   --  55*    < > = values in this interval not displayed  Results from last 7 days   Lab Units 03/09/21  0703   INR  1 55*        I Have Reviewed All Lab Data Listed Above          Recent Cultures (last 7 days):     Results from last 7 days   Lab Units 03/08/21  0614   C DIFF TOXIN B BY PCR  Negative       Last 24 Hours Medication List:   Current Facility-Administered Medications   Medication Dose Route Frequency Provider Last Rate    acetaminophen  650 mg Oral Q6H PRN Deanna Walter PA-C      albumin human  25 g Intravenous Q8H JOSE ALEJANDRO De La Garza      atorvastatin  40 mg Oral Daily With [place], [place]      bisacodyl  10 mg Rectal Daily PRN Deanna Walter PA-C      calcium carbonate  1,000 mg Oral Daily PRN Deanna Walter PA-C      insulin glargine  5 Units Subcutaneous HS Deanna Walter PA-C      insulin lispro  1-5 Units Subcutaneous 4x Daily (AC & HS) Vi Alamo PA-C      ondansetron  4 mg Intravenous Q6H PRN Deanna Walter PA-C      pantoprazole  40 mg Oral Early Morning Parveen Langley MD          Today, Patient Was Seen By: Theo Adams MD

## 2021-03-12 PROBLEM — E83.42 HYPOMAGNESEMIA: Status: ACTIVE | Noted: 2021-03-12

## 2021-03-12 LAB
ABO GROUP BLD BPU: NORMAL
ANION GAP SERPL CALCULATED.3IONS-SCNC: 9 MMOL/L (ref 4–13)
BASOPHILS # BLD AUTO: 0.01 THOUSANDS/ΜL (ref 0–0.1)
BASOPHILS NFR BLD AUTO: 1 % (ref 0–1)
BPU ID: NORMAL
BUN SERPL-MCNC: 23 MG/DL (ref 5–25)
CALCIUM SERPL-MCNC: 8.7 MG/DL (ref 8.3–10.1)
CHLORIDE SERPL-SCNC: 109 MMOL/L (ref 100–108)
CO2 SERPL-SCNC: 21 MMOL/L (ref 21–32)
CREAT SERPL-MCNC: 2.11 MG/DL (ref 0.6–1.3)
CROSSMATCH: NORMAL
EOSINOPHIL # BLD AUTO: 0.05 THOUSAND/ΜL (ref 0–0.61)
EOSINOPHIL NFR BLD AUTO: 3 % (ref 0–6)
ERYTHROCYTE [DISTWIDTH] IN BLOOD BY AUTOMATED COUNT: 16.9 % (ref 11.6–15.1)
GFR SERPL CREATININE-BSD FRML MDRD: 30 ML/MIN/1.73SQ M
GLUCOSE SERPL-MCNC: 138 MG/DL (ref 65–140)
GLUCOSE SERPL-MCNC: 77 MG/DL (ref 65–140)
GLUCOSE SERPL-MCNC: 78 MG/DL (ref 65–140)
GLUCOSE SERPL-MCNC: 84 MG/DL (ref 65–140)
GLUCOSE SERPL-MCNC: 94 MG/DL (ref 65–140)
HCT VFR BLD AUTO: 25.6 % (ref 36.5–49.3)
HGB BLD-MCNC: 8.2 G/DL (ref 12–17)
IMM GRANULOCYTES # BLD AUTO: 0.01 THOUSAND/UL (ref 0–0.2)
IMM GRANULOCYTES NFR BLD AUTO: 1 % (ref 0–2)
LYMPHOCYTES # BLD AUTO: 0.77 THOUSANDS/ΜL (ref 0.6–4.47)
LYMPHOCYTES NFR BLD AUTO: 38 % (ref 14–44)
MCH RBC QN AUTO: 30.1 PG (ref 26.8–34.3)
MCHC RBC AUTO-ENTMCNC: 32 G/DL (ref 31.4–37.4)
MCV RBC AUTO: 94 FL (ref 82–98)
MONOCYTES # BLD AUTO: 0.27 THOUSAND/ΜL (ref 0.17–1.22)
MONOCYTES NFR BLD AUTO: 13 % (ref 4–12)
NEUTROPHILS # BLD AUTO: 0.92 THOUSANDS/ΜL (ref 1.85–7.62)
NEUTS SEG NFR BLD AUTO: 44 % (ref 43–75)
NRBC BLD AUTO-RTO: 0 /100 WBCS
PLATELET # BLD AUTO: 54 THOUSANDS/UL (ref 149–390)
PMV BLD AUTO: 10.4 FL (ref 8.9–12.7)
POTASSIUM SERPL-SCNC: 3.7 MMOL/L (ref 3.5–5.3)
RBC # BLD AUTO: 2.72 MILLION/UL (ref 3.88–5.62)
SODIUM SERPL-SCNC: 139 MMOL/L (ref 136–145)
UNIT DISPENSE STATUS: NORMAL
UNIT PRODUCT CODE: NORMAL
UNIT RH: NORMAL
WBC # BLD AUTO: 2.03 THOUSAND/UL (ref 4.31–10.16)

## 2021-03-12 PROCEDURE — 82948 REAGENT STRIP/BLOOD GLUCOSE: CPT

## 2021-03-12 PROCEDURE — 97110 THERAPEUTIC EXERCISES: CPT

## 2021-03-12 PROCEDURE — 99233 SBSQ HOSP IP/OBS HIGH 50: CPT | Performed by: INTERNAL MEDICINE

## 2021-03-12 PROCEDURE — 80048 BASIC METABOLIC PNL TOTAL CA: CPT | Performed by: INTERNAL MEDICINE

## 2021-03-12 PROCEDURE — 99232 SBSQ HOSP IP/OBS MODERATE 35: CPT | Performed by: PHYSICIAN ASSISTANT

## 2021-03-12 PROCEDURE — 85025 COMPLETE CBC W/AUTO DIFF WBC: CPT | Performed by: INTERNAL MEDICINE

## 2021-03-12 PROCEDURE — 99232 SBSQ HOSP IP/OBS MODERATE 35: CPT | Performed by: INTERNAL MEDICINE

## 2021-03-12 RX ORDER — MAGNESIUM SULFATE HEPTAHYDRATE 40 MG/ML
4 INJECTION, SOLUTION INTRAVENOUS ONCE
Status: COMPLETED | OUTPATIENT
Start: 2021-03-12 | End: 2021-03-12

## 2021-03-12 RX ADMIN — MAGNESIUM SULFATE HEPTAHYDRATE 4 G: 40 INJECTION, SOLUTION INTRAVENOUS at 14:22

## 2021-03-12 RX ADMIN — ALBUMIN (HUMAN) 25 G: 0.25 INJECTION, SOLUTION INTRAVENOUS at 06:15

## 2021-03-12 RX ADMIN — PANTOPRAZOLE SODIUM 40 MG: 40 TABLET, DELAYED RELEASE ORAL at 06:15

## 2021-03-12 RX ADMIN — ATORVASTATIN CALCIUM 40 MG: 40 TABLET, FILM COATED ORAL at 16:34

## 2021-03-12 RX ADMIN — POTASSIUM & SODIUM PHOSPHATES POWDER PACK 280-160-250 MG 1 PACKET: 280-160-250 PACK at 16:34

## 2021-03-12 RX ADMIN — INSULIN GLARGINE 5 UNITS: 100 INJECTION, SOLUTION SUBCUTANEOUS at 22:03

## 2021-03-12 NOTE — ASSESSMENT & PLAN NOTE
· Cirrhosis of unknown etiology patient did state use a regular user of alcohol when he was in the [employer] many years ago, hepatitis panel negative  · CT abdomen - Nodular liver concerning for underlying cirrhosis  · Abdominal U/S - Liver cirrhosis  No suspicious lesions identified  · Alpha-fetoprotein 1 9  · Will need ongoing [facility]    · GI input appreciated

## 2021-03-12 NOTE — PHYSICAL THERAPY NOTE
PHYSICAL THERAPY NOTE          Patient Name: Tedi Brittle  MNOZC'C Date: 3/12/2021   Time: 7548-4793       03/12/21 1448   PT Last Visit   PT Visit Date 03/12/21   Note Type   Note Type Treatment   Pain Assessment   Pain Assessment Tool Pain Assessment not indicated - pt denies pain   Pain Score No Pain   Restrictions/Precautions   Other Precautions Cognitive; Chair Alarm; Bed Alarm;Multiple lines; Fall Risk   General   Chart Reviewed Yes   Additional Pertinent History pt reporting he is depressed; emotional support provided  reached out to CM regarding obtaining contact info for sister per patient request   Response to Previous Treatment Patient reporting fatigue but able to participate  Cognition   Overall Cognitive Status Impaired   Arousal/Participation Cooperative   Attention Attends with cues to redirect   Memory Decreased recall of precautions   Following Commands Follows one step commands with increased time or repetition   Comments requires frequent redirection   Subjective   Subjective "They can send people home on the weekend right?"   Bed Mobility   Rolling R 3  Moderate assistance   Additional items Assist x 1;Bedrails; Increased time required;Verbal cues   Rolling L 3  Moderate assistance   Additional items Assist x 1;Bedrails; Increased time required;Verbal cues   Supine to Sit 3  Moderate assistance   Additional items Assist x 1;HOB elevated; Bedrails; Increased time required;Verbal cues   Sit to Supine 4  Minimal assistance   Additional items Assist x 1; Increased time required;Verbal cues; Other   Additional Comments A primarily for trunk support   Transfers   Sit to Stand 4  Minimal assistance   Additional items Assist x 1; Increased time required; Bedrails;Verbal cues; Other  (RW)   Stand to Sit 5  Supervision   Additional items Increased time required;Verbal cues; Other  (RW)   Ambulation/Elevation   Gait pattern Forward Flexion;Decreased foot clearance; Inconsistent tali; Short stride   Gait Assistance 5  Supervision   Additional items Assist x 1   Assistive Device Rolling walker   Distance 300'   Balance   Static Standing Fair   Dynamic Standing Fair -   Ambulatory Fair -   Endurance Deficit   Endurance Deficit Yes   Endurance Deficit Description pt reporting fatigue   Activity Tolerance   Activity Tolerance Patient tolerated treatment well;Patient limited by fatigue; Other (Comment)  (cognition)   Medical Staff Made Aware Micquel CM   Nurse Made Aware Bailey RN   Exercises   Heelslides Supine;10 reps;Bilateral   Assessment   Prognosis Fair   Problem List Decreased range of motion;Decreased strength; Impaired balance;Decreased mobility; Decreased cognition;Decreased safety awareness; Impaired judgement; Impaired hearing;Decreased skin integrity   Assessment Osmani was seen for a f/u session per PT POC  Pt easily distracted and requires much redirection to attend to session  Require more physical assistance today for bed mobility and standing, likely d/t weakness as well as poor effort  However continues to demonstrate ability to ambulate community distances w no reported difficulty  Require frequent cuing for safe proximity to RW and upright posture  End of session pt repositioned in bed, alarm engaged and all needs in reach  Would benefit from PT at [facility]  Barriers to Discharge None   Goals   Patient Goals to go home   STG Expiration Date 03/18/21   Short Term Goal #1 1)  Pt will perform bed mobility Pasha demonstrating appropriate technique 100% of the time in order to improve function  2)  Perform all transfers with Pasha demonstrating safe and appropriate technique 100% of the time in order to improve ability to negotiate safely in home environment  3) Amb with least restrictive AD > 50'x1 with mod I in order to demonstrate ability to negotiate in home environment  4)  Improve overall strength and balance 1/2 grade in order to optimize ability to perform functional tasks and reduce fall risk  5) Increase activity tolerance to 45 minutes in order to improve endurance to functional tasks  6) PT for ongoing patient and family/caregiver education, DME needs and d/c planning in order to promote highest level of function in least restrictive environment  PT Treatment Day 4   Plan   Treatment/Interventions Functional transfer training;LE strengthening/ROM; Therapeutic exercise; Endurance training;Patient/family training;Equipment eval/education; Bed mobility;Gait training;Continued evaluation; Compensatory technique education;Spoke to nursing   Progress Slow progress, decreased activity tolerance   PT Frequency Other (Comment)  (3-5x)   Recommendation   PT Discharge Recommendation Return to previous environment with social support;Home with skilled therapy  ([facility]   PT - OK to Discharge Yes   Additional Comments with increased support from Torie Ward in Bed Without Bedrails 2   Lying on Back to Sitting on Edge of Flat Bed 2   Moving Bed to Chair 3   Standing Up From Chair 2   Walk in Room 3   Climb 3-5 Stairs 3   Basic Mobility Inpatient Raw Score 15   Basic Mobility Standardized Score 36 97     Mehran Dillon, PT

## 2021-03-12 NOTE — ASSESSMENT & PLAN NOTE
No results found for: HGBA1C    Recent Labs     03/11/21  1559 03/11/21 2017 03/12/21  0716 03/12/21  1119   POCGLU 114 180* 78 94       Blood Sugar Average: Last 72 hrs:     Will continue Lantus 5mg  Accuchecks q6h and sliding scale coverage

## 2021-03-12 NOTE — PLAN OF CARE
Problem: PHYSICAL THERAPY ADULT  Goal: Performs mobility at highest level of function for planned discharge setting  See evaluation for individualized goals  Description: Treatment/Interventions: Functional transfer training, LE strengthening/ROM, Therapeutic exercise, Endurance training, Patient/family training, Equipment eval/education, Bed mobility, Gait training, Continued evaluation, Compensatory technique education, Spoke to nursing, OT          See flowsheet documentation for full assessment, interventions and recommendations  Outcome: Progressing  Note: Prognosis: Fair  Problem List: Decreased range of motion, Decreased strength, Impaired balance, Decreased mobility, Decreased cognition, Decreased safety awareness, Impaired judgement, Impaired hearing, Decreased skin integrity  Assessment: Polo Irizarry was seen for a f/u session per PT POC  Pt easily distracted and requires much redirection to attend to session  Require more physical assistance today for bed mobility and standing, likely d/t weakness as well as poor effort  However continues to demonstrate ability to ambulate community distances w no reported difficulty  Require frequent cuing for safe proximity to RW and upright posture  End of session pt repositioned in bed, alarm engaged and all needs in reach  Would benefit from PT at [facility]  Barriers to Discharge: None     PT Discharge Recommendation: Return to previous environment with social support, Home with skilled therapy(PT at [facility])     PT - OK to Discharge: Yes    See flowsheet documentation for full assessment

## 2021-03-12 NOTE — ASSESSMENT & PLAN NOTE
Lab Results   Component Value Date    EGFR 30 03/12/2021    EGFR 24 03/11/2021    EGFR 22 03/10/2021    CREATININE 2 11 (H) 03/12/2021    CREATININE 2 57 (H) 03/11/2021    CREATININE 2 76 (H) 03/10/2021   · Baseline creatinine appears to be close to 2 4-2 6  · Acute kidney injury likely secondary to anemia and GI bleed  · Nephrology consultation appreciated  · Received albumin challenge for 48 hours    · Avoid hypotension and nephrotoxic medications  · Nephrology input appreciated  ·   Will monitor BMP

## 2021-03-12 NOTE — ASSESSMENT & PLAN NOTE
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 on 3/23/20  Redemonstrated on MRI 6/24/20 - 3 4 cm relatively homogeneous mass emanating from the pituitary gland consistent with microadenoma  Extension into the left cavernous sinus, sphenoid sinus, with elevation of contents of the suprasellar cyst   Diameter of the cavernous carotid arteries   do not appear significantly affected  Was following with [facility], who had ordered pituitary function labs and recommended outpatient follow-up with ophthalmology and follow-up MRI  However, labs were never obtained and patient appeared to have been lost to follow-up  Patient states he was told it was "benign", also states he has seen an ophtholmologist, although no records in [facility]    Neurosurgery had stated patient is not a surgical candidate

## 2021-03-12 NOTE — PROGRESS NOTES
[facility]  Progress Note - Keira Ham 1945, 77 y o  male MRN: [id number]  Unit/Bed#: E5 -01 Encounter: [id number]  Primary Care Provider: Apple Bond MD   Date and time admitted to hospital: 3/8/2021 12:06 AM    * GI bleed  Assessment & Plan  Pt presents with large bloody BM x 1  7 days of non-bloody diarrheal illness preceding bloody BM    · hemoglobin 7 6 status [id number] PRBCs since hospitalization  · CT abdomen (3/7/21) - Moderate circumferential thickening of the proximal duodenum in keeping with a nonspecific duodenitis  Ulcer disease is part of the differential diagnosis  · Status post EGD on 03/08/2021 which revealed 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, 3 superficial ulcers in duodenum, portal hypertensive gastropathy   · No further episodes of bleeding  · Discussed with GI bleed anemia is multifactorial secondary to chronic kidney disease will monitor H&H  · GI follow-up appreciated, will monitor H&H, continue PPI  · Repeat outpatient EGD in 1-2 months        Hypomagnesemia  Assessment & Plan  Replaced  Acute post-hemorrhagic anemia  Assessment & Plan  · Secondary to GI bleed  · Status post unit PRBC transfused on 03/08/2021and 3/11  · Hemoglobin 8 2 today  · Monitor H&H    Acute kidney injury superimposed on chronic kidney disease [facility])  Assessment & Plan  Lab Results   Component Value Date    EGFR 30 03/12/2021    EGFR 24 03/11/2021    EGFR 22 03/10/2021    CREATININE 2 11 (H) 03/12/2021    CREATININE 2 57 (H) 03/11/2021    CREATININE 2 76 (H) 03/10/2021   · Baseline creatinine appears to be close to 2 4-2 6  · Acute kidney injury likely secondary to anemia and GI bleed  · Nephrology consultation appreciated  · Received albumin challenge for 48 hours    · Avoid hypotension and nephrotoxic medications  · Nephrology input appreciated  ·   Will monitor BMP    Cirrhosis ([facility] [address] )  Assessment & Plan  · Cirrhosis of unknown etiology patient did state use a regular user of alcohol when he was in the [employer] many years ago, hepatitis panel negative  · CT abdomen - Nodular liver concerning for underlying cirrhosis  · Abdominal U/S - Liver cirrhosis  No suspicious lesions identified  · Alpha-fetoprotein 1 9  · Will need ongoing outpatient ultrasound follow-up for [facility] [address]  surveillance  · GI input appreciated    Type 2 diabetes mellitus with stage 3 chronic kidney disease, with long-term current use of insulin [facility])  Assessment & Plan  No results found for: HGBA1C    Recent Labs     03/11/21  1559 03/11/21 2017 03/12/21  0716 03/12/21  1119   POCGLU 114 180* 78 94       Blood Sugar Average: Last 72 hrs: Will continue Lantus 5mg  Accuchecks q6h and sliding scale coverage      Thrombocytopenia [facility])  Assessment & Plan  Patient with significant thrombocytopenia in March 2020 at which point hematology was following and patient was started on IV steroids and 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  Patient has been having progressive thrombocytopenia during his hospitalization with platelet count decreased to 54,000 today  He denies any overt bleeding  Hematology evaluation will be obtained  Follow-up CBC in a m     CAD (coronary artery disease)  Assessment & Plan  Not on antiplatelet therapy, not on beta blocker therapy  Continue atorvastatin      Pituitary macroadenoma [facility])  Assessment & Plan  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 on 3/23/20  Redemonstrated on MRI 6/24/20 - 3 4 cm relatively homogeneous mass emanating from the pituitary gland consistent with microadenoma  Extension into the left cavernous sinus, sphenoid sinus, with elevation of contents of the suprasellar cyst   Diameter of the cavernous carotid arteries   do not appear significantly affected  Was following with [facility], who had ordered pituitary function labs and recommended outpatient follow-up with ophthalmology and follow-up MRI    However, labs were never obtained and patient appeared to have been lost to follow-up  Patient states he was told it was "benign", also states he has seen an ophtholmologist, although no records in [facility]  Neurosurgery had stated patient is not a surgical candidate      VTE Pharmacologic Prophylaxis:   Pharmacologic: Pharmacologic VTE Prophylaxis contraindicated due to Thrombocytopenia  Mechanical VTE Prophylaxis in Place: No    Patient Centered Rounds: I have performed bedside rounds with nursing staff today  Discussions with Specialists or Other Care Team Provider:  Discussed with nephrology    Education and Discussions with Family / Patient:  Patient updated regarding plan of care at bedside  Time Spent for Care: 20 minutes  More than 50% of total time spent on counseling and coordination of care as described above  Current Length of Stay: 4 day(s)    Current Patient Status: Inpatient   Certification Statement: The patient will continue to require additional inpatient hospital stay due to Ongoing workup for thrombocytopenia    Discharge Plan:  Currently not stable for discharge    Code Status: Level 1 - Full Code      Subjective:   Patient examined at bedside  He get to go back to his nursing facility  Denies any abdominal discomfort nausea or vomiting  Objective:     Vitals:   Temp (24hrs), Av 5 °F (36 4 °C), Min:96 8 °F (36 °C), Max:98 4 °F (36 9 °C)    Temp:  [96 8 °F (36 °C)-98 4 °F (36 9 °C)] 97 4 °F (36 3 °C)  HR:  [69-78] 75  Resp:  [12-18] 18  BP: (119-139)/(54-67) 129/54  SpO2:  [97 %-100 %] 99 %  There is no height or weight on file to calculate BMI  Input and Output Summary (last 24 hours): Intake/Output Summary (Last 24 hours) at 3/12/2021 1354  Last data filed at 3/12/2021 0738  Gross per 24 hour   Intake 470 ml   Output 775 ml   Net -305 ml       Physical Exam:     Physical Exam  Constitutional:       Appearance: He is ill-appearing  HENT:      Head: Normocephalic        Nose: Nose normal  Mouth/Throat:      Mouth: Mucous membranes are moist    Eyes:      Pupils: Pupils are equal, round, and reactive to light  Neck:      Musculoskeletal: Neck supple  Cardiovascular:      Rate and Rhythm: Normal rate and regular rhythm  Pulmonary:      Effort: Pulmonary effort is normal       Breath sounds: Normal breath sounds  Abdominal:      General: Bowel sounds are normal  There is no distension  Palpations: Abdomen is soft  Tenderness: There is no abdominal tenderness  Musculoskeletal:         General: No swelling  Skin:     Coloration: Skin is pale  Neurological:      General: No focal deficit present  Mental Status: He is alert and oriented to person, place, and time  Psychiatric:         Mood and Affect: Mood normal          Additional Data:     Labs:    Results from last 7 days   Lab Units 03/12/21  0518   WBC Thousand/uL 2 03*   HEMOGLOBIN g/dL 8 2*   HEMATOCRIT % 25 6*   PLATELETS Thousands/uL 54*   NEUTROS PCT % 44   LYMPHS PCT % 38   MONOS PCT % 13*   EOS PCT % 3     Results from last 7 days   Lab Units 03/12/21  0518  03/10/21  0506  03/07/21  1906   SODIUM mmol/L 139   < > 142   < > 141   POTASSIUM mmol/L 3 7   < > 3 7   < > 4 5   CHLORIDE mmol/L 109*   < > 112*   < > 111*   CO2 mmol/L 21   < > 20*   < > 17*   BUN mg/dL 23   < > 39*   < > 40*   CREATININE mg/dL 2 11*   < > 2 76*   < > 2 69*   ANION GAP mmol/L 9   < > 10   < > 13   CALCIUM mg/dL 8 7   < > 8 4   < > 8 6   ALBUMIN g/dL  --   --  2 1*  --  2 6*   TOTAL BILIRUBIN mg/dL  --   --   --   --  0 90   ALK PHOS U/L  --   --   --   --  35*   ALT U/L  --   --   --   --  20   AST U/L  --   --   --   --  55*   GLUCOSE RANDOM mg/dL 84   < > 72   < > 117    < > = values in this interval not displayed       Results from last 7 days   Lab Units 03/09/21  0703   INR  1 55*     Results from last 7 days   Lab Units 03/12/21  1119 03/12/21  0716 03/11/21 2017 03/11/21  1559 03/11/21  1142 03/11/21  0804 03/10/21  2200 03/10/21  1515 03/10/21  1118 03/10/21  0707 03/09/21  2048 03/09/21  1630   POC GLUCOSE mg/dl 94 78 180* 114 131 75 96 185* 193* 72 179* 99         Results from last 7 days   Lab Units 03/07/21  1906   LACTIC ACID mmol/L 2 0           * I Have Reviewed All Lab Data Listed Above  * Additional Pertinent Lab Tests Reviewed: [address] Admission Reviewed    Imaging:    Imaging Reports Reviewed Today Include: NA  Recent Cultures (last 7 days):     Results from last 7 days   Lab Units 03/08/21  0614   C DIFF TOXIN B BY PCR  Negative       Last 24 Hours Medication List:   Current Facility-Administered Medications   Medication Dose Route Frequency Provider Last Rate    acetaminophen  650 mg Oral Q6H PRN Deanna Walter PA-C      atorvastatin  40 mg Oral Daily With [employer] [place]      bisacodyl  10 mg Rectal Daily PRN Deanna Walter PA-C      calcium carbonate  1,000 mg Oral Daily PRN Deanna Walter PA-C      insulin glargine  5 Units Subcutaneous HS Deanna Walter PA-C      insulin lispro  1-5 Units Subcutaneous 4x Daily (AC & HS) Vi Alamo PA-C      magnesium sulfate  4 g Intravenous Once Marcelle Olson MD      ondansetron  4 mg Intravenous Q6H PRN Deanna Walter PA-C      pantoprazole  40 mg Oral Early Morning Parveen Langley MD      potassium-sodium phosphates  1 packet Oral BID With June Davis MD          Today, Patient Was Seen By: Jessica Drew MD    ** Please Note: Dictation voice to text software may have been used in the creation of this document   **

## 2021-03-12 NOTE — CASE MANAGEMENT
Patient is here with GI bleed on PPI, consulted by GI  Patient's platelet count is low, hematology consulted  Patient is from [facility]  Patient will require insurance auth prior to discharge  CM will continue to follow

## 2021-03-12 NOTE — PROGRESS NOTES
NEPHROLOGY PROGRESS NOTE   Debbie Santana 77 y o  male MRN: [id number]  Unit/Bed#: E5 -01 Encounter: [id number]  Reason for Consult: Tash/CKD    ASSESSMENT AND PLAN:  Patient is 71-year-old male with significant medical issues of progressive CKD stage 4, cryptogenic cirrhosis, diabetes, presented with GI bleed   We are consulted for CKD management      Progressive CKD stage 4, baseline serum creatinine 2 4 to 2 8 since mid 2020, 1 6 to 1 8 in March 2020, follows with Dr Donaldo Cotter  -creatinine  peak level 2 7 now slowly improving to 2 1 today lower than recent baseline  -urine sodium 95, UA bland  -CT scan shows no ascites, bladder scan for PVR  -status post 48 hours IV albumin challenge, now remains off  -CKD may be in the setting of diabetes,? Hepatorenal  -avoid nephrotoxins or NSAIDs      Low bicarb, could be in the setting of chronic respiratory alkalosis   -bicarb level stable 21 today    Hypomagnesemia, serum magnesium 1 2 yesterday, will give IV magnesium sulfate 4 g once today  Hypophosphatemia, serum phosphorus 2 0 slightly below goal, will start patient on p o  Phosphorus supplement one packet p o  B i d  For next three days      Anemia in CKD, blood-loss anemia, GI follow-up, status post EGD this admission shows esophageal varices, duodenal ulcers  -status post blood transfusion this admission      Blood pressure acceptable   Avoid hypotension      Cryptogenic cirrhosis, GI follow-up  Volume status, currently seems to be fairly euvolemic  Avoiding diuretics for now, status post albumin trial      Worsening thrombocytopenia, management as per primary team, hematology evaluation pending      Discussed above plan in detail with primary team    SUBJECTIVE:  Patient seen and examined at bedside  Denies any chest pain, worsening shortness of breath, nausea vomiting      OBJECTIVE:  Current Weight:    Vitals:    03/12/21 0738   BP: 129/54   Pulse: 75   Resp: 18   Temp: (!) 97 4 °F (36 3 °C)   SpO2: 99%       Intake/Output Summary (Last 24 hours) at 3/12/2021 1149  Last data filed at 3/12/2021 0100  Gross per 24 hour   Intake 590 ml   Output 525 ml   Net 65 ml     Wt Readings from Last 3 Encounters:   03/07/21 58 7 kg (129 lb 6 6 oz)   11/03/20 62 6 kg (138 lb)   06/30/20 62 6 kg (138 lb)     Temp Readings from Last 3 Encounters:   03/12/21 (!) 97 4 °F (36 3 °C) (Temporal)   03/07/21 (!) 97 1 °F (36 2 °C) (Temporal)   11/03/20 98 6 °F (37 °C)     BP Readings from Last 3 Encounters:   03/12/21 129/54   03/07/21 103/56   11/03/20 133/83     Pulse Readings from Last 3 Encounters:   03/12/21 75   03/07/21 80   11/03/20 80        Physical Examination:  General:  Lying in bed, no acute distress   Eyes:  Mild conjunctival pallor present  ENT:  External examination of ears and nose unremarkable  Neck:  No obvious lymphadenopathy appreciated  Respiratory:  Decreased breath sound at bases  CVS:  S1, S2 present  GI:  Soft, nontender, mild distended  CNS:  Active alert oriented  Skin:  No new rash in legs  Musculoskeletal:  No obvious gross deformity noted    Medications:    Current Facility-Administered Medications:     acetaminophen (TYLENOL) tablet 650 mg, 650 mg, Oral, Q6H PRN, VICKI Goel-NETO    atorvastatin (LIPITOR) tablet 40 mg, 40 mg, Oral, Daily With MarionVICKI Anne-NETO, 40 mg at 03/11/21 1531    bisacodyl (DULCOLAX) rectal suppository 10 mg, 10 mg, Rectal, Daily PRN, Dionne Couch PA-C    calcium carbonate (TUMS) chewable tablet 1,000 mg, 1,000 mg, Oral, Daily PRN, VICKI Goel-NETO, 1,000 mg at 03/08/21 1522    insulin glargine (LANTUS) subcutaneous injection 5 Units 0 05 mL, 5 Units, Subcutaneous, HS, Dionne Couch PA-C, 5 Units at 03/11/21 2307    insulin lispro (HumaLOG) 100 units/mL subcutaneous injection 1-5 Units, 1-5 Units, Subcutaneous, 4x Daily (AC & HS), 1 Units at 03/11/21 2308 **AND** Fingerstick Glucose (POCT), , , 4x Daily AC and at bedtime, Dee Reich, RICKY    ondansetron (ZOFRAN) injection 4 mg, 4 mg, Intravenous, Q6H PRN, Jeffrey Mehta, RICKY    pantoprazole (PROTONIX) EC tablet 40 mg, 40 mg, Oral, Early Morning, Ewelina Strong MD, 40 mg at 03/12/21 0615    Laboratory Results:  Results from last 7 days   Lab Units 03/12/21  0518 03/11/21  0555 03/10/21  0506 03/09/21  1220 03/09/21  0703 03/09/21  0037 03/08/21  1813 03/08/21  0526 03/07/21  1906   WBC Thousand/uL 2 03* 2 45* 2 93*  --  3 14*  --   --  3 94* 9 13   HEMOGLOBIN g/dL 8 2* 7 6* 8 7* 10 9* 9 2* 9 4* 10 4* 5 7* 7 9*   HEMATOCRIT % 25 6* 23 9* 27 3*  --  28 3*  --   --  18 3* 25 5*   PLATELETS Thousands/uL 54* 62* 74*  --  78*  --   --  92* 208   SODIUM mmol/L 139 141 142  --  143  --   --  143 141   POTASSIUM mmol/L 3 7 3 6 3 7  --  3 7  --   --  4 4 4 5   CHLORIDE mmol/L 109* 110* 112*  --  114*  --   --  114* 111*   CO2 mmol/L 21 23 20*  --  20*  --   --  16* 17*   BUN mg/dL 23 30* 39*  --  39*  --   --  37* 40*   CREATININE mg/dL 2 11* 2 57* 2 76*  --  2 62*  --   --  2 21* 2 69*   CALCIUM mg/dL 8 7 8 1* 8 4  --  7 9*  --   --  8 0* 8 6   MAGNESIUM mg/dL  --  1 2*  --   --   --   --   --   --  1 4*   PHOSPHORUS mg/dL  --  2 0*  --   --   --   --   --   --   --        No orders to display       Portions of the record may have been created with voice recognition software  Occasional wrong word or "sound a like" substitutions may have occurred due to the inherent limitations of voice recognition software  Read the chart carefully and recognize, using context, where substitutions have occurred

## 2021-03-12 NOTE — PLAN OF CARE
Problem: Potential for Falls  Goal: Patient will remain free of falls  Description: INTERVENTIONS:  - Assess patient frequently for physical needs  -  Identify cognitive and physical deficits and behaviors that affect risk of falls    -  [place] fall precautions as indicated by assessment   - Educate patient/family on patient safety including physical limitations  - Instruct patient to call for assistance with activity based on assessment  - Modify environment to reduce risk of injury  - Consider OT/PT consult to assist with strengthening/mobility  Outcome: Progressing     Problem: Prexisting or High Potential for Compromised Skin Integrity  Goal: Skin integrity is maintained or improved  Description: INTERVENTIONS:  - Identify patients at risk for skin breakdown  - Assess and monitor skin integrity  - Assess and monitor nutrition and hydration status  - Monitor labs   - Assess for incontinence   - Turn and reposition patient  - Assist with mobility/ambulation  - Relieve pressure over bony prominences  - Avoid friction and shearing  - Provide appropriate hygiene as needed including keeping skin clean and dry  - Evaluate need for skin moisturizer/barrier cream  - Collaborate with interdisciplinary team   - Patient/family teaching  - Consider wound care consult   Outcome: Progressing     Problem: PAIN - ADULT  Goal: Verbalizes/displays adequate comfort level or baseline comfort level  Description: Interventions:  - Encourage patient to monitor pain and request assistance  - Assess pain using appropriate pain scale  - Administer analgesics based on type and severity of pain and evaluate response  - Implement non-pharmacological measures as appropriate and evaluate response  - Consider cultural and social influences on pain and pain management  - Notify physician/advanced practitioner if interventions unsuccessful or patient reports new pain  Outcome: Progressing     Problem: INFECTION - ADULT  Goal: Absence or prevention of progression during hospitalization  Description: INTERVENTIONS:  - Assess and monitor for signs and symptoms of infection  - Monitor lab/diagnostic results  - Monitor all insertion sites, i e  indwelling lines, tubes, and drains  - Monitor endotracheal if appropriate and nasal secretions for changes in amount and color  - [place] appropriate cooling/warming therapies per order  - Administer medications as ordered  - Instruct and encourage patient and family to use good hand hygiene technique  - Identify and instruct in appropriate isolation precautions for identified infection/condition  Outcome: Progressing  Goal: Absence of fever/infection during neutropenic period  Description: INTERVENTIONS:  - Monitor WBC    Outcome: Progressing     Problem: SAFETY ADULT  Goal: Patient will remain free of falls  Description: INTERVENTIONS:  - Assess patient frequently for physical needs  -  Identify cognitive and physical deficits and behaviors that affect risk of falls    -  [place] fall precautions as indicated by assessment   - Educate patient/family on patient safety including physical limitations  - Instruct patient to call for assistance with activity based on assessment  - Modify environment to reduce risk of injury  - Consider OT/PT consult to assist with strengthening/mobility  Outcome: Progressing  Goal: Maintain or return to baseline ADL function  Description: INTERVENTIONS:  -  Assess patient's ability to carry out ADLs; assess patient's baseline for ADL function and identify physical deficits which impact ability to perform ADLs (bathing, care of mouth/teeth, toileting, grooming, dressing, etc )  - Assess/evaluate cause of self-care deficits   - Assess range of motion  - Assess patient's mobility; develop plan if impaired  - Assess patient's need for assistive devices and provide as appropriate  - Encourage maximum independence but intervene and supervise when necessary  - Involve family in performance of ADLs  - Assess for home care needs following discharge   - Consider OT consult to assist with ADL evaluation and planning for discharge  - Provide patient education as appropriate  Outcome: Progressing  Goal: Maintain or return mobility status to optimal level  Description: INTERVENTIONS:  - Assess patient's baseline mobility status (ambulation, transfers, stairs, etc )    - Identify cognitive and physical deficits and behaviors that affect mobility  - Identify mobility aids required to assist with transfers and/or ambulation (gait belt, sit-to-stand, lift, walker, cane, etc )  - [place] fall precautions as indicated by assessment  - Record patient progress and toleration of activity level on Mobility SBAR; progress patient to next Phase/Stage  - Instruct patient to call for assistance with activity based on assessment  - Consider rehabilitation consult to assist with strengthening/weightbearing, etc   Outcome: Progressing     Problem: DISCHARGE PLANNING  Goal: Discharge to home or other facility with appropriate resources  Description: INTERVENTIONS:  - Identify barriers to discharge w/patient and caregiver  - Arrange for needed discharge resources and transportation as appropriate  - Identify discharge learning needs (meds, wound care, etc )  - Arrange for interpretive services to assist at discharge as needed  - Refer to Case Management Department for coordinating discharge planning if the patient needs post-hospital services based on physician/advanced practitioner order or complex needs related to functional status, cognitive ability, or social support system  Outcome: Progressing     Problem: Knowledge Deficit  Goal: Patient/family/caregiver demonstrates understanding of disease process, treatment plan, medications, and discharge instructions  Description: Complete learning assessment and assess knowledge base    Interventions:  - Provide teaching at level of understanding  - Provide teaching via preferred learning methods  Outcome: Progressing

## 2021-03-12 NOTE — ASSESSMENT & PLAN NOTE
Pt presents with large bloody BM x 1  7 days of non-bloody diarrheal illness preceding bloody BM    · hemoglobin 7 6 status [id number] PRBCs since hospitalization  · CT abdomen (3/7/21) - Moderate circumferential thickening of the proximal duodenum in keeping with a nonspecific duodenitis  Ulcer disease is part of the differential diagnosis    · Status post EGD on 03/08/2021 which revealed 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, 3 superficial ulcers in duodenum, portal hypertensive gastropathy   · No further episodes of bleeding  · Discussed with GI bleed anemia is multifactorial secondary to chronic kidney disease will monitor H&H  · GI follow-up appreciated, will monitor H&H, continue PPI  · Repeat outpatient EGD in 1-2 months

## 2021-03-12 NOTE — ASSESSMENT & PLAN NOTE
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

## 2021-03-12 NOTE — PROGRESS NOTES
Patient Name: Amina Day  Patient MRN: [id number]  Date: 03/12/21  Service: [facility]    Bryan Delarosa is a 77 y o  male who was admitted with hematochezia   He feels well today  Bowel movements brown, nonbloody  Tolerating low-sodium diet  Wants to go home    Patient Denies:  Chest pain, dyspnea, abdominal pain, nausea  All others negative except as noted in HPI  Objective     Vitals  /54 (BP Location: Left arm)   Pulse 75   Temp (!) 97 4 °F (36 3 °C) (Temporal)   Resp 18   SpO2 99%   General: Alert, no apparent distress  Eyes:  No scleral icterus  ENT:  Mucous membranes moist  Card:  Regular rhythm  Lungs: Clear to ascultation b/l  No wheezes, rales, rhonchi  Abdomen:  Soft  Nontender    Bowel sounds normoactive  Skin:  No jaundice  Extremities:  No edema  Neuro: Alert     Laboratory Studies  Lab Results   Component Value Date    CREATININE 2 11 (H) 03/12/2021    BUN 23 03/12/2021    SODIUM 139 03/12/2021    K 3 7 03/12/2021     (H) 03/12/2021    CO2 21 03/12/2021    CALCIUM 8 7 03/12/2021    ALKPHOS 35 (L) 03/07/2021    ALB 2 1 (L) 03/10/2021    TBILI 0 90 03/07/2021    AST 55 (H) 03/07/2021    ALT 20 03/07/2021     Lab Results   Component Value Date    WBC 2 03 (L) 03/12/2021    HGB 8 2 (L) 03/12/2021    HCT 25 6 (L) 03/12/2021    PLT 54 (L) 03/12/2021    MCV 94 03/12/2021     Lab Results   Component Value Date    HGB 8 2 (L) 03/12/2021    HGB 7 6 (L) 03/11/2021    HGB 8 7 (L) 03/10/2021       Lab Results   Component Value Date    PROTIME 18 3 (H) 03/09/2021    INR 1 55 (H) 03/09/2021       Inhouse Medications       Current Facility-Administered Medications:     acetaminophen (TYLENOL) tablet 650 mg, 650 mg, Oral, Q6H PRN    atorvastatin (LIPITOR) tablet 40 mg, 40 mg, Oral, Daily With Dinner, 40 mg at 03/11/21 1531    bisacodyl (DULCOLAX) rectal suppository 10 mg, 10 mg, Rectal, Daily PRN    calcium carbonate (TUMS) chewable tablet 1,000 mg, 1,000 mg, Oral, Daily PRN, 1,000 mg at 03/08/21 1522    insulin glargine (LANTUS) subcutaneous injection 5 Units 0 05 mL, 5 Units, Subcutaneous, HS, 5 Units at 03/11/21 2307    insulin lispro (HumaLOG) 100 units/mL subcutaneous injection 1-5 Units, 1-5 Units, Subcutaneous, 4x Daily (AC & HS), 1 Units at 03/11/21 2308 **AND** Fingerstick Glucose (POCT), , , 4x Daily AC and at bedtime    ondansetron (ZOFRAN) injection 4 mg, 4 mg, Intravenous, Q6H PRN    pantoprazole (PROTONIX) EC tablet 40 mg, 40 mg, Oral, Early Morning, 40 mg at 03/12/21 0615      Assessment/Plan:  1  GI bleed, likely variceal   Status is banding x3  No acute bleed at present  Will need repeat EGD with banding in 1-2 months  2  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 alcohol abuse  Hepatitis serologies negative  Alpha fetoprotein 1 9 Will need routine ultrasound and alpha fetoprotein for [place] [id number]  surveillance  3  Anemia, multifactorial   Likely chronic disease and recent GI bleeding    Continue to follow    Principal Problem:    GI bleed  Active Problems:    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HCC)    CAD (coronary artery disease)    Thrombocytopenia (HCC)    Type 2 diabetes mellitus with stage 3 chronic kidney disease, with long-term current use of insulin (HCC)    Cirrhosis ([facility] )    Acute kidney injury superimposed on chronic kidney disease (HCC)    Acute post-hemorrhagic anemia    Kellie Moeller PA-C

## 2021-03-12 NOTE — ASSESSMENT & PLAN NOTE
· Secondary to GI bleed  · Status post unit PRBC transfused on 03/08/2021and 3/11  · Hemoglobin 8 2 today    · Monitor H&H

## 2021-03-13 LAB
ANION GAP SERPL CALCULATED.3IONS-SCNC: 10 MMOL/L (ref 4–13)
BASOPHILS # BLD AUTO: 0.02 THOUSANDS/ΜL (ref 0–0.1)
BASOPHILS NFR BLD AUTO: 1 % (ref 0–1)
BUN SERPL-MCNC: 21 MG/DL (ref 5–25)
CALCIUM SERPL-MCNC: 8.3 MG/DL (ref 8.3–10.1)
CHLORIDE SERPL-SCNC: 107 MMOL/L (ref 100–108)
CO2 SERPL-SCNC: 22 MMOL/L (ref 21–32)
CREAT SERPL-MCNC: 1.98 MG/DL (ref 0.6–1.3)
EOSINOPHIL # BLD AUTO: 0.07 THOUSAND/ΜL (ref 0–0.61)
EOSINOPHIL NFR BLD AUTO: 3 % (ref 0–6)
ERYTHROCYTE [DISTWIDTH] IN BLOOD BY AUTOMATED COUNT: 17.2 % (ref 11.6–15.1)
FLUAV RNA RESP QL NAA+PROBE: NEGATIVE
FLUBV RNA RESP QL NAA+PROBE: NEGATIVE
GFR SERPL CREATININE-BSD FRML MDRD: 32 ML/MIN/1.73SQ M
GLUCOSE SERPL-MCNC: 103 MG/DL (ref 65–140)
GLUCOSE SERPL-MCNC: 80 MG/DL (ref 65–140)
GLUCOSE SERPL-MCNC: 82 MG/DL (ref 65–140)
GLUCOSE SERPL-MCNC: 91 MG/DL (ref 65–140)
GLUCOSE SERPL-MCNC: 94 MG/DL (ref 65–140)
HCT VFR BLD AUTO: 27.6 % (ref 36.5–49.3)
HGB BLD-MCNC: 8.8 G/DL (ref 12–17)
IMM GRANULOCYTES # BLD AUTO: 0.01 THOUSAND/UL (ref 0–0.2)
IMM GRANULOCYTES NFR BLD AUTO: 0 % (ref 0–2)
LYMPHOCYTES # BLD AUTO: 0.79 THOUSANDS/ΜL (ref 0.6–4.47)
LYMPHOCYTES NFR BLD AUTO: 32 % (ref 14–44)
MAGNESIUM SERPL-MCNC: 2.1 MG/DL (ref 1.6–2.6)
MCH RBC QN AUTO: 30.2 PG (ref 26.8–34.3)
MCHC RBC AUTO-ENTMCNC: 31.9 G/DL (ref 31.4–37.4)
MCV RBC AUTO: 95 FL (ref 82–98)
MONOCYTES # BLD AUTO: 0.34 THOUSAND/ΜL (ref 0.17–1.22)
MONOCYTES NFR BLD AUTO: 14 % (ref 4–12)
NEUTROPHILS # BLD AUTO: 1.27 THOUSANDS/ΜL (ref 1.85–7.62)
NEUTS SEG NFR BLD AUTO: 50 % (ref 43–75)
NRBC BLD AUTO-RTO: 0 /100 WBCS
PLATELET # BLD AUTO: 59 THOUSANDS/UL (ref 149–390)
PMV BLD AUTO: 11.3 FL (ref 8.9–12.7)
POTASSIUM SERPL-SCNC: 3.9 MMOL/L (ref 3.5–5.3)
RBC # BLD AUTO: 2.91 MILLION/UL (ref 3.88–5.62)
RSV RNA RESP QL NAA+PROBE: NEGATIVE
SARS-COV-2 RNA RESP QL NAA+PROBE: NEGATIVE
SODIUM SERPL-SCNC: 139 MMOL/L (ref 136–145)
WBC # BLD AUTO: 2.5 THOUSAND/UL (ref 4.31–10.16)

## 2021-03-13 PROCEDURE — 99232 SBSQ HOSP IP/OBS MODERATE 35: CPT | Performed by: PHYSICIAN ASSISTANT

## 2021-03-13 PROCEDURE — 99232 SBSQ HOSP IP/OBS MODERATE 35: CPT | Performed by: INTERNAL MEDICINE

## 2021-03-13 PROCEDURE — 80048 BASIC METABOLIC PNL TOTAL CA: CPT | Performed by: INTERNAL MEDICINE

## 2021-03-13 PROCEDURE — 82948 REAGENT STRIP/BLOOD GLUCOSE: CPT

## 2021-03-13 PROCEDURE — 0241U HB NFCT DS VIR RESP RNA 4 TRGT: CPT | Performed by: INTERNAL MEDICINE

## 2021-03-13 PROCEDURE — 85025 COMPLETE CBC W/AUTO DIFF WBC: CPT | Performed by: INTERNAL MEDICINE

## 2021-03-13 PROCEDURE — 83735 ASSAY OF MAGNESIUM: CPT | Performed by: INTERNAL MEDICINE

## 2021-03-13 PROCEDURE — 99222 1ST HOSP IP/OBS MODERATE 55: CPT | Performed by: INTERNAL MEDICINE

## 2021-03-13 RX ADMIN — INSULIN GLARGINE 5 UNITS: 100 INJECTION, SOLUTION SUBCUTANEOUS at 21:32

## 2021-03-13 RX ADMIN — POTASSIUM & SODIUM PHOSPHATES POWDER PACK 280-160-250 MG 1 PACKET: 280-160-250 PACK at 08:12

## 2021-03-13 RX ADMIN — PANTOPRAZOLE SODIUM 40 MG: 40 TABLET, DELAYED RELEASE ORAL at 05:47

## 2021-03-13 RX ADMIN — ATORVASTATIN CALCIUM 40 MG: 40 TABLET, FILM COATED ORAL at 16:15

## 2021-03-13 NOTE — CONSULTS
Consultation - Medical Oncology   Agiular Bardales 77 y o  male MRN: [id number]  Unit/Bed#: E5 -01 Encounter: [id number]    Referring physician:  [facility]  Reason for Consult:  Pancytopenia  HPI: Aguilar Bardales is a 77y o  year old male who presents with pancytopenia and he follows with Dr Stephanie Moser at [facility] and had bone marrow test last year that showed 30% cellularity and decreased iron stores  He does not have splenomegaly  He has cirrhosis of the liver  He had outpatient workup for pancytopenia and that workup is on going  Thrombocytopenia did not respond to IVIG therapy and that was given in case he had ITP-probably not  He is here now because of acute GI blood loss and is undergoing GI evaluation  EGD showed varices and superficial ulcers in the duodenum and portal hypertensive gastropathy  Patient states he does not have bleeding anymore and is feeling better and wants to go home  Has tiredness and exertional dyspnea  No chest pain  No cough or hemoptysis  No other bleeding  Appetite is fair  He states he has been not losing weight  He had upper abdominal pain but that has subsided  No swelling of the legs and ankles and no pain in calf areas  He states he ambulates with a roller walker at home  Has arthritic symptoms    ROS:  03/13/21 Reviewed 12 systems: See symptoms in HPI  Presently no other neurological, cardiac, pulmonary, GI and  symptoms other than listed in HPI  Other symptoms are in HPI  No  fever, chills,  bone pains, skin rash, weight loss, night sweats, numbness, claudication   No frequent infections  Not unusually sensitive to heat or cold  No swelling of the ankles  No swollen glands  Patient is anxious         Historical Information   Past Medical History:   Diagnosis Date    Cirrhosis ([facility] )     [facility]) [facility]     Coronary artery disease     Dementia ([facility] )     Diabetes mellitus ([facility] )     Diverticulosis     Gastric reflux     Hyperlipemia     Panlobular emphysema (HCC)     Pituitary mass (HCC)     Renal disorder     CKD Stage 3 & Bilat multi renal nodules    Thrombocyte disorder (HCC)     thrombocytopenia    Unstable angina (HCC)      Past Surgical History:   Procedure Laterality Date    CARDIAC SURGERY      unspecified    CHOLECYSTECTOMY      CORONARY ARTERY BYPASS GRAFT      2-3 years ago    HERNIA REPAIR      IR BIOPSY BONE MARROW  5/12/2020     Social History   Social History     Substance and Sexual Activity   Alcohol Use Never    Frequency: Never    Binge frequency: Never     Social History     Substance and Sexual Activity   Drug Use Never     Social History     Tobacco Use   Smoking Status Former Smoker    Packs/day: 0 25   Smokeless Tobacco Never Used   Tobacco Comment    Hasn't smoked since February     Family History: History reviewed  No pertinent family history        Current Facility-Administered Medications:     acetaminophen (TYLENOL) tablet 650 mg, 650 mg, Oral, Q6H PRN, Elif Norton PA-C    atorvastatin (LIPITOR) tablet 40 mg, 40 mg, Oral, Daily With Kristal Laurent, RICKY, 40 mg at 03/12/21 1634    bisacodyl (DULCOLAX) rectal suppository 10 mg, 10 mg, Rectal, Daily PRN, Elif Norton PA-C    calcium carbonate (TUMS) chewable tablet 1,000 mg, 1,000 mg, Oral, Daily PRN, Elif Norton PA-C, 1,000 mg at 03/08/21 1522    insulin glargine (LANTUS) subcutaneous injection 5 Units 0 05 mL, 5 Units, Subcutaneous, HS, Elif Norton PA-C, 5 Units at 03/12/21 2203    insulin lispro (HumaLOG) 100 units/mL subcutaneous injection 1-5 Units, 1-5 Units, Subcutaneous, 4x Daily (AC & HS), 1 Units at 03/11/21 2308 **AND** Fingerstick Glucose (POCT), , , 4x Daily AC and at bedtime, Maikel Mckeon PA-C    ondansetron [facility]) injection 4 mg, 4 mg, Intravenous, Q6H PRN, Elif Norton PA-C    pantoprazole (PROTONIX) EC tablet 40 mg, 40 mg, Oral, Early Morning, Kaur Mcneill MD, 40 mg at 03/13/21 0547    potassium-sodium phosphates (PHOS-NAK) packet 1 packet, 1 packet, Oral, BID With Meals, Kenia Hayward MD, 1 packet at 03/13/21 0812    Allergies   Allergen Reactions    Erythromycin     Penicillins     Shellfish-Derived Products      @ ROS@  Physical Exam:  Vitals:    03/12/21 0738 03/12/21 1515 03/12/21 2246 03/13/21 0718   BP: 129/54 118/60 132/63 128/59   BP Location: Left arm Left arm Left arm Left arm   Pulse: 75 69 71 65   Resp: 18 19 18 18   Temp: (!) 97 4 °F (36 3 °C) 97 6 °F (36 4 °C) 98 4 °F (36 9 °C) 98 2 °F (36 8 °C)   TempSrc: Temporal Temporal Temporal Temporal   SpO2: 99% 97% 94% 95%     Alert, oriented, not in distress, no icterus, no oral thrush, no palpable neck mass, clear lung fields, regular heart rate, systolic murmur, abdomen  soft and non tender, no palpable abdominal mass, no ascites, no edema of ankles, no calf tenderness, no focal neurological deficit, has generalized weakness, no skin rash, no palpable lymphadenopathy in the neck and axillary areas,  no clubbing  Patient is anxious  Performance status 3  Lab Results: I have reviewed all pertinent labs    LABS:  Results for orders placed or performed during the hospital encounter of 03/08/21   Clostridium difficile toxin by PCR with EIA    Specimen: Per Rectum; Stool   Result Value Ref Range     C difficile toxin by PCR  Negative Negative   Occult blood 1-3, stool   Result Value Ref Range    Fecal Occult Blood Diagnostic Negative Negative   Iron Saturation %   Result Value Ref Range    Iron Saturation 41 %    TIBC 124 (L) 250 - 450 ug/dL    Iron 51 (L) 65 - 175 ug/dL   Ferritin   Result Value Ref Range    Ferritin 56 8 - 388 ng/mL   CBC and differential   Result Value Ref Range    WBC 3 94 (L) 4 31 - 10 16 Thousand/uL    RBC 1 86 (L) 3 88 - 5 62 Million/uL    Hemoglobin 5 7 (LL) 12 0 - 17 0 g/dL    Hematocrit 18 3 (L) 36 5 - 49 3 %    MCV 98 82 - 98 fL    MCH 30 6 26 8 - 34 3 pg    MCHC 31 1 (L) 31 4 - 37 4 g/dL    RDW 16 4 (H) 11 6 - 15 1 %    MPV 10 2 8 9 - 12 7 fL    Platelets 92 (L) 374 - 390 Thousands/uL    nRBC 0 /100 WBCs   Basic metabolic panel   Result Value Ref Range    Sodium 143 136 - 145 mmol/L    Potassium 4 4 3 5 - 5 3 mmol/L    Chloride 114 (H) 100 - 108 mmol/L    CO2 16 (L) 21 - 32 mmol/L    ANION GAP 13 4 - 13 mmol/L    BUN 37 (H) 5 - 25 mg/dL    Creatinine 2 21 (H) 0 60 - 1 30 mg/dL    Glucose 81 65 - 140 mg/dL    Calcium 8 0 (L) 8 3 - 10 1 mg/dL    eGFR 28 ml/min/1 73sq m   Protime-INR   Result Value Ref Range    Protime 23 1 (H) 11 6 - 14 5 seconds    INR 2 10 (H) 0 84 - 1 19   Serial Hemoglobin Q6hrs   Result Value Ref Range    Hemoglobin 10 4 (L) 12 0 - 17 0 g/dL   Serial Hemoglobin Q6hrs   Result Value Ref Range    Hemoglobin 9 4 (L) 12 0 - 17 0 g/dL   Protime-INR   Result Value Ref Range    Protime 18 3 (H) 11 6 - 14 5 seconds    INR 1 55 (H) 0 84 - 1 19   CBC and differential   Result Value Ref Range    WBC 3 14 (L) 4 31 - 10 16 Thousand/uL    RBC 3 05 (L) 3 88 - 5 62 Million/uL    Hemoglobin 9 2 (L) 12 0 - 17 0 g/dL    Hematocrit 28 3 (L) 36 5 - 49 3 %    MCV 93 82 - 98 fL    MCH 30 2 26 8 - 34 3 pg    MCHC 32 5 31 4 - 37 4 g/dL    RDW 17 8 (H) 11 6 - 15 1 %    MPV 10 4 8 9 - 12 7 fL    Platelets 78 (L) 750 - 390 Thousands/uL    nRBC 0 /100 WBCs    Neutrophils Relative 48 43 - 75 %    Immat GRANS % 0 0 - 2 %    Lymphocytes Relative 36 14 - 44 %    Monocytes Relative 9 4 - 12 %    Eosinophils Relative 5 0 - 6 %    Basophils Relative 2 (H) 0 - 1 %    Neutrophils Absolute 1 53 (L) 1 85 - 7 62 Thousands/µL    Immature Grans Absolute 0 01 0 00 - 0 20 Thousand/uL    Lymphocytes Absolute 1 13 0 60 - 4 47 Thousands/µL    Monocytes Absolute 0 28 0 17 - 1 22 Thousand/µL    Eosinophils Absolute 0 14 0 00 - 0 61 Thousand/µL    Basophils Absolute 0 05 0 00 - 0 10 Thousands/µL   Basic metabolic panel   Result Value Ref Range    Sodium 143 136 - 145 mmol/L    Potassium 3 7 3 5 - 5 3 mmol/L    Chloride 114 (H) 100 - 108 mmol/L    CO2 20 (L) 21 - 32 mmol/L    ANION GAP 9 4 - 13 mmol/L    BUN 39 (H) 5 - 25 mg/dL    Creatinine 2 62 (H) 0 60 - 1 30 mg/dL    Glucose 79 65 - 140 mg/dL    Calcium 7 9 (L) 8 3 - 10 1 mg/dL    eGFR 23 ml/min/1 73sq m   Serial Hemoglobin Q6hrs   Result Value Ref Range    Hemoglobin 10 9 (L) 12 0 - 17 0 g/dL   Hepatitis B surface antigen   Result Value Ref Range    Hepatitis B Surface Ag Non-reactive Non-reactive, NonReactive - Confirmed   Hepatitis C antibody   Result Value Ref Range    Hepatitis C Ab Non-reactive Non-reactive   Hepatitis B surface antibody   Result Value Ref Range    Hep B S Ab <3 10 mIU/mL   Hepatitis A antibody, total   Result Value Ref Range    Hep A Total Ab Non-reactive Non-reactive   AFP tumor marker   Result Value Ref Range    AFP TUMOR MARKER 1 9 0 5 - 8 ng/mL   UA (URINE) with reflex to Scope   Result Value Ref Range    Color, UA Yellow     Clarity, UA Clear     Specific Gravity, UA 1 025 1 003 - 1 030    pH, UA 5 5 4 5, 5 0, 5 5, 6 0, 6 5, 7 0, 7 5, 8 0    Leukocytes, UA Negative Negative    Nitrite, UA Negative Negative    Protein, UA Negative Negative mg/dl    Glucose, UA Negative Negative mg/dl    Ketones, UA Negative Negative mg/dl    Urobilinogen, UA 0 2 0 2, 1 0 E U /dl E U /dl    Bilirubin, UA Negative Negative    Blood, UA Negative Negative   CBC and differential   Result Value Ref Range    WBC 2 93 (L) 4 31 - 10 16 Thousand/uL    RBC 2 91 (L) 3 88 - 5 62 Million/uL    Hemoglobin 8 7 (L) 12 0 - 17 0 g/dL    Hematocrit 27 3 (L) 36 5 - 49 3 %    MCV 94 82 - 98 fL    MCH 29 9 26 8 - 34 3 pg    MCHC 31 9 31 4 - 37 4 g/dL    RDW 17 8 (H) 11 6 - 15 1 %    MPV 10 3 8 9 - 12 7 fL    Platelets 74 (L) 992 - 390 Thousands/uL    nRBC 0 /100 WBCs   Basic metabolic panel   Result Value Ref Range    Sodium 142 136 - 145 mmol/L    Potassium 3 7 3 5 - 5 3 mmol/L    Chloride 112 (H) 100 - 108 mmol/L    CO2 20 (L) 21 - 32 mmol/L    ANION GAP 10 4 - 13 mmol/L    BUN 39 (H) 5 - 25 mg/dL    Creatinine 2 76 (H) 0 60 - 1 30 mg/dL    Glucose 72 65 - 140 mg/dL    Calcium 8 4 8 3 - 10 1 mg/dL    eGFR 22 ml/min/1 73sq m   Albumin   Result Value Ref Range    Albumin 2 1 (L) 3 5 - 5 0 g/dL   Sodium, urine, random   Result Value Ref Range    Sodium, Ur 95    Creatinine, urine, random   Result Value Ref Range    Creatinine, Ur 110 5 mg/dL   CBC and differential   Result Value Ref Range    WBC 2 45 (L) 4 31 - 10 16 Thousand/uL    RBC 2 53 (L) 3 88 - 5 62 Million/uL    Hemoglobin 7 6 (L) 12 0 - 17 0 g/dL    Hematocrit 23 9 (L) 36 5 - 49 3 %    MCV 95 82 - 98 fL    MCH 30 0 26 8 - 34 3 pg    MCHC 31 8 31 4 - 37 4 g/dL    RDW 17 8 (H) 11 6 - 15 1 %    MPV 10 6 8 9 - 12 7 fL    Platelets 62 (L) 786 - 390 Thousands/uL    nRBC 0 /100 WBCs    Neutrophils Relative 49 43 - 75 %    Immat GRANS % 1 0 - 2 %    Lymphocytes Relative 37 14 - 44 %    Monocytes Relative 9 4 - 12 %    Eosinophils Relative 3 0 - 6 %    Basophils Relative 1 0 - 1 %    Neutrophils Absolute 1 22 (L) 1 85 - 7 62 Thousands/µL    Immature Grans Absolute 0 02 0 00 - 0 20 Thousand/uL    Lymphocytes Absolute 0 90 0 60 - 4 47 Thousands/µL    Monocytes Absolute 0 22 0 17 - 1 22 Thousand/µL    Eosinophils Absolute 0 07 0 00 - 0 61 Thousand/µL    Basophils Absolute 0 02 0 00 - 0 10 Thousands/µL   Basic metabolic panel   Result Value Ref Range    Sodium 141 136 - 145 mmol/L    Potassium 3 6 3 5 - 5 3 mmol/L    Chloride 110 (H) 100 - 108 mmol/L    CO2 23 21 - 32 mmol/L    ANION GAP 8 4 - 13 mmol/L    BUN 30 (H) 5 - 25 mg/dL    Creatinine 2 57 (H) 0 60 - 1 30 mg/dL    Glucose 86 65 - 140 mg/dL    Calcium 8 1 (L) 8 3 - 10 1 mg/dL    eGFR 24 ml/min/1 73sq m   Magnesium   Result Value Ref Range    Magnesium 1 2 (L) 1 6 - 2 6 mg/dL   Phosphorus   Result Value Ref Range    Phosphorus 2 0 (L) 2 3 - 4 1 mg/dL   Basic metabolic panel   Result Value Ref Range    Sodium 139 136 - 145 mmol/L    Potassium 3 7 3 5 - 5 3 mmol/L Chloride 109 (H) 100 - 108 mmol/L    CO2 21 21 - 32 mmol/L    ANION GAP 9 4 - 13 mmol/L    BUN 23 5 - 25 mg/dL    Creatinine 2 11 (H) 0 60 - 1 30 mg/dL    Glucose 84 65 - 140 mg/dL    Calcium 8 7 8 3 - 10 1 mg/dL    eGFR 30 ml/min/1 73sq m   CBC and differential   Result Value Ref Range    WBC 2 03 (L) 4 31 - 10 16 Thousand/uL    RBC 2 72 (L) 3 88 - 5 62 Million/uL    Hemoglobin 8 2 (L) 12 0 - 17 0 g/dL    Hematocrit 25 6 (L) 36 5 - 49 3 %    MCV 94 82 - 98 fL    MCH 30 1 26 8 - 34 3 pg    MCHC 32 0 31 4 - 37 4 g/dL    RDW 16 9 (H) 11 6 - 15 1 %    MPV 10 4 8 9 - 12 7 fL    Platelets 54 (L) 001 - 390 Thousands/uL    nRBC 0 /100 WBCs    Neutrophils Relative 44 43 - 75 %    Immat GRANS % 1 0 - 2 %    Lymphocytes Relative 38 14 - 44 %    Monocytes Relative 13 (H) 4 - 12 %    Eosinophils Relative 3 0 - 6 %    Basophils Relative 1 0 - 1 %    Neutrophils Absolute 0 92 (L) 1 85 - 7 62 Thousands/µL    Immature Grans Absolute 0 01 0 00 - 0 20 Thousand/uL    Lymphocytes Absolute 0 77 0 60 - 4 47 Thousands/µL    Monocytes Absolute 0 27 0 17 - 1 22 Thousand/µL    Eosinophils Absolute 0 05 0 00 - 0 61 Thousand/µL    Basophils Absolute 0 01 0 00 - 0 10 Thousands/µL   CBC and differential   Result Value Ref Range    WBC 2 50 (L) 4 31 - 10 16 Thousand/uL    RBC 2 91 (L) 3 88 - 5 62 Million/uL    Hemoglobin 8 8 (L) 12 0 - 17 0 g/dL    Hematocrit 27 6 (L) 36 5 - 49 3 %    MCV 95 82 - 98 fL    MCH 30 2 26 8 - 34 3 pg    MCHC 31 9 31 4 - 37 4 g/dL    RDW 17 2 (H) 11 6 - 15 1 %    MPV 11 3 8 9 - 12 7 fL    Platelets 59 (L) 080 - 390 Thousands/uL    nRBC 0 /100 WBCs    Neutrophils Relative 50 43 - 75 %    Immat GRANS % 0 0 - 2 %    Lymphocytes Relative 32 14 - 44 %    Monocytes Relative 14 (H) 4 - 12 %    Eosinophils Relative 3 0 - 6 %    Basophils Relative 1 0 - 1 %    Neutrophils Absolute 1 27 (L) 1 85 - 7 62 Thousands/µL    Immature Grans Absolute 0 01 0 00 - 0 20 Thousand/uL    Lymphocytes Absolute 0 79 0 60 - 4 47 Thousands/µL Monocytes Absolute 0 34 0 17 - 1 22 Thousand/µL    Eosinophils Absolute 0 07 0 00 - 0 61 Thousand/µL    Basophils Absolute 0 02 0 00 - 0 10 Thousands/µL   Basic metabolic panel   Result Value Ref Range    Sodium 139 136 - 145 mmol/L    Potassium 3 9 3 5 - 5 3 mmol/L    Chloride 107 100 - 108 mmol/L    CO2 22 21 - 32 mmol/L    ANION GAP 10 4 - 13 mmol/L    BUN 21 5 - 25 mg/dL    Creatinine 1 98 (H) 0 60 - 1 30 mg/dL    Glucose 80 65 - 140 mg/dL    Calcium 8 3 8 3 - 10 1 mg/dL    eGFR 32 ml/min/1 73sq m   Magnesium   Result Value Ref Range    Magnesium 2 1 1 6 - 2 6 mg/dL   Type and screen   Result Value Ref Range    ABO Grouping O     Rh Factor Positive     Antibody Screen Negative     Specimen Expiration Date 20210311    Prepare Leukoreduced RBC: 2 Units   Result Value Ref Range    Unit Product Code L0567C12     Unit Number [id number]     Unit ABO O     Unit [facility] POS     Crossmatch Compatible     Unit Dispense Status Presumed Trans     Unit Product Code S1668Y56     Unit Number [id number]     Unit ABO O     Unit RH POS     Crossmatch Compatible     Unit Dispense Status Presumed Trans    Type and screen   Result Value Ref Range    ABO Grouping O     Rh Factor Positive     Antibody Screen Negative     Specimen Expiration Date 20210314    Prepare Leukoreduced RBC: 1 Units   Result Value Ref Range    Unit Product Code N2363N04     Unit Number [id number]     Unit ABO O     Unit RH POS     Crossmatch Compatible     Unit Dispense Status Presumed Trans    Fingerstick Glucose (POCT)   Result Value Ref Range    POC Glucose 92 65 - 140 mg/dl   Fingerstick Glucose (POCT)   Result Value Ref Range    POC Glucose 86 65 - 140 mg/dl   Fingerstick Glucose (POCT)   Result Value Ref Range    POC Glucose 86 65 - 140 mg/dl   Fingerstick Glucose (POCT)   Result Value Ref Range    POC Glucose 110 65 - 140 mg/dl   Fingerstick Glucose (POCT)   Result Value Ref Range    POC Glucose 94 65 - 140 mg/dl   Fingerstick Glucose (POCT) Result Value Ref Range    POC Glucose 72 65 - 140 mg/dl   Fingerstick Glucose (POCT)   Result Value Ref Range    POC Glucose 78 65 - 140 mg/dl   Fingerstick Glucose (POCT)   Result Value Ref Range    POC Glucose 99 65 - 140 mg/dl   Fingerstick Glucose (POCT)   Result Value Ref Range    POC Glucose 179 (H) 65 - 140 mg/dl   Fingerstick Glucose (POCT)   Result Value Ref Range    POC Glucose 72 65 - 140 mg/dl   Fingerstick Glucose (POCT)   Result Value Ref Range    POC Glucose 193 (H) 65 - 140 mg/dl   Fingerstick Glucose (POCT)   Result Value Ref Range    POC Glucose 185 (H) 65 - 140 mg/dl   Fingerstick Glucose (POCT)   Result Value Ref Range    POC Glucose 96 65 - 140 mg/dl   Fingerstick Glucose (POCT)   Result Value Ref Range    POC Glucose 75 65 - 140 mg/dl   Fingerstick Glucose (POCT)   Result Value Ref Range    POC Glucose 131 65 - 140 mg/dl   Fingerstick Glucose (POCT)   Result Value Ref Range    POC Glucose 114 65 - 140 mg/dl   Fingerstick Glucose (POCT)   Result Value Ref Range    POC Glucose 180 (H) 65 - 140 mg/dl   Fingerstick Glucose (POCT)   Result Value Ref Range    POC Glucose 78 65 - 140 mg/dl   Fingerstick Glucose (POCT)   Result Value Ref Range    POC Glucose 94 65 - 140 mg/dl   Fingerstick Glucose (POCT)   Result Value Ref Range    POC Glucose 77 65 - 140 mg/dl   Fingerstick Glucose (POCT)   Result Value Ref Range    POC Glucose 138 65 - 140 mg/dl   Fingerstick Glucose (POCT)   Result Value Ref Range    POC Glucose 82 65 - 140 mg/dl   Fingerstick Glucose (POCT)   Result Value Ref Range    POC Glucose 91 65 - 140 mg/dl   Manual Differential(PHLEBS Do Not Order)   Result Value Ref Range    Segmented % 49 43 - 75 %    Lymphocytes % 44 14 - 44 %    Monocytes % 7 4 - 12 %    Eosinophils, % 0 0 - 6 %    Basophils % 0 0 - 1 %    Absolute Neutrophils 1 85 1 85 - 7 62 Thousand/uL    Lymphocytes Absolute 1 73 0 60 - 4 47 Thousand/uL    Monocytes Absolute 0 28 0 00 - 1 22 Thousand/uL    Eosinophils Absolute 0  00 0 00 - 0 40 Thousand/uL    Basophils Absolute 0 00 0 00 - 0 10 Thousand/uL    Total Counted 100     Anisocytosis Present     Polychromasia Present     Platelet Estimate Decreased (A) Adequate         Imaging Studies: I have personally reviewed pertinent reports  MPRESSION:     Moderate circumferential thickening of the proximal duodenum in keeping with a nonspecific duodenitis  Ulcer disease is part of the differential diagnosis      The study was marked in [facility] for immediate notification      Workstation performed: [id number]      Imaging    CT abdomen pelvis wo contrast (Order: [id number]) - 3/7/2021    Pathology, and Other Studies: I have personally reviewed pertinent reports        Reviewed blood counts, chemistry, iron studies and CT scan of abdomen and pelvis and other studies listed above    Assessment and Plan:  Pancytopenia  HPI: Neil Mireles is a 77y o  year old male who presents with pancytopenia and he follows with Dr Ara Corey at [facility] and had bone marrow test last year that showed 30% cellularity and decreased iron stores  He does not have splenomegaly  He has cirrhosis of the liver  He had outpatient workup for pancytopenia and that workup is on going  Thrombocytopenia did not respond to IVIG therapy and that was given in case he had ITP-probably not  He is here now because of acute GI blood loss and is undergoing GI evaluation  EGD showed varices and superficial ulcers in the duodenum and portal hypertensive gastropathy  Patient states he does not have bleeding anymore and is feeling better and wants to go home  Has tiredness and exertional dyspnea  No chest pain  No cough or hemoptysis  No other bleeding  Appetite is fair  He states he has been not losing weight  He had upper abdominal pain but that has subsided  No swelling of the legs and ankles and no pain in calf areas  He states he ambulates with a roller walker at home  Has arthritic symptoms    Physical examination and test results are as recorded and discussed     Pancytopenia and workup is in progress  He already had bone marrow test in 2020  Anemia secondary to acute GI blood loss and renal disease  Acute GI blood loss secondary to esophageal varices and duodenal ulcer  Cirrhosis of liver  Patient wants to go home and he can finish hematological workup on outpatient basis and follow-up with Dr Joseph Edwards within 1 week post discharge in [place]  Patient would prefer that  All discussed in detail  Questions answered  Patient voiced understanding and agreement in the discussion  Counseling / Coordination of Care    Desmond Garcia   Provided counseling and support

## 2021-03-13 NOTE — ASSESSMENT & PLAN NOTE
Lab Results   Component Value Date    EGFR 32 03/13/2021    EGFR 30 03/12/2021    EGFR 24 03/11/2021    CREATININE 1 98 (H) 03/13/2021    CREATININE 2 11 (H) 03/12/2021    CREATININE 2 57 (H) 03/11/2021   · Baseline creatinine appears to be close to 2 4-2 6  · Acute kidney injury likely secondary to anemia and GI bleed  · Nephrology consultation appreciated  · Received albumin challenge for 48 hours    · Avoid hypotension and nephrotoxic medications  · Nephrology input appreciated  ·   Will monitor BMP

## 2021-03-13 NOTE — TRANSPORTATION MEDICAL NECESSITY
Section I - General Information    Name of Patient: Aguilar Bardales                 : 1945    Medicare #: Ayde Pope  Transport Date: 21 (PCS is valid for round trips on this date and for all repetitive trips in the 60-day range as noted below )  Origin: [address]                                                         Destination: [address]  Is the pt's stay covered under Medicare Part A (PPS/DRG)   []     Closest appropriate facility? If no, why is transport to more distant facility required? Yes  If hospice pt, is this transport related to pt's terminal illness? NA       Section II - Medical Necessity Questionnaire  Ambulance transportation is medically necessary only if other means of transport are contraindicated or would be potentially harmful to the patient  To meet this requirement, the patient must either be "bed confined" or suffer from a condition such that transport by means other than ambulance is contraindicated by the patient's condition  The following questions must be answered by the medical professional signing below for this form to be valid:    1)  Describe the MEDICAL CONDITION (physical and/or mental) of this patient AT [address] [place] [address] that requires the patient to be transported in an ambulance and why transport by other means is contraindicated by the patient's condition: bed bound, forgetful    2) Is the patient "bed confined" as defined below? Yes  To be "be confined" the patient must satisfy all three of the following conditions: (1) unable to get up from bed without Assistance; AND (2) unable to ambulate; AND (3) unable to sit in a chair or wheelchair  3) Can this patient safely be transported by car or wheelchair van (i e , seated during transport without a medical attendant or monitoring)?    No    4) In addition to completing questions 1-3 above, please check any of the following conditions that apply*:   *Note: supporting documentation for any boxes checked must be maintained in the patient's medical records  If hosp-hosp transfer, describe services needed at 2nd facility not available at 1st facility? Patient is confused  Medical attendant required   Unable to tolerate seated position for time needed to transport       Section III - Signature of Physician or Healthcare Professional  I certify that the above information is true and correct based on my evaluation of this patient, and represent that the patient requires transport by ambulance and that other forms of transport are contraindicated  I understand that this information will be used by the Centers for Medicare and Medicaid Services (CMS) to support the determination of medical necessity for ambulance services, and I represent that I have personal knowledge of the patient's condition at time of transport  [x]  If this box is checked, I also certify that the patient is physically or mentally incapable of signing the ambulance service's claim and that the institution with which I am affiliated has furnished care, services, or assistance to the patient  My signature below is made on behalf of the patient pursuant to 42 CFR §424 36(b)(4)  In accordance with 42 CFR §424 37, the specific reason(s) that the patient is physically or mentally incapable of signing the claim form is as follows: forgetful  Signature of Physician* or Healthcare Professional______________________________________________________________  Signature Date 03/13/21 (For scheduled repetitive transports, this form is not valid for transports performed more than 60 days after this date)    Printed Name & Credentials of Physician or Healthcare Professional (MD, , RN, etc )_Gladis Amaral RN  _______________________________  *Form must be signed by patient's attending physician for scheduled, repetitive transports   For non-repetitive, unscheduled ambulance transports, if unable to obtain the signature of the attending physician, any of the following may sign (choose appropriate option below)  [] Physician Assistant []  Clinical Nurse Specialist [x]  Registered Nurse  []  Nurse Practitioner  [x] Discharge Planner

## 2021-03-13 NOTE — PLAN OF CARE
Problem: Potential for Falls  Goal: Patient will remain free of falls  Description: INTERVENTIONS:  - Assess patient frequently for physical needs  -  Identify cognitive and physical deficits and behaviors that affect risk of falls    -  [place] fall precautions as indicated by assessment   - Educate patient/family on patient safety including physical limitations  - Instruct patient to call for assistance with activity based on assessment  - Modify environment to reduce risk of injury  - Consider OT/PT consult to assist with strengthening/mobility  Outcome: Progressing     Problem: Prexisting or High Potential for Compromised Skin Integrity  Goal: Skin integrity is maintained or improved  Description: INTERVENTIONS:  - Identify patients at risk for skin breakdown  - Assess and monitor skin integrity  - Assess and monitor nutrition and hydration status  - Monitor labs   - Assess for incontinence   - Turn and reposition patient  - Assist with mobility/ambulation  - Relieve pressure over bony prominences  - Avoid friction and shearing  - Provide appropriate hygiene as needed including keeping skin clean and dry  - Evaluate need for skin moisturizer/barrier cream  - Collaborate with interdisciplinary team   - Patient/family teaching  - Consider wound care consult   Outcome: Progressing     Problem: PAIN - ADULT  Goal: Verbalizes/displays adequate comfort level or baseline comfort level  Description: Interventions:  - Encourage patient to monitor pain and request assistance  - Assess pain using appropriate pain scale  - Administer analgesics based on type and severity of pain and evaluate response  - Implement non-pharmacological measures as appropriate and evaluate response  - Consider cultural and social influences on pain and pain management  - Notify physician/advanced practitioner if interventions unsuccessful or patient reports new pain  Outcome: Progressing     Problem: INFECTION - ADULT  Goal: Absence or prevention of progression during hospitalization  Description: INTERVENTIONS:  - Assess and monitor for signs and symptoms of infection  - Monitor lab/diagnostic results  - Monitor all insertion sites, i e  indwelling lines, tubes, and drains  - Monitor endotracheal if appropriate and nasal secretions for changes in amount and color  - [place] appropriate cooling/warming therapies per order  - Administer medications as ordered  - Instruct and encourage patient and family to use good hand hygiene technique  - Identify and instruct in appropriate isolation precautions for identified infection/condition  Outcome: Progressing  Goal: Absence of fever/infection during neutropenic period  Description: INTERVENTIONS:  - Monitor WBC    Outcome: Progressing     Problem: SAFETY ADULT  Goal: Patient will remain free of falls  Description: INTERVENTIONS:  - Assess patient frequently for physical needs  -  Identify cognitive and physical deficits and behaviors that affect risk of falls    -  [place] fall precautions as indicated by assessment   - Educate patient/family on patient safety including physical limitations  - Instruct patient to call for assistance with activity based on assessment  - Modify environment to reduce risk of injury  - Consider OT/PT consult to assist with strengthening/mobility  Outcome: Progressing  Goal: Maintain or return to baseline ADL function  Description: INTERVENTIONS:  -  Assess patient's ability to carry out ADLs; assess patient's baseline for ADL function and identify physical deficits which impact ability to perform ADLs (bathing, care of mouth/teeth, toileting, grooming, dressing, etc )  - Assess/evaluate cause of self-care deficits   - Assess range of motion  - Assess patient's mobility; develop plan if impaired  - Assess patient's need for assistive devices and provide as appropriate  - Encourage maximum independence but intervene and supervise when necessary  - Involve family in performance of ADLs  - Assess for home care needs following discharge   - Consider OT consult to assist with ADL evaluation and planning for discharge  - Provide patient education as appropriate  Outcome: Progressing  Goal: Maintain or return mobility status to optimal level  Description: INTERVENTIONS:  - Assess patient's baseline mobility status (ambulation, transfers, stairs, etc )    - Identify cognitive and physical deficits and behaviors that affect mobility  - Identify mobility aids required to assist with transfers and/or ambulation (gait belt, sit-to-stand, lift, walker, cane, etc )  - [place] fall precautions as indicated by assessment  - Record patient progress and toleration of activity level on Mobility SBAR; progress patient to next Phase/Stage  - Instruct patient to call for assistance with activity based on assessment  - Consider rehabilitation consult to assist with strengthening/weightbearing, etc   Outcome: Progressing     Problem: DISCHARGE PLANNING  Goal: Discharge to home or other facility with appropriate resources  Description: INTERVENTIONS:  - Identify barriers to discharge w/patient and caregiver  - Arrange for needed discharge resources and transportation as appropriate  - Identify discharge learning needs (meds, wound care, etc )  - Arrange for interpretive services to assist at discharge as needed  - Refer to Case Management Department for coordinating discharge planning if the patient needs post-hospital services based on physician/advanced practitioner order or complex needs related to functional status, cognitive ability, or social support system  Outcome: Progressing     Problem: Knowledge Deficit  Goal: Patient/family/caregiver demonstrates understanding of disease process, treatment plan, medications, and discharge instructions  Description: Complete learning assessment and assess knowledge base    Interventions:  - Provide teaching at level of understanding  - Provide teaching via preferred learning methods  Outcome: Progressing

## 2021-03-13 NOTE — ASSESSMENT & PLAN NOTE
· Cirrhosis of unknown etiology patient did state use a regular user of alcohol when he was in the [employer] many years ago, hepatitis panel negative  · CT abdomen - Nodular liver concerning for underlying cirrhosis  · Abdominal U/S - Liver cirrhosis  No suspicious lesions identified  · Alpha-fetoprotein 1 9  · Will need [facility]    · GI input appreciated

## 2021-03-13 NOTE — ASSESSMENT & PLAN NOTE
No results found for: HGBA1C    Recent Labs     03/12/21  1119 03/12/21  1559 03/12/21 2055 03/13/21  0717   POCGLU 94 77 138 82       Blood Sugar Average: Last 72 hrs:     Will continue Lantus 5mg  Accuchecks q6h and sliding scale coverage

## 2021-03-13 NOTE — ASSESSMENT & PLAN NOTE
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 on 3/23/20  Redemonstrated on MRI 6/24/20 - 3 4 cm relatively homogeneous mass emanating from the pituitary gland consistent with microadenoma  Extension into the left cavernous sinus, sphenoid sinus, with elevation of contents of the suprasellar cyst   Diameter of the cavernous carotid arteries   do not appear significantly affected  Was following with [facility], who had ordered pituitary function labs and recommended outpatient follow-up with ophthalmology and follow-up MRI  However, labs were never obtained and patient appeared to have been lost to follow-up  Patient states he was told it was "benign", also states he has seen an ophtholmologist, although no records in [facility]    Neurosurgery had stated patient is not a surgical candidate

## 2021-03-13 NOTE — PROGRESS NOTES
Patient Name: Dilcia Ward  Patient MRN: [id number]  Date: 03/13/21  Service: [facility]    Bard Ames is a 77 y o  male who was admitted with hematochezia  He feels well today  Tolerating diet  No bowel movements today  Patient Denies:  Chest pain, dyspnea, nausea, abdominal pain    All others negative except as noted in HPI  Objective     Vitals  /59 (BP Location: Left arm)   Pulse 65   Temp 98 2 °F (36 8 °C) (Temporal)   Resp 18   SpO2 95%   General: Alert, no apparent distress  Eyes:  No scleral icterus  ENT:  Mucous membranes moist  Card:  Regular rhythm  Lungs: Clear to ascultation b/l  No wheezes, rales, rhonchi  Abdomen:  Soft  Bowel sounds normoactive  Nontender    Skin:  No jaundice  Extremities:  No edema  Neuro: Alert     Laboratory Studies  Lab Results   Component Value Date    CREATININE 1 98 (H) 03/13/2021    BUN 21 03/13/2021    SODIUM 139 03/13/2021    K 3 9 03/13/2021     03/13/2021    CO2 22 03/13/2021    CALCIUM 8 3 03/13/2021    ALKPHOS 35 (L) 03/07/2021    ALB 2 1 (L) 03/10/2021    TBILI 0 90 03/07/2021    AST 55 (H) 03/07/2021    ALT 20 03/07/2021     Lab Results   Component Value Date    WBC 2 50 (L) 03/13/2021    HGB 8 8 (L) 03/13/2021    HCT 27 6 (L) 03/13/2021    PLT 59 (L) 03/13/2021    MCV 95 03/13/2021     Lab Results   Component Value Date    HGB 8 8 (L) 03/13/2021    HGB 8 2 (L) 03/12/2021    HGB 7 6 (L) 03/11/2021       Lab Results   Component Value Date    PROTIME 18 3 (H) 03/09/2021    INR 1 55 (H) 03/09/2021       Inhouse Medications       Current Facility-Administered Medications:     acetaminophen (TYLENOL) tablet 650 mg, 650 mg, Oral, Q6H PRN    atorvastatin (LIPITOR) tablet 40 mg, 40 mg, Oral, Daily With Dinner, 40 mg at 03/12/21 1634    bisacodyl (DULCOLAX) rectal suppository 10 mg, 10 mg, Rectal, Daily PRN    calcium carbonate (TUMS) chewable tablet 1,000 mg, 1,000 mg, Oral, Daily PRN, 1,000 mg at 03/08/21 1526   insulin glargine (LANTUS) subcutaneous injection 5 Units 0 05 mL, 5 Units, Subcutaneous, HS, 5 Units at 03/12/21 2203    insulin lispro (HumaLOG) 100 units/mL subcutaneous injection 1-5 Units, 1-5 Units, Subcutaneous, 4x Daily (AC & HS), 1 Units at 03/11/21 2308 **AND** Fingerstick Glucose (POCT), , , 4x Daily AC and at bedtime    ondansetron (ZOFRAN) injection 4 mg, 4 mg, Intravenous, Q6H PRN    pantoprazole (PROTONIX) EC tablet 40 mg, 40 mg, Oral, Early Morning, 40 mg at 03/13/21 0547    potassium-sodium phosphates (PHOS-NAK) packet 1 packet, 1 packet, Oral, BID With Meals, 1 packet at 03/13/21 9944      Assessment/Plan:  1  GI bleed, likely variceal   Status post banding x3  No acute bleed at present  Will need repeat EGD with banding in 1-2 months  2  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 alcohol abuse  Hepatitis serologies negative  Alpha fetoprotein 1 9  Will need outpatient [place] [id number]  surveillance  3  Anemia, multifactorial   Hemoglobin improved      Principal Problem:    GI bleed  Active Problems:    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HCC)    CAD (coronary artery disease)    Thrombocytopenia (HCC)    Type 2 diabetes mellitus with stage 3 chronic kidney disease, with long-term current use of insulin (HCC)    Cirrhosis ([facility] )    Acute kidney injury superimposed on chronic kidney disease (HCC)    Acute post-hemorrhagic anemia    Kellie Moeller PA-C

## 2021-03-13 NOTE — CASE MANAGEMENT
Patient is medically cleared to d/c back to [facility]  Spoke to Deshawn, admissions coordinator at [facility]  She confirmed patient is an MA bed hold and may return without insurance authorization  Ludell authorized return for either today or Sunday depending on transportation and provided phone and fax numbers for report  Updated facility contact information for nurse to call report  COVID test pending  Called [facility] to arrange S transportation  Unable to find a transport team until tomorrow  BLS by Cecil desouza Sunday at 1100  Completed Medical Necessity Form  [facility] and placed copy in chart  Daniel Ly RN and Dr Nimo Ledezma of confirmed d/c plans  Sent message in [facility] to Ludell with pickup time for tomorrow  CM Department will continue to follow

## 2021-03-13 NOTE — PROGRESS NOTES
NEPHROLOGY PROGRESS NOTE   Vadim Lino 77 y o  male MRN: [id number]  Unit/Bed#: E5 -01 Encounter: [id number]      ASSESSMENT/PLAN:  Progressive Chronic kidney disease, stage IV:  - etiology of elevated creatinine suspect prerenal secondary to volume depletion with poor oral intake/diarrhea, acute blood loss anemia in the setting of GI bleed, hemodynamic perturbations, plus or minus HRS  - underlying chronic kidney disease suspect secondary to diabetic nephropathy, cirrhosis, prior acute kidney injury, age-related nephron loss  - seen by AP with nephrology in April 2020  Message has been sent to office for follow-up upon discharge  - to note, patient with acute kidney injuries in the past    - upon review of medical records, creatinine appears to be around 1 7- 1 9 mg/dL in 2020  Creatinine 2 4- 2 6 mg/dL more recently since mid 2020    - creatinine 2 69 mg/dL upon admission   - most recent creatinine 1 98 mg/dL today  - status post albumin challenge  - UA: No proteinuria no hematuria, urine sodium 95               - imaging: CT scan completed on 03/07/2021 revealed unchanged renal cyst   - maintain urinary retention protocol   - avoid NSAIDs, nephrotoxic agents, IV contrast   - adjust medications to appropriate GFR  - monitor volume status with strict intake/output, daily weight      Electrolytes, acid/base:  - bicarbonate stable at 22 suspect due to underlying cirrhosis with chronic respiratory alkalosis  - hypomagnesemia improved with most recent magnesium 2 1 post replacement  - hypophosphatemia most recent phosphorus 2 0, on phosphorus supplements 1 pocket twice daily for 3 days  - will continue to monitor with repeat lab studies      Blood pressure, hemodynamics:  - blood pressure stable  - patient is not on any antihypertensive medications    - optimize hemodynamics; avoid hypotension and fluctuations of blood pressure   - maintain MAP > 65       H/H, anemia of chronic disease/GIB:  - most recent hemoglobin 8 8 grams/deciliter  - status post 2 units PRBC on 3/8   - goal hemoglobin greater than 8 grams/deciliter  - recommend PRBC transfusion for hemoglobin less than 7   - iron panel: Iron saturation 41%, ferritin 56    - hematology following      Cryptogenic cirrhosis:  - with history of fatty liver and alcohol abuse remotely, hepatitis A, B and C negative  - to note, patient with significant thrombocytopenia in March 2020 warranting Hematology consult in which patient was started on IV steroids and received IVIG x 2    - CT scan revealed nodule liver concerning for underlying cirrhosis, abdominal ultrasound revealed liver cirrhosis with no suspicious lesions identified  - with evidence of portal hypertension with gastropathy and varices post band ligation  - GI following      Other medical problems:  - pituitary macroadenoma, incidental finding per CT scan of head in March 2020   - diabetes, on insulin per primary team   - worsening thrombocytopenia, Hematology following  SUBJECTIVE:  Patient resting in chair  Patient without acute shortness of breath or chest pain  Patient is wanting to go home today      OBJECTIVE:  Current Weight:    Vitals:    03/13/21 0718   BP: 128/59   Pulse: 65   Resp: 18   Temp: 98 2 °F (36 8 °C)   SpO2: 95%       Intake/Output Summary (Last 24 hours) at 3/13/2021 1453  Last data filed at 3/13/2021 0316  Gross per 24 hour   Intake --   Output 525 ml   Net -525 ml       General:  No acute distress  Skin:  Warm, no rash  Eyes:  Sclerae anicteric  ENT:  Moist lips mucous membranes  Neck:  Supple trachea midline  Chest:  Clear breath sounds bilaterally   CVS:  Regular rate regular rhythm  Abdomen:  Slightly distended, soft, normoactive bowel sounds  Extremities:  No overt edema   Neuro:  Awake and interactive  Psych:  Appropriate affect      Medications:  Scheduled Meds:  Current Facility-Administered Medications   Medication Dose Route Frequency Provider Last Rate    acetaminophen  650 mg Oral Q6H PRN Gus Mason PA-C      atorvastatin  40 mg Oral Daily With [place], [place]      bisacodyl  10 mg Rectal Daily PRN Gus Mason PA-C      calcium carbonate  1,000 mg Oral Daily PRN Gus Mason PA-C      insulin glargine  5 Units Subcutaneous HS Gus Mason PA-C      insulin lispro  1-5 Units Subcutaneous 4x Daily (AC & HS) [place] PA-C      ondansetron  4 mg Intravenous Q6H PRN Gus Mason PA-C      pantoprazole  40 mg Oral Early Morning Candido Coronado MD      potassium-sodium phosphates  1 packet Oral BID With Meals Mariano Dowling MD         PRN Meds:   acetaminophen    bisacodyl    calcium carbonate    ondansetron    Continuous Infusions:     Laboratory Results:  Results from last 7 days   Lab Units 03/13/21  0546 03/12/21  0518 03/11/21  0555 03/10/21  0506 03/09/21  1220 03/09/21  0703 03/09/21  0037  03/08/21  0526 03/07/21  1906   WBC Thousand/uL 2 50* 2 03* 2 45* 2 93*  --  3 14*  --   --  3 94* 9 13   HEMOGLOBIN g/dL 8 8* 8 2* 7 6* 8 7* 10 9* 9 2* 9 4*   < > 5 7* 7 9*   HEMATOCRIT % 27 6* 25 6* 23 9* 27 3*  --  28 3*  --   --  18 3* 25 5*   PLATELETS Thousands/uL 59* 54* 62* 74*  --  78*  --   --  92* 208   SODIUM mmol/L 139 139 141 142  --  143  --   --  143 141   POTASSIUM mmol/L 3 9 3 7 3 6 3 7  --  3 7  --   --  4 4 4 5   CHLORIDE mmol/L 107 109* 110* 112*  --  114*  --   --  114* 111*   CO2 mmol/L 22 21 23 20*  --  20*  --   --  16* 17*   BUN mg/dL 21 23 30* 39*  --  39*  --   --  37* 40*   CREATININE mg/dL 1 98* 2 11* 2 57* 2 76*  --  2 62*  --   --  2 21* 2 69*   CALCIUM mg/dL 8 3 8 7 8 1* 8 4  --  7 9*  --   --  8 0* 8 6   MAGNESIUM mg/dL 2 1  --  1 2*  --   --   --   --   --   --  1 4*   PHOSPHORUS mg/dL  --   --  2 0*  --   --   --   --   --   --   --     < > = values in this interval not displayed

## 2021-03-13 NOTE — ASSESSMENT & PLAN NOTE
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

## 2021-03-13 NOTE — PROGRESS NOTES
[address]  Progress Note - Alyssa Hollis 1945, 77 y o  male MRN: [id number]  Unit/Bed#: E5 -01 Encounter: [id number]  Primary Care Provider: Karan Cole MD   Date and time admitted to hospital: 3/8/2021 12:06 AM    * GI bleed  Assessment & Plan  Pt presents with large bloody BM x 1  7 days of non-bloody diarrheal illness preceding bloody BM    · hemoglobin 7 6 status [id number] PRBCs since hospitalization  · CT abdomen (3/7/21) - Moderate circumferential thickening of the proximal duodenum in keeping with a nonspecific duodenitis  Ulcer disease is part of the differential diagnosis  · Status post EGD on 03/08/2021 which revealed 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, 3 superficial ulcers in duodenum, portal hypertensive gastropathy   · No further episodes of bleeding  · Discussed with GI bleed anemia is multifactorial secondary to chronic kidney disease will monitor H&H  · GI follow-up appreciated, will monitor H&H, continue PPI  · Repeat outpatient EGD in 4-8 weeks        Acute post-hemorrhagic anemia  Assessment & Plan  · Secondary to GI bleed  · Status post unit PRBC transfused on 03/08/2021and 3/11  · Hemoglobin 8 2 today  · Monitor H&H    Acute kidney injury superimposed on chronic kidney disease [facility])  Assessment & Plan  Lab Results   Component Value Date    EGFR 32 03/13/2021    EGFR 30 03/12/2021    EGFR 24 03/11/2021    CREATININE 1 98 (H) 03/13/2021    CREATININE 2 11 (H) 03/12/2021    CREATININE 2 57 (H) 03/11/2021   · Baseline creatinine appears to be close to 2 4-2 6  · Acute kidney injury likely secondary to anemia and GI bleed  · Nephrology consultation appreciated  · Received albumin challenge for 48 hours    · Avoid hypotension and nephrotoxic medications  · Nephrology input appreciated  ·   Will monitor BMP    Cirrhosis ([address] )  Assessment & Plan  · Cirrhosis of unknown etiology patient did state use a regular user of alcohol when he was in the [employer] many years ago, hepatitis panel negative  · CT abdomen - Nodular liver concerning for underlying cirrhosis  · Abdominal U/S - Liver cirrhosis  No suspicious lesions identified  · Alpha-fetoprotein 1 9  · Will need ongoing outpatient ultrasound follow[facility]  · GI input appreciated    Type 2 diabetes mellitus with stage 3 chronic kidney disease, with long-term current use of insulin [facility])  Assessment & Plan  No results found for: HGBA1C    Recent Labs     03/12/21  1119 03/12/21  1559 03/12/21 2055 03/13/21  0717   POCGLU 94 77 138 82       Blood Sugar Average: Last 72 hrs: Will continue Lantus 5mg  Accuchecks q6h and sliding scale coverage      Thrombocytopenia [facility])  Assessment & Plan  Patient with significant thrombocytopenia in March 2020 at which point hematology was following and patient was started on IV steroids and 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  Patient has been having progressive thrombocytopenia during his hospitalization  · Platelet count 13551  · Hematology consult stating further workup can be done as an outpatient and patient can follow with Hematology outpatient    CAD (coronary artery disease)  Assessment & Plan  Not on antiplatelet therapy, not on beta blocker therapy  Continue atorvastatin      Pituitary macroadenoma [facility])  Assessment & Plan  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 on 3/23/20  Redemonstrated on MRI 6/24/20 - 3 4 cm relatively homogeneous mass emanating from the pituitary gland consistent with microadenoma  Extension into the left cavernous sinus, sphenoid sinus, with elevation of contents of the suprasellar cyst   Diameter of the cavernous carotid arteries   do not appear significantly affected  Was following with [facility] Neurosurgery, who had ordered pituitary function labs and recommended outpatient follow-up with ophthalmology and follow-up MRI    However, labs were never obtained and patient appeared to have been lost to follow-up  Patient states he was told it was "benign", also states he has seen an ophtholmologist, although no records in [facility]  Neurosurgery had stated patient is not a surgical candidate          VTE Pharmacologic Prophylaxis:   Pharmacologic:  Contraindicated due to GI bleed and thrombocytopenia    Patient Centered Rounds: I have performed bedside rounds with nursing staff today  Time Spent for Care: 20 minutes  More than 50% of total time spent on counseling and coordination of care as described above  Current Length of Stay: 5 day(s)    Current Patient Status: Inpatient   Certification Statement: The patient will continue to require additional inpatient hospital stay due to Thrombocytopenia    Discharge Plan / Estimated Discharge Date:  24 hours    Code Status: Level 1 - Full Code      Subjective:   Patient seen and examined at bedside, denies any complaints    Objective:     Vitals:   Temp (24hrs), Av °F (36 7 °C), Min:97 5 °F (36 4 °C), Max:98 4 °F (36 9 °C)    Temp:  [97 5 °F (36 4 °C)-98 4 °F (36 9 °C)] 97 5 °F (36 4 °C)  HR:  [65-72] 72  Resp:  [18] 18  BP: (128-136)/(59-63) 136/60  SpO2:  [94 %-98 %] 98 %  There is no height or weight on file to calculate BMI  Input and Output Summary (last 24 hours): Intake/Output Summary (Last 24 hours) at 3/13/2021 1656  Last data filed at 3/13/2021 0316  Gross per 24 hour   Intake --   Output 525 ml   Net -525 ml       Physical Exam:    Constitutional: Patient is oriented to person, place and time, no acute distress  HEENT:  Normocephalic, atraumatic  Cardiovascular: Normal S1S2, RRR, No murmurs/rubs/gallops appreciated  Pulmonary:  Bilateral air entry, No rhonchi/rales/wheezing appreciated  Abdominal: Soft, Bowel sounds present, Non-tender, Non-distended  Extremities:  No cyanosis, clubbing or edema  Neurological: Cranial nerves II-XII grossly intact, sensation intact, otherwise no focal neurological symptoms     Skin:  Warm, dry    Additional Data:     Labs:    Results from last 7 days   Lab Units 03/13/21  0546   WBC Thousand/uL 2 50*   HEMOGLOBIN g/dL 8 8*   HEMATOCRIT % 27 6*   PLATELETS Thousands/uL 59*   NEUTROS PCT % 50   LYMPHS PCT % 32   MONOS PCT % 14*   EOS PCT % 3     Results from last 7 days   Lab Units 03/13/21  0546  03/07/21  1906   POTASSIUM mmol/L 3 9   < > 4 5   CHLORIDE mmol/L 107   < > 111*   CO2 mmol/L 22   < > 17*   BUN mg/dL 21   < > 40*   CREATININE mg/dL 1 98*   < > 2 69*   CALCIUM mg/dL 8 3   < > 8 6   ALK PHOS U/L  --   --  35*   ALT U/L  --   --  20   AST U/L  --   --  55*    < > = values in this interval not displayed  Results from last 7 days   Lab Units 03/09/21  0703   INR  1 55*        I Have Reviewed All Lab Data Listed Above          Recent Cultures (last 7 days):     Results from last 7 days   Lab Units 03/08/21  0614   C DIFF TOXIN B BY PCR  Negative       Last 24 Hours Medication List:   Current Facility-Administered Medications   Medication Dose Route Frequency Provider Last Rate    acetaminophen  650 mg Oral Q6H PRN Harman Closs, PA-C      atorvastatin  40 mg Oral Daily With CASTT, [place]      bisacodyl  10 mg Rectal Daily PRN Harman Closs, PA-C      calcium carbonate  1,000 mg Oral Daily PRN Harman Closs, PA-C      insulin glargine  5 Units Subcutaneous HS Harman Closs, PA-C      insulin lispro  1-5 Units Subcutaneous 4x Daily (AC & HS) Gege Medrano PA-C      ondansetron  4 mg Intravenous Q6H PRN Harman Closs, PA-C      pantoprazole  40 mg Oral Early Morning Juana Dobbs MD      potassium-sodium phosphates  1 packet Oral BID With Meals Inez Gonzalez MD          Today, Patient Was Seen By: Allen Harden MD

## 2021-03-13 NOTE — ASSESSMENT & PLAN NOTE
Pt presents with large bloody BM x 1  7 days of non-bloody diarrheal illness preceding bloody BM    · hemoglobin 7 6 status [id number] PRBCs since hospitalization  · CT abdomen (3/7/21) - Moderate circumferential thickening of the proximal duodenum in keeping with a nonspecific duodenitis  Ulcer disease is part of the differential diagnosis    · Status post EGD on 03/08/2021 which revealed 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, 3 superficial ulcers in duodenum, portal hypertensive gastropathy   · No further episodes of bleeding  · Discussed with GI bleed anemia is multifactorial secondary to chronic kidney disease will monitor H&H  · GI follow-up appreciated, will monitor H&H, continue PPI  · Repeat outpatient EGD in 4-8 weeks

## 2021-03-14 VITALS
HEIGHT: 66 IN | HEART RATE: 72 BPM | SYSTOLIC BLOOD PRESSURE: 155 MMHG | WEIGHT: 129 LBS | RESPIRATION RATE: 18 BRPM | BODY MASS INDEX: 20.73 KG/M2 | OXYGEN SATURATION: 97 % | DIASTOLIC BLOOD PRESSURE: 75 MMHG | TEMPERATURE: 98.1 F

## 2021-03-14 LAB
ANION GAP SERPL CALCULATED.3IONS-SCNC: 10 MMOL/L (ref 4–13)
BASOPHILS # BLD AUTO: 0.02 THOUSANDS/ΜL (ref 0–0.1)
BASOPHILS NFR BLD AUTO: 1 % (ref 0–1)
BUN SERPL-MCNC: 21 MG/DL (ref 5–25)
CALCIUM SERPL-MCNC: 8.8 MG/DL (ref 8.3–10.1)
CHLORIDE SERPL-SCNC: 105 MMOL/L (ref 100–108)
CO2 SERPL-SCNC: 22 MMOL/L (ref 21–32)
CREAT SERPL-MCNC: 2.06 MG/DL (ref 0.6–1.3)
EOSINOPHIL # BLD AUTO: 0.09 THOUSAND/ΜL (ref 0–0.61)
EOSINOPHIL NFR BLD AUTO: 3 % (ref 0–6)
ERYTHROCYTE [DISTWIDTH] IN BLOOD BY AUTOMATED COUNT: 17.1 % (ref 11.6–15.1)
GFR SERPL CREATININE-BSD FRML MDRD: 31 ML/MIN/1.73SQ M
GLUCOSE SERPL-MCNC: 78 MG/DL (ref 65–140)
GLUCOSE SERPL-MCNC: 79 MG/DL (ref 65–140)
HCT VFR BLD AUTO: 28.7 % (ref 36.5–49.3)
HGB BLD-MCNC: 9.4 G/DL (ref 12–17)
IMM GRANULOCYTES # BLD AUTO: 0.01 THOUSAND/UL (ref 0–0.2)
IMM GRANULOCYTES NFR BLD AUTO: 0 % (ref 0–2)
LYMPHOCYTES # BLD AUTO: 0.82 THOUSANDS/ΜL (ref 0.6–4.47)
LYMPHOCYTES NFR BLD AUTO: 30 % (ref 14–44)
MCH RBC QN AUTO: 30.5 PG (ref 26.8–34.3)
MCHC RBC AUTO-ENTMCNC: 32.8 G/DL (ref 31.4–37.4)
MCV RBC AUTO: 93 FL (ref 82–98)
MONOCYTES # BLD AUTO: 0.43 THOUSAND/ΜL (ref 0.17–1.22)
MONOCYTES NFR BLD AUTO: 16 % (ref 4–12)
NEUTROPHILS # BLD AUTO: 1.33 THOUSANDS/ΜL (ref 1.85–7.62)
NEUTS SEG NFR BLD AUTO: 50 % (ref 43–75)
NRBC BLD AUTO-RTO: 0 /100 WBCS
PLATELET # BLD AUTO: 67 THOUSANDS/UL (ref 149–390)
PMV BLD AUTO: 12 FL (ref 8.9–12.7)
POTASSIUM SERPL-SCNC: 4.1 MMOL/L (ref 3.5–5.3)
RBC # BLD AUTO: 3.08 MILLION/UL (ref 3.88–5.62)
SODIUM SERPL-SCNC: 137 MMOL/L (ref 136–145)
WBC # BLD AUTO: 2.7 THOUSAND/UL (ref 4.31–10.16)

## 2021-03-14 PROCEDURE — 85025 COMPLETE CBC W/AUTO DIFF WBC: CPT | Performed by: INTERNAL MEDICINE

## 2021-03-14 PROCEDURE — 99239 HOSP IP/OBS DSCHRG MGMT >30: CPT | Performed by: INTERNAL MEDICINE

## 2021-03-14 PROCEDURE — 80048 BASIC METABOLIC PNL TOTAL CA: CPT | Performed by: INTERNAL MEDICINE

## 2021-03-14 PROCEDURE — 82948 REAGENT STRIP/BLOOD GLUCOSE: CPT

## 2021-03-14 RX ADMIN — PANTOPRAZOLE SODIUM 40 MG: 40 TABLET, DELAYED RELEASE ORAL at 05:37

## 2021-03-14 NOTE — PLAN OF CARE
Problem: Potential for Falls  Goal: Patient will remain free of falls  Description: INTERVENTIONS:  - Assess patient frequently for physical needs  -  Identify cognitive and physical deficits and behaviors that affect risk of falls    -  [place] fall precautions as indicated by assessment   - Educate patient/family on patient safety including physical limitations  - Instruct patient to call for assistance with activity based on assessment  - Modify environment to reduce risk of injury  - Consider OT/PT consult to assist with strengthening/mobility  Outcome: Progressing     Problem: Prexisting or High Potential for Compromised Skin Integrity  Goal: Skin integrity is maintained or improved  Description: INTERVENTIONS:  - Identify patients at risk for skin breakdown  - Assess and monitor skin integrity  - Assess and monitor nutrition and hydration status  - Monitor labs   - Assess for incontinence   - Turn and reposition patient  - Assist with mobility/ambulation  - Relieve pressure over bony prominences  - Avoid friction and shearing  - Provide appropriate hygiene as needed including keeping skin clean and dry  - Evaluate need for skin moisturizer/barrier cream  - Collaborate with interdisciplinary team   - Patient/family teaching  - Consider wound care consult   Outcome: Progressing     Problem: PAIN - ADULT  Goal: Verbalizes/displays adequate comfort level or baseline comfort level  Description: Interventions:  - Encourage patient to monitor pain and request assistance  - Assess pain using appropriate pain scale  - Administer analgesics based on type and severity of pain and evaluate response  - Implement non-pharmacological measures as appropriate and evaluate response  - Consider cultural and social influences on pain and pain management  - Notify physician/advanced practitioner if interventions unsuccessful or patient reports new pain  Outcome: Progressing     Problem: INFECTION - ADULT  Goal: Absence or prevention of progression during hospitalization  Description: INTERVENTIONS:  - Assess and monitor for signs and symptoms of infection  - Monitor lab/diagnostic results  - Monitor all insertion sites, i e  indwelling lines, tubes, and drains  - Monitor endotracheal if appropriate and nasal secretions for changes in amount and color  - [place] appropriate cooling/warming therapies per order  - Administer medications as ordered  - Instruct and encourage patient and family to use good hand hygiene technique  - Identify and instruct in appropriate isolation precautions for identified infection/condition  Outcome: Progressing  Goal: Absence of fever/infection during neutropenic period  Description: INTERVENTIONS:  - Monitor WBC    Outcome: Progressing     Problem: SAFETY ADULT  Goal: Patient will remain free of falls  Description: INTERVENTIONS:  - Assess patient frequently for physical needs  -  Identify cognitive and physical deficits and behaviors that affect risk of falls    -  [place] fall precautions as indicated by assessment   - Educate patient/family on patient safety including physical limitations  - Instruct patient to call for assistance with activity based on assessment  - Modify environment to reduce risk of injury  - Consider OT/PT consult to assist with strengthening/mobility  Outcome: Progressing  Goal: Maintain or return to baseline ADL function  Description: INTERVENTIONS:  -  Assess patient's ability to carry out ADLs; assess patient's baseline for ADL function and identify physical deficits which impact ability to perform ADLs (bathing, care of mouth/teeth, toileting, grooming, dressing, etc )  - Assess/evaluate cause of self-care deficits   - Assess range of motion  - Assess patient's mobility; develop plan if impaired  - Assess patient's need for assistive devices and provide as appropriate  - Encourage maximum independence but intervene and supervise when necessary  - Involve family in performance of ADLs  - Assess for home care needs following discharge   - Consider OT consult to assist with ADL evaluation and planning for discharge  - Provide patient education as appropriate  Outcome: Progressing  Goal: Maintain or return mobility status to optimal level  Description: INTERVENTIONS:  - Assess patient's baseline mobility status (ambulation, transfers, stairs, etc )    - Identify cognitive and physical deficits and behaviors that affect mobility  - Identify mobility aids required to assist with transfers and/or ambulation (gait belt, sit-to-stand, lift, walker, cane, etc )  - [place] fall precautions as indicated by assessment  - Record patient progress and toleration of activity level on Mobility SBAR; progress patient to next Phase/Stage  - Instruct patient to call for assistance with activity based on assessment  - Consider rehabilitation consult to assist with strengthening/weightbearing, etc   Outcome: Progressing     Problem: DISCHARGE PLANNING  Goal: Discharge to home or other facility with appropriate resources  Description: INTERVENTIONS:  - Identify barriers to discharge w/patient and caregiver  - Arrange for needed discharge resources and transportation as appropriate  - Identify discharge learning needs (meds, wound care, etc )  - Arrange for interpretive services to assist at discharge as needed  - Refer to Case Management Department for coordinating discharge planning if the patient needs post-hospital services based on physician/advanced practitioner order or complex needs related to functional status, cognitive ability, or social support system  Outcome: Progressing     Problem: Knowledge Deficit  Goal: Patient/family/caregiver demonstrates understanding of disease process, treatment plan, medications, and discharge instructions  Description: Complete learning assessment and assess knowledge base    Interventions:  - Provide teaching at level of understanding  - Provide teaching via preferred learning methods  Outcome: Progressing

## 2021-03-14 NOTE — DISCHARGE SUMMARY
[address]  Discharge- Brittany [facility] 1945, 77 y o  male MRN: [id number]  Unit/Bed#: E5 -01 Encounter: [id number]  Primary Care Provider: Haresh Gann MD   Date and time admitted to hospital: 3/8/2021 12:06 AM    * GI bleed  Assessment & Plan  Pt presents with large bloody BM x 1  7 days of non-bloody diarrheal illness preceding bloody BM    · hemoglobin 7 6 status [id number] PRBCs since hospitalization  · CT abdomen (3/7/21) - Moderate circumferential thickening of the proximal duodenum in keeping with a nonspecific duodenitis  Ulcer disease is part of the differential diagnosis  · Status post EGD on 03/08/2021 which revealed 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, 3 superficial ulcers in duodenum, portal hypertensive gastropathy   · No further episodes of bleeding  · Discussed with GI bleed anemia is multifactorial secondary to chronic kidney disease will monitor H&H  · GI follow-up appreciated, will monitor H&H, continue PPI  · Repeat outpatient EGD in 4-8 weeks      Acute post-hemorrhagic anemia  Assessment & Plan  · Secondary to GI bleed  · Status post unit PRBC transfused on 03/08/2021and 3/11  · Hemoglobin stable 8 8    Acute kidney injury superimposed on chronic kidney disease [facility])  Assessment & Plan  Lab Results   Component Value Date    EGFR 31 03/14/2021    EGFR 32 03/13/2021    EGFR 30 03/12/2021    CREATININE 2 06 (H) 03/14/2021    CREATININE 1 98 (H) 03/13/2021    CREATININE 2 11 (H) 03/12/2021   · Baseline creatinine appears to be close to 2 4-2 6  · Creatinine appears to be at baseline  · Further outpatient follow-up    Cirrhosis ([facility] )  Assessment & Plan  · Cirrhosis of unknown etiology patient did state use a regular user of alcohol when he was in the [employer] many years ago, hepatitis panel negative  · CT abdomen - Nodular liver concerning for underlying cirrhosis  · Abdominal U/S - Liver cirrhosis    No suspicious lesions identified  · Alpha-fetoprotein 1 9  · Will need ongoing outpatient ultrasound follow-up for [facility] [address]  surveillance  · GI input appreciated    Type 2 diabetes mellitus with stage 3 chronic kidney disease, with long-term current use of insulin 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)  Assessment & Plan  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 on 3/23/20  Redemonstrated on MRI 6/24/20 - 3 4 cm relatively homogeneous mass emanating from the pituitary gland consistent with microadenoma  Extension into the left cavernous sinus, sphenoid sinus, with elevation of contents of the suprasellar cyst   Diameter of the cavernous carotid arteries   do not appear significantly affected  Was following with [facility]surgery, who had ordered pituitary function labs and recommended outpatient follow-up with ophthalmology and follow-up MRI  However, labs were never obtained and patient appeared to have been lost to follow-up  Patient states he was told it was "benign", also states he has seen an ophtholmologist, although no records in [facility]    Neurosurgery had stated patient is not a surgical candidate        Transition of Care Discharge Summary - [facility] Internal Medicine    Patient Information: Debbie Santana 77 y o  male MRN: [id number]  Unit/Bed#: E5 -01 Encounter: [id number]    Discharging Physician / Practitioner: Allen Harden MD  PCP: Roula Johnson MD  Admission Date: 3/8/2021  Discharge Date: 03/14/21    Disposition:      Other: [address]      Reason for Admission: GI bleed    Discharge Diagnoses:     Principal Problem:    GI bleed  Active Problems:    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[facility] )    CAD (coronary artery disease)    Thrombocytopenia ([address] )    Type 2 diabetes mellitus with stage 3 chronic kidney disease, with long-term current use of insulin ([facility] )    Cirrhosis ([address] )    Acute kidney injury superimposed on chronic kidney disease ([facility] )    Acute post-hemorrhagic anemia  Resolved Problems:    * No resolved hospital problems  *      Consultations During Hospital Stay:  · IP CONSULT TO GASTROENTEROLOGY  · IP CONSULT TO NEPHROLOGY  · IP CONSULT TO HEMATOLOGY      Procedures Performed:     · egd    Medication Adjustments and Discharge Medications:  · Medication Dosing Tapers - Please refer to Discharge Medication List for details on any medication dosing tapers (if applicable to patient)  · Discharge Medication List: See after visit summary for reconciled discharge medications  Wound Care Recommendations:  When applicable, please see wound care section of After Visit Summary  Diet Recommendations at Discharge:  Diet -        Diet Orders   (From admission, onward)             Start     Ordered    03/08/21 1422  Diet GI; Non Ulcerogenic; Sodium 2 GM  Diet effective now     Question Answer Comment   Diet Type GI    GI Non Ulcerogenic    Other Restriction(s): Sodium 2 GM    RD to adjust diet per protocol? Yes        03/08/21 1421              Fluid Restriction - Continue Fluid Restriction as Listed Above at Discharge  Significant Findings / Test Results:     · CT abdomen pelvis revealed:  Moderate circumferential thickening of the proximal duodenum in keeping with a nonspecific duodenitis  Ulcer disease is part of the differential diagnosis  Hospital Course:     Shantal Webster is a 77 y o  male patient who originally presented to the hospital on 3/8/2021 due to GI bleed  Patient has history of hypertension, CAD, cirrhosis, CKD, pituitary adenoma transfer from [address] [place] [address] with episode of melena and hematochezia  GI consulted and patient underwent EGD which revealed 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, 3 superficial ulcers in duodenum, portal hypertensive gastropathy  Patient required 3 units PRBC, hemoglobin stable  Will need repeat EGD in 4-8 weeks  Patient also had acute kidney injury on CKD, nephrology consulted creatinine at baseline will follow-up patient  Patient also with thrombocytopenia for which Hematology consulted stating outpatient follow-up  Patient is otherwise stable and cleared for discharge today  Please see above problem list for further details  Condition at Discharge: good     Discharge Day Visit / Exam:     Subjective:  Patient seen examined at bedside, states he had a large bowel movement today was non bloody, brown in color  Otherwise denies any complaint    Vitals: Blood Pressure: 155/75 (03/14/21 0754)  Pulse: 72 (03/14/21 0754)  Temperature: 98 1 °F (36 7 °C) (03/14/21 0754)  Temp Source: Temporal (03/14/21 0754)  Respirations: 18 (03/14/21 0754)  Height: 5' 6" (167 6 cm) (03/14/21 0754)  Weight - Scale: 58 5 kg (129 lb) (03/14/21 0754)  SpO2: 97 % (03/14/21 0754)    Physical Exam:    Constitutional: Patient is oriented to person, place and time, no acute distress  HEENT:  Normocephalic, atraumatic  Cardiovascular: Normal S1S2, RRR, No murmurs/rubs/gallops appreciated    Pulmonary:  Bilateral air entry, No rhonchi/rales/wheezing appreciated  Abdominal: Soft, Bowel sounds present, Non-tender, Non-distended  Extremities:  No cyanosis, clubbing or edema  Neurological: Cranial nerves II-XII grossly intact, sensation intact, otherwise no focal neurological symptoms  Discharge instructions/Information to patient and family:   See after visit summary section titled Discharge Instructions for information provided to patient and family  Planned Readmission: no     Discharge Statement:  I spent 30 minutes discharging the patient  This time was spent on the day of discharge  I had direct contact with the patient on the day of discharge  Greater than 50% of the total time was spent examining patient, answering all patient questions, arranging and discussing plan of care with patient as well as directly providing post-discharge instructions  Additional time then spent on discharge activities      ** Please Note: This note has been constructed using a voice recognition system **

## 2021-03-14 NOTE — ASSESSMENT & PLAN NOTE
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

## 2021-03-14 NOTE — ASSESSMENT & PLAN NOTE
Lab Results   Component Value Date    EGFR 31 03/14/2021    EGFR 32 03/13/2021    EGFR 30 03/12/2021    CREATININE 2 06 (H) 03/14/2021    CREATININE 1 98 (H) 03/13/2021    CREATININE 2 11 (H) 03/12/2021   · Baseline creatinine appears to be close to 2 4-2 6  · Acute kidney injury likely secondary to anemia and GI bleed  · Nephrology consultation appreciated  · Received albumin challenge for 48 hours    · Avoid hypotension and nephrotoxic medications  · Nephrology input appreciated  ·   Will monitor BMP

## 2021-03-14 NOTE — UTILIZATION REVIEW
Continued Stay Review    Date:  3/13/2021                   Current Patient Class: IP Current Level of Care: [facility]    HPI:75 y o  male initially admitted on  3/8 to Inpatient ( transferred from ED [facility]) with GIB, BEN and plan is GI consult, NPO, monitor labs,  Transfuse  1 U PRBC,   Stool c/diff,  IV rocephin, PPI, Vitamin K and gentle  IVF  EGD  ( 3/8)     shows multiple grade 2 varices with red helena sign, portal hypertensive gastropathy and 3 superficial duodenal ulcers with a clean base   Banded x3         Assessment/Plan:   3/10:  Nephrology - Consulted for rising Creat:  Progressive CKD stage 4, baseline serum creatinine 2 4 to 2 8 since mid 2020, 1 6 to 1 8 in March 2020  etiology of elevated creatinine suspect prerenal secondary to volume depletion with poor oral intake/diarrhea, acute blood loss anemia in the setting of GI bleed, hemodynamic perturbations, plus or minus HRS  Plan Albumin IV q8h  X 48 hrs, check urine lytes, PVR  Low bicarb, could be in the setting of chronic respiratory alkalosis with compensation, check VBG in a m       3/11: progressive drops in his hemoglobin -currently at 7 6  He is not passing any melena or any bright red blood per rectum presently  Patient is to receive 1 unit of packed red blood cells today will continue to monitor if hemoglobin continues to drop may need repeat EGD  Continue PPI b i d  Cr improving  Cont albumin  bicarb level has improved 23    3/12:  Hg 8 2 s/p transfusion 3/11  Creat improved s/p IV Albumin x 48 hrs  Hypomagnesemia - 1 2 yesterday  Give IV magnesium sulfate 4 g once today  S javy phosphorus 2 0 slightly below goal, will start patient on p o  Phosphorus supplement one packet p o  B i d  For next three days   Progressive thrombocytopenia  with platelet count decreased to 54,000 today  He denies any overt bleeding  Hematology evaluation will be obtained       3/13: Per Hem/Onc: presents with pancytopenia   follows with Dr Berto Brooks at [facility] and had bone marrow test last year that showed 30% cellularity and decreased iron stores  Thrombocytopenia did not respond to IVIG therapy and that was given in case he had ITP-probably not  He does not have splenomegaly  He has cirrhosis of the liver  He had outpatient workup for pancytopenia  that is on going  Patient wants to go home and further w/u can be done as OP  Transport arranged for DC back to [facility] on 3/14 (Unable to find a transport team until tomorrow 3/14)      Pertinent Labs/Diagnostic Results:   Results from last 7 days   Lab Units 03/13/21  1425 03/07/21 2058   SARS-COV-2  Negative Negative     Results from last 7 days   Lab Units 03/14/21  0527 03/13/21  0546 03/12/21  0518 03/11/21  0555 03/10/21  0506   WBC Thousand/uL 2 70* 2 50* 2 03* 2 45* 2 93*   HEMOGLOBIN g/dL 9 4* 8 8* 8 2* 7 6* 8 7*   HEMATOCRIT % 28 7* 27 6* 25 6* 23 9* 27 3*   PLATELETS Thousands/uL 67* 59* 54* 62* 74*   NEUTROS ABS Thousands/µL 1 33* 1 27* 0 92* 1 22*  --      Results from last 7 days   Lab Units 03/14/21  0527 03/13/21  0546 03/12/21  0518 03/11/21  0555 03/10/21  0506  03/07/21  1906   SODIUM mmol/L 137 139 139 141 142   < > 141   POTASSIUM mmol/L 4 1 3 9 3 7 3 6 3 7   < > 4 5   CHLORIDE mmol/L 105 107 109* 110* 112*   < > 111*   CO2 mmol/L 22 22 21 23 20*   < > 17*   ANION GAP mmol/L 10 10 9 8 10   < > 13   BUN mg/dL 21 21 23 30* 39*   < > 40*   CREATININE mg/dL 2 06* 1 98* 2 11* 2 57* 2 76*   < > 2 69*   EGFR ml/min/1 73sq m 31 32 30 24 22   < > 22   CALCIUM mg/dL 8 8 8 3 8 7 8 1* 8 4   < > 8 6   MAGNESIUM mg/dL  --  2 1  --  1 2*  --   --  1 4*   PHOSPHORUS mg/dL  --   --   --  2 0*  --   --   --     < > = values in this interval not displayed       Results from last 7 days   Lab Units 03/10/21  0506 03/07/21  1906   AST U/L  --  55*   ALT U/L  --  20   ALK PHOS U/L  --  35*   TOTAL PROTEIN g/dL  --  5 7*   ALBUMIN g/dL 2 1* 2 6*   TOTAL BILIRUBIN mg/dL  --  0 90     Results from last 7 days   Lab Units 03/14/21  0714 03/13/21  2036 03/13/21  1613 03/13/21  1128 03/13/21  0717 03/12/21  2055 03/12/21  1559 03/12/21  1119 03/12/21  0716 03/11/21  2017 03/11/21  1559 03/11/21  1142   POC GLUCOSE mg/dl 79 103 94 91 82 138 77 94 78 180* 114 131     Results from last 7 days   Lab Units 03/14/21  0527 03/13/21  0546 03/12/21  0518 03/11/21  0555 03/10/21  0506 03/09/21  0703 03/08/21  0526 03/07/21  1906   GLUCOSE RANDOM mg/dL 78 80 84 86 72 79 81 117       Results from last 7 days   Lab Units 03/07/21  1906   TROPONIN I ng/mL 0 02     Results from last 7 days   Lab Units 03/09/21  0703 03/08/21  1104 03/07/21  1906   PROTIME seconds 18 3* 23 1* 21 3*   INR  1 55* 2 10* 1 89*     Results from last 7 days   Lab Units 03/07/21  1906   LACTIC ACID mmol/L 2 0     Results from last 7 days   Lab Units 03/08/21  0148   FERRITIN ng/mL 56     Results from last 7 days   Lab Units 03/09/21  1220   HEP B S AG  Non-reactive   HEP C AB  Non-reactive     Results from last 7 days   Lab Units 03/07/21  1906   LIPASE u/L 29*     Results from last 7 days   Lab Units 03/11/21  1546 03/09/21  1843   CLARITY UA   --  Clear   COLOR UA   --  Yellow   SPEC GRAV UA   --  1 025   PH UA   --  5 5   GLUCOSE UA mg/dl  --  Negative   KETONES UA mg/dl  --  Negative   BLOOD UA   --  Negative   PROTEIN UA mg/dl  --  Negative   NITRITE UA   --  Negative   BILIRUBIN UA   --  Negative   UROBILINOGEN UA E U /dl  --  0 2   LEUKOCYTES UA   --  Negative   SODIUM UR  95  --    CREATININE UR mg/dL 110 5  --      Results from last 7 days   Lab Units 03/13/21  1425 03/07/21  2058   INFLUENZA A PCR  Negative Negative   INFLUENZA B PCR  Negative Negative   RSV PCR  Negative Negative     Results from last 7 days   Lab Units 03/08/21  0614   C DIFF TOXIN B BY PCR  Negative     Vital Signs:   03/13/21 2355  98 °F (36 7 °C)  74  18  132/59  --  92 %  None (Room air)  Lying   03/13/21 1514  97 5 °F (36 4 °C)  72  18  136/60  --  98 %  None (Room air)  Lying   03/13/21 0718  98 2 °F (36 8 °C)  65  18  128/59  85  95 %  None (Room air)  Lying   03/12/21 2246  98 4 °F (36 9 °C)  71  18  132/63  --  94 %  --  Lying   03/12/21 1515  97 6 °F (36 4 °C)  69  19  118/60  --  97 %  None (Room air)  Lying   03/12/21 0738  97 4 °F (36 3 °C)   75  18  129/54  --  99 %  None (Room air)  Lying        Medications:   Scheduled Medications:  albumin human 25 % injection 25 g  IV q8H - completed 3/12  atorvastatin, 40 mg, Oral, Daily With Dinner  insulin glargine, 5 Units, Subcutaneous, HS  insulin lispro, 1-5 Units, Subcutaneous, 4x Daily (AC & HS)  pantoprazole, 40 mg, Oral, Early Morning  potassium-sodium phosphates, 1 packet, Oral, BID With Meals  magnesium sulfate 4 g/100 mL IVPB (premix) 4 g x 1 3/12    Continuous IV Infusions:     PRN Meds:  acetaminophen, 650 mg, Oral, Q6H PRN  bisacodyl, 10 mg, Rectal, Daily PRN  calcium carbonate, 1,000 mg, Oral, Daily PRN  ondansetron, 4 mg, Intravenous, Q6H PRN    Discharge Plan: DC to [facility]    Network Utilization Review Department  ATTENTION: Please call with any questions or concerns to [phone] and carefully listen to the prompts so that you are directed to the right person  All voicemails are confidential   [employer] all requests for admission clinical reviews, approved or denied determinations and any other requests to dedicated fax number below belonging to the campus where the patient is receiving treatment   List of dedicated fax numbers for the Facilities:  [address] DENIALS (Administrative/Medical Necessity) [phone]   [address] (Maternity/NICU/Pediatrics) 261 [facility],[address] Floor [address] Stanislav Cagle 3205 (Joyce Santamaria "Deirdre" 103) [id number] [place] [address] [place] [address] Nichelle Thomson 1481 [phone]   [place] [address] [phone]

## 2021-03-14 NOTE — ASSESSMENT & PLAN NOTE
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 on 3/23/20  Redemonstrated on MRI 6/24/20 - 3 4 cm relatively homogeneous mass emanating from the pituitary gland consistent with microadenoma  Extension into the left cavernous sinus, sphenoid sinus, with elevation of contents of the suprasellar cyst   Diameter of the cavernous carotid arteries   do not appear significantly affected  Was following with [facility], who had ordered pituitary function labs and recommended outpatient follow-up with ophthalmology and follow-up MRI  However, labs were never obtained and patient appeared to have been lost to follow-up  Patient states he was told it was "benign", also states he has seen an ophtholmologist, although no records in [facility]    Neurosurgery had stated patient is not a surgical candidate

## 2021-03-14 NOTE — PLAN OF CARE
Problem: Potential for Falls  Goal: Patient will remain free of falls  Description: INTERVENTIONS:  - Assess patient frequently for physical needs  -  Identify cognitive and physical deficits and behaviors that affect risk of falls    -  [place] fall precautions as indicated by assessment   - Educate patient/family on patient safety including physical limitations  - Instruct patient to call for assistance with activity based on assessment  - Modify environment to reduce risk of injury  - Consider OT/PT consult to assist with strengthening/mobility  Outcome: Progressing     Problem: Prexisting or High Potential for Compromised Skin Integrity  Goal: Skin integrity is maintained or improved  Description: INTERVENTIONS:  - Identify patients at risk for skin breakdown  - Assess and monitor skin integrity  - Assess and monitor nutrition and hydration status  - Monitor labs   - Assess for incontinence   - Turn and reposition patient  - Assist with mobility/ambulation  - Relieve pressure over bony prominences  - Avoid friction and shearing  - Provide appropriate hygiene as needed including keeping skin clean and dry  - Evaluate need for skin moisturizer/barrier cream  - Collaborate with interdisciplinary team   - Patient/family teaching  - Consider wound care consult   Outcome: Progressing     Problem: PAIN - ADULT  Goal: Verbalizes/displays adequate comfort level or baseline comfort level  Description: Interventions:  - Encourage patient to monitor pain and request assistance  - Assess pain using appropriate pain scale  - Administer analgesics based on type and severity of pain and evaluate response  - Implement non-pharmacological measures as appropriate and evaluate response  - Consider cultural and social influences on pain and pain management  - Notify physician/advanced practitioner if interventions unsuccessful or patient reports new pain  Outcome: Progressing     Problem: INFECTION - ADULT  Goal: Absence or prevention of progression during hospitalization  Description: INTERVENTIONS:  - Assess and monitor for signs and symptoms of infection  - Monitor lab/diagnostic results  - Monitor all insertion sites, i e  indwelling lines, tubes, and drains  - Monitor endotracheal if appropriate and nasal secretions for changes in amount and color  - [place] appropriate cooling/warming therapies per order  - Administer medications as ordered  - Instruct and encourage patient and family to use good hand hygiene technique  - Identify and instruct in appropriate isolation precautions for identified infection/condition  Outcome: Progressing  Goal: Absence of fever/infection during neutropenic period  Description: INTERVENTIONS:  - Monitor WBC    Outcome: Progressing     Problem: SAFETY ADULT  Goal: Patient will remain free of falls  Description: INTERVENTIONS:  - Assess patient frequently for physical needs  -  Identify cognitive and physical deficits and behaviors that affect risk of falls    -  [place] fall precautions as indicated by assessment   - Educate patient/family on patient safety including physical limitations  - Instruct patient to call for assistance with activity based on assessment  - Modify environment to reduce risk of injury  - Consider OT/PT consult to assist with strengthening/mobility  Outcome: Progressing  Goal: Maintain or return to baseline ADL function  Description: INTERVENTIONS:  -  Assess patient's ability to carry out ADLs; assess patient's baseline for ADL function and identify physical deficits which impact ability to perform ADLs (bathing, care of mouth/teeth, toileting, grooming, dressing, etc )  - Assess/evaluate cause of self-care deficits   - Assess range of motion  - Assess patient's mobility; develop plan if impaired  - Assess patient's need for assistive devices and provide as appropriate  - Encourage maximum independence but intervene and supervise when necessary  - Involve family in performance of ADLs  - Assess for home care needs following discharge   - Consider OT consult to assist with ADL evaluation and planning for discharge  - Provide patient education as appropriate  Outcome: Progressing  Goal: Maintain or return mobility status to optimal level  Description: INTERVENTIONS:  - Assess patient's baseline mobility status (ambulation, transfers, stairs, etc )    - Identify cognitive and physical deficits and behaviors that affect mobility  - Identify mobility aids required to assist with transfers and/or ambulation (gait belt, sit-to-stand, lift, walker, cane, etc )  - [place] fall precautions as indicated by assessment  - Record patient progress and toleration of activity level on Mobility SBAR; progress patient to next Phase/Stage  - Instruct patient to call for assistance with activity based on assessment  - Consider rehabilitation consult to assist with strengthening/weightbearing, etc   Outcome: Progressing     Problem: DISCHARGE PLANNING  Goal: Discharge to home or other facility with appropriate resources  Description: INTERVENTIONS:  - Identify barriers to discharge w/patient and caregiver  - Arrange for needed discharge resources and transportation as appropriate  - Identify discharge learning needs (meds, wound care, etc )  - Arrange for interpretive services to assist at discharge as needed  - Refer to Case Management Department for coordinating discharge planning if the patient needs post-hospital services based on physician/advanced practitioner order or complex needs related to functional status, cognitive ability, or social support system  Outcome: Progressing     Problem: Knowledge Deficit  Goal: Patient/family/caregiver demonstrates understanding of disease process, treatment plan, medications, and discharge instructions  Description: Complete learning assessment and assess knowledge base    Interventions:  - Provide teaching at level of understanding  - Provide teaching via preferred learning methods  Outcome: Progressing

## 2021-03-14 NOTE — ASSESSMENT & PLAN NOTE
Pt presents with large bloody BM x 1  7 days of non-bloody diarrheal illness preceding bloody BM    · hemoglobin 7 6 status [id number] PRBCs since hospitalization  · CT abdomen (3/7/21) - Moderate circumferential thickening of the proximal duodenum in keeping with a nonspecific duodenitis  Ulcer disease is part of the differential diagnosis    · Status post EGD on 03/08/2021 which revealed 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, 3 superficial ulcers in duodenum, portal hypertensive gastropathy   · No further episodes of bleeding  · Discussed with GI bleed anemia is multifactorial secondary to chronic kidney disease will monitor H&H  · GI follow-up appreciated, will monitor H&H, continue PPI  · Repeat outpatient EGD in 4-8 weeks

## 2021-03-14 NOTE — ASSESSMENT & PLAN NOTE
· Cirrhosis of unknown etiology patient did state use a regular user of alcohol when he was in the [employer] many years ago, hepatitis panel negative  · CT abdomen - Nodular liver concerning for underlying cirrhosis  · Abdominal U/S - Liver cirrhosis  No suspicious lesions identified  · Alpha-fetoprotein 1 9  · Will need [facility]    · GI input appreciated

## 2021-03-14 NOTE — ASSESSMENT & PLAN NOTE
No results found for: HGBA1C    Recent Labs     03/13/21  1128 03/13/21  1613 03/13/21 2036 03/14/21  0714   POCGLU 91 94 103 79       Blood Sugar Average: Last 72 hrs:     Will continue Lantus 5mg  Accuchecks q6h and sliding scale coverage

## 2021-03-15 ENCOUNTER — TELEPHONE (OUTPATIENT)
Dept: HEMATOLOGY ONCOLOGY | Facility: MEDICAL CENTER | Age: 76
End: 2021-03-15

## 2021-03-15 NOTE — TELEPHONE ENCOUNTER
Aimee from the nursing home called in to reschedule 03/30/2021 to 04/02/2021 with [facility]

## 2021-03-19 ENCOUNTER — TELEPHONE (OUTPATIENT)
Dept: NEPHROLOGY | Facility: CLINIC | Age: 76
End: 2021-03-19

## 2021-03-19 DIAGNOSIS — Z79.4 TYPE 2 DIABETES MELLITUS WITH STAGE 3 CHRONIC KIDNEY DISEASE, WITH LONG-TERM CURRENT USE OF INSULIN, UNSPECIFIED WHETHER STAGE 3A OR 3B CKD (HCC): ICD-10-CM

## 2021-03-19 DIAGNOSIS — N18.30 STAGE 3 CHRONIC KIDNEY DISEASE, UNSPECIFIED WHETHER STAGE 3A OR 3B CKD (HCC): Primary | ICD-10-CM

## 2021-03-19 DIAGNOSIS — E87.6 HYPOKALEMIA: ICD-10-CM

## 2021-03-19 DIAGNOSIS — E11.22 TYPE 2 DIABETES MELLITUS WITH STAGE 3 CHRONIC KIDNEY DISEASE, WITH LONG-TERM CURRENT USE OF INSULIN, UNSPECIFIED WHETHER STAGE 3A OR 3B CKD (HCC): ICD-10-CM

## 2021-03-19 DIAGNOSIS — N18.30 TYPE 2 DIABETES MELLITUS WITH STAGE 3 CHRONIC KIDNEY DISEASE, WITH LONG-TERM CURRENT USE OF INSULIN, UNSPECIFIED WHETHER STAGE 3A OR 3B CKD (HCC): ICD-10-CM

## 2021-03-19 DIAGNOSIS — D69.6 THROMBOCYTOPENIA (HCC): ICD-10-CM

## 2021-03-19 NOTE — TELEPHONE ENCOUNTER
----- Message from Derian Diamond sent at 3/15/2021 11:47 AM EDT -----  Regarding: FW: CKD follow-up  Hello, this patient lives in [place], [place]  I think he is close to either [place] or [place]  Can you please schedule the patient for a hospital follow up in 2/3 weeks?  ----- Message -----  From: JOSE ALEJANDRO Rivera  Sent: 3/13/2021   2:59 PM EDT  To: Nephrology [facility] [place], #  Subject: CKD follow-up                                    Please add placement complete BMP, magnesium, phosphorus prior to CKD follow-up appointment  If able, can patient be seen in around 2-3 weeks    Thank you

## 2021-03-19 NOTE — TELEPHONE ENCOUNTER
Pt scheduled for a teams visit on 4/7 and notified to have blood work  [facility]

## 2021-04-02 ENCOUNTER — OFFICE VISIT (OUTPATIENT)
Dept: HEMATOLOGY ONCOLOGY | Facility: CLINIC | Age: 76
End: 2021-04-02
Payer: COMMERCIAL

## 2021-04-02 VITALS — DIASTOLIC BLOOD PRESSURE: 64 MMHG | HEART RATE: 74 BPM | TEMPERATURE: 98.5 F | SYSTOLIC BLOOD PRESSURE: 126 MMHG

## 2021-04-02 DIAGNOSIS — D69.6 THROMBOCYTOPENIA (HCC): ICD-10-CM

## 2021-04-02 DIAGNOSIS — D61.818 PANCYTOPENIA (HCC): Primary | ICD-10-CM

## 2021-04-02 DIAGNOSIS — D64.9 ANEMIA, UNSPECIFIED TYPE: ICD-10-CM

## 2021-04-02 PROCEDURE — 99214 OFFICE O/P EST MOD 30 MIN: CPT | Performed by: NURSE PRACTITIONER

## 2021-04-02 NOTE — PROGRESS NOTES
[address] [facility] HEMATOLOGY ONCOLOGY [address] [facility]   [address] [facility]  [address] [place] [id number] [phone]  Hematology Ambulatory Follow-Up  Cathy Peña 1945, [id number]  4/2/2021    Assessment/Plan:    1  Pancytopenia ([facility] )  2  Thrombocytopenia ([facility] )    Patient is a 36-year-old male with a history of pancytopenia  Patient was recently hospitalized for a GI bleed on 03/08/2021 with a hemoglobin of 5 7  Patient was transfused with 3 units of packed red cells  Stabilizing his hemoglobin to 9 4  He was found to have multiple great varices requiring banding, 3 superficial ulcers in the duodenum, portal hypertensive gastropathy and cirrhosis of the liver  His hepatitis panel is negative, ultrasound of the abdomen did not reveal any suspicious lesions, AFP was 1 9 and he has an elevated creatinine with a baseline of 2 4-2 6  On CT scan spleen was not remarkable  Previous workup included a bone marrow biopsy  And flow cytometry which were negative   For leukemia or increased blasts,  no myeloid dysplasia or myeloproliferative neoplasms noted  Most recent iron panel revealed a saturation of 41% and a ferritin level of 56  Previous SPEP   From November 2020 revealed an alpha 2 decrease suggestive of decreased haptoglobin level associated with liver disease  No monoclonal gammopathy identified  Differential diagnoses  Include anemia secondary to his chronic kidney disease,   anemia of chronic disease, secondary to his known GI bleed verses thrombopoietin deficiency or possible autoimmune processes  We will request the following work and see patient in 3 months  Patient verbalized understanding and is in agreement with the plan  - CBC and differential; Future  - Comprehensive metabolic panel; Future  - Iron Panel (Includes Ferritin, Iron Sat%, Iron, and TIBC); Future  - IgG, IgA, IgM; Future  - Immunoglobulin free LT chains blood;  Future  - Protein electrophoresis, serum; Future  - vitamin B12  - folate    Barrier(s) to care: None  The patient is able to self care   -----------------------------------------------------------------------------------------------------    Interval history:  Clinically stable    Review of Systems   Constitutional: Negative for activity change, appetite change, fatigue, fever and unexpected weight change  Respiratory: Negative for cough and shortness of breath  Cardiovascular: Negative for chest pain and leg swelling  Gastrointestinal: Negative for abdominal pain, constipation, diarrhea and nausea  Endocrine: Negative for cold intolerance and heat intolerance  Musculoskeletal: Negative for arthralgias and myalgias  Skin: Negative  Neurological: Negative for dizziness, weakness and headaches  Hematological: Negative for adenopathy  Does not bruise/bleed easily         Patient Active Problem List   Diagnosis    Shock ([place] [address] )    Symptomatic anemia    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[place] [address] )    CAD (coronary artery disease)    COPD (chronic obstructive pulmonary disease) (HCC)    Thrombocytopenia (HCC)    Type 2 diabetes mellitus with stage 3 chronic kidney disease, with long-term current use of insulin (HCC)    Elevated troponin    Uremia    CKD (chronic kidney disease) stage 3, GFR 30-59 ml/min    Hypokalemia    Compression of optic chiasm    GI bleed    Cirrhosis ([place] [address] )    Acute kidney injury superimposed on chronic kidney disease (HCC)    Acute post-hemorrhagic anemia    Hypomagnesemia    Pancytopenia (HCC)       Past Medical History:   Diagnosis Date    Cirrhosis ([place] [address] )     COPD (chronic obstructive pulmonary disease) (HCC)     Coronary artery disease     Dementia ([place] [address] )     Diabetes mellitus ([place] [address] )     Diverticulosis     Gastric reflux     Hyperlipemia     Panlobular emphysema (HCC)     Pituitary mass (HCC)     Renal disorder     CKD Stage 3 & Bilat multi renal nodules    Thrombocyte disorder (HCC)     thrombocytopenia    Unstable angina (HCC)        Past Surgical History:   Procedure Laterality Date    CARDIAC SURGERY      unspecified    CHOLECYSTECTOMY      CORONARY ARTERY BYPASS GRAFT      2-3 years ago    HERNIA REPAIR      IR BIOPSY BONE MARROW  5/12/2020       No family history on file      Social History     Socioeconomic History    Marital status: /Civil Union     Spouse name: None    Number of children: None    Years of education: None    Highest education level: None   Occupational History    None   Social Needs    Financial resource strain: None    Food insecurity     Worry: None     Inability: None    Transportation needs     Medical: None     Non-medical: None   Tobacco Use    Smoking status: Former Smoker     Packs/day: 0 25    Smokeless tobacco: Never Used    Tobacco comment: Hasn't smoked since February   Substance and Sexual Activity    Alcohol use: Never     Frequency: Never     Binge frequency: Never    Drug use: Never    Sexual activity: Not Currently     Partners: Female   Lifestyle    Physical activity     Days per week: None     Minutes per session: None    Stress: None   Relationships    Social connections     Talks on phone: None     Gets together: None     Attends Taoist service: None     Active member of club or organization: None     Attends meetings of clubs or organizations: None     Relationship status: None    Intimate partner violence     Fear of current or ex partner: None     Emotionally abused: None     Physically abused: None     Forced sexual activity: None   Other Topics Concern    None   Social History Narrative    None         Current Outpatient Medications:     acetaminophen (TYLENOL) 325 mg tablet, Take 650 mg by mouth every 6 (six) hours as needed for mild pain, Disp: , Rfl:     bisacodyl (DULCOLAX) 10 mg suppository, Insert 10 mg into the rectum daily as needed for constipation, Disp: , Rfl:     calcium carbonate (TUMS) 500 mg chewable tablet, Chew 2 tablets 2 (two) times a day, Disp: , Rfl:     ergocalciferol (VITAMIN D2) 50,000 units, Take 50,000 Units by mouth every 28 days, Disp: , Rfl:     Eyelid Cleansers (OcuSoft Lid Scrub Plus) PADS, Apply 1 each topically 2 (two) times a day, Disp: , Rfl:     fenofibrate (TRICOR) 145 mg tablet, Take 145 mg by mouth daily, Disp: , Rfl:     Glucagon, rDNA, (Glucagon Emergency) 1 MG KIT, Inject 1 mg as directed as needed, Disp: , Rfl:     insulin glargine (LANTUS) 100 units/mL subcutaneous injection, Inject 5 Units under the skin daily at bedtime, Disp: , Rfl: 0    magnesium cl-calcium carbonate (MAG-DELAY)  MG tablet, Take 2 tablets by mouth daily, Disp: , Rfl:     magnesium hydroxide (MILK OF MAGNESIA) 400 mg/5 mL oral suspension, Take by mouth as needed for constipation, Disp: , Rfl:     mineral oil enema, Insert 1 enema into the rectum as needed for constipation, Disp: , Rfl:     pantoprazole (PROTONIX) 40 mg tablet, Take 1 tablet (40 mg total) by mouth 2 (two) times a day before meals, Disp: , Rfl: 0    potassium chloride (MICRO-K) 10 MEQ CR capsule, Take 10 mEq by mouth daily, Disp: , Rfl:     rosuvastatin (CRESTOR) 20 MG tablet, Take 20 mg by mouth daily, Disp: , Rfl:     Allergies   Allergen Reactions    Erythromycin     Penicillins     Shellfish-Derived Products - Food Allergy        Objective:  /64   Pulse 74   Temp 98 5 °F (36 9 °C)    Physical Exam  Constitutional:       Appearance: Normal appearance  He is well-developed  Comments: Sitting in a wheelchair   HENT:      Head: Normocephalic and atraumatic  Eyes:      Extraocular Movements: Extraocular movements intact  Conjunctiva/sclera: Conjunctivae normal       Pupils: Pupils are equal, round, and reactive to light  Neck:      Musculoskeletal: Normal range of motion  Cardiovascular:      Rate and Rhythm: Normal rate and regular rhythm  Pulses: Normal pulses  Heart sounds: Normal heart sounds     Pulmonary: Effort: Pulmonary effort is normal  No respiratory distress  Breath sounds: Normal breath sounds  Abdominal:      General: Bowel sounds are normal       Palpations: Abdomen is soft  Musculoskeletal: Normal range of motion  Lymphadenopathy:      Cervical: No cervical adenopathy  Skin:     General: Skin is warm and dry  Capillary Refill: Capillary refill takes less than 2 seconds  Neurological:      Mental Status: He is alert and oriented to person, place, and time  Psychiatric:         Behavior: Behavior normal          Please note: This report has been generated by a voice recognition software system  Therefore there may be syntax, spelling, and/or grammatical errors  Please call if you have any questions

## 2021-04-07 ENCOUNTER — TELEMEDICINE (OUTPATIENT)
Dept: NEPHROLOGY | Facility: CLINIC | Age: 76
End: 2021-04-07

## 2021-04-07 VITALS
BODY MASS INDEX: 21.85 KG/M2 | RESPIRATION RATE: 18 BRPM | DIASTOLIC BLOOD PRESSURE: 70 MMHG | SYSTOLIC BLOOD PRESSURE: 116 MMHG | HEIGHT: 64 IN | WEIGHT: 128 LBS | HEART RATE: 78 BPM | TEMPERATURE: 97.2 F | OXYGEN SATURATION: 100 %

## 2021-04-07 DIAGNOSIS — K74.60 CIRRHOSIS OF LIVER WITH ASCITES, UNSPECIFIED HEPATIC CIRRHOSIS TYPE (HCC): ICD-10-CM

## 2021-04-07 DIAGNOSIS — R18.8 CIRRHOSIS OF LIVER WITH ASCITES, UNSPECIFIED HEPATIC CIRRHOSIS TYPE (HCC): ICD-10-CM

## 2021-04-07 DIAGNOSIS — Z79.4 TYPE 2 DIABETES MELLITUS WITH STAGE 3B CHRONIC KIDNEY DISEASE, WITH LONG-TERM CURRENT USE OF INSULIN (HCC): ICD-10-CM

## 2021-04-07 DIAGNOSIS — N18.32 STAGE 3B CHRONIC KIDNEY DISEASE (HCC): ICD-10-CM

## 2021-04-07 DIAGNOSIS — N18.32 TYPE 2 DIABETES MELLITUS WITH STAGE 3B CHRONIC KIDNEY DISEASE, WITH LONG-TERM CURRENT USE OF INSULIN (HCC): ICD-10-CM

## 2021-04-07 DIAGNOSIS — N18.9 ACUTE KIDNEY INJURY SUPERIMPOSED ON CHRONIC KIDNEY DISEASE (HCC): ICD-10-CM

## 2021-04-07 DIAGNOSIS — N18.4 STAGE 4 CHRONIC KIDNEY DISEASE (HCC): ICD-10-CM

## 2021-04-07 DIAGNOSIS — K92.2 GASTROINTESTINAL HEMORRHAGE, UNSPECIFIED GASTROINTESTINAL HEMORRHAGE TYPE: ICD-10-CM

## 2021-04-07 DIAGNOSIS — N17.9 ACUTE KIDNEY INJURY SUPERIMPOSED ON CHRONIC KIDNEY DISEASE (HCC): ICD-10-CM

## 2021-04-07 DIAGNOSIS — R57.9 SHOCK (HCC): ICD-10-CM

## 2021-04-07 DIAGNOSIS — N17.9 ACUTE RENAL FAILURE, UNSPECIFIED ACUTE RENAL FAILURE TYPE (HCC): Primary | ICD-10-CM

## 2021-04-07 DIAGNOSIS — E11.22 TYPE 2 DIABETES MELLITUS WITH STAGE 3B CHRONIC KIDNEY DISEASE, WITH LONG-TERM CURRENT USE OF INSULIN (HCC): ICD-10-CM

## 2021-04-07 PROCEDURE — 99443 PR PHYS/QHP TELEPHONE EVALUATION 21-30 MIN: CPT | Performed by: INTERNAL MEDICINE

## 2021-04-07 NOTE — ASSESSMENT & PLAN NOTE
No results found for: HGBA1C     Sugars are low  His fasting sugar was 54  Due to profound kidney failure he recirculation his insulin    Will hold Lantus and check his fasting sugars in the morning

## 2021-04-07 NOTE — ASSESSMENT & PLAN NOTE
Had a variceal bleed last month  Is on pantoprazole  No further episodes of bleeding    Hemoglobin is being monitored

## 2021-04-07 NOTE — ASSESSMENT & PLAN NOTE
Lab Results   Component Value Date    EGFR 31 03/14/2021    EGFR 32 03/13/2021    EGFR 30 03/12/2021    CREATININE 2 06 (H) 03/14/2021    CREATININE 1 98 (H) 03/13/2021    CREATININE 2 11 (H) 03/12/2021   Baseline creatinine 1 7-1 9 mg/dL presumed to be due to diabetes mellitus and underlying vascular disease    Stop fenofibrate completely

## 2021-04-07 NOTE — ASSESSMENT & PLAN NOTE
Lab Results   Component Value Date    EGFR 31 03/14/2021    EGFR 32 03/13/2021    EGFR 30 03/12/2021    CREATININE 2 06 (H) 03/14/2021    CREATININE 1 98 (H) 03/13/2021    CREATININE 2 11 (H) 03/12/2021   Admitted with severe anemia and thrombocytopenia  Required transfusions  Was found to have a variceal bleed  Three varices were banded successfully into unsuccessfully  Was also found to have a duodenal ulcer  Etiology of acute kidney injury with hypovolemic shock and acute blood loss  He is seeing Hematology for workup of anemia  Will check labs  Baseline creatinine has been 1 7-1 9 mg/dL  Creatinine increased as above    He had a similar episode March of 2020 due to a similar presentation with a creatinine as high as 2 96 mg/dL

## 2021-04-07 NOTE — PROGRESS NOTES
Virtual Brief Visit    Reason for visit: The patient is concerned for COVID exposure therefore did not want to present to office for face-to-face visit  The patient was informed that this is a billable visit and they are agreeable  It was my intention to perform this visit via video technology but the patient was not able to do a video connection therefore the visit was completed via audio telephone only  I spent 32 minutes with the patient and >50% was spent in counseling/coordination of care  Spoke with his nurse to give lab orders and medication changes  Assessment/Plan:    Problem List Items Addressed This Visit        Digestive    GI bleed     Had a variceal bleed last month  Is on pantoprazole  No further episodes of bleeding  Hemoglobin is being monitored         [facility] )       Endocrine    Type 2 diabetes mellitus with stage 3 chronic kidney disease, with long-term current use of insulin ([facility])       No results found for: HGBA1C     Sugars are low  His fasting sugar was 54  Due to profound kidney failure he recirculation his insulin  Will hold Lantus and check his fasting sugars in the morning            Genitourinary    CKD (chronic kidney disease) stage 3, GFR 30-59 ml/min     Lab Results   Component Value Date    EGFR 31 03/14/2021    EGFR 32 03/13/2021    EGFR 30 03/12/2021    CREATININE 2 06 (H) 03/14/2021    CREATININE 1 98 (H) 03/13/2021    CREATININE 2 11 (H) 03/12/2021   Baseline creatinine 1 7-1 9 mg/dL presumed to be due to diabetes mellitus and underlying vascular disease    Stop fenofibrate completely         Acute kidney injury superimposed on chronic kidney disease ([facility] [address] )     Lab Results   Component Value Date    EGFR 31 03/14/2021    EGFR 32 03/13/2021    EGFR 30 03/12/2021    CREATININE 2 06 (H) 03/14/2021    CREATININE 1 98 (H) 03/13/2021    CREATININE 2 11 (H) 03/12/2021   Admitted with severe anemia and thrombocytopenia  Required transfusions    Was found to have a variceal bleed  Three varices were banded successfully into unsuccessfully  Was also found to have a duodenal ulcer  Etiology of acute kidney injury with hypovolemic shock and acute blood loss  He is seeing Hematology for workup of anemia  Will check labs  Baseline creatinine has been 1 7-1 9 mg/dL  Creatinine increased as above  He had a similar episode March of 2020 due to a similar presentation with a creatinine as high as 2 96 mg/dL            Other    Shock ([address] )     Hypovolemia causing prerenal azotemia due to marked blood loss anemia  Resolved           Other Visit Diagnoses     Acute renal failure, unspecified acute renal failure type ([facility] )    -  Primary    Stage 4 chronic kidney disease ([facility] )                    Reason for visit is   Chief Complaint   Patient presents with    Follow-up    Virtual Brief Visit        Encounter provider Alejandro Monge MD    Provider located at [address] [place] [id number] [phone]    Recent Visits  No visits were found meeting these conditions  Showing recent visits within past 7 days and meeting all other requirements     Today's Visits  Date Type Provider Dept   04/07/21 Guille Becerra MD [address] today's visits and meeting all other requirements     Future Appointments  No visits were found meeting these conditions  Showing future appointments within next 150 days and meeting all other requirements        After connecting through telephone, the patient was identified by name and date of birth  [facility] informed that this is a telemedicine visit and that the visit is being conducted through telephone and patient was informed that this is not a secure, HIPAA-compliant platform  He agrees to proceed     My office door was closed  No one else was in the room    He acknowledged consent and understanding of privacy and security of the platform  The patient has agreed to participate and understands he can discontinue the visit at any time  Patient is aware this is a billable service  Subjective    Benny Silva is a 77 y o  male with progressive CKD stage 4, cryptogenic cirrhosis, diabetes type 2 who was admitted to [facility] with a GI bleed  He has had a baseline creatinine of 2 4-2 8 since the middle of 2020  His creatinine peak was 2 7 now it has dropped to 2 1 prior to baseline  It is a question whether not his chronic kidney disease due to diabetes or hepatorenal syndrome while inpatient March 12, 2021  CT of abdomen and pelvis showed normal kidneys on 3/20/21  He was found to have a duodenal ulcer requiring hospitalization  It was diagnosed by EGD  He had multiple grade ii varices and 3 bands were successfully placed        He saw Dr Aleksandra Barriga and a number of studies were ordered to evaluate his anemia and they are pending for thrombocytopenia      HPI     Past Medical History:   Diagnosis Date    Cirrhosis ([facility] )     [facility]     Coronary artery disease     Dementia ([facility]     Diabetes mellitus ([facility]     Diverticulosis     Gastric reflux     Hyperlipemia     Panlobular emphysema ([facility]     Pituitary mass ([facility]     Renal disorder     CKD Stage 3 & Bilat multi renal nodules    Thrombocyte disorder (HCC)     thrombocytopenia    Unstable angina (HCC)        Past Surgical History:   Procedure Laterality Date    CARDIAC SURGERY      unspecified    CHOLECYSTECTOMY      CORONARY ARTERY BYPASS GRAFT      2-3 years ago    HERNIA REPAIR      IR BIOPSY BONE MARROW  5/12/2020       Current Outpatient Medications   Medication Sig Dispense Refill    acetaminophen (TYLENOL) 325 mg tablet Take 650 mg by mouth every 6 (six) hours as needed for mild pain      bisacodyl (DULCOLAX) 10 mg suppository Insert 10 mg into the rectum daily as needed for constipation      calcium carbonate (TUMS) 500 mg chewable tablet Chew 2 tablets 2 (two) times a day      ergocalciferol (VITAMIN D2) 50,000 units Take 50,000 Units by mouth every 28 days      Eyelid Cleansers (OcuSoft Lid Scrub Plus) PADS Apply 1 each topically 2 (two) times a day      Glucagon, rDNA, (Glucagon Emergency) 1 MG KIT Inject 1 mg as directed as needed      insulin glargine (LANTUS) 100 units/mL subcutaneous injection Inject 5 Units under the skin daily at bedtime  0    magnesium cl-calcium carbonate (MAG-DELAY)  MG tablet Take 2 tablets by mouth daily      magnesium hydroxide (MILK OF MAGNESIA) 400 mg/5 mL oral suspension Take by mouth as needed for constipation      mineral oil enema Insert 1 enema into the rectum as needed for constipation      pantoprazole (PROTONIX) 40 mg tablet Take 1 tablet (40 mg total) by mouth 2 (two) times a day before meals  0    potassium chloride (MICRO-K) 10 MEQ CR capsule Take 10 mEq by mouth daily      rosuvastatin (CRESTOR) 20 MG tablet Take 20 mg by mouth daily       No current facility-administered medications for this visit  Allergies   Allergen Reactions    Erythromycin     Penicillins     Shellfish-Derived Products - Food Allergy        Review of Systems   Constitutional: Negative for activity change, fatigue and fever  HENT: Negative for hearing loss  Eyes: Negative for visual disturbance  Respiratory: Negative for cough and shortness of breath  Cardiovascular: Negative for chest pain, palpitations and leg swelling  Gastrointestinal: Negative for abdominal pain, blood in stool, constipation, diarrhea and nausea  Genitourinary: Negative for difficulty urinating, frequency, hematuria and urgency  Musculoskeletal: Negative for arthralgias and back pain  Neurological: Negative for dizziness, weakness and light-headedness  Hematological: Bruises/bleeds easily     Psychiatric/Behavioral: Negative for dysphoric mood  Vitals:    04/07/21 1151   BP: 116/70   Pulse: 78   Resp: 18   Temp: (!) 97 2 °F (36 2 °C)   SpO2: 100%   Weight: 58 1 kg (128 lb)   Height: 5' 4" (1 626 m)     Conversant and answers questions easily in NAD  I spent 32 minutes directly with the patient during this visit    VIRTUAL VISIT [address] acknowledges that he has consented to an online visit or consultation  He understands that the online visit is based solely on information provided by him, and that, in the absence of a face-to-face physical evaluation by the physician, the diagnosis he receives is both limited and provisional in terms of accuracy and completeness  This is not intended to replace a full medical face-to-face evaluation by the physician  C[place] Dawson understands and accepts these terms

## 2021-04-26 PROBLEM — I85.00 ESOPHAGEAL VARICES (HCC): Status: ACTIVE | Noted: 2021-04-26

## 2021-06-17 ENCOUNTER — TELEPHONE (OUTPATIENT)
Dept: GASTROENTEROLOGY | Facility: HOSPITAL | Age: 76
End: 2021-06-17

## 2021-06-18 ENCOUNTER — ANESTHESIA (OUTPATIENT)
Dept: GASTROENTEROLOGY | Facility: HOSPITAL | Age: 76
End: 2021-06-18

## 2021-06-18 ENCOUNTER — ANESTHESIA EVENT (OUTPATIENT)
Dept: GASTROENTEROLOGY | Facility: HOSPITAL | Age: 76
End: 2021-06-18

## 2021-06-18 ENCOUNTER — HOSPITAL ENCOUNTER (OUTPATIENT)
Dept: GASTROENTEROLOGY | Facility: HOSPITAL | Age: 76
Setting detail: OUTPATIENT SURGERY
Discharge: HOME | End: 2021-06-18
Attending: INTERNAL MEDICINE | Admitting: INTERNAL MEDICINE
Payer: COMMERCIAL

## 2021-06-18 VITALS
OXYGEN SATURATION: 100 % | SYSTOLIC BLOOD PRESSURE: 111 MMHG | HEIGHT: 64 IN | DIASTOLIC BLOOD PRESSURE: 55 MMHG | BODY MASS INDEX: 22.2 KG/M2 | RESPIRATION RATE: 20 BRPM | HEART RATE: 59 BPM | WEIGHT: 130 LBS | TEMPERATURE: 97.4 F

## 2021-06-18 DIAGNOSIS — K74.60 CIRRHOSIS OF LIVER WITH ASCITES, UNSPECIFIED HEPATIC CIRRHOSIS TYPE (HCC): ICD-10-CM

## 2021-06-18 DIAGNOSIS — K92.2 GASTROINTESTINAL HEMORRHAGE, UNSPECIFIED GASTROINTESTINAL HEMORRHAGE TYPE: Primary | ICD-10-CM

## 2021-06-18 DIAGNOSIS — R18.8 CIRRHOSIS OF LIVER WITH ASCITES, UNSPECIFIED HEPATIC CIRRHOSIS TYPE (HCC): ICD-10-CM

## 2021-06-18 DIAGNOSIS — I85.01 BLEEDING ESOPHAGEAL VARICES, UNSPECIFIED ESOPHAGEAL VARICES TYPE (HCC): ICD-10-CM

## 2021-06-18 LAB — GLUCOSE SERPL-MCNC: 110 MG/DL (ref 65–140)

## 2021-06-18 PROCEDURE — 88305 TISSUE EXAM BY PATHOLOGIST: CPT | Performed by: PATHOLOGY

## 2021-06-18 PROCEDURE — 82948 REAGENT STRIP/BLOOD GLUCOSE: CPT

## 2021-06-18 PROCEDURE — 43239 EGD BIOPSY SINGLE/MULTIPLE: CPT | Performed by: INTERNAL MEDICINE

## 2021-06-18 RX ORDER — SERTRALINE HYDROCHLORIDE 25 MG/1
25 TABLET, FILM COATED ORAL DAILY
COMMUNITY

## 2021-06-18 RX ORDER — SODIUM CHLORIDE 9 MG/ML
125 INJECTION, SOLUTION INTRAVENOUS CONTINUOUS
Status: DISCONTINUED | OUTPATIENT
Start: 2021-06-18 | End: 2021-06-22 | Stop reason: HOSPADM

## 2021-06-18 RX ORDER — PROPOFOL 10 MG/ML
INJECTION, EMULSION INTRAVENOUS AS NEEDED
Status: DISCONTINUED | OUTPATIENT
Start: 2021-06-18 | End: 2021-06-18

## 2021-06-18 RX ORDER — LIDOCAINE HYDROCHLORIDE 20 MG/ML
INJECTION, SOLUTION EPIDURAL; INFILTRATION; INTRACAUDAL; PERINEURAL AS NEEDED
Status: DISCONTINUED | OUTPATIENT
Start: 2021-06-18 | End: 2021-06-18

## 2021-06-18 RX ORDER — PANTOPRAZOLE SODIUM 40 MG/1
40 TABLET, DELAYED RELEASE ORAL DAILY
Qty: 30 TABLET | Refills: 10 | Status: SHIPPED | OUTPATIENT
Start: 2021-06-18

## 2021-06-18 RX ADMIN — LIDOCAINE HYDROCHLORIDE 80 MG: 20 INJECTION, SOLUTION EPIDURAL; INFILTRATION; INTRACAUDAL; PERINEURAL at 09:13

## 2021-06-18 RX ADMIN — PHENYLEPHRINE HYDROCHLORIDE 200 MCG: 10 INJECTION INTRAVENOUS at 09:17

## 2021-06-18 RX ADMIN — PROPOFOL 100 MG: 10 INJECTION, EMULSION INTRAVENOUS at 09:13

## 2021-06-18 RX ADMIN — SODIUM CHLORIDE 125 ML/HR: 0.9 INJECTION, SOLUTION INTRAVENOUS at 08:52

## 2021-06-18 NOTE — DISCHARGE INSTRUCTIONS
Upper Endoscopy   WHAT YOU NEED TO KNOW:   An upper endoscopy is also called an upper gastrointestinal (GI) endoscopy, or an esophagogastroduodenoscopy (EGD)  You may feel bloated, gassy, or have some abdominal discomfort after your procedure  Your throat may be sore for 24 to 36 hours  You may burp or pass gas from air that is still inside your body  DISCHARGE INSTRUCTIONS:   Call 911 if:   · You have sudden chest pain or trouble breathing  Seek care immediately if:   · You feel dizzy or faint  · You have trouble swallowing  · You have severe throat pain  · Your bowel movements are very dark or black  · Your abdomen is hard and firm and you have severe pain  · You vomit blood  Contact your healthcare provider if:   · You feel full or bloated and cannot burp or pass gas  · You have not had a bowel movement for 3 days after your procedure  · You have neck pain  · You have a fever or chills  · You have nausea or are vomiting  · You have a rash or hives  · You have questions or concerns about your endoscopy  Relieve a sore throat:  Suck on throat lozenges or crushed ice  Gargle with a small amount of warm salt water  Mix 1 teaspoon of salt and 1 cup of warm water to make salt water  Relieve gas and discomfort from bloating:  Lie on your right side with a heating pad on your abdomen  Take short walks to help pass gas  Eat small meals until bloating is relieved  Rest after your procedure:  Do not drive or make important decisions until the day after your procedure  Return to your normal activity as directed  You can usually return to work the day after your procedure  Follow up with your healthcare provider as directed:  Write down your questions so you remember to ask them during your visits  © Copyright [address] Information is for End User's use only and may not be sold, redistributed or otherwise used for commercial purposes   All illustrations and images included in CareNotes® are the copyrighted property of A D A M [employer] Inc  or [employer]   The above information is an  only  It is not intended as medical advice for individual conditions or treatments  Talk to your doctor, nurse or pharmacist before following any medical regimen to see if it is safe and effective for you

## 2021-06-18 NOTE — ANESTHESIA PREPROCEDURE EVALUATION
Procedure:  EGD    Relevant Problems   ANESTHESIA (within normal limits)      CARDIO   (+) CAD (coronary artery disease)      ENDO   (+) Type 2 diabetes mellitus with stage 3 chronic kidney disease, with long-term current use of insulin (HCC)      GI/HEPATIC   (+) Cirrhosis (HCC)   (+) GI bleed      /RENAL   (+) Acute kidney injury superimposed on chronic kidney disease (HCC)   (+) CKD (chronic kidney disease) stage 3, GFR 30-59 ml/min   (+) Uremia      HEMATOLOGY   (+) Acute post-hemorrhagic anemia   (+) Symptomatic anemia   (+) Thrombocytopenia (HCC)      PULMONARY   (+) COPD (chronic obstructive pulmonary disease) (HCC)        Physical Exam    Airway    Mallampati score: III  TM Distance: >3 FB  Neck ROM: full     Dental       Cardiovascular  Rhythm: regular, Rate: normal,     Pulmonary  Breath sounds clear to auscultation,     Other Findings        Anesthesia Plan  ASA Score- 3     Anesthesia Type- IV sedation with anesthesia with ASA Monitors  Additional Monitors:   Airway Plan:           Plan Factors-Exercise tolerance (METS): <4 METS  Chart reviewed  Patient summary reviewed  Patient is not a current smoker  Induction- intravenous  Postoperative Plan-     Informed Consent- Anesthetic plan and risks discussed with patient

## 2021-07-01 ENCOUNTER — TELEPHONE (OUTPATIENT)
Dept: HEMATOLOGY ONCOLOGY | Facility: CLINIC | Age: 76
End: 2021-07-01

## 2021-07-01 NOTE — TELEPHONE ENCOUNTER
I phoned [facility], where the patient is a resident, and spoke with the floor nurse who indicated that the patient had all of his labs completed last week and will bring a hard copy of the results with him to the appointment

## 2021-07-02 ENCOUNTER — OFFICE VISIT (OUTPATIENT)
Dept: HEMATOLOGY ONCOLOGY | Facility: CLINIC | Age: 76
End: 2021-07-02
Payer: COMMERCIAL

## 2021-07-02 VITALS — DIASTOLIC BLOOD PRESSURE: 59 MMHG | SYSTOLIC BLOOD PRESSURE: 130 MMHG | HEART RATE: 71 BPM | TEMPERATURE: 97.2 F

## 2021-07-02 DIAGNOSIS — D89.0 POLYCLONAL GAMMOPATHY DETERMINED BY SERUM PROTEIN ELECTROPHORESIS: ICD-10-CM

## 2021-07-02 DIAGNOSIS — N18.32 STAGE 3B CHRONIC KIDNEY DISEASE (HCC): ICD-10-CM

## 2021-07-02 DIAGNOSIS — R18.8 CIRRHOSIS OF LIVER WITH ASCITES, UNSPECIFIED HEPATIC CIRRHOSIS TYPE (HCC): ICD-10-CM

## 2021-07-02 DIAGNOSIS — D61.818 PANCYTOPENIA (HCC): Primary | ICD-10-CM

## 2021-07-02 DIAGNOSIS — K74.60 CIRRHOSIS OF LIVER WITH ASCITES, UNSPECIFIED HEPATIC CIRRHOSIS TYPE (HCC): ICD-10-CM

## 2021-07-02 PROCEDURE — 99214 OFFICE O/P EST MOD 30 MIN: CPT | Performed by: NURSE PRACTITIONER

## 2021-07-02 RX ORDER — METHOCARBAMOL 500 MG/1
TABLET, FILM COATED ORAL
COMMUNITY
End: 2021-08-27

## 2021-07-02 RX ORDER — OXYCODONE HYDROCHLORIDE AND ACETAMINOPHEN 5; 325 MG/1; MG/1
TABLET ORAL
COMMUNITY
End: 2021-08-27

## 2021-07-02 RX ORDER — TRAMADOL HYDROCHLORIDE 50 MG/1
TABLET ORAL
COMMUNITY
End: 2021-08-27 | Stop reason: ALTCHOICE

## 2021-07-02 RX ORDER — POTASSIUM CHLORIDE 750 MG/1
TABLET, EXTENDED RELEASE ORAL
COMMUNITY
Start: 2021-06-15

## 2021-07-02 RX ORDER — CALCIUM CIT/MAG/D3/ZN/COP/MANG 250-40-125
TABLET ORAL
COMMUNITY
Start: 2020-05-21 | End: 2021-08-27 | Stop reason: ALTCHOICE

## 2021-07-02 NOTE — PROGRESS NOTES
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  3  Cirrhosis of liver with ascites, unspecified hepatic cirrhosis type ([facility] )  4  Stage 3b chronic kidney disease ([facility] )   Patient is a 71-year-old male with a history of pancytopenia with previous negative bone marrow biopsy and negative flow cytometry  Patient lives in a skilled care facility  Recent CBC with differential continues to reveal pancytopenia with a hemoglobin of 10 0, white blood cell 3 5 and platelet count 71 with an otherwise normal differential  These are essentially within his established range  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 however this may be seen in chronic inflammatory conditions  Iron saturation is 21% with a ferritin level of 44, normal folic acid level at 7 1, creatinine 2 7 for and albumin 3 4  He does have an elevated AST at 51 other liver enzymes are within normal limits  Calcium and total protein levels are also within normal limits  Previous CT scan on 03/07/2021 did not reveal any splenomegaly  I suspect his pancytopenia may be secondary to his chronic conditions including cirrhosis of the liver, CKD versus other possible autoimmune processes  We will continue to monitor his blood work every 3 months but plan to see him in 6 months  Patient verbalized understanding and is in agreement with the plan  - CBC and differential; Standing  - Comprehensive metabolic panel; Standing  - IgG, IgA, IgM; Future  - Immunoglobulin free LT chains blood; Future  - Protein electrophoresis, serum; Future  - CBC and differential  - Comprehensive metabolic panel    Barrier(s) to care: None  The patient is able to self care      Interval history: Clinically stable    Review of Systems   Constitutional: Positive for fatigue  Negative for activity change, appetite change, fever and unexpected weight change  Respiratory: Negative for cough and shortness of breath  Cardiovascular: Negative for chest pain and leg swelling  Gastrointestinal: Negative for abdominal pain, constipation, diarrhea and nausea  Endocrine: Negative for cold intolerance and heat intolerance  Musculoskeletal: Negative for arthralgias and myalgias  Skin: Negative  Neurological: Negative for dizziness, weakness and headaches  Hematological: Negative for adenopathy  Does not bruise/bleed easily         Patient Active Problem List   Diagnosis    [facility]  )    Symptomatic anemia    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[facility] )    CAD (coronary artery disease)    COPD (chronic obstructive pulmonary disease) (HCC)    Thrombocytopenia (HCC)    Type 2 diabetes mellitus with stage 3 chronic kidney disease, with long-term current use of insulin (HCC)    Elevated troponin    Uremia    CKD (chronic kidney disease) stage 3, GFR 30-59 ml/min    Hypokalemia    Compression of optic chiasm    GI bleed    Cirrhosis (HCC)    Acute kidney injury superimposed on chronic kidney disease (HCC)    Acute post-hemorrhagic anemia    Hypomagnesemia    Pancytopenia (HCC)    Esophageal varices (HCC)    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       Past Medical History:   Diagnosis Date    Cirrhosis ([place] [address] )     COPD (chronic obstructive pulmonary disease) ([place] [address] )     Coronary artery disease     Dementia ([place] [address] )     Diabetes mellitus ([place] [address] )     Diverticulosis     Gastric reflux     Hyperlipemia     Panlobular emphysema (HCC)     Pituitary mass (HCC)     Renal disorder     CKD Stage 3 & Bilat multi renal nodules    Thrombocyte disorder (HCC)     thrombocytopenia    Unstable angina ([facility] )        Past Surgical History:   Procedure Laterality Date    CARDIAC SURGERY unspecified    CHOLECYSTECTOMY      CORONARY ARTERY BYPASS GRAFT      2-3 years ago   Jessie Christine HERNIA REPAIR      IR BIOPSY BONE MARROW  5/12/2020       Family History   Problem Relation Age of Onset    Colon cancer Other        Social History     Socioeconomic History    Marital status: /Civil Union     Spouse name: None    Number of children: None    Years of education: None    Highest education level: None   Occupational History    None   Tobacco Use    Smoking status: Former Smoker     Packs/day: 0 25    Smokeless tobacco: Never Used    Tobacco comment: Hasn't smoked since February   Vaping Use    Vaping Use: Never used   Substance and Sexual Activity    Alcohol use: Never    Drug use: Never    Sexual activity: Not Currently     Partners: Female   Other Topics Concern    None   Social History Narrative    None     Social Determinants of Health     Financial Resource Strain:     Difficulty of Paying Living Expenses:    Food Insecurity:     Worried About Running Out of Food in the Last Year:     Ran Out of Food in the Last Year:    Transportation Needs:     Lack of Transportation (Medical):      Lack of Transportation (Non-Medical):    Physical Activity:     Days of Exercise per Week:     Minutes of Exercise per Session:    Stress:     Feeling of Stress :    Social Connections:     Frequency of Communication with Friends and Family:     Frequency of Social Gatherings with Friends and Family:     Attends [employer] Services:     Active Member of Clubs or Organizations:     Attends Club or Organization Meetings:     Marital Status:    Intimate Partner Violence:     Fear of Current or Ex-Partner:     Emotionally Abused:     Physically Abused:     Sexually Abused:          Current Outpatient Medications:     acetaminophen (TYLENOL) 325 mg tablet, Take 650 mg by mouth every 6 (six) hours as needed for mild pain, Disp: , Rfl:     bisacodyl (DULCOLAX) 10 mg suppository, Insert 10 mg into the rectum daily as needed for constipation, Disp: , Rfl:     calcium carbonate (TUMS) 500 mg chewable tablet, Chew 2 tablets 2 (two) times a day, Disp: , Rfl:     ergocalciferol (VITAMIN D2) 50,000 units, Take 50,000 Units by mouth every 28 days, Disp: , Rfl:     Eyelid Cleansers (OcuSoft Lid Scrub Plus) PADS, Apply 1 each topically 2 (two) times a day, Disp: , Rfl:     Glucagon, rDNA, (Glucagon Emergency) 1 MG KIT, Inject 1 mg as directed as needed, Disp: , Rfl:     glucose blood test strip, , Disp: , Rfl:     magnesium cl-calcium carbonate (MAG-DELAY)  MG tablet, Take 2 tablets by mouth daily, Disp: , Rfl:     pantoprazole (PROTONIX) 40 mg tablet, Take 1 tablet (40 mg total) by mouth daily, Disp: 30 tablet, Rfl: 10    potassium chloride (K-DUR,KLOR-CON) 10 mEq tablet, , Disp: , Rfl:     rosuvastatin (CRESTOR) 20 MG tablet, Take 20 mg by mouth daily, Disp: , Rfl:     sertraline (ZOLOFT) 25 mg tablet, Take 25 mg by mouth daily, Disp: , Rfl:     insulin glargine (LANTUS) 100 units/mL subcutaneous injection, Inject 5 Units under the skin daily at bedtime (Patient not taking: Reported on 7/2/2021), Disp: , Rfl: 0    magnesium hydroxide (MILK OF MAGNESIA) 400 mg/5 mL oral suspension, Take by mouth as needed for constipation (Patient not taking: Reported on 7/2/2021), Disp: , Rfl:     metFORMIN (GLUCOPHAGE) 1000 MG tablet, metformin 1,000 mg tablet (Patient not taking: Reported on 7/2/2021), Disp: , Rfl:     methocarbamol (ROBAXIN) 500 mg tablet, methocarbamol 500 mg tablet (Patient not taking: Reported on 7/2/2021), Disp: , Rfl:     mineral oil enema, Insert 1 enema into the rectum as needed for constipation (Patient not taking: Reported on 7/2/2021), Disp: , Rfl:     oxyCODONE-acetaminophen (PERCOCET) 5-325 mg per tablet, oxycodone-acetaminophen 5 mg-325 mg tablet (Patient not taking: Reported on 7/2/2021), Disp: , Rfl:     potassium chloride (MICRO-K) 10 MEQ CR capsule, Take 10 mEq by mouth daily (Patient not taking: Reported on 7/2/2021), Disp: , Rfl:     sitaGLIPtin (Januvia) 25 mg tablet, Januvia 25 mg tablet (Patient not taking: Reported on 7/2/2021), Disp: , Rfl:     sitaGLIPtin (Januvia) 50 mg tablet, Januvia 50 mg tablet (Patient not taking: Reported on 7/2/2021), Disp: , Rfl:     Sodium Phosphates (Enema Pediatric) 3 5-9 5 GM/59ML ENEM, Insert into the rectum (Patient not taking: Reported on 7/2/2021), Disp: , Rfl:     traMADol (ULTRAM) 50 mg tablet, tramadol 50 mg tablet (Patient not taking: Reported on 7/2/2021), Disp: , Rfl:     Allergies   Allergen Reactions    Erythromycin     Penicillins     Shellfish-Derived Products - Food Allergy        Objective:  /59   Pulse 71   Temp (!) 97 2 °F (36 2 °C) (Tympanic)    Physical Exam  Vitals reviewed  Constitutional:       Appearance: Normal appearance  He is well-developed  Comments: Sitting in a wheelchair   HENT:      Head: Normocephalic and atraumatic  Eyes:      Extraocular Movements: Extraocular movements intact  Pupils: Pupils are equal, round, and reactive to light  Cardiovascular:      Rate and Rhythm: Normal rate and regular rhythm  Pulses: Normal pulses  Heart sounds: Normal heart sounds  Pulmonary:      Effort: Pulmonary effort is normal  No respiratory distress  Breath sounds: Normal breath sounds  Abdominal:      General: Bowel sounds are normal       Palpations: Abdomen is soft  Musculoskeletal:         General: Normal range of motion  Cervical back: Normal range of motion  Lymphadenopathy:      Cervical: No cervical adenopathy  Skin:     General: Skin is warm and dry  Capillary Refill: Capillary refill takes less than 2 seconds  Neurological:      Mental Status: He is alert and oriented to person, place, and time  Psychiatric:         Behavior: Behavior normal        Please note: This report has been generated by a voice recognition software system   Therefore there may be syntax, spelling, and/or grammatical errors  Please call if you have any questions

## 2021-07-14 ENCOUNTER — OFFICE VISIT (OUTPATIENT)
Dept: NEPHROLOGY | Facility: CLINIC | Age: 76
End: 2021-07-14
Payer: COMMERCIAL

## 2021-07-14 VITALS
HEART RATE: 62 BPM | SYSTOLIC BLOOD PRESSURE: 104 MMHG | DIASTOLIC BLOOD PRESSURE: 60 MMHG | BODY MASS INDEX: 22.31 KG/M2 | HEIGHT: 64 IN

## 2021-07-14 DIAGNOSIS — D89.0 POLYCLONAL GAMMOPATHY DETERMINED BY SERUM PROTEIN ELECTROPHORESIS: ICD-10-CM

## 2021-07-14 DIAGNOSIS — E55.9 VITAMIN D DEFICIENCY: ICD-10-CM

## 2021-07-14 DIAGNOSIS — Z79.4 TYPE 2 DIABETES MELLITUS WITH STAGE 3B CHRONIC KIDNEY DISEASE, WITH LONG-TERM CURRENT USE OF INSULIN (HCC): ICD-10-CM

## 2021-07-14 DIAGNOSIS — D69.6 THROMBOCYTOPENIA (HCC): ICD-10-CM

## 2021-07-14 DIAGNOSIS — N17.9 ACUTE KIDNEY INJURY SUPERIMPOSED ON CHRONIC KIDNEY DISEASE (HCC): Primary | ICD-10-CM

## 2021-07-14 DIAGNOSIS — N18.32 TYPE 2 DIABETES MELLITUS WITH STAGE 3B CHRONIC KIDNEY DISEASE, WITH LONG-TERM CURRENT USE OF INSULIN (HCC): ICD-10-CM

## 2021-07-14 DIAGNOSIS — N25.81 SECONDARY HYPERPARATHYROIDISM OF RENAL ORIGIN (HCC): ICD-10-CM

## 2021-07-14 DIAGNOSIS — I85.01 BLEEDING ESOPHAGEAL VARICES, UNSPECIFIED ESOPHAGEAL VARICES TYPE (HCC): ICD-10-CM

## 2021-07-14 DIAGNOSIS — E11.22 TYPE 2 DIABETES MELLITUS WITH STAGE 3B CHRONIC KIDNEY DISEASE, WITH LONG-TERM CURRENT USE OF INSULIN (HCC): ICD-10-CM

## 2021-07-14 DIAGNOSIS — N18.9 ACUTE KIDNEY INJURY SUPERIMPOSED ON CHRONIC KIDNEY DISEASE (HCC): Primary | ICD-10-CM

## 2021-07-14 DIAGNOSIS — D61.818 PANCYTOPENIA (HCC): ICD-10-CM

## 2021-07-14 PROCEDURE — 99214 OFFICE O/P EST MOD 30 MIN: CPT | Performed by: INTERNAL MEDICINE

## 2021-07-14 NOTE — PROGRESS NOTES
[facility] [place], [place]    Name: Tutu Burns  YOB: 1945      Assessment/Plan:           Problem List Items Addressed This Visit        Digestive    Esophageal varices ([address] )     Appears stable and continues to take pantoprazole  Hemoglobin is adequate            Endocrine    Type 2 diabetes mellitus with stage 3 chronic kidney disease, with long-term current use of insulin ([facility] [address] )       Lab Results   Component Value Date    HGBA1C 5 1 04/08/2021   All anti diabetic agents were stopped  His last A1c was 5 1  His sugar was 85  He had CKD 3 with a baseline creatinine 1 9-2 1 mg/dL            Hematopoietic and Hemostatic    Pancytopenia (HCC)     Due to cirrhosis  He is being followed by Hematology  An SPEP was unremarkable            Genitourinary    Acute kidney injury superimposed on chronic kidney disease [facility]) - Primary     Lab Results   Component Value Date    EGFR 31 03/14/2021    EGFR 32 03/13/2021    EGFR 30 03/12/2021    CREATININE 2 06 (H) 03/14/2021    CREATININE 1 98 (H) 03/13/2021    CREATININE 2 11 (H) 03/12/2021     Kidney function has declined on recent testing  Labs dated [place] 15, 2021 demonstrated creatinine of 2 74 which is a rise from 2 06 in March of 2021  Is nonoliguric  Medication review does not reveal any potential nephrotoxins  His blood pressure is low normal at 104/60  He is not taking diuretic therapy or antihypertensive medication  Would encourage increased fluid intake and check a bladder scan to rule out urinary retention  He is hemoglobin is  10 4 which is adequate for renal perfusion  Will check urinary studies  Of note is that his serum sodium is high at 145 indicating more volume depletion  Would try to liberalize free water intake    Would expect that with cirrhosis his urine sodium would be low  Will check a kidney ultrasound  Obtain short interval follow-ups, avoid nephrotoxins and diuretic therapy at this time   He looks dry and does not appear to have significant ascites or other signs of volume overload            Other    Thrombocytopenia (HCC)    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    Vitamin D deficiency     Hold weekly vitamin-D and check a level  Check a PTH level and phosphorus level                 Subjective:      Patient ID: Raffi Chand is a 77 y o  male  HPI has a history of type 2 diabetes mellitus with stage 3 chronic kidney disease  He is currently not taking insulin   Review of his last labs  Dated 6/15/21 demonstrated a creatinine of 2 74 mg/dl -->eGFR of 22 ml/min  He has a history of anemia, TCP, variceal bleed which were banded  T that time he had acute kidney injury due to hypovolemic shock He had partial recovery with a creatinine of 2 4-> 1 9, however, he has worsening in function  He has cryptogenic cirrhosis currently not on diuretic therapy  The following portions of the patient's history were reviewed and updated as appropriate: allergies, current medications, past family history, past medical history, past social history, past surgical history and problem list     Review of Systems   Constitutional: Positive for fatigue  Negative for appetite change  Respiratory: Negative for shortness of breath  Cardiovascular: Negative for chest pain, palpitations and leg swelling  Gastrointestinal: Negative for abdominal distention, diarrhea, nausea and vomiting  Genitourinary: Negative for difficulty urinating, hematuria and urgency  Musculoskeletal: Positive for arthralgias and gait problem  Seated in [facility]  He does not walk at the SNF   Neurological: Negative for dizziness and light-headedness           Social History     Socioeconomic History    Marital status: /Civil Union     Spouse name: None    Number of children: None    Years of education: None    Highest education level: None   Occupational History    None   Tobacco Use  Smoking status: Former Smoker     Packs/day: 0 25    Smokeless tobacco: Never Used    Tobacco comment: Hasn't smoked since February   Vaping Use    Vaping Use: Never used   Substance and Sexual Activity    Alcohol use: Never    Drug use: Never    Sexual activity: Not Currently     Partners: Female   Other Topics Concern    None   Social History Narrative    None     Social Determinants of Health     Financial Resource Strain:     Difficulty of Paying Living Expenses:    Food Insecurity:     Worried About Running Out of Food in the Last Year:     Ran Out of Food in the Last Year:    Transportation Needs:     Lack of Transportation (Medical):      Lack of Transportation (Non-Medical):    Physical Activity:     Days of Exercise per Week:     Minutes of Exercise per Session:    Stress:     Feeling of Stress :    Social Connections:     Frequency of Communication with Friends and Family:     Frequency of Social Gatherings with Friends and Family:     Attends Sabianist Services:     Active Member of Clubs or Organizations:     Attends Club or Organization Meetings:     Marital Status:    Intimate Partner Violence:     Fear of Current or Ex-Partner:     Emotionally Abused:     Physically Abused:     Sexually Abused:      Past Medical History:   Diagnosis Date    Cirrhosis ([place] [address] )     COPD (chronic obstructive pulmonary disease) ([place] [address] )     Coronary artery disease     Dementia ([place] [address] )     Diabetes mellitus ([facility] )     Diverticulosis     Gastric reflux     Hyperlipemia     Panlobular emphysema ([facility] )     Pituitary mass ([facility] )     Renal disorder     CKD Stage 3 & Bilat multi renal nodules    Thrombocyte disorder (HCC)     thrombocytopenia    Unstable angina ([facility] )      Past Surgical History:   Procedure Laterality Date    CARDIAC SURGERY      unspecified    CHOLECYSTECTOMY      CORONARY ARTERY BYPASS GRAFT      2-3 years ago    HERNIA REPAIR      IR BIOPSY BONE MARROW  5/12/2020 Current Outpatient Medications:     acetaminophen (TYLENOL) 325 mg tablet, Take 650 mg by mouth every 6 (six) hours as needed for mild pain, Disp: , Rfl:     bisacodyl (DULCOLAX) 10 mg suppository, Insert 10 mg into the rectum daily as needed for constipation, Disp: , Rfl:     calcium carbonate (TUMS) 500 mg chewable tablet, Chew 2 tablets 2 (two) times a day, Disp: , Rfl:     ergocalciferol (VITAMIN D2) 50,000 units, Take 50,000 Units by mouth every 28 days, Disp: , Rfl:     Eyelid Cleansers (OcuSoft Lid Scrub Plus) PADS, Apply 1 each topically 2 (two) times a day, Disp: , Rfl:     Glucagon, rDNA, (Glucagon Emergency) 1 MG KIT, Inject 1 mg as directed as needed, Disp: , Rfl:     glucose blood test strip, , Disp: , Rfl:     magnesium cl-calcium carbonate (MAG-DELAY)  MG tablet, Take 2 tablets by mouth daily, Disp: , Rfl:     magnesium hydroxide (MILK OF MAGNESIA) 400 mg/5 mL oral suspension, Take by mouth as needed for constipation , Disp: , Rfl:     pantoprazole (PROTONIX) 40 mg tablet, Take 1 tablet (40 mg total) by mouth daily, Disp: 30 tablet, Rfl: 10    potassium chloride (K-DUR,KLOR-CON) 10 mEq tablet, , Disp: , Rfl:     potassium chloride (MICRO-K) 10 MEQ CR capsule, Take 10 mEq by mouth daily , Disp: , Rfl:     rosuvastatin (CRESTOR) 20 MG tablet, Take 20 mg by mouth daily, Disp: , Rfl:     sertraline (ZOLOFT) 25 mg tablet, Take 25 mg by mouth daily, Disp: , Rfl:     insulin glargine (LANTUS) 100 units/mL subcutaneous injection, Inject 5 Units under the skin daily at bedtime (Patient not taking: Reported on 7/2/2021), Disp: , Rfl: 0    metFORMIN (GLUCOPHAGE) 1000 MG tablet, metformin 1,000 mg tablet (Patient not taking: Reported on 7/2/2021), Disp: , Rfl:     methocarbamol (ROBAXIN) 500 mg tablet, methocarbamol 500 mg tablet (Patient not taking: Reported on 7/2/2021), Disp: , Rfl:     mineral oil enema, Insert 1 enema into the rectum as needed for constipation (Patient not taking: Reported on 7/2/2021), Disp: , Rfl:     oxyCODONE-acetaminophen (PERCOCET) 5-325 mg per tablet, oxycodone-acetaminophen 5 mg-325 mg tablet (Patient not taking: Reported on 7/2/2021), Disp: , Rfl:     sitaGLIPtin (Januvia) 25 mg tablet, Januvia 25 mg tablet (Patient not taking: Reported on 7/2/2021), Disp: , Rfl:     sitaGLIPtin (Januvia) 50 mg tablet, Januvia 50 mg tablet (Patient not taking: Reported on 7/2/2021), Disp: , Rfl:     Sodium Phosphates (Enema Pediatric) 3 5-9 5 GM/59ML ENEM, Insert into the rectum (Patient not taking: Reported on 7/2/2021), Disp: , Rfl:     traMADol (ULTRAM) 50 mg tablet, tramadol 50 mg tablet (Patient not taking: Reported on 7/2/2021), Disp: , Rfl:     Lab Results   Component Value Date    SODIUM 137 03/14/2021    K 4 1 03/14/2021     03/14/2021    CO2 22 03/14/2021    AGAP 10 03/14/2021    BUN 21 03/14/2021    CREATININE 2 06 (H) 03/14/2021    GLUC 78 03/14/2021    CALCIUM 8 8 03/14/2021    AST 55 (H) 03/07/2021    ALT 20 03/07/2021    ALKPHOS 35 (L) 03/07/2021    TP 5 7 (L) 03/07/2021    TBILI 0 90 03/07/2021    EGFR 31 03/14/2021     Lab Results   Component Value Date    WBC 2 70 (L) 03/14/2021    HGB 9 4 (L) 03/14/2021    HCT 28 7 (L) 03/14/2021    MCV 93 03/14/2021    PLT 67 (L) 03/14/2021     No results found for: CHOLESTEROL  No results found for: HDL  No results found for: LDLCALC  No results found for: TRIG  No results found for: [place], [place]  Lab Results   Component Value Date    KQN4FRGUQHCO 1 452 03/23/2020     Lab Results   Component Value Date    CALCIUM 8 8 03/14/2021    PHOS 2 0 (L) 03/11/2021     No results found for: SPEP, UPEP  No results found for: [facility] [place]        Objective:      /60 (BP Location: Left arm, Patient Position: Sitting, Cuff Size: Standard)   Pulse 62   Ht 5' 4" (1 626 m)   BMI 22 31 kg/m²          Physical Exam  Constitutional:       General: He is not in acute distress  Appearance: He is normal weight   He is not toxic-appearing  HENT:      Head: Normocephalic and atraumatic  Right Ear: External ear normal       Left Ear: External ear normal    Eyes:      Conjunctiva/sclera: Conjunctivae normal    Cardiovascular:      Rate and Rhythm: Normal rate  Heart sounds: Murmur heard  Pulmonary:      Effort: Pulmonary effort is normal       Breath sounds: Normal breath sounds  No wheezing or rales  Abdominal:      General: Bowel sounds are normal       Palpations: Abdomen is soft  Tenderness: There is no abdominal tenderness  Musculoskeletal:      Right lower leg: No edema  Left lower leg: No edema  Skin:     General: Skin is warm and dry  Neurological:      Mental Status: He is alert        Gait: Gait abnormal    Psychiatric:         Mood and Affect: Mood normal          Behavior: Behavior normal

## 2021-07-14 NOTE — ASSESSMENT & PLAN NOTE
Lab Results   Component Value Date    HGBA1C 5 1 04/08/2021   All anti diabetic agents were stopped  His last A1c was 5 1    His sugar was 85  He had CKD 3 with a baseline creatinine 1 9-2 1 mg/dL

## 2021-07-14 NOTE — ASSESSMENT & PLAN NOTE
Lab Results   Component Value Date    EGFR 31 03/14/2021    EGFR 32 03/13/2021    EGFR 30 03/12/2021    CREATININE 2 06 (H) 03/14/2021    CREATININE 1 98 (H) 03/13/2021    CREATININE 2 11 (H) 03/12/2021     Kidney function has declined on recent testing  Labs dated [place] 15, 2021 demonstrated creatinine of 2 74 which is a rise from 2 06 in March of 2021  Is nonoliguric  Medication review does not reveal any potential nephrotoxins  His blood pressure is low normal at 104/60  He is not taking diuretic therapy or antihypertensive medication  Would encourage increased fluid intake and check a bladder scan to rule out urinary retention  He is hemoglobin is  10 4 which is adequate for renal perfusion  Will check urinary studies  Of note is that his serum sodium is high at 145 indicating more volume depletion  Would try to liberalize free water intake  Would expect that with cirrhosis his urine sodium would be low  Will check a kidney ultrasound  Obtain short interval follow-ups, avoid nephrotoxins and diuretic therapy at this time    He looks dry and does not appear to have significant ascites or other signs of volume overload

## 2021-08-27 ENCOUNTER — OFFICE VISIT (OUTPATIENT)
Dept: NEPHROLOGY | Facility: CLINIC | Age: 76
End: 2021-08-27
Payer: COMMERCIAL

## 2021-08-27 VITALS — SYSTOLIC BLOOD PRESSURE: 118 MMHG | DIASTOLIC BLOOD PRESSURE: 74 MMHG | HEART RATE: 60 BPM | OXYGEN SATURATION: 98 %

## 2021-08-27 DIAGNOSIS — N18.9 ACUTE KIDNEY INJURY SUPERIMPOSED ON CHRONIC KIDNEY DISEASE (HCC): ICD-10-CM

## 2021-08-27 DIAGNOSIS — N18.32 STAGE 3B CHRONIC KIDNEY DISEASE (HCC): Primary | ICD-10-CM

## 2021-08-27 DIAGNOSIS — N17.9 ACUTE KIDNEY INJURY SUPERIMPOSED ON CHRONIC KIDNEY DISEASE (HCC): ICD-10-CM

## 2021-08-27 DIAGNOSIS — E55.9 VITAMIN D DEFICIENCY: ICD-10-CM

## 2021-08-27 DIAGNOSIS — D61.818 PANCYTOPENIA (HCC): ICD-10-CM

## 2021-08-27 PROCEDURE — 99214 OFFICE O/P EST MOD 30 MIN: CPT | Performed by: PHYSICIAN ASSISTANT

## 2021-08-27 NOTE — ASSESSMENT & PLAN NOTE
Lab Results   Component Value Date    EGFR 31 03/14/2021    EGFR 32 03/13/2021    EGFR 30 03/12/2021    CREATININE 2 06 (H) 03/14/2021    CREATININE 1 98 (H) 03/13/2021    CREATININE 2 11 (H) 03/12/2021   Renal function improved to a BUN of 34 mg/dL with a creatinine of 2 29 mg/dL estimated GFR of 27/31 mL/min on 07/15/2021 from peak sCr 2 7 mg/dL on 6/15/21  Likely pre-renal azotemia  The PVR was not performed  Unlikely obstruction given improvement  Will request and update labs

## 2021-08-27 NOTE — ASSESSMENT & PLAN NOTE
Lab Results   Component Value Date    EGFR 31 03/14/2021    EGFR 32 03/13/2021    EGFR 30 03/12/2021    CREATININE 2 06 (H) 03/14/2021    CREATININE 1 98 (H) 03/13/2021    CREATININE 2 11 (H) 03/12/2021   Approaching baseline with sCr 2 2 mg/dL on 7/15/21  Improved off fenofibrate  Stop milk of magnesia

## 2021-08-27 NOTE — PROGRESS NOTES
Assessment & Plan:    1  Stage 3b chronic kidney disease [facility])  Assessment & Plan:  Lab Results   Component Value Date    EGFR 31 03/14/2021    EGFR 32 03/13/2021    EGFR 30 03/12/2021    CREATININE 2 06 (H) 03/14/2021    CREATININE 1 98 (H) 03/13/2021    CREATININE 2 11 (H) 03/12/2021   Approaching baseline with sCr 2 2 mg/dL on 7/15/21  Improved off fenofibrate  Stop milk of magnesia  Orders:  -     US kidney and bladder; Future; Expected date: 01/24/2022  -     CBC and differential; Future; Expected date: 01/24/2022  -     Comprehensive metabolic panel; Future; Expected date: 01/24/2022  -     Magnesium; Future; Expected date: 01/24/2022  -     Microalbumin / creatinine urine ratio; Future; Expected date: 01/24/2022  -     Phosphorus; Future; Expected date: 01/24/2022  -     Protein / creatinine ratio, urine; Future; Expected date: 01/24/2022  -     PTH, intact; Future; Expected date: 01/24/2022  -     Urinalysis with microscopic; Future; Expected date: 01/24/2022  -     Basic metabolic panel  -     Sodium With Creatinine, Random Urine    2  Acute kidney injury superimposed on chronic kidney disease [facility])  Assessment & Plan:  Lab Results   Component Value Date    EGFR 31 03/14/2021    EGFR 32 03/13/2021    EGFR 30 03/12/2021    CREATININE 2 06 (H) 03/14/2021    CREATININE 1 98 (H) 03/13/2021    CREATININE 2 11 (H) 03/12/2021   Renal function improved to a BUN of 34 mg/dL with a creatinine of 2 29 mg/dL estimated GFR of 27/31 mL/min on 07/15/2021 from peak sCr 2 7 mg/dL on 6/15/21  Likely pre-renal azotemia  The PVR was not performed  Unlikely obstruction given improvement  Will request and update labs  Orders:  -     US kidney and bladder; Future; Expected date: 01/24/2022  -     CBC and differential; Future; Expected date: 01/24/2022  -     Comprehensive metabolic panel; Future; Expected date: 01/24/2022  -     Magnesium;  Future; Expected date: 01/24/2022  -     Microalbumin / creatinine urine ratio; Future; Expected date: 01/24/2022  -     Phosphorus; Future; Expected date: 01/24/2022  -     Protein / creatinine ratio, urine; Future; Expected date: 01/24/2022  -     PTH, intact; Future; Expected date: 01/24/2022  -     Urinalysis with microscopic; Future; Expected date: 01/24/2022  -     Basic metabolic panel  -     Sodium With Creatinine, Random Urine    3  Pancytopenia [facility])  Assessment & Plan:  Hemoglobin is worsened 8 4 grams/deciliter with hematocrit 24 7% within iron saturation 16% on 07/15/2021  Due to cirrhosis  May benefit from iron infusion  Defer to Hematology  4  Vitamin D deficiency  Assessment & Plan:  PTH is 30 8 pg/mL with a Twenty-five hydroxy vitamin-D is 39 ng/mL on 07/15/2021  He has been taking ergocalciferol 50,000 units weekly  Will continue  Orders:  -     PTH, intact; Future; Expected date: 01/24/2022       The benefits, risks and alternatives to the treatment plan were discussed at this visit  Patient was advised of common adverse effects of any medical therapies prescribed  All questions were answered and discussed with the patient and any accompanying family members or caretakers  Subjective:      Patient ID: Roosevelt Tatum is a 77 y o  male seen in the [place] office  He is followed by Dr Roge Ortega for management of acute kidney injury superimposed on chronic kidney disease  Medical history is also significant for cirrhosis with esophageal varices  HPI     Patient was last seen on 07/14/2021, which time renal function had worsened to creatinine of 2 7 mg/dL on 06/15/2021 from 2 06 mg/dL March of 2021  This was associated with hypernatremia with a sodium of 145 millimole per L  Patient was instructed to increase fluid intake, postvoid residual bladder scan was ordered  He was further instructed to hold vitamin-D and check follow-up labs  Today, patient presents to follow-up regarding above visit      Renal function improved to a BUN of 34 mg/dL with a creatinine of 2 29 mg/dL estimated GFR of 27/31 mL/min on 07/15/2021  Hemoglobin is worsened 8 4 grams/deciliter with hematocrit 24 7% within iron saturation 16% on 07/15/2021  Patient sees hematology  PTH is 30 8 pg/mL with a Twenty-five hydroxy vitamin-D is 39 ng/mL on 07/15/2021  Urine study labs were not performed  The following portions of the patient's history were reviewed and updated as appropriate: allergies, current medications, past family history, past medical history, past social history, past surgical history, and problem list     Review of Systems   Constitutional: Positive for fatigue  Negative for activity change, chills, diaphoresis and fever  Respiratory: Negative for apnea, cough, chest tightness, shortness of breath and wheezing  Cardiovascular: Negative for chest pain, palpitations and leg swelling  Gastrointestinal: Negative for abdominal distention, abdominal pain, blood in stool, constipation, diarrhea and nausea  Genitourinary: Negative for decreased urine volume, difficulty urinating, dysuria, enuresis, frequency, hematuria and urgency  Incontinent of urine   Musculoskeletal: Positive for arthralgias and back pain  Negative for joint swelling  Skin: Negative for pallor, rash and wound  Neurological: Negative for dizziness, seizures, light-headedness, numbness and headaches  Hematological: Does not bruise/bleed easily  Psychiatric/Behavioral: Negative for agitation, behavioral problems and confusion  The patient is not nervous/anxious  Objective:      /74   Pulse 60   SpO2 98%          Physical Exam  Vitals and nursing note reviewed  Constitutional:       General: He is awake  He is not in acute distress  Appearance: Normal appearance  He is well-developed and underweight  He is ill-appearing (chronically)  He is not toxic-appearing or diaphoretic  Comments: wheelchair   HENT:      Head: Normocephalic and atraumatic        Jaw: There is normal jaw occlusion  Nose: Nose normal       Mouth/Throat:      Mouth: Mucous membranes are moist       Pharynx: Oropharynx is clear  No oropharyngeal exudate or posterior oropharyngeal erythema  Eyes:      General: Lids are normal  Vision grossly intact  Gaze aligned appropriately  No scleral icterus  Right eye: No discharge  Left eye: No discharge  Extraocular Movements: Extraocular movements intact  Conjunctiva/sclera: Conjunctivae normal       Pupils: Pupils are equal, round, and reactive to light  Neck:      Thyroid: No thyroid mass or thyromegaly  Trachea: Trachea and phonation normal    Cardiovascular:      Rate and Rhythm: Normal rate and regular rhythm  Heart sounds: Normal heart sounds, S1 normal and S2 normal  No murmur heard  No friction rub  No gallop  Pulmonary:      Effort: Pulmonary effort is normal  No respiratory distress  Breath sounds: Normal breath sounds  No stridor  No wheezing, rhonchi or rales  Abdominal:      General: Abdomen is flat  Bowel sounds are normal  There is no distension  Palpations: Abdomen is soft  There is no mass  Tenderness: There is no abdominal tenderness  There is no guarding  Hernia: No hernia is present  Musculoskeletal:         General: Normal range of motion  Cervical back: Normal range of motion and neck supple  No rigidity or tenderness  Right lower leg: No edema  Left lower leg: No edema  Lymphadenopathy:      Cervical: No cervical adenopathy  Skin:     General: Skin is warm and dry  Coloration: Skin is not jaundiced  Findings: No bruising  Nails: There is no clubbing  Neurological:      General: No focal deficit present  Mental Status: He is oriented to person, place, and time  He is lethargic  Psychiatric:         Attention and Perception: He is inattentive (sleeps)  Speech: He is noncommunicative           Cognition and Memory: Cognition is impaired               Lab Results   Component Value Date    SODIUM 137 03/14/2021    K 4 1 03/14/2021     03/14/2021    CO2 22 03/14/2021    AGAP 10 03/14/2021    BUN 21 03/14/2021    CREATININE 2 06 (H) 03/14/2021    GLUC 78 03/14/2021    CALCIUM 8 8 03/14/2021    AST 55 (H) 03/07/2021    ALT 20 03/07/2021    ALKPHOS 35 (L) 03/07/2021    TP 5 7 (L) 03/07/2021    TBILI 0 90 03/07/2021    EGFR 31 03/14/2021      Lab Results   Component Value Date    CREATININE 2 06 (H) 03/14/2021    CREATININE 1 98 (H) 03/13/2021    CREATININE 2 11 (H) 03/12/2021    CREATININE 2 57 (H) 03/11/2021    CREATININE 2 76 (H) 03/10/2021    CREATININE 2 62 (H) 03/09/2021    CREATININE 2 21 (H) 03/08/2021    CREATININE 2 69 (H) 03/07/2021    CREATININE 1 71 (H) 03/30/2020    CREATININE 1 83 (H) 03/29/2020    CREATININE 1 89 (H) 03/28/2020    CREATININE 1 89 (H) 03/27/2020    CREATININE 1 96 (H) 03/26/2020    CREATININE 2 31 (H) 03/25/2020    CREATININE 2 54 (H) 03/24/2020      Lab Results   Component Value Date    COLORU Yellow 03/09/2021    CLARITYU Clear 03/09/2021    SPECGRAV 1 025 03/09/2021    PHUR 5 5 03/09/2021    LEUKOCYTESUR Negative 03/09/2021    NITRITE Negative 03/09/2021    PROTEIN UA Negative 03/09/2021    GLUCOSEU Negative 03/09/2021    KETONESU Negative 03/09/2021    UROBILINOGEN 0 2 03/09/2021    BILIRUBINUR Negative 03/09/2021    BLOODU Negative 03/09/2021      No results found for: LABPROT  No results found for: Alverna Blight  Lab Results   Component Value Date    WBC 2 70 (L) 03/14/2021    HGB 9 4 (L) 03/14/2021    HCT 28 7 (L) 03/14/2021    MCV 93 03/14/2021    PLT 67 (L) 03/14/2021      Lab Results   Component Value Date    HGB 9 4 (L) 03/14/2021    HGB 8 8 (L) 03/13/2021    HGB 8 2 (L) 03/12/2021    HGB 7 6 (L) 03/11/2021    HGB 8 7 (L) 03/10/2021      Lab Results   Component Value Date    IRON 51 (L) 03/08/2021    TIBC 124 (L) 03/08/2021    FERRITIN 56 03/08/2021      No results found for: PTHCALCIUM, [id number], PHOSPHORUS   No results found for: CHOLESTEROL, HDL, LDLCALC, TRIG   No results found for: URICACID   Lab Results   Component Value Date    HGBA1C 5 1 04/08/2021      Lab Results   Component Value Date    FREET4 0 79 03/23/2020      No results found for: BENITA, DSDNAAB, RFIGM   No results found for: PROT, UPEP, IMMUNOFIX, KAPPALAMBDA, KAPPALIGHT     Portions of the record may have been created with voice recognition software  Occasional wrong word or "sound a like" substitutions may have occurred due to the inherent limitations of voice recognition software  Read the chart carefully and recognize, using context, where substitutions have occurred  If you have any questions, please contact the dictating provider

## 2021-08-27 NOTE — ASSESSMENT & PLAN NOTE
PTH is 30 8 pg/mL with a Twenty-five hydroxy vitamin-D is 39 ng/mL on 07/15/2021  He has been taking ergocalciferol 50,000 units weekly  Will continue

## 2021-08-27 NOTE — ASSESSMENT & PLAN NOTE
Hemoglobin is worsened 8 4 grams/deciliter with hematocrit 24 7% within iron saturation 16% on 07/15/2021  Due to cirrhosis  May benefit from iron infusion  Defer to Hematology

## 2021-08-27 NOTE — PATIENT INSTRUCTIONS
Kidney function is improved but not yet at baseline  Please repeat labs  I do not see the kidney and bladder ultrasound were performed
